# Patient Record
Sex: MALE | Employment: UNEMPLOYED | URBAN - METROPOLITAN AREA
[De-identification: names, ages, dates, MRNs, and addresses within clinical notes are randomized per-mention and may not be internally consistent; named-entity substitution may affect disease eponyms.]

---

## 2020-12-27 ENCOUNTER — TELEPHONE (OUTPATIENT)
Facility: CLINIC | Age: 60
End: 2020-12-27

## 2020-12-27 DIAGNOSIS — R60.9 EDEMA, UNSPECIFIED TYPE: Primary | ICD-10-CM

## 2020-12-27 NOTE — TELEPHONE ENCOUNTER
[facility] now requests orders and shares plan of care/discharge summaries for some patients through [employer].  Please REPLY TO THIS MESSAGE OR ROUTE BACK TO THE AUTHOR in order to give authorization for orders when needed.  This is considered a verbal order, you will still receive a faxed copy of orders for signature.  Thank you for your assistance in improving collaboration for our patients.    ORDER    Requesting 4 total visits with the patient for ongoing OT lymphedema therapy. To assist with compression garments for BLE and education for long term management.     Binu Uribe OTRL, CLT  [phone]  [email or web address]   
no

## 2020-12-30 ENCOUNTER — TELEPHONE (OUTPATIENT)
Facility: CLINIC | Age: 60
End: 2020-12-30

## 2020-12-30 NOTE — TELEPHONE ENCOUNTER
"[facility] now requests orders and shares plan of care/discharge summaries for some patients through [employer].  Please REPLY TO THIS MESSAGE OR ROUTE BACK TO THE AUTHOR in order to give authorization for orders when needed.  This is considered a verbal order, you will still receive a faxed copy of orders for signature.  Thank you for your assistance in improving collaboration for our patients.    ORDER    The patient is requesting Diabetes Education, if agree please place  a Diabetic Education Program Referral through \"Endocrinology\". The patient could do a virtual visit or an in person visit for diabetes/nutrition help for his diabetes.    Thank you    Binu Uribe OTRL, CLT  [phone]  [email or web address]      "

## 2020-12-31 ENCOUNTER — TRANSCRIBE ORDERS (OUTPATIENT)
Dept: OTHER | Age: 60
End: 2020-12-31

## 2020-12-31 DIAGNOSIS — E11.9 TYPE 2 DIABETES MELLITUS WITHOUT COMPLICATION, WITHOUT LONG-TERM CURRENT USE OF INSULIN (H): Primary | ICD-10-CM

## 2020-12-31 DIAGNOSIS — I73.9 PAD (PERIPHERAL ARTERY DISEASE) (H): Primary | ICD-10-CM

## 2021-01-06 ENCOUNTER — TELEPHONE (OUTPATIENT)
Dept: VASCULAR SURGERY | Facility: CLINIC | Age: 61
End: 2021-01-06

## 2021-01-06 DIAGNOSIS — I73.9 PVD (PERIPHERAL VASCULAR DISEASE) (H): Primary | ICD-10-CM
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
## 2021-01-11 ENCOUNTER — TELEPHONE (OUTPATIENT)
Dept: INTERVENTIONAL RADIOLOGY/VASCULAR | Facility: CLINIC | Age: 61
End: 2021-01-11

## 2021-01-11 NOTE — TELEPHONE ENCOUNTER
The pt is scheduled for imaging on 1/13/21 at the [facility] and a telephone visit with Supriya on 1/18/21. The pt does not have video capabilities and states to please call a few times when calling because he takes medications that make him sleep pretty hard.  1/11/21 RC

## 2021-01-11 NOTE — TELEPHONE ENCOUNTER
----- Message from Jo Gross RN sent at 2021  3:48 PM CST -----  Regardin/11 appt needs to be moved  HI Roll,   Did you get my message on this one?? I see that he is still on for Monday new consult with Supriya Black out at least to the following Monday.  He needs to complete the imaging scheduled later next week prior to his consult.    Thanks,  A. Tatiana Gross, RN, BSN  Interventional Radiology Nurse Coordinator   Phone:  [phone]

## 2021-01-14 ENCOUNTER — TELEPHONE (OUTPATIENT)
Facility: CLINIC | Age: 61
End: 2021-01-14

## 2021-01-14 NOTE — TELEPHONE ENCOUNTER
[facility] now requests orders and shares plan of care/discharge summaries for some patients through [facility].  Please REPLY TO THIS MESSAGE OR ROUTE BACK TO THE AUTHOR in order to give authorization for orders when needed.  This is considered a verbal order, you will still receive a faxed copy of orders for signature.  Thank you for your assistance in improving collaboration for our patients.    ORDER    Requesting to restart SN and WOCN through home care to evaluate and treat as indicated for BLE wounds and weeping. Pt has uncontrolled drainage and unable to complete dressings IND. Current SN for mental health will be placed on hold to allow home care to reopen SN and wound care.     Binu Uribe OTRL, CLT  [phone]  [email or web address]

## 2021-01-15 NOTE — TELEPHONE ENCOUNTER
DIAGNOSIS: new pt PVD consult   Dr Gaitan referring    DATE RECEIVED: 1.18.21   NOTES STATUS DETAILS   OFFICE NOTE from referring provider CE 12.31.20, 11.22.19  Dr. Jacky SORIANO   OFFICE NOTE from other specialist CE 6.17-7.11.19  Dr. Gagan Ly  [facility]   OPERATIVE REPORT CE 6.18.19  Aortic Dissection  Dr. Be[facility]   MEDICATION LIST CE    PERTINENT LABS CE    CTA (CT ANGIOGRAPHY) na    CT In process 6.17.19  CT Abd/Pelvis   MRI na    ULTRASOUND In process *sched* for 1.13.21  US ROMY Doppler no exercise  US Venous Comp Left

## 2021-01-18 ENCOUNTER — TELEPHONE (OUTPATIENT)
Dept: VASCULAR SURGERY | Facility: CLINIC | Age: 61
End: 2021-01-18

## 2021-01-18 ENCOUNTER — PRE VISIT (OUTPATIENT)
Dept: VASCULAR SURGERY | Facility: CLINIC | Age: 61
End: 2021-01-18

## 2021-01-18 NOTE — TELEPHONE ENCOUNTER
Left voicemail for patient to call [facility]  to reschedule missed appointments    Carire Mercedes on 1/18/2021 at 1:05 PM

## 2021-01-19 ENCOUNTER — TELEPHONE (OUTPATIENT)
Facility: CLINIC | Age: 61
End: 2021-01-19

## 2021-01-19 NOTE — TELEPHONE ENCOUNTER
Dr. Gaitan / Care team,    Writer requesting a response of approval for the following orders.    SN visits 2x week for week 1, then 3x week for 1 week, then 2 x week for 2 weeks with 3 PRN visits. SN for wound assessments, medication education, general health assessments.    BLE wound cares.    1. Cleanse with soap/water or NS. OK for patient to shower with no dressings prior to nurse arrival.  2. Apply Aquaphor or similar to intact skin.  3. Apply silvasorb gel to wound base.  4. Wrap affected area with unna boot wrap 50% overlap.  5. Apply ABD and kerlix/tape.  6. Apply compression per OT recs.  7. Apply 2-3x week and PRN for drainage control needs.    Thank you,    Michael Stromberg BSN, RN, CWOCN  [phone]  [email or web address]

## 2021-07-15 ENCOUNTER — MEDICAL CORRESPONDENCE (OUTPATIENT)
Dept: HEALTH INFORMATION MANAGEMENT | Facility: CLINIC | Age: 61
End: 2021-07-15

## 2021-07-15 ENCOUNTER — TRANSFERRED RECORDS (OUTPATIENT)
Dept: HEALTH INFORMATION MANAGEMENT | Facility: CLINIC | Age: 61
End: 2021-07-15

## 2021-07-16 ENCOUNTER — TRANSCRIBE ORDERS (OUTPATIENT)
Dept: OTHER | Age: 61
End: 2021-07-16

## 2021-07-16 DIAGNOSIS — E11.9 TYPE 2 DIABETES MELLITUS WITHOUT COMPLICATION, WITHOUT LONG-TERM CURRENT USE OF INSULIN (H): Primary | ICD-10-CM

## 2021-07-20 NOTE — TELEPHONE ENCOUNTER
RECORDS RECEIVED FROM: Type 2 diabetes mellitus without complication- needs toenail trim/Denise Rios MD @ [facility]   DATE RECEIVED: Aug 24, 2021     NOTES STATUS DETAILS   OFFICE NOTE from referring provider Internal    OFFICE NOTE from other specialist N/A    DISCHARGE SUMMARY from hospital N/A    DISCHARGE REPORT from the ER N/A    OPERATIVE REPORT N/A    MEDICATION LIST Internal    IMPLANT RECORD/STICKER N/A    LABS     CBC/DIFF N/A    CULTURES N/A    INJECTIONS DONE IN RADIOLOGY N/A    MRI N/A    CT SCAN N/A    XRAYS (IMAGES & REPORTS) N/A    TUMOR     PATHOLOGY  Slides & report N/A

## 2021-08-24 ENCOUNTER — PRE VISIT (OUTPATIENT)
Dept: ORTHOPEDICS | Facility: CLINIC | Age: 61
End: 2021-08-24

## 2021-09-22 NOTE — TELEPHONE ENCOUNTER
DIAGNOSIS: PVD    DATE RECEIVED: 9.21.21   NOTES STATUS DETAILS   OFFICE NOTE from referring provider Care Everywhere 12.31.20, 11.22.19  Dr. Jacky SORIANO   OFFICE NOTE from other specialist Care Everywhere 6.17-7.11.19  Dr. Gagan Ly  [facility]   OPERATIVE REPORT Care Everywhere 6.18.19  Aortic Dissection  Dr. Beyer [facility]   MEDICATION LIST N/A    PERTINENT LABS Care Everywhere    CTA (CT ANGIOGRAPHY) Care Everywhere    CT NA Nothing within two years   MRI N/A    ULTRASOUND N/A

## 2021-09-25 ENCOUNTER — HOSPITAL ENCOUNTER (OUTPATIENT)
Facility: CLINIC | Age: 61
Setting detail: OBSERVATION
Discharge: HOME | End: 2021-09-28
Attending: EMERGENCY MEDICINE | Admitting: NURSE PRACTITIONER
Payer: COMMERCIAL

## 2021-09-25 ENCOUNTER — APPOINTMENT (OUTPATIENT)
Dept: GENERAL RADIOLOGY | Facility: CLINIC | Age: 61
End: 2021-09-25
Attending: EMERGENCY MEDICINE
Payer: COMMERCIAL

## 2021-09-25 ENCOUNTER — APPOINTMENT (OUTPATIENT)
Dept: ULTRASOUND IMAGING | Facility: CLINIC | Age: 61
End: 2021-09-25
Attending: EMERGENCY MEDICINE
Payer: COMMERCIAL

## 2021-09-25 DIAGNOSIS — I87.2 PERIPHERAL VENOUS INSUFFICIENCY: ICD-10-CM

## 2021-09-25 DIAGNOSIS — M79.89 SWELLING OF LIMB: ICD-10-CM

## 2021-09-25 DIAGNOSIS — L97.201 VENOUS STASIS ULCER OF CALF LIMITED TO BREAKDOWN OF SKIN WITHOUT VARICOSE VEINS, UNSPECIFIED LATERALITY (H): ICD-10-CM

## 2021-09-25 DIAGNOSIS — I87.8 PHLEBOLITHIASIS: ICD-10-CM

## 2021-09-25 DIAGNOSIS — I87.2 VENOUS STASIS ULCER OF CALF LIMITED TO BREAKDOWN OF SKIN WITHOUT VARICOSE VEINS, UNSPECIFIED LATERALITY (H): ICD-10-CM

## 2021-09-25 DIAGNOSIS — R22.43 LOCALIZED SWELLING OF BOTH LOWER LEGS: ICD-10-CM

## 2021-09-25 LAB
ALBUMIN SERPL-MCNC: 2.8 G/DL (ref 3.4–5)
ALP SERPL-CCNC: 72 U/L (ref 40–150)
ALT SERPL W P-5'-P-CCNC: 37 U/L (ref 0–70)
ANION GAP SERPL CALCULATED.3IONS-SCNC: 7 MMOL/L (ref 3–14)
AST SERPL W P-5'-P-CCNC: 19 U/L (ref 0–45)
BASOPHILS # BLD AUTO: 0.1 10E3/UL (ref 0–0.2)
BASOPHILS NFR BLD AUTO: 1 %
BILIRUB SERPL-MCNC: 0.2 MG/DL (ref 0.2–1.3)
BUN SERPL-MCNC: 10 MG/DL (ref 7–30)
CALCIUM SERPL-MCNC: 8.8 MG/DL (ref 8.5–10.1)
CHLORIDE BLD-SCNC: 101 MMOL/L (ref 94–109)
CO2 SERPL-SCNC: 30 MMOL/L (ref 20–32)
CREAT SERPL-MCNC: 1.44 MG/DL (ref 0.66–1.25)
CRP SERPL-MCNC: 22 MG/L (ref 0–8)
EOSINOPHIL # BLD AUTO: 0.2 10E3/UL (ref 0–0.7)
EOSINOPHIL NFR BLD AUTO: 3 %
ERYTHROCYTE [DISTWIDTH] IN BLOOD BY AUTOMATED COUNT: 15.9 % (ref 10–15)
GFR SERPL CREATININE-BSD FRML MDRD: 52 ML/MIN/1.73M2
GLUCOSE BLD-MCNC: 119 MG/DL (ref 70–99)
GLUCOSE BLDC GLUCOMTR-MCNC: 171 MG/DL (ref 70–99)
HBA1C MFR BLD: 7.4 % (ref 0–5.6)
HCT VFR BLD AUTO: 39.8 % (ref 40–53)
HGB BLD-MCNC: 12.9 G/DL (ref 13.3–17.7)
HOLD SPECIMEN: NORMAL
HOLD SPECIMEN: NORMAL
IMM GRANULOCYTES # BLD: 0.1 10E3/UL
IMM GRANULOCYTES NFR BLD: 1 %
LACTATE SERPL-SCNC: 2 MMOL/L (ref 0.7–2)
LYMPHOCYTES # BLD AUTO: 2 10E3/UL (ref 0.8–5.3)
LYMPHOCYTES NFR BLD AUTO: 22 %
MCH RBC QN AUTO: 29.2 PG (ref 26.5–33)
MCHC RBC AUTO-ENTMCNC: 32.4 G/DL (ref 31.5–36.5)
MCV RBC AUTO: 90 FL (ref 78–100)
MONOCYTES # BLD AUTO: 0.7 10E3/UL (ref 0–1.3)
MONOCYTES NFR BLD AUTO: 8 %
NEUTROPHILS # BLD AUTO: 5.8 10E3/UL (ref 1.6–8.3)
NEUTROPHILS NFR BLD AUTO: 65 %
NRBC # BLD AUTO: 0 10E3/UL
NRBC BLD AUTO-RTO: 0 /100
NT-PROBNP SERPL-MCNC: 210 PG/ML (ref 0–900)
PLATELET # BLD AUTO: 272 10E3/UL (ref 150–450)
POTASSIUM BLD-SCNC: 3.4 MMOL/L (ref 3.4–5.3)
PROT SERPL-MCNC: 7.4 G/DL (ref 6.8–8.8)
RBC # BLD AUTO: 4.42 10E6/UL (ref 4.4–5.9)
SARS-COV-2 RNA RESP QL NAA+PROBE: NEGATIVE
SODIUM SERPL-SCNC: 138 MMOL/L (ref 133–144)
TROPONIN I SERPL-MCNC: <0.015 UG/L (ref 0–0.04)
WBC # BLD AUTO: 8.9 10E3/UL (ref 4–11)

## 2021-09-25 PROCEDURE — 71046 X-RAY EXAM CHEST 2 VIEWS: CPT

## 2021-09-25 PROCEDURE — 99285 EMERGENCY DEPT VISIT HI MDM: CPT | Mod: 25 | Performed by: EMERGENCY MEDICINE

## 2021-09-25 PROCEDURE — 36415 COLL VENOUS BLD VENIPUNCTURE: CPT | Performed by: NURSE PRACTITIONER

## 2021-09-25 PROCEDURE — G0378 HOSPITAL OBSERVATION PER HR: HCPCS

## 2021-09-25 PROCEDURE — 84484 ASSAY OF TROPONIN QUANT: CPT | Performed by: EMERGENCY MEDICINE

## 2021-09-25 PROCEDURE — 83880 ASSAY OF NATRIURETIC PEPTIDE: CPT | Performed by: EMERGENCY MEDICINE

## 2021-09-25 PROCEDURE — 36415 COLL VENOUS BLD VENIPUNCTURE: CPT | Performed by: EMERGENCY MEDICINE

## 2021-09-25 PROCEDURE — 93010 ELECTROCARDIOGRAM REPORT: CPT | Performed by: EMERGENCY MEDICINE

## 2021-09-25 PROCEDURE — 73590 X-RAY EXAM OF LOWER LEG: CPT | Mod: 26 | Performed by: RADIOLOGY

## 2021-09-25 PROCEDURE — 93970 EXTREMITY STUDY: CPT | Mod: 26 | Performed by: RADIOLOGY

## 2021-09-25 PROCEDURE — 83036 HEMOGLOBIN GLYCOSYLATED A1C: CPT | Performed by: NURSE PRACTITIONER

## 2021-09-25 PROCEDURE — 80053 COMPREHEN METABOLIC PANEL: CPT | Performed by: EMERGENCY MEDICINE

## 2021-09-25 PROCEDURE — 83605 ASSAY OF LACTIC ACID: CPT | Performed by: NURSE PRACTITIONER

## 2021-09-25 PROCEDURE — 96374 THER/PROPH/DIAG INJ IV PUSH: CPT | Performed by: EMERGENCY MEDICINE

## 2021-09-25 PROCEDURE — 250N000013 HC RX MED GY IP 250 OP 250 PS 637: Performed by: NURSE PRACTITIONER

## 2021-09-25 PROCEDURE — 86140 C-REACTIVE PROTEIN: CPT | Performed by: EMERGENCY MEDICINE

## 2021-09-25 PROCEDURE — 93970 EXTREMITY STUDY: CPT

## 2021-09-25 PROCEDURE — 250N000013 HC RX MED GY IP 250 OP 250 PS 637: Performed by: EMERGENCY MEDICINE

## 2021-09-25 PROCEDURE — U0003 INFECTIOUS AGENT DETECTION BY NUCLEIC ACID (DNA OR RNA); SEVERE ACUTE RESPIRATORY SYNDROME CORONAVIRUS 2 (SARS-COV-2) (CORONAVIRUS DISEASE [COVID-19]), AMPLIFIED PROBE TECHNIQUE, MAKING USE OF HIGH THROUGHPUT TECHNOLOGIES AS DESCRIBED BY CMS-2020-01-R: HCPCS | Performed by: EMERGENCY MEDICINE

## 2021-09-25 PROCEDURE — 71046 X-RAY EXAM CHEST 2 VIEWS: CPT | Mod: 26 | Performed by: RADIOLOGY

## 2021-09-25 PROCEDURE — C9803 HOPD COVID-19 SPEC COLLECT: HCPCS | Performed by: EMERGENCY MEDICINE

## 2021-09-25 PROCEDURE — 85025 COMPLETE CBC W/AUTO DIFF WBC: CPT | Performed by: EMERGENCY MEDICINE

## 2021-09-25 PROCEDURE — 73590 X-RAY EXAM OF LOWER LEG: CPT | Mod: 50

## 2021-09-25 PROCEDURE — 93005 ELECTROCARDIOGRAM TRACING: CPT | Performed by: EMERGENCY MEDICINE

## 2021-09-25 PROCEDURE — 250N000011 HC RX IP 250 OP 636: Performed by: EMERGENCY MEDICINE

## 2021-09-25 RX ORDER — QUETIAPINE FUMARATE 300 MG/1
600 TABLET, FILM COATED ORAL AT BEDTIME
Status: DISCONTINUED | OUTPATIENT
Start: 2021-09-25 | End: 2021-09-28 | Stop reason: HOSPADM

## 2021-09-25 RX ORDER — MINERAL OIL/HYDROPHIL PETROLAT
2 OINTMENT (GRAM) TOPICAL 3 TIMES DAILY PRN
Status: DISCONTINUED | OUTPATIENT
Start: 2021-09-25 | End: 2021-09-28 | Stop reason: HOSPADM

## 2021-09-25 RX ORDER — MELOXICAM 7.5 MG/1
7.5 TABLET ORAL EVERY 12 HOURS PRN
COMMUNITY
End: 2022-12-22

## 2021-09-25 RX ORDER — POTASSIUM CHLORIDE 1500 MG/1
40 TABLET, EXTENDED RELEASE ORAL DAILY
Status: DISCONTINUED | OUTPATIENT
Start: 2021-09-26 | End: 2021-09-28 | Stop reason: HOSPADM

## 2021-09-25 RX ORDER — SIMETHICONE 125 MG
125 TABLET,CHEWABLE ORAL 4 TIMES DAILY PRN
COMMUNITY
End: 2022-09-16

## 2021-09-25 RX ORDER — OLANZAPINE 5 MG/1
5 TABLET ORAL AT BEDTIME
Status: DISCONTINUED | OUTPATIENT
Start: 2021-09-25 | End: 2021-09-28 | Stop reason: HOSPADM

## 2021-09-25 RX ORDER — OLANZAPINE 5 MG/1
5 TABLET ORAL AT BEDTIME
COMMUNITY
End: 2022-12-22

## 2021-09-25 RX ORDER — QUETIAPINE FUMARATE 400 MG/1
800 TABLET, FILM COATED ORAL AT BEDTIME
Status: DISCONTINUED | OUTPATIENT
Start: 2021-09-25 | End: 2021-09-25

## 2021-09-25 RX ORDER — DIPHENHYDRAMINE HCL 50 MG
50 CAPSULE ORAL EVERY 6 HOURS PRN
Status: DISCONTINUED | OUTPATIENT
Start: 2021-09-25 | End: 2021-09-28 | Stop reason: HOSPADM

## 2021-09-25 RX ORDER — POLYETHYLENE GLYCOL 3350 17 G/17G
17 POWDER, FOR SOLUTION ORAL 2 TIMES DAILY
Status: DISCONTINUED | OUTPATIENT
Start: 2021-09-25 | End: 2021-09-28 | Stop reason: HOSPADM

## 2021-09-25 RX ORDER — ARIPIPRAZOLE 10 MG/1
10 TABLET ORAL DAILY
COMMUNITY
End: 2022-12-22

## 2021-09-25 RX ORDER — ACETAMINOPHEN 500 MG
500-1000 TABLET ORAL EVERY 8 HOURS PRN
Status: DISCONTINUED | OUTPATIENT
Start: 2021-09-25 | End: 2021-09-25

## 2021-09-25 RX ORDER — GABAPENTIN 600 MG/1
600 TABLET ORAL 3 TIMES DAILY
COMMUNITY
End: 2022-12-22

## 2021-09-25 RX ORDER — HYDROCODONE BITARTRATE AND ACETAMINOPHEN 5; 325 MG/1; MG/1
1-2 TABLET ORAL EVERY 6 HOURS PRN
Status: ON HOLD | COMMUNITY
End: 2021-09-28

## 2021-09-25 RX ORDER — HYDROCODONE BITARTRATE AND ACETAMINOPHEN 5; 325 MG/1; MG/1
1 TABLET ORAL ONCE
Status: COMPLETED | OUTPATIENT
Start: 2021-09-25 | End: 2021-09-25

## 2021-09-25 RX ORDER — ONDANSETRON 4 MG/1
4 TABLET, ORALLY DISINTEGRATING ORAL EVERY 6 HOURS PRN
Status: DISCONTINUED | OUTPATIENT
Start: 2021-09-25 | End: 2021-09-28 | Stop reason: HOSPADM

## 2021-09-25 RX ORDER — DEXTROSE MONOHYDRATE 25 G/50ML
25-50 INJECTION, SOLUTION INTRAVENOUS
Status: DISCONTINUED | OUTPATIENT
Start: 2021-09-25 | End: 2021-09-28 | Stop reason: HOSPADM

## 2021-09-25 RX ORDER — SIMETHICONE 125 MG
125 TABLET,CHEWABLE ORAL 4 TIMES DAILY PRN
Status: DISCONTINUED | OUTPATIENT
Start: 2021-09-25 | End: 2021-09-28 | Stop reason: HOSPADM

## 2021-09-25 RX ORDER — ONDANSETRON 2 MG/ML
4 INJECTION INTRAMUSCULAR; INTRAVENOUS EVERY 6 HOURS PRN
Status: DISCONTINUED | OUTPATIENT
Start: 2021-09-25 | End: 2021-09-28 | Stop reason: HOSPADM

## 2021-09-25 RX ORDER — FUROSEMIDE 20 MG
40 TABLET ORAL DAILY
Status: DISCONTINUED | OUTPATIENT
Start: 2021-09-26 | End: 2021-09-28 | Stop reason: HOSPADM

## 2021-09-25 RX ORDER — SENNOSIDES A AND B 8.6 MG/1
1 TABLET, FILM COATED ORAL 2 TIMES DAILY PRN
COMMUNITY
End: 2022-09-16

## 2021-09-25 RX ORDER — HYDROCHLOROTHIAZIDE 50 MG/1
50 TABLET ORAL DAILY
Status: ON HOLD | COMMUNITY
End: 2023-01-15

## 2021-09-25 RX ORDER — GABAPENTIN 600 MG/1
600 TABLET ORAL 3 TIMES DAILY
Status: DISCONTINUED | OUTPATIENT
Start: 2021-09-25 | End: 2021-09-28 | Stop reason: HOSPADM

## 2021-09-25 RX ORDER — FUROSEMIDE 10 MG/ML
20 INJECTION INTRAMUSCULAR; INTRAVENOUS ONCE
Status: COMPLETED | OUTPATIENT
Start: 2021-09-25 | End: 2021-09-25

## 2021-09-25 RX ORDER — SENNOSIDES 8.6 MG
1 TABLET ORAL 2 TIMES DAILY PRN
Status: DISCONTINUED | OUTPATIENT
Start: 2021-09-25 | End: 2021-09-28 | Stop reason: HOSPADM

## 2021-09-25 RX ORDER — ACETAMINOPHEN 500 MG
500-1000 TABLET ORAL EVERY 8 HOURS PRN
COMMUNITY

## 2021-09-25 RX ORDER — POTASSIUM CHLORIDE 1500 MG/1
40 TABLET, EXTENDED RELEASE ORAL
COMMUNITY
End: 2022-12-22

## 2021-09-25 RX ORDER — POLYETHYLENE GLYCOL 3350 17 G/17G
1 POWDER, FOR SOLUTION ORAL 2 TIMES DAILY
COMMUNITY
End: 2022-09-16

## 2021-09-25 RX ORDER — NICOTINE POLACRILEX 4 MG
15-30 LOZENGE BUCCAL
Status: DISCONTINUED | OUTPATIENT
Start: 2021-09-25 | End: 2021-09-28 | Stop reason: HOSPADM

## 2021-09-25 RX ORDER — MINERAL OIL/HYDROPHIL PETROLAT
2 OINTMENT (GRAM) TOPICAL 3 TIMES DAILY PRN
COMMUNITY
End: 2022-12-22

## 2021-09-25 RX ORDER — ALBUTEROL SULFATE 90 UG/1
2 AEROSOL, METERED RESPIRATORY (INHALATION) EVERY 4 HOURS PRN
Status: ON HOLD | COMMUNITY
End: 2023-01-15

## 2021-09-25 RX ORDER — FUROSEMIDE 40 MG
40 TABLET ORAL DAILY
COMMUNITY
End: 2022-12-22

## 2021-09-25 RX ORDER — HYDROCODONE BITARTRATE AND ACETAMINOPHEN 5; 325 MG/1; MG/1
1-2 TABLET ORAL EVERY 6 HOURS PRN
Status: DISCONTINUED | OUTPATIENT
Start: 2021-09-25 | End: 2021-09-28 | Stop reason: HOSPADM

## 2021-09-25 RX ORDER — DIPHENHYDRAMINE HCL 50 MG
50 CAPSULE ORAL EVERY 6 HOURS PRN
COMMUNITY
End: 2022-12-22

## 2021-09-25 RX ORDER — ALBUTEROL SULFATE 90 UG/1
2 AEROSOL, METERED RESPIRATORY (INHALATION) EVERY 4 HOURS PRN
Status: DISCONTINUED | OUTPATIENT
Start: 2021-09-25 | End: 2021-09-28 | Stop reason: HOSPADM

## 2021-09-25 RX ORDER — HYDROCHLOROTHIAZIDE 25 MG/1
50 TABLET ORAL DAILY
Status: DISCONTINUED | OUTPATIENT
Start: 2021-09-26 | End: 2021-09-28 | Stop reason: HOSPADM

## 2021-09-25 RX ORDER — MELOXICAM 7.5 MG/1
7.5 TABLET ORAL EVERY 12 HOURS PRN
Status: DISCONTINUED | OUTPATIENT
Start: 2021-09-25 | End: 2021-09-28 | Stop reason: HOSPADM

## 2021-09-25 RX ORDER — ARIPIPRAZOLE 10 MG/1
10 TABLET ORAL DAILY
Status: DISCONTINUED | OUTPATIENT
Start: 2021-09-26 | End: 2021-09-28 | Stop reason: HOSPADM

## 2021-09-25 RX ORDER — LEVETIRACETAM 500 MG/1
500 TABLET ORAL 2 TIMES DAILY
COMMUNITY
End: 2022-12-22

## 2021-09-25 RX ORDER — QUETIAPINE FUMARATE 400 MG/1
400 TABLET, FILM COATED ORAL AT BEDTIME
COMMUNITY
End: 2022-12-22

## 2021-09-25 RX ADMIN — HYDROCODONE BITARTRATE AND ACETAMINOPHEN 1 TABLET: 5; 325 TABLET ORAL at 21:49

## 2021-09-25 RX ADMIN — QUETIAPINE FUMARATE 600 MG: 300 TABLET ORAL at 21:48

## 2021-09-25 RX ADMIN — HYDROCODONE BITARTRATE AND ACETAMINOPHEN 1 TABLET: 5; 325 TABLET ORAL at 15:38

## 2021-09-25 RX ADMIN — GABAPENTIN 600 MG: 600 TABLET, FILM COATED ORAL at 21:48

## 2021-09-25 RX ADMIN — FUROSEMIDE 20 MG: 10 INJECTION, SOLUTION INTRAVENOUS at 15:38

## 2021-09-25 RX ADMIN — POLYETHYLENE GLYCOL 3350 17 G: 17 POWDER, FOR SOLUTION ORAL at 21:50

## 2021-09-25 RX ADMIN — METFORMIN HYDROCHLORIDE 1000 MG: 500 TABLET ORAL at 22:35

## 2021-09-25 RX ADMIN — OLANZAPINE 5 MG: 5 TABLET, FILM COATED ORAL at 21:50

## 2021-09-25 ASSESSMENT — ENCOUNTER SYMPTOMS: SHORTNESS OF BREATH: 1

## 2021-09-25 NOTE — ED PROVIDER NOTES
ED Provider Note  [facility]      History     Chief Complaint   Patient presents with     Leg Pain     The history is provided by the patient and medical records.     Antony Aguila Jr. is a 61 year old male with a past medical history significant for hypertension, DM2, MI, and venous insufficiency who presents here to the Emergency Department due to bilateral leg pain. Per chart review, patient has had multiple recent ED visits concerning venous stasis dermatitis of both lower extremities with the most recent being on 9/22/2021.  On this most recent ED visit, the patient presented with increasing leg pain with the right being greater than left.  Cellulitis was not apparent during this ED visit as the patient had no asymmetric erythema or warmth.  Patient had still not been taking his Lasix.  The patient's renal function was checked which was baseline.  The patient had stated that he was supposed to have wound care visits, but he states that this had not been occurring.  The patient was informed that his illness is chronic and that the best treatment would most likely be observation in the hospital and PT/OT.  The patient did not wish to come into the hospital at that time and requested pain medications.  The patient was comfortable with outpatient plan of care and was deemed stable for discharge.    Here in the ED today, patient presents with request of his wounds to be rewrapped.  He states the wound care nurse that is supposed to visit his house to clean and rewrap his wounds has still not shown up to his house.  Patient states that he last had his wounds rewrapped at his most recent ED visit on 9/22.  He states that he experienced pain as these wraps began to fall off.  He states he also noticed fluid draining from the wound on his right leg that he reports had a foul smell.  Patient does note that his wounds are healing.  He states that his left leg is tender and sore.  Patient  states that he has been experiencing bilateral leg swelling since his MI in 2019.  Does note that the swelling has gone down in the last couple days.  Patient does report some posterior medial left lower leg redness.  Patient denies any recent falls or injuries to his legs.  Patient states that he is on a water pill, but is unsure what exactly he is taking.  He states that he is doing his best to take these as prescribed.  Patient reports that the leg pain he is experiencing has been present for about a year.  Patient also reports having shortness of breath for the past couple months. He denies any chest pain.    Past Medical History  No past medical history on file.  No past surgical history on file.  No current outpatient medications on file.    Allergies   Allergen Reactions     Lidocaine      Lisinopril Swelling     Pollen Extract      Family History  No family history on file.  Social History   Social History     Tobacco Use     Smoking status: Not on file   Substance Use Topics     Alcohol use: Not on file     Drug use: Not on file      Past medical history, past surgical history, medications, allergies, family history, and social history were reviewed with the patient. No additional pertinent items.       Review of Systems   Respiratory: Positive for shortness of breath.    Musculoskeletal:        Pos for bilateral leg pain  Pos for discharge from wound on right leg   All other systems reviewed and are negative.      Physical Exam   BP: (!) 146/80  Pulse: 71  Temp: 97.8  F (36.6  C)  Resp: 16  SpO2: 94 %  Physical Exam  General: patient is alert and oriented and in no acute distress   Head: atraumatic and normocephalic   EENT: moist mucus membranes, sclera anicteric   Neck: supple   Cardiovascular: regular rate and rhythm, extremities warm and well perfused, 1+ bilateral lower extremity edema  Pulmonary: lungs clear to auscultation bilaterally   Abdomen: soft, non-tender   Musculoskeletal: normal range of  motion of the lower extremities, no point bony tenderness to palpation, no crepitus  Neurological: alert and oriented, moving all extremities symmetrically, gait normal   Skin: warm, dry, no associated warmth or erythema, wounds on the right and left lower extremity appear to be well-healing without purulent discharge or signs of infection.  ED Course     1:45 PM  The patient was seen and examined by Ana Suarez MD in Room ED19.     Procedures             EKG Interpretation:      Interpreted by Ana Suarez MD  Time reviewed:1404  Symptoms at time of EKG: dyspnea   Rhythm: Normal sinus   Rate: Normal  Axis: Normal  Ectopy: None and Premature atrial contraction  Conduction: Normal  ST Segments/ T Waves: No acute ischemic changes  Q Waves: None  Comparison to prior: No old EKG available    Clinical Impression: non-specific EKG             No results found for any visits on 09/25/21.  Medications - No data to display     Assessments & Plan (with Medical Decision Making)   Mr. Mingo Valencia is a 61 year old male with a past medical history significant for hypertension, DM2, MI, and venous insufficiency who presents here to the Emergency Department due to bilateral leg pain.  He is mildly hypertensive otherwise hemodynamically stable, afebrile and in no respiratory distress.  He is saturating 94% on room air.  His wounds do not appear to be secondarily infected and no evidence of cellulitis.  Patient did go for duplex ultrasound which shows no evidence of DVT.  Plain films show no evidence of gas within the tissue.  His labs demonstrate no leukocytosis.  CRP is mildly elevated at 22.  His creatinine is at baseline at 1.44, no significant electrolyte abnormalities.  Patient did have an ECG which shows no evidence of acute ischemic changes and troponin is negative.  His BNP is within normal limits.  Chest x-ray shows low lung volumes without focal infiltrate.  He was given a dose of IV Lasix and [place] in the ED.  This  is his fifth visit for similar symptoms and will plan to admit to the observation unit to coordinate wound consultation, home care nursing and adjust his diuretics as needed for continued diuresis at home.    I have reviewed the nursing notes. I have reviewed the findings, diagnosis, plan and need for follow up with the patient.    New Prescriptions    No medications on file       Final diagnoses:   Localized swelling of both lower legs   Venous stasis ulcer of calf limited to breakdown of skin without varicose veins, unspecified laterality (H)   I, Tawanna Pineda, am serving as a trained medical scribe to document services personally performed by Ana Suarez MD, based on the provider's statements to me.  I, Ana Suarez MD, was physically present and have reviewed and verified the accuracy of this note documented by Tawanna Pineda.    --  [facility] EMERGENCY DEPARTMENT  9/25/2021     Ana Suarez MD  09/25/21 1522       Ana Suarez MD  09/25/21 [id number]

## 2021-09-25 NOTE — ED NOTES
[facility]   ED Nurse to Floor Handoff     Antony Aguila Jr. is a 61 year old male who speaks English and lives alone,  in a home  They arrived in the ED by ambulance from home    ED Chief Complaint: Leg Pain    ED Dx;   Final diagnoses:   Localized swelling of both lower legs   Venous stasis ulcer of calf limited to breakdown of skin without varicose veins, unspecified laterality (H)         Needed?: No    Allergies:   Allergies   Allergen Reactions     Lidocaine      Lisinopril Swelling     Pollen Extract    .  Past Medical Hx: No past medical history on file.   Baseline Mental status: WDL  Current Mental Status changes: at basesline    Infection present or suspected this encounter: no  Sepsis suspected: No  Isolation type: No active isolations  Patient tested for COVID 19 prior to admission: YES     Activity level - Baseline/Home:  Independent  Activity Level - Current:   Stand with Assist and Walker    Bariatric equipment needed?: No    In the ED these meds were given:   Medications   furosemide (LASIX) injection 20 mg (20 mg Intravenous Given 9/25/21 1538)   HYDROcodone-acetaminophen (NORCO) 5-325 MG per tablet 1 tablet (1 tablet Oral Given 9/25/21 1538)       Drips running?  No    Home pump  No    Current LDAs  Peripheral IV 09/25/21 Left Hand (Active)   Site Assessment WDL 09/25/21 1428   Number of days: 0       Labs results:   Labs Ordered and Resulted from Time of ED Arrival Up to the Time of Departure from the ED   COMPREHENSIVE METABOLIC PANEL - Abnormal; Notable for the following components:       Result Value    Creatinine 1.44 (*)     Glucose 119 (*)     Albumin 2.8 (*)     GFR Estimate 52 (*)     All other components within normal limits   CRP INFLAMMATION - Abnormal; Notable for the following components:    CRP Inflammation 22.0 (*)     All other components within normal limits   CBC WITH PLATELETS AND DIFFERENTIAL - Abnormal; Notable for the  following components:    Hemoglobin 12.9 (*)     Hematocrit 39.8 (*)     RDW 15.9 (*)     Absolute Immature Granulocytes 0.1 (*)     All other components within normal limits   NT PROBNP INPATIENT - Normal   TROPONIN I - Normal   EXTRA BLUE TOP TUBE   EXTRA RED TOP TUBE   COVID-19 VIRUS (CORONAVIRUS) BY PCR   CBC WITH PLATELETS & DIFFERENTIAL    Narrative:     The following orders were created for panel order CBC with platelets differential.  Procedure                               Abnormality         Status                     ---------                               -----------         ------                     CBC with platelets and d...[[id number]]  Abnormal            Final result                 Please view results for these tests on the individual orders.   EXTRA TUBE    Narrative:     The following orders were created for panel order [place] Draw.  Procedure                               Abnormality         Status                     ---------                               -----------         ------                     Extra Blue Top Tube[[id number]]                              Final result               Extra Red Top Tube[[id number]]                               Final result                 Please view results for these tests on the individual orders.       Imaging Studies:   Recent Results (from the past 24 hour(s))   US Lower Extremity Venous Duplex Bilateral    Impression    RESIDENT PRELIMINARY INTERPRETATION  IMPRESSION:  1.  No evidence of deep venous thrombosis in either lower extremity.   XR Tibia & Fibula Bilateral 2 Views    Narrative    EXAM: XR TIBIA and FIBULA BILATERAL2 VW  LOCATION: [facility]  DATE/TIME: 9/25/2021 2:29 PM    INDICATION: bilateral leg pain  COMPARISON: None.      Impression    IMPRESSION: No evidence of tibial fracture. Left knee compartment joint space narrowing. Right knee lateral compartment joint space narrowing.   XR Chest 2  Views    Narrative    EXAM: XR CHEST 2 VW  LOCATION: [facility]  DATE/TIME: 9/25/2021 2:29 PM    INDICATION: Dyspnea.  COMPARISON: None.      Impression    IMPRESSION: Negative chest.       Recent vital signs:   BP (!) 143/98   Pulse 84   Temp 97.8  F (36.6  C) (Oral)   Resp 16   SpO2 97%     Judith Coma Scale Score: 15 (09/25/21 1549)       Cardiac Rhythm: Normal Sinus  Pt needs tele? No  Skin/wound Issues: leg wounds    Code Status: Full Code    Pain control: fair    Nausea control: pt had none    Abnormal labs/tests/findings requiring intervention: see results    Family present during ED course? No   Family Comments/Social Situation comments: NA    Tasks needing completion: None    Cecilia Arrieta RN  [facility] -- [phone] [place] [facility]  [phone] [facility]

## 2021-09-25 NOTE — ED TRIAGE NOTES
Edema/discharge to bilateral legs. Patient complains of increasing leg pain that started today. 100mcg fentanyl given by EMS in route.

## 2021-09-25 NOTE — LETTER
Fax   Date:         9/27/2021    To:    Name: [facility]  Fax: [phone]  Message:  Home care referral for FABRICIO Aguila. Patient will likely need RN (wound care oversight, teaching) and home PT. Please call w/update as to whether you could accept him or not. Thank you.    From:   Arlene Mendoza RN, BSN  Nurse Care Coordinator   Office: [phone]: [phone]

## 2021-09-25 NOTE — H&P
[facility]    History and Physical - Emergency Department Observation Unit       Date of Admission:  9/25/2021    Assessment & Plan      Antony Aguila Jr. is a 61 year old male admitted on 9/25/2021. He has a history of hypertension, DM2, MI, and venous insufficiency who presents here to the Emergency Department due to bilateral leg pain.     # PVD  # Chronic bilateral lower leg pain  # Lower leg wounds  # h/o chronic dissecting thoracic aorta s/p repair  Leg pain 2/2 PVD and chronic wounds.No concern for infection. Per chart review, patient has had multiple recent ED visits concerning venous stasis dermatitis of both lower extremities with the most recent being on 9/22/2021.   Patient has not been taking his Lasix. The patient reports home care is suppose to come to his house and complete wound care. Home care has not been there for 2 weeks. He notes drainage from the wounds as the dressings on his legs fall off.  Patient states that he has been experiencing bilateral leg swelling since his MI in 2019.  Does note that the swelling has gone down in the last couple days. Patient has been referred to vascular surgery. In the ED:   Vitals:BP:143/98  Pulse: 84 Temp: 97.8 Resp: 16 SP02:97 % Labs: Na 138, K 3.4, GFR 52, Cr 1.44, BUN 10, LFTS normal, CRP 22.0, , Troponin negative, , WBC 8.9, Hgb 12.9, Plt 272.  Medications: Lasix 20 mg IV x 1, Norco 1 tablet once.  Imaging: Chest xray negative, Bilateral xray of tibia/Fibula negative. US LE: No evidence of deep venous thrombosis in either lower extremity. EKG PAC, T wave abnormality.  Consults: WOCN Plan: Admit to ED observation for coordinate wound consultation, home care nursing and adjust his diuretics as needed for continued diuresis at home.   cont Lasix 40 mg daily with PO potassium 40mEq daily   - continue HCTZ 50 daily  - Gabapentin 600mg TID  - Norco, Mobic prn  - Elevate legs  - Aquaphor TID, Benadryl  "prn  - Zofran prn  - Wound care consult  - PT consult  - SW/CC to set up home care     # Insomnia  - PTA seroquel 600 QHS    # T2DM   - add on Hgb A1C   - cont metformin 1000 twice daily  - BG checks TID and HS  - Hypoglycemic protocol     # HTN  - cont Lasix 40 mg daily with PO potassium 40mEq daily   - continue HCTZ 50 daily    # Mood disorder  - PTA Zyprexa, Abilify.     # Constipation  - prn Miralax and Senna       Diet: 2 Gram Sodium Diet    DVT Prophylaxis: Ambulate every shift  Noble Catheter: Not present  Central Lines: None  Code Status:   Full    Disposition Plan   Expected discharge: Tomorrow recommended to TCU vs home once safe disposition plan/ TCU bed available and wound care consult.     The patient's care was discussed with the Bedside Nurse, Patient and ED physician, Dr. Lemos.    BRANDY Prasad CNP        ______________________________________________________________________    Chief Complaint   Bilateral leg pain    History is obtained from the patient and chart reviewed.     History of Present Illness   Per ED note, \" The history is provided by the patient and medical records.      Antony Aguila Jr. is a 61 year old male with a past medical history significant for hypertension, DM2, MI, and venous insufficiency who presents here to the Emergency Department due to bilateral leg pain. Per chart review, patient has had multiple recent ED visits concerning venous stasis dermatitis of both lower extremities with the most recent being on 9/22/2021.  On this most recent ED visit, the patient presented with increasing leg pain with the right being greater than left.  Cellulitis was not apparent during this ED visit as the patient had no asymmetric erythema or warmth.  Patient had still not been taking his Lasix.  The patient's renal function was checked which was baseline.  The patient had stated that he was supposed to have wound care visits, but he states that this had not been occurring.  The " "patient was informed that his illness is chronic and that the best treatment would most likely be observation in the hospital and PT/OT.  The patient did not wish to come into the hospital at that time and requested pain medications.  The patient was comfortable with outpatient plan of care and was deemed stable for discharge.     Here in the ED today, patient presents with request of his wounds to be rewrapped.  He states the wound care nurse that is supposed to visit his house to clean and rewrap his wounds has still not shown up to his house.  Patient states that he last had his wounds rewrapped at his most recent ED visit on 9/22.  He states that he experienced pain as these wraps began to fall off.  He states he also noticed fluid draining from the wound on his right leg that he reports had a foul smell.  Patient does note that his wounds are healing.  He states that his left leg is tender and sore.  Patient states that he has been experiencing bilateral leg swelling since his MI in 2019.  Does note that the swelling has gone down in the last couple days.  Patient does report some posterior medial left lower leg redness.  Patient denies any recent falls or injuries to his legs.  Patient states that he is on a water pill, but is unsure what exactly he is taking.  He states that he is doing his best to take these as prescribed.  Patient reports that the leg pain he is experiencing has been present for about a year.  Patient also reports having shortness of breath for the past couple months. He denies any chest pain.\"        Review of Systems    The 10 point Review of Systems is negative other than noted in the HPI or here. Discharge from right leg. Bilateral leg pain,    Past Medical History    I have reviewed this patient's medical history and updated it with pertinent information if needed.   No past medical history on file.    Past Surgical History   I have reviewed this patient's surgical history and updated " it with pertinent information if needed.  No past surgical history on file.    Social History   I have reviewed this patient's social history and updated it with pertinent information if needed.  Social History     Tobacco Use     Smoking status: Not on file   Substance Use Topics     Alcohol use: Not on file     Drug use: Not on file         Prior to Admission Medications   None     Allergies   Allergies   Allergen Reactions     Lidocaine      Lisinopril Swelling     Pollen Extract        Physical Exam   Vital Signs: Temp: 97.8  F (36.6  C) Temp src: Oral BP: (!) 143/98 Pulse: 84   Resp: 16 SpO2: 97 %      Weight: 0 lbs 0 oz    General: patient is alert and oriented and in no acute distress   Head: atraumatic and normocephalic   EENT: moist mucus membranes, sclera anicteric   Neck: supple   Cardiovascular: regular rate and rhythm, extremities warm and well perfused, 1+ bilateral lower extremity edema  Pulmonary: lungs clear to auscultation bilaterally   Abdomen: soft, non-tender   Musculoskeletal: normal range of motion of the lower extremities, no point bony tenderness to palpation, no crepitus  Neurological: alert and oriented, moving all extremities symmetrically, gait normal   Skin: warm, dry, no associated warmth or erythema, wounds on the right and left lower extremity appear to be well-healing without purulent discharge or signs of infection.    Data   Data reviewed today: I reviewed all medications, new labs and imaging results over the last 24 hours. .    Recent Labs   Lab 09/25/21  1426   WBC 8.9   HGB 12.9*   MCV 90         POTASSIUM 3.4   CHLORIDE 101   CO2 30   BUN 10   CR 1.44*   ANIONGAP 7   ANGELLA 8.8   *   ALBUMIN 2.8*   PROTTOTAL 7.4   BILITOTAL 0.2   ALKPHOS 72   ALT 37   AST 19   TROPONIN <0.015     Most Recent 3 CBC's:Recent Labs   Lab Test 09/25/21  1426   WBC 8.9   HGB 12.9*   MCV 90        Most Recent 3 BMP's:Recent Labs   Lab Test 09/25/21  1426      POTASSIUM  3.4   CHLORIDE 101   CO2 30   BUN 10   CR 1.44*   ANIONGAP 7   ANGELLA 8.8   *     Most Recent 2 LFT's:Recent Labs   Lab Test 09/25/21  1426   AST 19   ALT 37   ALKPHOS 72   BILITOTAL 0.2     Recent Results (from the past 24 hour(s))   US Lower Extremity Venous Duplex Bilateral    Impression    RESIDENT PRELIMINARY INTERPRETATION  IMPRESSION:  1.  No evidence of deep venous thrombosis in either lower extremity.   XR Tibia & Fibula Bilateral 2 Views    Narrative    EXAM: XR TIBIA and FIBULA BILATERAL2 VW  LOCATION: [facility]  DATE/TIME: 9/25/2021 2:29 PM    INDICATION: bilateral leg pain  COMPARISON: None.      Impression    IMPRESSION: No evidence of tibial fracture. Left knee compartment joint space narrowing. Right knee lateral compartment joint space narrowing.   XR Chest 2 Views    Narrative    EXAM: XR CHEST 2 VW  LOCATION: [facility]  DATE/TIME: 9/25/2021 2:29 PM    INDICATION: Dyspnea.  COMPARISON: None.      Impression    IMPRESSION: Negative chest.

## 2021-09-26 ENCOUNTER — APPOINTMENT (OUTPATIENT)
Dept: PHYSICAL THERAPY | Facility: CLINIC | Age: 61
End: 2021-09-26
Attending: NURSE PRACTITIONER
Payer: COMMERCIAL

## 2021-09-26 LAB
ATRIAL RATE - MUSE: 76 BPM
DIASTOLIC BLOOD PRESSURE - MUSE: NORMAL MMHG
GLUCOSE BLDC GLUCOMTR-MCNC: 114 MG/DL (ref 70–99)
GLUCOSE BLDC GLUCOMTR-MCNC: 137 MG/DL (ref 70–99)
INTERPRETATION ECG - MUSE: NORMAL
P AXIS - MUSE: 34 DEGREES
PR INTERVAL - MUSE: 150 MS
QRS DURATION - MUSE: 84 MS
QT - MUSE: 414 MS
QTC - MUSE: 465 MS
R AXIS - MUSE: 31 DEGREES
SYSTOLIC BLOOD PRESSURE - MUSE: NORMAL MMHG
T AXIS - MUSE: 98 DEGREES
VENTRICULAR RATE- MUSE: 76 BPM

## 2021-09-26 PROCEDURE — G0378 HOSPITAL OBSERVATION PER HR: HCPCS

## 2021-09-26 PROCEDURE — 250N000013 HC RX MED GY IP 250 OP 250 PS 637: Performed by: NURSE PRACTITIONER

## 2021-09-26 PROCEDURE — 999N000127 HC STATISTIC PERIPHERAL IV START W US GUIDANCE

## 2021-09-26 PROCEDURE — 99220 PR INITIAL OBSERVATION CARE,LEVEL III: CPT | Performed by: EMERGENCY MEDICINE

## 2021-09-26 PROCEDURE — 97116 GAIT TRAINING THERAPY: CPT | Mod: GP | Performed by: REHABILITATION PRACTITIONER

## 2021-09-26 PROCEDURE — 97161 PT EVAL LOW COMPLEX 20 MIN: CPT | Mod: GP | Performed by: REHABILITATION PRACTITIONER

## 2021-09-26 RX ADMIN — HYDROCHLOROTHIAZIDE 50 MG: 25 TABLET ORAL at 08:24

## 2021-09-26 RX ADMIN — GABAPENTIN 600 MG: 600 TABLET, FILM COATED ORAL at 19:55

## 2021-09-26 RX ADMIN — FUROSEMIDE 40 MG: 20 TABLET ORAL at 08:24

## 2021-09-26 RX ADMIN — OLANZAPINE 5 MG: 5 TABLET, FILM COATED ORAL at 22:10

## 2021-09-26 RX ADMIN — HYDROCODONE BITARTRATE AND ACETAMINOPHEN 2 TABLET: 5; 325 TABLET ORAL at 08:35

## 2021-09-26 RX ADMIN — METFORMIN HYDROCHLORIDE 1000 MG: 500 TABLET ORAL at 17:00

## 2021-09-26 RX ADMIN — GABAPENTIN 600 MG: 600 TABLET, FILM COATED ORAL at 14:32

## 2021-09-26 RX ADMIN — POTASSIUM CHLORIDE 40 MEQ: 1500 TABLET, EXTENDED RELEASE ORAL at 08:24

## 2021-09-26 RX ADMIN — POLYETHYLENE GLYCOL 3350 17 G: 17 POWDER, FOR SOLUTION ORAL at 19:55

## 2021-09-26 RX ADMIN — GABAPENTIN 600 MG: 600 TABLET, FILM COATED ORAL at 08:24

## 2021-09-26 RX ADMIN — METFORMIN HYDROCHLORIDE 1000 MG: 500 TABLET ORAL at 08:24

## 2021-09-26 RX ADMIN — ARIPIPRAZOLE 10 MG: 10 TABLET ORAL at 08:24

## 2021-09-26 RX ADMIN — QUETIAPINE FUMARATE 600 MG: 300 TABLET ORAL at 22:09

## 2021-09-26 RX ADMIN — HYDROCODONE BITARTRATE AND ACETAMINOPHEN 2 TABLET: 5; 325 TABLET ORAL at 16:13

## 2021-09-26 NOTE — PROGRESS NOTES
[facility]    Medicine Progress Note - Emergency Department Observation Unit       Date of Admission:  9/25/2021    Assessment & Plan         Antony Aguila Jr. is a 61 year old male admitted on 9/25/2021. He has a history of hypertension, DM2, MI, and venous insufficiency who presents here to the Emergency Department due to bilateral leg pain.      # PVD  # Chronic bilateral lower leg pain  # Lower leg wounds  # h/o chronic dissecting thoracic aorta s/p repair  Leg pain 2/2 PVD and chronic wounds.No concern for infection. Per chart review, patient has had multiple recent ED visits concerning venous stasis dermatitis of both lower extremities with the most recent being on 9/22/2021. Patient has not been taking his Lasix. The patient reports home care is suppose to come to his house and complete wound care. Home care has not been there for 2 weeks. He notes drainage from the wounds as the dressings on his legs fall off.  Patient states that he has been experiencing bilateral leg swelling since his MI in 2019.  Does note that the swelling has gone down in the last couple days. Patient has been referred to vascular surgery but has not been seen by them.  Chest xray negative, Bilateral xray of tibia/Fibula negative. US LE: No evidence of deep venous thrombosis in either lower extremity. Patient will be seen by wound care tomorrow. Patient was seen by PT who recommended home.   -  cont Lasix 40 mg daily with PO potassium 40mEq daily   - continue HCTZ 50 daily  - Gabapentin 600mg TID  - Norco, Mobic prn  - Elevate legs  - Aquaphor TID, Benadryl prn  - Zofran prn  - SW/CC to set up home care     # Insomnia  - PTA seroquel 600 QHS    # T2DM A1C 7.2/ BG today 171, 137  - cont metformin 1000 twice daily  - BG checks TID and HS  - Hypoglycemic protocol     # HTN  - cont Lasix 40 mg daily with PO potassium 40mEq daily   - continue HCTZ 50 daily     # Mood disorder  - PTA Zyprexa,  Abilify.      # Constipation  - prn Miralax and Senna           Diet: 2 Gram Sodium Diet    DVT Prophylaxis: Ambulate every shift  Noble Catheter: Not present  Central Lines: None  Code Status:   Full     Disposition Plan     Expected discharge: Tomorrow recommended to TCU vs home once safe disposition plan/ TCU bed available and wound care consult.        The patient's care was discussed with the Bedside Nurse, Patient and ED physician, Dr. Murali Dickerson, APRN CNP       ______________________________________________________________________    Interval History   No events overnight     Data reviewed today: I reviewed all medications, new labs and imaging results over the last 24 hours.     Physical Exam   Vital Signs: Temp: 98.1  F (36.7  C) Temp src: Oral BP: 112/82 Pulse: 75   Resp: 20 SpO2: 97 % O2 Device: None (Room air)    Weight: 0 lbs 0 oz  General: patient is alert and oriented and in no acute distress   Head: atraumatic and normocephalic   EENT: moist mucus membranes, sclera anicteric   Neck: supple   Cardiovascular: regular rate and rhythm, extremities warm and well perfused, 1+ bilateral lower extremity edema  Pulmonary: lungs clear to auscultation bilaterally   Abdomen: soft, non-tender   Musculoskeletal: normal range of motion of the lower extremities, no point bony tenderness to palpation, no crepitus  Neurological: alert and oriented, moving all extremities symmetrically, gait normal   Skin: warm, dry, no associated warmth or erythema, wounds on the right and left lower extremity appear to be well-healing without purulent discharge or signs of infection.       Data   Recent Labs   Lab 09/26/21  0803 09/25/21  2252 09/25/21  1426   WBC  --   --  8.9   HGB  --   --  12.9*   MCV  --   --  90   PLT  --   --  272   NA  --   --  138   POTASSIUM  --   --  3.4   CHLORIDE  --   --  101   CO2  --   --  30   BUN  --   --  10   CR  --   --  1.44*   ANIONGAP  --   --  7   ANGELAL  --   --  8.8   *  171* 119*   ALBUMIN  --   --  2.8*   PROTTOTAL  --   --  7.4   BILITOTAL  --   --  0.2   ALKPHOS  --   --  72   ALT  --   --  37   AST  --   --  19   TROPONIN  --   --  <0.015     Recent Results (from the past 24 hour(s))   US Lower Extremity Venous Duplex Bilateral    Narrative    EXAMINATION: DOPPLER VENOUS ULTRASOUND OF BILATERAL LOWER EXTREMITIES,  9/25/2021 2:33 PM     COMPARISON: None.    HISTORY: Bilateral leg swelling    TECHNIQUE:  Gray-scale evaluation with compression, spectral flow and  color Doppler assessment of the deep venous system of both legs from  groin to knee, and then at the ankles.    FINDINGS:  In both lower extremities, the common femoral, femoral, popliteal and  posterior tibial veins demonstrate normal compressibility and blood  flow.      Impression    IMPRESSION:  1.  No evidence of deep venous thrombosis in either lower extremity.    I have personally reviewed the examination and initial interpretation  and I agree with the findings.    MILTON LANCASTER MD         SYSTEM ID:  [id number]   XR Tibia & Fibula Bilateral 2 Views    Narrative    EXAM: XR TIBIA and FIBULA BILATERAL2 VW  LOCATION: [facility]  DATE/TIME: 9/25/2021 2:29 PM    INDICATION: bilateral leg pain  COMPARISON: None.      Impression    IMPRESSION: No evidence of tibial fracture. Left knee compartment joint space narrowing. Right knee lateral compartment joint space narrowing.   XR Chest 2 Views    Narrative    EXAM: XR CHEST 2 VW  LOCATION: [facility]  DATE/TIME: 9/25/2021 2:29 PM    INDICATION: Dyspnea.  COMPARISON: None.      Impression    IMPRESSION: Negative chest.     Medications       ARIPiprazole  10 mg Oral Daily     furosemide  40 mg Oral Daily     gabapentin  600 mg Oral TID     hydrochlorothiazide  50 mg Oral Daily     metFORMIN  1,000 mg Oral BID w/meals     OLANZapine  5 mg Oral At Bedtime     polyethylene glycol  17 g  Oral BID     potassium chloride ER  40 mEq Oral Daily     QUEtiapine  600 mg Oral At Bedtime

## 2021-09-26 NOTE — PROGRESS NOTES
Emergency Medicine Observation Attending note    The patient was independently seen and examined by me. The chart, vital signs, and labs were reviewed. The patient's findings were discussed with the DENA on the observation unit, and I agree with the findings of the note and the plan.    61 year old male with a past medical history significant for hypertension, DM2, MI, and venous insufficiency, admitted to ED OBS after presenting to the ED with complaint of bilateral leg pain.  . He was supposed to be following with wound care, but apparently those visits were not happening. He reports some left leg redness and discomfort. No recent trauma. In the ED it wasn't felt that he had evidence of cellulitis. LE unit(s)/s was done which was negative. Plain films didn't show gas within the soft tissues. Given repeated ED visit with same complaint, he was admitted to ED OBS for social work care coordination regarding his home nursing and wound care. This morning he states he's feeling ok. Still feeling like his legs are draining a little.    BP (!) 159/94 (BP Location: Right arm)   Pulse 102   Temp 98.4  F (36.9  C) (Oral)   Resp 20   SpO2 97%     Exam:  General: awake, alert, NAD  HEENT: NC/AT  Neck: supple  Lungs: CTA-B  Heart: RRR, no M/R/G  Abd: soft, ND/NT  Ext:stasis changes to both lower legs with superficial ulceration. Some diffuse skin thickening of the bilateral lower legs. Feet warm with likely faintly palpable pulses.       Assessment/plan:  1. Stasis dermatitis with lower extremity wounds - ongoing. No clear evidence for cellulitis.  Social work consult to help with arranging home wound care. PT and wound care consults while here.

## 2021-09-26 NOTE — PLAN OF CARE
Observation goals PRIOR TO DISCHARGE    Comments:   -diagnostic tests and consults completed and resulted: not met     -vital signs normal or at patient baseline: met     -returns to baseline functional status: met     -safe disposition plan has been identified: not met     -Wound care consult complete: not met

## 2021-09-26 NOTE — PLAN OF CARE
Discharge Goals  Diagnostic tests and consults completed and resulted.No.  Vital signs normal or at patient baseline.Yes.  Returns to baseline functional status.Yes.  Safe disposition plan has been identified No.  Wound care consult complete.No.

## 2021-09-26 NOTE — PROGRESS NOTES
Observation goals PRIOR TO DISCHARGE    Comments:   -diagnostic tests and consults completed and resulted: not met      -vital signs normal or at patient baseline: met   Blood pressure (!) 136/98, pulse 102, temperature 99.2  F (37.3  C), temperature source Oral, resp. rate 20, SpO2 94 %.       -returns to baseline functional status: met      -safe disposition plan has been identified: not met      -Wound care consult complete: not met    Patient requesting to be left alone to sleep overnight and declining vitals. Provider aware

## 2021-09-26 NOTE — CONSULTS
Care Management Initial Consult    General Information  Assessment completed with: Patient,    Type of CM/SW Visit: Initial Assessment    Primary Care Provider verified and updated as needed: Yes (Per patient)   Readmission within the last 30 days:     Reason for Consult: discharge planning  Advance Care Planning:          Communication Assessment  Patient's communication style: spoken language (English or Bilingual)    Hearing Difficulty or Deaf: no   Wear Glasses or Blind: yes    Cognitive  Cognitive/Neuro/Behavioral: WDL  Mood/Behavior: agitated        Living Environment:   People in home: alone     Current living Arrangements: apartment      Able to return to prior arrangements: yes     Family/Social Support:  Care provided by:  PCA  Provides care for:  No one          Description of Support System:         Current Resources:   Patient receiving home care services: No  Community Resources:  PCA  Equipment currently used at home:  (Cane and walker)  Supplies currently used at home:      Employment/Financial:  Employment Status:          Financial Concerns: No concerns identified      Lifestyle & Psychosocial Needs:  Social Determinants of Health     Tobacco Use:      Smoking Tobacco Use:      Smokeless Tobacco Use:    Alcohol Use:      Frequency of Alcohol Consumption:      Average Number of Drinks:      Frequency of Binge Drinking:    Financial Resource Strain:      Difficulty of Paying Living Expenses:    Food Insecurity:      Worried About Running Out of Food in the Last Year:      Ran Out of Food in the Last Year:    Transportation Needs:      Lack of Transportation (Medical):      Lack of Transportation (Non-Medical):    Physical Activity:      Days of Exercise per Week:      Minutes of Exercise per Session:    Stress:      Feeling of Stress :    Social Connections:      Frequency of Communication with Friends and Family:      Frequency of Social Gatherings with Friends and Family:      Attends [employer]  Services:      Active Member of Clubs or Organizations:      Attends Club or Organization Meetings:      Marital Status:    Intimate Partner Violence:      Fear of Current or Ex-Partner:      Emotionally Abused:      Physically Abused:      Sexually Abused:    Depression:      PHQ-2 Score:    Housing Stability:      Unable to Pay for Housing in the Last Year:      Number of Places Lived in the Last Year:      Unstable Housing in the Last Year:        Additional Information:  Introduced self/role and completed initial assessment with patient via phone. Patient lives alone in an apartment. Has PCA services. He verbalized that he thinks he has home care but a nurse hasn't come for a few weeks. He does not remember the name of the agency. Per chart review, patient has had several recent emergency room visits. A referral was recently sent by [facility] to [facility]. Spoke to the on call nurse there and notes indicate that they declined referral due to patient stating that he already has home care.     Patient agreed for writer to send new referrals for RN/PT. Sent referrals to [facility], [facility], and [facility].     Discharge is pending [facility] consult and home care set up.         NICOLÁS Walters RNCC  RN Care Coordinator   Weekend pager: [phone]

## 2021-09-26 NOTE — PROGRESS NOTES
09/26/21 0904   Quick Adds   Type of Visit Initial PT Evaluation   Living Environment   People in home alone   Current Living Arrangements apartment   Home Accessibility no concerns  (3rd floor apartment,elevators present)   Transportation Anticipated public transportation   Self-Care   Usual Activity Tolerance moderate   Current Activity Tolerance fair   Regular Exercise Yes   Activity/Exercise Type walking   Exercise Amount/Frequency 3-5 times/wk   Equipment Currently Used at Home other (see comments);grab bar, tub/shower;shower chair  (, lifeline button, lift chair)   Activity/Exercise/Self-Care Comment Pt reports he is supposed to have PCA services 2 hours per day, thinks they may only come 5 hours per week, says it is inconsistent.   Disability/Function   Hearing Difficulty or Deaf no   Wear Glasses or Blind yes   Vision Management wears glasses   Concentrating, Remembering or Making Decisions Difficulty no   Difficulty Communicating no   Difficulty Eating/Swallowing no   Walking or Climbing Stairs Difficulty yes   Walking or Climbing Stairs ambulation difficulty, requires equipment   Mobility Management Pt uses 4WW   Dressing/Bathing Difficulty yes   Dressing/Bathing bathing difficulty, requires equipment   Dressing/Bathing Management Pt uses shower chair to bathe.   Toileting issues no   Doing Errands Independently Difficulty (such as shopping) yes   Errands Management Pt reports he receives 1 meal a week from meals on wheels, PCA does some shopping.   Fall history within last six months no   Change in Functional Status Since Onset of Current Illness/Injury no   General Information   Onset of Illness/Injury or Date of Surgery 09/25/21   Referring Physician Bhavana Dickerson, BRANDY CNP   Pertinent History of Current Problem (include personal factors and/or comorbidities that impact the POC) Pt is a 61 year old male admitted on 9/25/2021. He has a history of hypertension, DM2, MI, and venous insufficiency who  presents here to the Emergency Department due to bilateral leg pain.    Existing Precautions/Restrictions fall   Heart Disease Risk Factors Age;Gender;Medical history;High blood pressure;Diabetes;Overweight   Cognition   Orientation Status (Cognition) oriented x 4   Affect/Mental Status (Cognition) WFL   Follows Commands (Cognition) WFL   Safety Deficit (Cognition) impulsivity   Pain Assessment   Patient Currently in Pain Yes, see Vital Sign flowsheet  (Pt reports BLE pain, reports tight achilles of RLE)   Integumentary/Edema   Integumentary/Edema other (describe)   Integumentary/Edema Comments Wounds on BLE   Posture    Posture Forward head position   Range of Motion (ROM)   ROM Comment Decreased ankle DF RLE, reports tight/taut skin, and tight achilles, all other joints WFL   Strength   Strength Comments BLE 5/5 throughout   Bed Mobility   Comment (Bed Mobility) Pt tx supine->sit with IND, HOB up 45 degrees.   Transfers   Transfer Safety Comments Pt tx sit->stand with SBA.   Gait/Stairs (Locomotion)   Comment (Gait/Stairs) Pt amb ~ 50 feet with 4WW and SBA. Pt ambulates with slow pace, flexed posture, no LOB.    Balance   Balance Comments Minimal sway in static standing, needs 4WW for BUE support.   Sensory Examination   Sensory Perception other (describe)   Sensory Perception Comments Pt reports tingling in BLE from toes to mid-shin.   Clinical Impression   Criteria for Skilled Therapeutic Intervention yes, treatment indicated   PT Diagnosis (PT) Impaired Functional Mobility   Influenced by the following impairments activity intolerance, impaired balance, pain limiting functional mobility   Functional limitations due to impairments transfers, gait   Clinical Presentation Stable/Uncomplicated   Clinical Presentation Rationale PMHx, clinical judgement, current presentation   Clinical Decision Making (Complexity) low complexity   Therapy Frequency (PT) 3x/week   Predicted Duration of Therapy Intervention (days/wks)  10/3/21   Planned Therapy Interventions (PT) gait training;neuromuscular re-education;strengthening;transfer training;home program guidelines;risk factor education;progressive activity/exercise   Risk & Benefits of therapy have been explained care plan/treatment goals reviewed   PT Discharge Planning    PT Discharge Recommendation (DC Rec) home with home care physical therapy;home   PT Rationale for DC Rec Pt is mobilizing well, appears to be near baseline level of function, would benefit from skilled home care to address endurance, balance, wound management, and was previously working with edema therapist.   PT Brief overview of current status  Nursing Staff: up with 4WW   Total Evaluation Time   Total Evaluation Time (Minutes) 8

## 2021-09-26 NOTE — PLAN OF CARE
Diagnostic tests and consults completed and resulted.No, pending consult.  Vital signs normal or at patient baseline. Yes.  Returns to baseline functional status.Yes.   Safe disposition plan has been identified.No, pending consult.  Wound care consult complete. No, charge RN attempted to contact WOC RN.

## 2021-09-27 ENCOUNTER — PRE VISIT (OUTPATIENT)
Dept: VASCULAR SURGERY | Facility: CLINIC | Age: 61
End: 2021-09-27

## 2021-09-27 LAB
GLUCOSE BLDC GLUCOMTR-MCNC: 111 MG/DL (ref 70–99)
GLUCOSE BLDC GLUCOMTR-MCNC: 118 MG/DL (ref 70–99)
GLUCOSE BLDC GLUCOMTR-MCNC: 143 MG/DL (ref 70–99)
GLUCOSE BLDC GLUCOMTR-MCNC: 162 MG/DL (ref 70–99)

## 2021-09-27 PROCEDURE — 250N000013 HC RX MED GY IP 250 OP 250 PS 637: Performed by: NURSE PRACTITIONER

## 2021-09-27 PROCEDURE — 250N000013 HC RX MED GY IP 250 OP 250 PS 637: Performed by: PHYSICIAN ASSISTANT

## 2021-09-27 PROCEDURE — G0463 HOSPITAL OUTPT CLINIC VISIT: HCPCS

## 2021-09-27 PROCEDURE — 99226 PR SUBSEQUENT OBSERVATION CARE,LEVEL III: CPT | Performed by: EMERGENCY MEDICINE

## 2021-09-27 PROCEDURE — G0378 HOSPITAL OBSERVATION PER HR: HCPCS

## 2021-09-27 RX ORDER — NALOXONE HYDROCHLORIDE 0.4 MG/ML
0.4 INJECTION, SOLUTION INTRAMUSCULAR; INTRAVENOUS; SUBCUTANEOUS
Status: DISCONTINUED | OUTPATIENT
Start: 2021-09-27 | End: 2021-09-28 | Stop reason: HOSPADM

## 2021-09-27 RX ORDER — NALOXONE HYDROCHLORIDE 0.4 MG/ML
0.2 INJECTION, SOLUTION INTRAMUSCULAR; INTRAVENOUS; SUBCUTANEOUS
Status: DISCONTINUED | OUTPATIENT
Start: 2021-09-27 | End: 2021-09-28 | Stop reason: HOSPADM

## 2021-09-27 RX ORDER — LEVETIRACETAM 500 MG/1
500 TABLET ORAL 2 TIMES DAILY
Status: DISCONTINUED | OUTPATIENT
Start: 2021-09-27 | End: 2021-09-28 | Stop reason: HOSPADM

## 2021-09-27 RX ADMIN — METFORMIN HYDROCHLORIDE 1000 MG: 500 TABLET ORAL at 19:03

## 2021-09-27 RX ADMIN — POLYETHYLENE GLYCOL 3350 17 G: 17 POWDER, FOR SOLUTION ORAL at 19:44

## 2021-09-27 RX ADMIN — HYDROCODONE BITARTRATE AND ACETAMINOPHEN 2 TABLET: 5; 325 TABLET ORAL at 16:06

## 2021-09-27 RX ADMIN — POLYETHYLENE GLYCOL 3350 17 G: 17 POWDER, FOR SOLUTION ORAL at 08:14

## 2021-09-27 RX ADMIN — FUROSEMIDE 40 MG: 20 TABLET ORAL at 08:13

## 2021-09-27 RX ADMIN — GABAPENTIN 600 MG: 600 TABLET, FILM COATED ORAL at 08:14

## 2021-09-27 RX ADMIN — SENNOSIDES 1 TABLET: 8.6 TABLET, COATED ORAL at 16:11

## 2021-09-27 RX ADMIN — GABAPENTIN 600 MG: 600 TABLET, FILM COATED ORAL at 16:07

## 2021-09-27 RX ADMIN — HYDROCODONE BITARTRATE AND ACETAMINOPHEN 2 TABLET: 5; 325 TABLET ORAL at 21:51

## 2021-09-27 RX ADMIN — ARIPIPRAZOLE 10 MG: 10 TABLET ORAL at 08:14

## 2021-09-27 RX ADMIN — METFORMIN HYDROCHLORIDE 1000 MG: 500 TABLET ORAL at 08:13

## 2021-09-27 RX ADMIN — LEVETIRACETAM 500 MG: 500 TABLET ORAL at 19:44

## 2021-09-27 RX ADMIN — HYDROCHLOROTHIAZIDE 50 MG: 25 TABLET ORAL at 08:13

## 2021-09-27 RX ADMIN — QUETIAPINE FUMARATE 600 MG: 300 TABLET ORAL at 21:43

## 2021-09-27 RX ADMIN — ALBUTEROL SULFATE 2 PUFF: 90 AEROSOL, METERED RESPIRATORY (INHALATION) at 19:05

## 2021-09-27 RX ADMIN — GABAPENTIN 600 MG: 600 TABLET, FILM COATED ORAL at 19:44

## 2021-09-27 RX ADMIN — OLANZAPINE 5 MG: 5 TABLET, FILM COATED ORAL at 21:43

## 2021-09-27 RX ADMIN — POTASSIUM CHLORIDE 40 MEQ: 1500 TABLET, EXTENDED RELEASE ORAL at 08:14

## 2021-09-27 NOTE — PROGRESS NOTES
"Emergency Medicine Observation Attending note    The patient was independently seen and examined by me. The chart, vital signs, and labs were reviewed. The patient's findings were discussed with the [facility] on the observation unit, and I agree with the findings of the note and the plan.    61 year old male with a past medical history significant for hypertension, DM2, MI, and venous insufficiency, admitted to ED OBS after presenting to the ED with complaint of bilateral leg pain.  . He was supposed to be following with wound care, but apparently those visits were not happening. He reports some left leg redness and discomfort. No recent trauma. In the ED it wasn't felt that he had evidence of cellulitis. LE unit(s)/s was done which was negative. Plain films didn't show gas within the soft tissues. Given repeated ED visit with same complaint, he was admitted to ED OBS for social work care coordination regarding his home nursing and wound care. This morning he states he's feeling alright -\"So far so good.\"    /72 (BP Location: Right arm)   Pulse 71   Temp 98.6  F (37  C) (Oral)   Resp 18   Wt (!) 153.8 kg (339 lb)   SpO2 96%     Exam:  General: sleepy but awake, NAD  HEENT: NC/AT  Neck: supple  Lungs: CTA-B  Heart: RRR, no M/R/G  Ext:stasis changes to both lower legs with superficial ulceration. Some serosang drainage from some of the wounds. Some diffuse skin thickening of the bilateral lower legs. Feet warm with likely faintly palpable pulses.       Assessment/plan:  1. Stasis dermatitis with lower extremity wounds - ongoing. No clear evidence for cellulitis.  Social work consult to help with arranging home wound care. PT and wound care consults while here.    "

## 2021-09-27 NOTE — PROGRESS NOTES
Observation Goals:  Diagnostic tests and consults completed and resulted: pending  Vital signs normal or at patient baseline: yes  Returns to baseline functional status: pending  Safe disposition plan has been identified: pending  Wound care consult complete: pending

## 2021-09-27 NOTE — PROGRESS NOTES
Care Management Follow Up    Length of Stay (days): 0    Expected Discharge Date: TBD     Concerns to be Addressed: discharge planning, medical readiness.   Patient plan of care discussed at interdisciplinary rounds: Yes    Anticipated Discharge Disposition: Home   Anticipated Discharge Services: PCA, home care  Anticipated Discharge DME: No new DME noted.     Referrals Placed by CM/SW: Homecare  Private pay costs discussed: Not applicable    Additional Information:  Discharge pending medical readiness for discharge, wound care consult. Follow up needed on pending home care referrals.     Pending Home Care referrals:  [facility]: Voicemail left at this time.   [facility]: Referral faxed at this time (fax: [phone]).    Declined home care referrals:  -[facility]: Declined due to capacity.   -[facility]: Declined due to capacity.   -[facility]: Declined due to capacity.     3660 Addendum:  Call out to [facility], they are able to accept patient as long as he does not need daily wound care from home care nurse, primary provider updated. Patient updated, patient confirmed that he will need assistance with arranging a ride at discharge, confirms he would like to take a [place] cab ride.     Discharge transportation:   [place] transportation: [employer] [phone].    RNCC will continue to follow for discharge planning.     Arlene Mendoza, RNCC, BSN    [facility]  [address] [place] [id number]    [email or web address]    Office: [phone] Pager: [phone]  To contact the weekend RNCC, page [phone].

## 2021-09-27 NOTE — UTILIZATION REVIEW
"  [facility] Utilization Review  Admission Status; Secondary Review Determination     Admission Date: 9/25/2021  1:35 PM      Under the authority of the Utilization Management Committee, the utilization review process indicated a secondary review on the above patient.  The review outcome is based on review of the medical records, discussions with staff, and applying clinical experience noted on the date of the review.        (X) Observation Status Appropriate - This patient does not meet hospital inpatient criteria and is placed in observation status. If this patient's primary payer is Medicare and was admitted as an inpatient, Condition Code 44 should be used and patient status changed to \"observation\".   () Observation Status concurrent Review           RATIONALE FOR DETERMINATION   61-year-old male with history of hypertension, diabetes mellitus, MI, venous insufficiency, admitted with bilateral back pain.  Patient has chronic venous stasis ulcers with peripheral vascular disease, has not been taking his Lasix.  Patient has been afebrile, elevated CRP.  Wound care consult, resumed gabapentin, Norco, Lasix with potassium.  Patient has right set up for home and will continue with wound care at home, no antibiotics needed, no further work-up anticipated, patient does not meet criteria for inpatient stay, recommend continued observation status      The severity of illness, intensity of service provided, expected LOS make the care appropriate for observation status at this time.        The information on this document is developed by the utilization review team in order for the business office to ensure compliance.  This only denotes the appropriateness of proper admission status and does not reflect the quality of care rendered.         The definitions of Inpatient Status and Observation Status used in making the determination above are those provided in the CMS Coverage Manual, Chapter 1 and Chapter 6, " section 70.4.      Sincerely,       Roseline Garner MD  Physician Advisor  Utilization Review-[employer]    Phone: [phone]

## 2021-09-27 NOTE — PROGRESS NOTES
Discharge Goals:  Diagnostic tests and consults completed and resulted: pending  Vital signs normal or at patient baseline: yes  Returns to baseline functional status: pending  Safe disposition plan has been identified: pending  Wound care consult complete: pending

## 2021-09-27 NOTE — PLAN OF CARE
"BP (!) 154/96 (BP Location: Right arm)   Pulse 90   Temp 98.8  F (37.1  C) (Oral)   Resp 21   Ht 1.822 m (5' 11.73\")   Wt (!) 153.8 kg (339 lb)   SpO2 98%   BMI 46.32 kg/m      AVSS. A &O x4. On RA. Woc nurse saw pt this afternoon for bilateral leg wounds. Voiding spontaneously. Reports 1 small bm. PIV SL. Tolerating diet with good appetite/ No prn's given. Up independently with his wheel walker from home.       "

## 2021-09-27 NOTE — PROGRESS NOTES
Observation goals PRIOR TO DISCHARGE    Comments:   -diagnostic tests and consults completed and resulted: not met   WOC consult pending      -vital signs normal or at patient baseline: met   .      -returns to baseline functional status: met      -safe disposition plan has been identified: pending   Patient tells me he is not interested in TCU     -Wound care consult complete: not met    Patient requesting to be left alone to sleep overnight and declining vitals.

## 2021-09-27 NOTE — PROGRESS NOTES
"ED OBSERVATION PROGRESS NOTE:  S:Antony Aguila Jr. is a 61 year old male admitted on 9/25/2021. He has a history of hypertension, DM2, MI, and venous insufficiency who presents here to the Emergency Department due to bilateral leg pain.     Chief Complaint   Patient presents with     Leg Pain     1. Localized swelling of both lower legs    2. Venous stasis ulcer of calf limited to breakdown of skin without varicose veins, unspecified laterality (H)        Problem List:  Patient Active Problem List   Diagnosis     Localized swelling of both lower legs     Venous stasis ulcer of calf limited to breakdown of skin without varicose veins, unspecified laterality (H)       MEDS:   No current outpatient medications on file.       ALLERGIES:    Allergies   Allergen Reactions     Lidocaine      Lisinopril Swelling     Pollen Extract        O:BP (!) 154/96 (BP Location: Right arm)   Pulse 90   Temp 98.8  F (37.1  C) (Oral)   Resp 21   Ht 1.822 m (5' 11.73\")   Wt (!) 153.8 kg (339 lb)   SpO2 98%   BMI 46.32 kg/m      Physical Exam   Constitutional: Pt is oriented to person, place, and time.Pt appears well-developed and well-nourished.   HENT:   Head: Normocephalic and atraumatic.   Eyes: Conjunctivae are normal. Pupils are equal, round, and reactive to light.   Neck: Normal range of motion. Neck supple.   Cardiovascular: Normal rate, regular rhythm, normal heart sounds and intact distal pulses.    Pulmonary/Chest: Effort normal and breath sounds normal. No respiratory distress. Pt has no wheezes. Pt has no rales  Abdominal: Soft. Bowel sounds are normal. Pt exhibits no distension and no mass. No tenderness. Pt has no rebound and no guarding.   Musculoskeletal: Bilateral lower extremity dressing in place.   Neurological: Pt is alert and oriented to person, place, and time. Normal reflexes.   Skin: Skin is warm and dry. No rash noted.   Psychiatric: Pt has a normal mood and affect. Behavior is normal. Judgment and thought " content normal.       Assessment & Plan         Antony Aguila Jr. is a 61 year old male admitted on 9/25/2021. He has a history of hypertension, DM2, MI, and venous insufficiency who presents here to the Emergency Department due to bilateral leg pain.      # PVD  # Chronic bilateral lower leg pain  # Lower leg wounds  # h/o chronic dissecting thoracic aorta s/p repair  Leg pain 2/2 PVD and chronic wounds.No concern for infection. Per chart review, patient has had multiple recent ED visits concerning venous stasis dermatitis of both lower extremities with the most recent being on 9/22/2021. Patient has not been taking his Lasix. The patient reports home care is suppose to come to his house and complete wound care. Home care has not been there for 2 weeks. He notes drainage from the wounds as the dressings on his legs fall off.  Patient states that he has been experiencing bilateral leg swelling since his MI in 2019.  Does note that the swelling has gone down in the last couple days. Patient has been referred to vascular surgery but has not been seen by them.  Chest xray negative, Bilateral xray of tibia/Fibula negative. US LE: No evidence of deep venous thrombosis in either lower extremity. Patient was seen by wound care, final recommendation pending. Patient was seen by PT who recommended home. Patient has accepting home care agent, orders placed. Anticipate discharge tomorrow morning.   -  cont Lasix 40 mg daily with PO potassium 40mEq daily   - continue HCTZ 50 daily  - Gabapentin 600mg TID  - Norco, Mobic prn  - Elevate legs  - Aquaphor TID, Benadryl prn  - Zofran prn     # Insomnia  - PTA seroquel 600 QHS    # T2DM A1C 7.2/ BG today 171, 137  - cont metformin 1000 twice daily  - BG checks TID and HS  - Hypoglycemic protocol     # HTN  - cont Lasix 40 mg daily with PO potassium 40mEq daily   - continue HCTZ 50 daily     # Mood disorder  - PTA Zyprexa, Abilify.      # Constipation  - prn Miralax and  Senna           Diet: 2 Gram Sodium Diet    DVT Prophylaxis: Ambulate every shift  Noble Catheter: Not present  Central Lines: None  Code Status:   Full        Signed:  Conchis Chavez PA-C  September 27, 2021 at 5:44 PM

## 2021-09-27 NOTE — PLAN OF CARE
Observation goals PRIOR TO DISCHARGE    Comments:   -diagnostic tests and consults completed and resulted: not met   WOC consult pending      -vital signs normal or at patient baseline: met   Blood pressure (!) 142/90, pulse 93, temperature 98.6  F (37  C), temperature source Oral, resp. rate 19, SpO2 98 %.      -returns to baseline functional status: met      -safe disposition plan has been identified: pending   Patient tells me he is not interested in TCU     -Wound care consult complete: not met

## 2021-09-27 NOTE — CONSULTS
[facility] Nurse Inpatient Wound Assessment   Reason for consultation: Evaluate and treat  Bilateral LE wounds    Assessment  Bilateral LE wounds due to Venous Ulcer  Status: initial assessment    Treatment Plan  Bilateral LE cares: daily while inpt, then over other day after discharge   Cleanse skin and wounds with microklenz spray and gauze.  Pat dry.   Apply sween 24 to intact skin     RLE wounds: Daily  while inpt, every other day after discharge    Cleanse as above.   Cover open wounds and intact blisters with vaseline gauze.   Cover with ABD pads  Secure with kerlix, then surgilast netting.     Orders Written  Recommended provider order: None, at this time  WO Nurse follow-up plan:weekly  Nursing to notify the Provider(s) and re-consult the WO Nurse if wound(s) deteriorates or new skin concern.    Patient History  According to provider note(s): 61 year old male admitted on 9/25/2021. He has a history of hypertension, DM2, MI, and venous insufficiency who presents here to the Emergency Department due to bilateral leg pain.    Objective Data  Active Diet Order  Orders Placed This Encounter      Regular Diet Adult      Output:   I/O last 3 completed shifts:  In: 960 [P.O.:960]  Out: 1000 [Urine:1000]    Risk Assessment:   Sensory Perception: 4-->no impairment  Moisture: 4-->rarely moist  Activity: 3-->walks occasionally  Mobility: 3-->slightly limited  Nutrition: 3-->adequate  Friction and Shear: 3-->no apparent problem  Abhinav Score: 20                          Labs:   Recent Labs   Lab 09/25/21  1807 09/25/21  1426   ALBUMIN  --  2.8*   HGB  --  12.9*   WBC  --  8.9   A1C 7.4*  --    CRP  --  22.0*       Physical Exam  Areas of skin assessed: focused bilateral LEs  Bilateral PP 2/4, cap refill 2-3 secs.  Feet appropriately warm.  Hemosidirin staining from ankles to calves    Wound History: multiple recent ED visits for venous stasis of bilateral LEs.  Has not been taking his diuretics.  Home health visits have been  ordered but have not occurred.  Per pt, this is due to his inability to answer the front door. He is unable to wrap his legs independently.  Wounds had been wrapped in OSH ED on 9/22.  He noted increased drainage and foul odor from wounds on 9/24 and came to our ED    LLE with minimal edema, large amt of crevices from wrinkled epithelium.     Medial aspect of LLE 9/27    Multiple areas of new epithelium with old dry serous drainage.  No current open areas.       Wound Location:  RLE  1+ pitting edema with large amt of skin wrinkling      Anterior and lateral aspects: 9/27    Multiple flat blisters, majority on anterior aspect.    2 wounds that have coalesced on lateral aspect:  Wound Base: 50% smooth pink fibrin, 50% red moist dermis       Palpation of the wound bed: normal      Drainage: small     Description of drainage: serosanguinous     Measurements (length x width x depth, in cm) 12.5  x 8.5  x  0.2 cm      Tunneling N/A     Undermining N/A  Periwound skin: peeling, hyperkeratosis and macerated      Color: normal and consistent with surrounding tissue      Temperature: normal   Odor: mild  Pain: moderate,   Pain intervention prior to dressing change: none    Interventions  Visual inspection and assessment completed   Wound Care Rationale Protect periwound skin, Promote moist wound healing without tissue dehydration , Provide protection , Decrease bacterial load and Pain reduction  Wound Care: done per plan of care  Supplies: gathered and placed at the bedside  Current support surface: Standard  Atmos Air mattress  Education provided to: plan of care, wound progress, Infection prevention  and Moisture management  Discussed plan of care with Patient and Nurse

## 2021-09-27 NOTE — PROGRESS NOTES
Observation Goals:  Diagnostic tests and consults completed and resulted: yes  Vital signs normal or at patient baseline: yes  Returns to baseline functional status: yes  Safe disposition plan has been identified: pending  Wound care consult complete: yes

## 2021-09-28 VITALS
DIASTOLIC BLOOD PRESSURE: 93 MMHG | OXYGEN SATURATION: 96 % | HEIGHT: 72 IN | WEIGHT: 315 LBS | BODY MASS INDEX: 42.66 KG/M2 | HEART RATE: 88 BPM | SYSTOLIC BLOOD PRESSURE: 147 MMHG | RESPIRATION RATE: 18 BRPM | TEMPERATURE: 98.7 F

## 2021-09-28 LAB — GLUCOSE BLDC GLUCOMTR-MCNC: 318 MG/DL (ref 70–99)

## 2021-09-28 PROCEDURE — 250N000013 HC RX MED GY IP 250 OP 250 PS 637: Performed by: NURSE PRACTITIONER

## 2021-09-28 PROCEDURE — G0378 HOSPITAL OBSERVATION PER HR: HCPCS

## 2021-09-28 PROCEDURE — 250N000013 HC RX MED GY IP 250 OP 250 PS 637: Performed by: PHYSICIAN ASSISTANT

## 2021-09-28 PROCEDURE — 99217 PR OBSERVATION CARE DISCHARGE: CPT | Performed by: NURSE PRACTITIONER

## 2021-09-28 RX ORDER — HYDROCODONE BITARTRATE AND ACETAMINOPHEN 5; 325 MG/1; MG/1
1-2 TABLET ORAL EVERY 6 HOURS PRN
Qty: 10 TABLET | Refills: 0 | Status: SHIPPED | OUTPATIENT
Start: 2021-09-28 | End: 2022-09-16

## 2021-09-28 RX ADMIN — LEVETIRACETAM 500 MG: 500 TABLET ORAL at 07:52

## 2021-09-28 RX ADMIN — FUROSEMIDE 40 MG: 20 TABLET ORAL at 07:52

## 2021-09-28 RX ADMIN — POLYETHYLENE GLYCOL 3350 17 G: 17 POWDER, FOR SOLUTION ORAL at 07:51

## 2021-09-28 RX ADMIN — HYDROCHLOROTHIAZIDE 50 MG: 25 TABLET ORAL at 07:52

## 2021-09-28 RX ADMIN — POTASSIUM CHLORIDE 40 MEQ: 1500 TABLET, EXTENDED RELEASE ORAL at 07:52

## 2021-09-28 RX ADMIN — ARIPIPRAZOLE 10 MG: 10 TABLET ORAL at 07:52

## 2021-09-28 RX ADMIN — GABAPENTIN 600 MG: 600 TABLET, FILM COATED ORAL at 07:52

## 2021-09-28 RX ADMIN — METFORMIN HYDROCHLORIDE 1000 MG: 500 TABLET ORAL at 07:52

## 2021-09-28 RX ADMIN — ALBUTEROL SULFATE 2 PUFF: 90 AEROSOL, METERED RESPIRATORY (INHALATION) at 07:09

## 2021-09-28 NOTE — DISCHARGE SUMMARY
"ED Observation Discharge Summary    Antony Aguila Jr.   MRN# [id number]  Age: 61 year old   YOB: 1960            Date of Admission: 09/25/2021    Date of Discharge: 09/28/2021  Admitting Provider:  BRANDY Diaz CNP  Discharge Provider: BRANDY Garzon, CNP        DISCHARGE DIAGNOSIS:     Localized swelling of both lower legs    Venous stasis ulcer of calf limited to breakdown of skin without varicose veins, unspecified laterality (H)    * No resolved hospital problems. *      INTERVAL HISTORY: VSS, afebrile. Up ad holden. Feels improved and ready to discharge to home.     PHYSICAL EXAM:   Blood pressure (!) 145/96, pulse 96, temperature 98.5  F (36.9  C), temperature source Oral, resp. rate 18, height 1.822 m (5' 11.73\"), weight (!) 153.8 kg (339 lb), SpO2 98 %.     GENERAL APPEARENCE:  A/O x4. NAD.  SKIN: Status changes to bilateral legs. No open wound to left leg. Right lower extremity with Kerlix in place. Left leg open to air  HEENT/NECK: NCAT . Sclera anicteric, PERRLA, EOMI.  Oral mucosa pink and moist  CARDIOVASCULAR: S1, S2 RRR. No murmurs, rubs, or gallops.   RESPIRATORY: Respiratory effort WNL. CTA  bilaterally without crackles/rales/wheeze   GI: Active BS in all 4 quadrants. Abdomen soft and non-tender.   : Deferred  MUSCULOSKELETAL: Moves all extremities, wound as above.    PV: 2+ bilateral radial and pedal pulses. No edema noted.   NEURO: CN II-XII grossly intact.  HEME/LYMPH: No visible bleeding.   PSYCHIATRIC: Mentation and affect appear normal  VASCULAR ACCESS: CDI without erythema or discharge. Non-tender.    PROCEDURES AND IMAGING:   Results for orders placed or performed during the hospital encounter of 09/25/21 (from the past 24 hour(s))   Glucose by meter   Result Value Ref Range    GLUCOSE BY METER POCT 162 (H) 70 - 99 mg/dL   Glucose by meter   Result Value Ref Range    GLUCOSE BY METER POCT 118 (H) 70 - 99 mg/dL   Glucose by meter   Result Value Ref Range    " GLUCOSE BY METER POCT 111 (H) 70 - 99 mg/dL     DISCHARGE MEDICATIONS:   Current Discharge Medication List      CONTINUE these medications which have NOT CHANGED    Details   acetaminophen (TYLENOL) 500 MG tablet Take 500-1,000 mg by mouth every 8 hours as needed for mild pain      albuterol (PROAIR HFA/PROVENTIL HFA/VENTOLIN HFA) 108 (90 Base) MCG/ACT inhaler Inhale 2 puffs into the lungs every 4 hours as needed for shortness of breath / dyspnea or wheezing      ARIPiprazole (ABILIFY) 10 MG tablet Take 10 mg by mouth daily      diphenhydrAMINE (BENADRYL) 50 MG capsule Take 50 mg by mouth every 6 hours as needed for itching or allergies      furosemide (LASIX) 40 MG tablet Take 40 mg by mouth daily      gabapentin (NEURONTIN) 600 MG tablet Take 600 mg by mouth 3 times daily      hydrochlorothiazide (HYDRODIURIL) 50 MG tablet Take 50 mg by mouth daily      HYDROcodone-acetaminophen (NORCO) 5-325 MG tablet Take 1-2 tablets by mouth every 6 hours as needed for severe pain      levETIRAcetam (KEPPRA) 500 MG tablet Take 500 mg by mouth 2 times daily      meloxicam (MOBIC) 7.5 MG tablet Take 7.5 mg by mouth every 12 hours as needed      metFORMIN (GLUCOPHAGE) 1000 MG tablet Take 1,000 mg by mouth 2 times daily (with meals)      mineral oil-hydrophilic petrolatum (AQUAPHOR) external ointment Apply 2 g topically 3 times daily as needed      OLANZapine (ZYPREXA) 5 MG tablet Take 5 mg by mouth At Bedtime      polyethylene glycol (MIRALAX) 17 g packet Take 1 packet by mouth 2 times daily      potassium chloride ER (K-TAB) 20 MEQ CR tablet Take 40 mEq by mouth      QUEtiapine (SEROQUEL) 400 MG tablet Take 800 mg by mouth At Bedtime      senna (SENOKOT) 8.6 MG tablet Take 1 tablet by mouth 2 times daily as needed for constipation      simethicone (MYLICON) 125 MG chewable tablet Take 125 mg by mouth 4 times daily as needed for intestinal gas               CONSULTATIONS:   Consultation during this admission received from:  RICKY  "  PT  CC/SW    BRIEF HISTORY OF PRESENT ILLNESS:   (Adopted from admission H&P).    \" The history is provided by the patient and medical records.      Antony Aguila Jr. is a 61 year old male with a past medical history significant for hypertension, DM2, MI, and venous insufficiency who presents here to the Emergency Department due to bilateral leg pain. Per chart review, patient has had multiple recent ED visits concerning venous stasis dermatitis of both lower extremities with the most recent being on 9/22/2021.  On this most recent ED visit, the patient presented with increasing leg pain with the right being greater than left.  Cellulitis was not apparent during this ED visit as the patient had no asymmetric erythema or warmth.  Patient had still not been taking his Lasix.  The patient's renal function was checked which was baseline.  The patient had stated that he was supposed to have wound care visits, but he states that this had not been occurring.  The patient was informed that his illness is chronic and that the best treatment would most likely be observation in the hospital and PT/OT.  The patient did not wish to come into the hospital at that time and requested pain medications.  The patient was comfortable with outpatient plan of care and was deemed stable for discharge.     Here in the ED today, patient presents with request of his wounds to be rewrapped.  He states the wound care nurse that is supposed to visit his house to clean and rewrap his wounds has still not shown up to his house.  Patient states that he last had his wounds rewrapped at his most recent ED visit on 9/22.  He states that he experienced pain as these wraps began to fall off.  He states he also noticed fluid draining from the wound on his right leg that he reports had a foul smell.  Patient does note that his wounds are healing.  He states that his left leg is tender and sore.  Patient states that he has been experiencing bilateral " "leg swelling since his MI in 2019.  Does note that the swelling has gone down in the last couple days.  Patient does report some posterior medial left lower leg redness.  Patient denies any recent falls or injuries to his legs.  Patient states that he is on a water pill, but is unsure what exactly he is taking.  He states that he is doing his best to take these as prescribed.  Patient reports that the leg pain he is experiencing has been present for about a year.  Patient also reports having shortness of breath for the past couple months. He denies any chest pain.\"     ED OBSERVATION COURSE: Antony Aguila Jr. is a 61 year old male admitted on 9/25/2021. He has a history of hypertension, DM2, MI, and venous insufficiency who presents here to the Emergency Department due to bilateral leg pain.      ##PVD:  ##Chronic bilateral lower leg pain:  ##Lower leg wounds:   ##h/o chronic dissecting thoracic aorta s/p repair:   Adopted from H & P. Patient presenting with leg pain 2/2 PVD and chronic wounds. No concern for infection. Per chart review, patient has had multiple recent ED visits concerning venous stasis dermatitis of both lower extremities with the most recent being on 9/22/2021. Patient has not been taking his Lasix. The patient reports home care is suppose to come to his house and complete wound care. Home care has not been there for 2 weeks. He notes drainage from the wounds as the dressings on his legs fall off.  Patient states that he has been experiencing bilateral leg swelling since his MI in 2019.  Does note that the swelling has gone down in the last couple days. Patient has been referred to vascular surgery but has not been seen by them.  Chest xray negative, Bilateral xray of tibia/Fibula negative. US LE: No evidence of deep venous thrombosis in either lower extremity. Patient was seen by wound care, with recommendations as below. Patient was seen by PT who recommended home. Patient has accepting home " care agent, orders placed. Patient improved and feels ready to discharge. He was given strict return instructions. Recommend follow-up with PCP for repeat labs within 2 days.   -  cont Lasix 40 mg daily with PO potassium 40mEq daily   - continue HCTZ 50 daily  - Continue Gabapentin 600mg TID  - Continue Norco, Mobic prn  - Elevate legs  - Aquaphor TID, Benadryl prn       ##Insomnia  - PTA seroquel 600 QHS    ##T2DM:   - cont metformin 1000 twice daily  - BG checks TID and HS  - Hypoglycemic protocol     ##HTN:  - cont Lasix 40 mg daily with PO potassium 40mEq daily   - continue HCTZ 50 daily     ##Mood disorder:  - PTA Zyprexa, Abilify.      ##Constipation:  - prn Miralax and Senna      DISCHARGE DISPOSITION:   Discharged to home. The patient was discharged in a stable condition and agreed to discharge plan.    DISCHARGE INSTRUCTIONS AND FOLLOW-UP:  Discharge Procedure Orders   Home Care PT Referral for Hospital Discharge   Referral Priority: Routine Referral Type: Home Health Therapies & Aides   Number of Visits Requested: 1     Home care nursing referral   Referral Priority: Routine Referral Type: Home Health Therapies & Aides   Number of Visits Requested: 1     MD face to face encounter   Order Comments: Documentation of Face to Face and Certification for Home Health Services    I certify that patient: Antony Aguila Jr. is under my care and that I, or a nurse practitioner or physician's assistant working with me, had a face-to-face encounter that meets the physician face-to-face encounter requirements with this patient on: 9/27/2021.    This encounter with the patient was in whole, or in part, for the following medical condition, which is the primary reason for home health care: venus stasis ulcer.    I certify that, based on my findings, the following services are medically necessary home health services: Nursing and Physical Therapy.    My clinical findings support the need for the above services because:  Nurse is needed: To provide caregiver training to assist with: wound care and Physical Therapy Services are needed to assess and treat the following functional impairments: mobility, endurance.    Further, I certify that my clinical findings support that this patient is homebound (i.e. absences from home require considerable and taxing effort and are for medical reasons or [employer] services or infrequently or of short duration when for other reasons) because: Requires assistance of another person or specialized equipment to access medical services because patient: Range of motion limitations prevents ability to exit home safely...    Based on the above findings. I certify that this patient is confined to the home and needs intermittent skilled nursing care, physical therapy and/or speech therapy.  The patient is under my care, and I have initiated the establishment of the plan of care.  This patient will be followed by a physician who will periodically review the plan of care.  Physician/Provider to provide follow up care: Jacky Gaitan    Attending hospital physician (the Medicare certified [facility] provider): Conchis Chavez  Physician Signature: See electronic signature associated with these discharge orders.  Date: 9/27/2021     Reason for your hospital stay   Order Comments: You were admitted to the hospital for leg pain. Would care saw you and recommended wound cares.     Activity   Order Comments: Your activity upon discharge: activity as tolerated and no driving for today     Order Specific Question Answer Comments   Is discharge order? Yes      Wound care and dressings   Order Comments: Instructions to care for your wound at home: as directed.  Bilateral LE cares: daily while inpt, then over other day after discharge   Cleanse skin and wounds with microklenz spray and gauze.  Pat dry.   Apply sween 24 to intact skin      RLE wounds: Daily  while inpt, every other day after discharge    Cleanse as above.   Cover  open wounds and intact blisters with vaseline gauze.   Cover with ABD pads  Secure with kerlix, then surgilast netting.     When to contact your care team   Order Comments: Return to the ED with fever, uncontrolled nausea, vomiting, unrelieved pain, bleeding not relieved with pressure, dizziness, chest pain, shortness of breath, loss of consciousness, and any new or concerning symptoms.     Adult [facility] [place] Follow-up and recommended labs and tests   Order Comments: Follow up with primary care provider, Jacky Gaitan, within 2 days for hospital follow- up.  The following labs/tests are recommended: CBC, BMP.      Appointments on [place] and/or [facility] (with [facility] [place] or [facility] provider or service). Call [phone] if you haven't heard regarding these appointments within 7 days of discharge.     Diet   Order Comments: Follow this diet upon discharge: Orders Placed This Encounter      Regular Diet Adult     Order Specific Question Answer Comments   Is discharge order? Yes       Attestation:   I have reviewed today's vital signs, notes, medications, labs and imaging.      BRANDY Fernandez, CNP  Nurse Practitioner   Emergency Department Observation Unit

## 2021-09-28 NOTE — PLAN OF CARE
Observation Goals:  Diagnostic tests and consults completed and resulted: Met  Vital signs normal or at patient baseline: Met  Returns to baseline functional status: Met  Safe disposition plan has been identified: Met  Wound care consult complete: Met     A&Ox4.B/P runs high; but pt stated that it is at baseline. Denied pain. PIV removed; tip of catheter is intact.Discharge instruction revised with pt; pt stated understanding of discharge instruction.  Pt's discharge pain med was brought by staff( LAURA Goodman) and given to p Wheelchair ride is scheduled for 11:45 am back to his home.Pt will have home care nurse visit and physical therapy to evaluate and treat. Pt will leave with his belongings and personal walker.  Addendum  Pt is given couple of days of supplies for R leg dressing change.  Pt left floor via [facility] wheelchair ride with his belongings and personal walker.

## 2021-09-28 NOTE — PLAN OF CARE
Physical Therapy Discharge Summary    Reason for therapy discharge:    Discharged to home.    Progress towards therapy goal(s). See goals on Care Plan in [facility] electronic health record for goal details.  Goals partially met.  Barriers to achieving goals:   discharge from facility.    Therapy recommendation(s):    Continued therapy is recommended.  Rationale/Recommendations:  LORRI MCCABE.

## 2021-09-28 NOTE — PROGRESS NOTES
Care Management Discharge Note    Discharge Date:       Discharge Disposition: Home    Discharge Services: PCA, home care    Discharge DME:  No new DME noted.     Discharge Transportation: [facility] w/c ride at 11:45a today.     Private pay costs discussed: Not applicable    Education Provided on the Discharge Plan:  Yes  Persons Notified of Discharge Plans: Patient, bedside nurse, primary provider, home care  Patient/Family in Agreement with the Plan: yes    Handoff Referral Completed: No    Additional Information:  Patient is medically ready for discharge, patient updated. [facility] wheelchair ride scheduled for today at 11:45am. Patient will need to be sent with a few days of wound care supplies, bedside nurse updated. Discharge orders to be faxed to [facility] once completed. No additional needs noted at this time.     [facility] (RN/PT)  Phone: [phone]  Fax: [phone] Addendum:  Discharge orders faxed to [facility] at this time.     Arlene Mendoza, RNCC, BSN    [facility]icine [facility]  [address] [place] [id number]    [email or web address]    Office: [phone] Pager: [phone]  To contact the weekend RNCC, page [phone].

## 2021-09-28 NOTE — PLAN OF CARE
"Observation Goals:  Diagnostic tests and consults completed and resulted: Met  Vital signs normal or at patient baseline: Met  Returns to baseline functional status: Met  Safe disposition plan has been identified: pending  Wound care consult complete: Met     Patient fell sleep while asking pain medication. Re-direct and encourage to follow plan of care.   /52 (BP Location: Right arm)   Pulse 89   Temp 98.7  F (37.1  C) (Oral)   Resp 17   Ht 1.822 m (5' 11.73\")   Wt (!) 153.8 kg (339 lb)   SpO2 96%   BMI 46.32 kg/m       "

## 2021-09-28 NOTE — PLAN OF CARE
Observation Goals:  Diagnostic tests and consults completed and resulted: Met  Vital signs normal or at patient baseline: Met  Returns to baseline functional status: Met  Safe disposition plan has been identified: pending  Wound care consult complete: Met

## 2021-09-28 NOTE — PROGRESS NOTES
"Emergency Medicine Observation Attending note    The patient was independently seen and examined by me. The chart, vital signs, and labs were reviewed. The patient's findings were discussed with the [facility] on the observation unit, and I agree with the findings of the note and the plan.    61 year old male with a past medical history significant for hypertension, DM2, MI, and venous insufficiency, admitted to ED OBS after presenting to the ED with complaint of bilateral leg pain.  . He was supposed to be following with wound care, but apparently those visits were not happening. He reports some left leg redness and discomfort. No recent trauma. In the ED it wasn't felt that he had evidence of cellulitis. LE unit(s)/s was done which was negative. Plain films didn't show gas within the soft tissues. Given repeated ED visit with same complaint, he was admitted to ED OBS for social work care coordination regarding his home nursing and wound care. This morning he states he's feeling a little congesting this am, otherwise feeling alright.    BP (!) 145/96 (BP Location: Left arm)   Pulse 96   Temp 98.5  F (36.9  C) (Oral)   Resp 18   Ht 1.822 m (5' 11.73\")   Wt (!) 153.8 kg (339 lb)   SpO2 98%   BMI 46.32 kg/m        Exam:  General: sleepy but awake, NAD  HEENT: NC/AT  Neck: supple  Lungs: CTA-B  Heart: RRR, no M/R/G  Ext:stasis changes to both lower legs with superficial ulceration to right leg. No open wounds on the left today. Some serosang drainage from some of the wounds on the right - dressing in place. Some diffuse skin thickening of the bilateral lower legs. Feet warm with likely faintly palpable pulses.       Assessment/plan:  1. Stasis dermatitis with lower extremity wounds - ongoing. No clear evidence for cellulitis.  Wound care saw yesterday and has made recs. Home care arranged by [facility] for discharge today.     "

## 2021-09-29 ENCOUNTER — PATIENT OUTREACH (OUTPATIENT)
Dept: CARE COORDINATION | Facility: CLINIC | Age: 61
End: 2021-09-29

## 2021-09-29 DIAGNOSIS — Z71.89 OTHER SPECIFIED COUNSELING: ICD-10-CM

## 2021-09-29 NOTE — PROGRESS NOTES
Clinic Care Coordination Contact  [facility]/Voicemail       Clinical Data: Care Coordinator Outreach  Outreach attempted x 1.  Left message on patient's voicemail with call back information and requested return call.  Plan:Care Coordinator will try to reach patient again in 1-2 business days.        MITCH Kaur  [phone]  [facility]

## 2021-09-30 NOTE — PROGRESS NOTES
"Clinic Care Coordination Contact  [facility]: Post-Discharge Note  SITUATION                                                      Admission:    Admission Date: 09/25/21   Reason for Admission: Localized swelling of both lower legs, Venous stasis ulcer of calf limited to breakdown of skin without varicose veins, unspecified laterality (H)  Discharge:   Discharge Date: 09/28/21  Discharge Diagnosis: Localized swelling of both lower legs, Venous stasis ulcer of calf limited to breakdown of skin without varicose veins, unspecified laterality (H)    BACKGROUND                                                         \" The history is provided by the patient and medical records.      Antony Aguila Jr. is a 61 year old male with a past medical history significant for hypertension, DM2, MI, and venous insufficiency who presents here to the Emergency Department due to bilateral leg pain. Per chart review, patient has had multiple recent ED visits concerning venous stasis dermatitis of both lower extremities with the most recent being on 9/22/2021.  On this most recent ED visit, the patient presented with increasing leg pain with the right being greater than left.  Cellulitis was not apparent during this ED visit as the patient had no asymmetric erythema or warmth.  Patient had still not been taking his Lasix.  The patient's renal function was checked which was baseline.  The patient had stated that he was supposed to have wound care visits, but he states that this had not been occurring.  The patient was informed that his illness is chronic and that the best treatment would most likely be observation in the hospital and PT/OT.  The patient did not wish to come into the hospital at that time and requested pain medications.  The patient was comfortable with outpatient plan of care and was deemed stable for discharge.     Here in the ED today, patient presents with request of his wounds to be rewrapped.  He states the " "wound care nurse that is supposed to visit his house to clean and rewrap his wounds has still not shown up to his house.  Patient states that he last had his wounds rewrapped at his most recent ED visit on 9/22.  He states that he experienced pain as these wraps began to fall off.  He states he also noticed fluid draining from the wound on his right leg that he reports had a foul smell.  Patient does note that his wounds are healing.  He states that his left leg is tender and sore.  Patient states that he has been experiencing bilateral leg swelling since his MI in 2019.  Does note that the swelling has gone down in the last couple days.  Patient does report some posterior medial left lower leg redness.  Patient denies any recent falls or injuries to his legs.  Patient states that he is on a water pill, but is unsure what exactly he is taking.  He states that he is doing his best to take these as prescribed.  Patient reports that the leg pain he is experiencing has been present for about a year.  Patient also reports having shortness of breath for the past couple months. He denies any chest pain.\"      ED OBSERVATION COURSE: Antony Aguila Jr. is a 61 year old male admitted on 9/25/2021. He has a history of hypertension, DM2, MI, and venous insufficiency who presents here to the Emergency Department due to bilateral leg pain.     ASSESSMENT      Enrollment  Primary Care Care Coordination Status: Not a Candidate    Discharge Assessment  How are you doing now that you are home?: leg is still a little swollen  How are your symptoms? (Red Flag symptoms escalate to triage hotline per guidelines): Unchanged  Do you feel your condition is stable enough to be safe at home until your provider visit?: Yes  Does the patient have their discharge instructions? : Yes  Does the patient have questions regarding their discharge instructions? : Yes (see comment) (Pt asked if someone will visit him at home, CTA told Pt that he will be " seen by homecare and where to find that information on his dicharge instructions)  Were you started on any new medications or were there changes to any of your previous medications? : No  Does the patient have all of their medications?: Yes  Do you have questions regarding any of your medications? : No  Do you have all of your needed medical supplies or equipment (DME)?  (i.e. oxygen tank, CPAP, cane, etc.): Yes  Discharge follow-up appointment scheduled within 14 calendar days? : Yes  Discharge Follow Up Appointment Date: 10/06/21  Discharge Follow Up Appointment Scheduled with?: Specialty Care Provider        PLAN                                                      Outpatient Plan:     Follow up with primary care provider, Jacky Gaitan, within 2 days for hospital follow- up.  The following labs/tests are recommended: CBC, BMP.       Appointments on [facility] and/or [facility] (with [facility] or [facility] provider or service). Call [phone] if you haven't heard regarding these appointments within 7 days of discharge.         Future Appointments   Date Time Provider Department Center   [facility]  [facility]   10/6/2021  3:30 PM [address]   10/11/2021  9:30 AM Igor Epps MD Located within [facility]         For any urgent concerns, please contact our 24 hour nurse triage line: [phone])         SULMA Dickson  [phone]  [facility]

## 2021-10-28 ENCOUNTER — HOSPITAL ENCOUNTER (EMERGENCY)
Facility: CLINIC | Age: 61
Discharge: HOME | End: 2021-10-28
Attending: EMERGENCY MEDICINE | Admitting: EMERGENCY MEDICINE
Payer: COMMERCIAL

## 2021-10-28 VITALS
DIASTOLIC BLOOD PRESSURE: 90 MMHG | RESPIRATION RATE: 16 BRPM | BODY MASS INDEX: 42.66 KG/M2 | HEART RATE: 70 BPM | SYSTOLIC BLOOD PRESSURE: 153 MMHG | OXYGEN SATURATION: 99 % | WEIGHT: 315 LBS | TEMPERATURE: 98 F | HEIGHT: 72 IN

## 2021-10-28 DIAGNOSIS — I10 ESSENTIAL HYPERTENSION: ICD-10-CM

## 2021-10-28 LAB
ANION GAP SERPL CALCULATED.3IONS-SCNC: 1 MMOL/L (ref 3–14)
ATRIAL RATE - MUSE: 69 BPM
BASOPHILS # BLD AUTO: 0.1 10E3/UL (ref 0–0.2)
BASOPHILS NFR BLD AUTO: 1 %
BUN SERPL-MCNC: 11 MG/DL (ref 7–30)
CALCIUM SERPL-MCNC: 9.2 MG/DL (ref 8.5–10.1)
CHLORIDE BLD-SCNC: 104 MMOL/L (ref 94–109)
CO2 SERPL-SCNC: 34 MMOL/L (ref 20–32)
CREAT SERPL-MCNC: 1.16 MG/DL (ref 0.66–1.25)
DIASTOLIC BLOOD PRESSURE - MUSE: NORMAL MMHG
EOSINOPHIL # BLD AUTO: 0.2 10E3/UL (ref 0–0.7)
EOSINOPHIL NFR BLD AUTO: 3 %
ERYTHROCYTE [DISTWIDTH] IN BLOOD BY AUTOMATED COUNT: 16.3 % (ref 10–15)
GFR SERPL CREATININE-BSD FRML MDRD: 67 ML/MIN/1.73M2
GLUCOSE BLD-MCNC: 127 MG/DL (ref 70–99)
HCT VFR BLD AUTO: 41.8 % (ref 40–53)
HGB BLD-MCNC: 13.5 G/DL (ref 13.3–17.7)
IMM GRANULOCYTES # BLD: 0 10E3/UL
IMM GRANULOCYTES NFR BLD: 1 %
INTERPRETATION ECG - MUSE: NORMAL
LYMPHOCYTES # BLD AUTO: 1.6 10E3/UL (ref 0.8–5.3)
LYMPHOCYTES NFR BLD AUTO: 24 %
MCH RBC QN AUTO: 28.8 PG (ref 26.5–33)
MCHC RBC AUTO-ENTMCNC: 32.3 G/DL (ref 31.5–36.5)
MCV RBC AUTO: 89 FL (ref 78–100)
MONOCYTES # BLD AUTO: 0.6 10E3/UL (ref 0–1.3)
MONOCYTES NFR BLD AUTO: 9 %
NEUTROPHILS # BLD AUTO: 4.2 10E3/UL (ref 1.6–8.3)
NEUTROPHILS NFR BLD AUTO: 62 %
NRBC # BLD AUTO: 0 10E3/UL
NRBC BLD AUTO-RTO: 0 /100
P AXIS - MUSE: 24 DEGREES
PLATELET # BLD AUTO: 227 10E3/UL (ref 150–450)
POTASSIUM BLD-SCNC: 3.4 MMOL/L (ref 3.4–5.3)
PR INTERVAL - MUSE: 152 MS
QRS DURATION - MUSE: 74 MS
QT - MUSE: 426 MS
QTC - MUSE: 456 MS
R AXIS - MUSE: 32 DEGREES
RBC # BLD AUTO: 4.69 10E6/UL (ref 4.4–5.9)
SODIUM SERPL-SCNC: 139 MMOL/L (ref 133–144)
SYSTOLIC BLOOD PRESSURE - MUSE: NORMAL MMHG
T AXIS - MUSE: 90 DEGREES
VENTRICULAR RATE- MUSE: 69 BPM
WBC # BLD AUTO: 6.6 10E3/UL (ref 4–11)

## 2021-10-28 PROCEDURE — 93005 ELECTROCARDIOGRAM TRACING: CPT | Performed by: EMERGENCY MEDICINE

## 2021-10-28 PROCEDURE — 82565 ASSAY OF CREATININE: CPT | Performed by: EMERGENCY MEDICINE

## 2021-10-28 PROCEDURE — 85025 COMPLETE CBC W/AUTO DIFF WBC: CPT | Performed by: EMERGENCY MEDICINE

## 2021-10-28 PROCEDURE — 99284 EMERGENCY DEPT VISIT MOD MDM: CPT | Performed by: EMERGENCY MEDICINE

## 2021-10-28 PROCEDURE — 250N000013 HC RX MED GY IP 250 OP 250 PS 637: Performed by: EMERGENCY MEDICINE

## 2021-10-28 PROCEDURE — 36415 COLL VENOUS BLD VENIPUNCTURE: CPT | Performed by: EMERGENCY MEDICINE

## 2021-10-28 PROCEDURE — 93010 ELECTROCARDIOGRAM REPORT: CPT | Performed by: EMERGENCY MEDICINE

## 2021-10-28 PROCEDURE — 99285 EMERGENCY DEPT VISIT HI MDM: CPT | Mod: 25 | Performed by: EMERGENCY MEDICINE

## 2021-10-28 RX ORDER — HYDROCODONE BITARTRATE AND ACETAMINOPHEN 5; 325 MG/1; MG/1
1 TABLET ORAL ONCE
Status: COMPLETED | OUTPATIENT
Start: 2021-10-28 | End: 2021-10-28

## 2021-10-28 RX ORDER — HYDROCODONE BITARTRATE AND ACETAMINOPHEN 5; 325 MG/1; MG/1
1 TABLET ORAL ONCE
Status: DISCONTINUED | OUTPATIENT
Start: 2021-10-28 | End: 2021-10-28 | Stop reason: HOSPADM

## 2021-10-28 RX ADMIN — HYDROCODONE BITARTRATE AND ACETAMINOPHEN 1 TABLET: 5; 325 TABLET ORAL at 17:59

## 2021-10-28 ASSESSMENT — MIFFLIN-ST. JEOR: SCORE: 2425.59

## 2021-10-28 ASSESSMENT — ENCOUNTER SYMPTOMS: DIZZINESS: 0

## 2021-10-28 NOTE — DISCHARGE INSTRUCTIONS
Thank you for coming to the [facility] Emergency Department.     Please follow up with your primary care provider in the next 1-2 weeks for a recheck of your blood pressure and adjustment of BP medications, if needed. Take all of your blood pressure medications at the same time each day and try not to skip any doses.     Return to the ER for symptoms of high blood pressure:  Chest pain  Shortness of breath  Worsening leg swelling  Headaches  Vision changes

## 2021-10-28 NOTE — ED TRIAGE NOTES
Pt BIBA with concerns for HTN.    Baseline -140s  Last two days 160-200s  Called nurse triage line and they told him to come into ER to get checked out.    EMS:  EKG NSR  VSS        Addendum: Pt falling asleep intermittently and frequently during triage questions. When asked if he feels tired he stated that the medication he takes makes him feel like he has narcolepsy.

## 2021-10-28 NOTE — PROGRESS NOTES
Brief Social Work Note    Expected Discharge Date:  10/28/2021     Concerns to be Addressed:    Discharge transportation    Additional Information:    SW spoke with ED RN Mariah who requested assistance with arranging discharge transportation.    1749 SW contacted [employer] ([phone]) and was able to secure discharge transportation for will call pt pickup. Representative advised SW to inform pt that  will contact him when near [facility].    1755 SW updated RN regarding transportation. SW also asked RN to ask pt to have his phone on and that  would call when near.    Addendum 1927:    1927 SW spoke with ED RN Francisco who told SW that pt was still waiting for his taxi. SW agreed to follow up with transportation service and update RN.    1931 SW contacted [employer] and was told that the ride was set up as will call which means that pt would need to call them to let them know he was ready for transportation. Agent told SW that she would dispatch  ASAP. SW thanked agent for assistance.    1933 SW updated RN who agreed to update pt.    SW will continue to follow as needed.    CELI Mcdonald, LGSW  ED/OBS   [facility]  Phone: [phone]  Pager: [phone]  Fax: [phone]     On-call pager, [phone], 4:00 pm to midnight

## 2021-10-28 NOTE — ED PROVIDER NOTES
ED Provider Note  [facility]      History     Chief Complaint   Patient presents with     Hypertension     HPI  Antony Aguila Jr. is a 61 year old male with a PMH of DM, HTN and venous stasis ulcer of calf who presents to the ED today complaining of hypertension.  Patient states has been struggling with hypertension for the past 3 years.  He states his home health nurse came out today to check his blood pressure which was high, and told him to come to the ED.  He states he has no symptoms and denies dizziness or malaise.  He states he is on 2 other blood pressure medications besides hydrochlorothiazide, for a total of 3 antihypertensive medications.  He states that he misses his medication some days, noting that he took 2 out of 3 of his medications yesterday and only 1 out of 3 today at 7 AM this morning.  He states he normally takes them before bed and that none of the medications are 3 times a day or more.  Patient also endorses bilateral lower leg pain.  He states he uses edema wraps every other day which his home health nurse helps him put on.  He states he has open wounds on his lower legs but does not want the dressings to be removed for evaluation.     Additional information from his home health RN.   Pt's BP meds are:  Lasix 40mg once daily  hydrochlorothiazide 50mg once daily  Amlodipine 10mg once daily  Lisinopril/HCTZ 10/12.5mg once daily  She is not sure he is taking all of them every day because he prefers to keep the pills in the bottle rather than use a pill box.   She measures /100 then repeat was 158/92 last night. This AM it was 174/103/ Pt was asymptomatic last night and continues to be asymptomatic now.     Past Medical History  Past Medical History:   Diagnosis Date     Diabetes (H)      Hypertension      History reviewed. No pertinent surgical history.  acetaminophen (TYLENOL) 500 MG tablet  albuterol (PROAIR HFA/PROVENTIL HFA/VENTOLIN HFA) 108 (90 Base)  MCG/ACT inhaler  ARIPiprazole (ABILIFY) 10 MG tablet  diphenhydrAMINE (BENADRYL) 50 MG capsule  furosemide (LASIX) 40 MG tablet  gabapentin (NEURONTIN) 600 MG tablet  hydrochlorothiazide (HYDRODIURIL) 50 MG tablet  HYDROcodone-acetaminophen (NORCO) 5-325 MG tablet  levETIRAcetam (KEPPRA) 500 MG tablet  meloxicam (MOBIC) 7.5 MG tablet  metFORMIN (GLUCOPHAGE) 1000 MG tablet  mineral oil-hydrophilic petrolatum (AQUAPHOR) external ointment  OLANZapine (ZYPREXA) 5 MG tablet  polyethylene glycol (MIRALAX) 17 g packet  potassium chloride ER (K-TAB) 20 MEQ CR tablet  QUEtiapine (SEROQUEL) 400 MG tablet  senna (SENOKOT) 8.6 MG tablet  simethicone (MYLICON) 125 MG chewable tablet      Allergies   Allergen Reactions     Lidocaine      Pt states this is not an allergy and doesn't recall this to be an allergy     Lisinopril Swelling     Pt states this is not an allergy and doesn't recall this to be an allergy     Pollen Extract      Family History  No family history on file.  Social History   Social History     Tobacco Use     Smoking status: None   Substance Use Topics     Alcohol use: None     Drug use: None      Past medical history, past surgical history, medications, allergies, family history, and social history were reviewed with the patient. No additional pertinent items.       Review of Systems   Musculoskeletal:        (Positive for bilateral lower extremity pain)  (Positive for lower extremity open wound)   Neurological: Negative for dizziness.   Hematological:        (Positive for hypertension)   All other systems reviewed and are negative.    A complete review of systems was performed with pertinent positives and negatives noted in the HPI, and all other systems negative.    Physical Exam   BP: (!) 156/89  Pulse: 86  Temp: 98  F (36.7  C)  Resp: 16  Height: 182.9 cm (6')  Weight: (!) 158.8 kg (350 lb)  SpO2: 99 %     Physical Exam  Gen:A&Ox3, no acute distress  HEENT:PERRL, no facial tenderness or wounds, head  atraumatic, oropharynx clear, mucous membranes moist, TMs clear bilaterally  CV:RRR without murmurs  PULM:Clear to auscultation bilaterally  Abd:soft, nontender, nondistended. Bowel sounds present and normal  UE:No traumatic injuries, skin normal  LE: edema and venous stasis changes  Neuro:CN II-XII intact, strength 5/5 throughout  Skin: no rashes or ecchymoses      ED Course     3:01 PM  The patient was seen and examined by Mary Kessler MD in Room ED15.     Procedures            EKG Interpretation:      Interpreted by Mary Kessler MD  Time reviewed: 3:44pm  Symptoms at time of EKG: HTN  Rhythm: normal sinus   Rate: 69  Axis: normal  Ectopy: none  Conduction: normal  ST Segments/ T Waves: No ST-T wave changes with nonspecific ST/T wave abnormalities  Q Waves: none  Comparison to prior: Unchanged from Sep 25, 2021    Clinical Impression: normal EKG              Results for orders placed or performed during the hospital encounter of 10/28/21   Basic metabolic panel     Status: Abnormal   Result Value Ref Range    Sodium 139 133 - 144 mmol/L    Potassium 3.4 3.4 - 5.3 mmol/L    Chloride 104 94 - 109 mmol/L    Carbon Dioxide (CO2) 34 (H) 20 - 32 mmol/L    Anion Gap 1 (L) 3 - 14 mmol/L    Urea Nitrogen 11 7 - 30 mg/dL    Creatinine 1.16 0.66 - 1.25 mg/dL    Calcium 9.2 8.5 - 10.1 mg/dL    Glucose 127 (H) 70 - 99 mg/dL    GFR Estimate 67 >60 mL/min/1.73m2   CBC with platelets and differential     Status: Abnormal   Result Value Ref Range    WBC Count 6.6 4.0 - 11.0 10e3/uL    RBC Count 4.69 4.40 - 5.90 10e6/uL    Hemoglobin 13.5 13.3 - 17.7 g/dL    Hematocrit 41.8 40.0 - 53.0 %    MCV 89 78 - 100 fL    MCH 28.8 26.5 - 33.0 pg    MCHC 32.3 31.5 - 36.5 g/dL    RDW 16.3 (H) 10.0 - 15.0 %    Platelet Count 227 150 - 450 10e3/uL    % Neutrophils 62 %    % Lymphocytes 24 %    % Monocytes 9 %    % Eosinophils 3 %    % Basophils 1 %    % Immature Granulocytes 1 %    NRBCs per 100 WBC 0 <1 /100    Absolute  Neutrophils 4.2 1.6 - 8.3 10e3/uL    Absolute Lymphocytes 1.6 0.8 - 5.3 10e3/uL    Absolute Monocytes 0.6 0.0 - 1.3 10e3/uL    Absolute Eosinophils 0.2 0.0 - 0.7 10e3/uL    Absolute Basophils 0.1 0.0 - 0.2 10e3/uL    Absolute Immature Granulocytes 0.0 <=0.0 10e3/uL    Absolute NRBCs 0.0 10e3/uL   EKG 12 lead     Status: None (Preliminary result)   Result Value Ref Range    Systolic Blood Pressure  mmHg    Diastolic Blood Pressure  mmHg    Ventricular Rate 69 BPM    Atrial Rate 69 BPM    IN Interval 152 ms    QRS Duration 74 ms     ms    QTc 456 ms    P Axis 24 degrees    R AXIS 32 degrees    T Axis 90 degrees    Interpretation ECG       Sinus rhythm with sinus arrhythmia  Nonspecific ST and T wave abnormality  Abnormal ECG     CBC with platelets differential     Status: Abnormal    Narrative    The following orders were created for panel order CBC with platelets differential.  Procedure                               Abnormality         Status                     ---------                               -----------         ------                     CBC with platelets and d...[[id number]]  Abnormal            Final result                 Please view results for these tests on the individual orders.     Medications   HYDROcodone-acetaminophen (NORCO) 5-325 MG per tablet 1 tablet (has no administration in time range)   HYDROcodone-acetaminophen (NORCO) 5-325 MG per tablet 1 tablet (1 tablet Oral Given 10/28/21 1759)        Assessments & Plan (with Medical Decision Making)   62 yo M presenting with HTN. Sent by his home nurse after elevated BPs last night and this AM.   Arrives afebrile, initial /90.   Automatic cuff had difficulty reading BPs on him, so we had occasional very elevated levels. Manual /95 on left arm.   Pt continues to be asymptomatic.  Discharged home.   Encouraged continuing BP medications without change, but to make sure to take them daily.     I have reviewed the nursing notes. I have  reviewed the findings, diagnosis, plan and need for follow up with the patient.    New Prescriptions    No medications on file       Final diagnoses:   Essential hypertension   I, Alin Bond, am serving as a trained medical scribe to document services personally performed by Mary Kessler MD, based on the provider's statements to me.     IMary MD, was physically present and have reviewed and verified the accuracy of this note documented by Alin Bond.      --  Mary Kessler MD [facility] EMERGENCY DEPARTMENT  10/28/2021     Mary Kessler MD  10/28/21 2019

## 2021-11-18 ENCOUNTER — LAB REQUISITION (OUTPATIENT)
Dept: LAB | Facility: CLINIC | Age: 61
End: 2021-11-18
Payer: COMMERCIAL

## 2021-11-18 DIAGNOSIS — Z79.899 OTHER LONG TERM (CURRENT) DRUG THERAPY: ICD-10-CM

## 2021-11-18 LAB
ANION GAP SERPL CALCULATED.3IONS-SCNC: 7 MMOL/L (ref 3–14)
BUN SERPL-MCNC: 11 MG/DL (ref 7–30)
CALCIUM SERPL-MCNC: 9 MG/DL (ref 8.5–10.1)
CHLORIDE BLD-SCNC: 101 MMOL/L (ref 94–109)
CHOLEST SERPL-MCNC: 131 MG/DL
CO2 SERPL-SCNC: 27 MMOL/L (ref 20–32)
CREAT SERPL-MCNC: 1.19 MG/DL (ref 0.66–1.25)
FASTING STATUS PATIENT QL REPORTED: NO
GFR SERPL CREATININE-BSD FRML MDRD: 65 ML/MIN/1.73M2
GLUCOSE BLD-MCNC: 126 MG/DL (ref 70–99)
HDLC SERPL-MCNC: 42 MG/DL
LDLC SERPL CALC-MCNC: 68 MG/DL
NONHDLC SERPL-MCNC: 89 MG/DL
POTASSIUM BLD-SCNC: 3.4 MMOL/L (ref 3.4–5.3)
SODIUM SERPL-SCNC: 135 MMOL/L (ref 133–144)
TRIGL SERPL-MCNC: 103 MG/DL

## 2021-11-18 PROCEDURE — 80061 LIPID PANEL: CPT | Mod: ORL | Performed by: INTERNAL MEDICINE

## 2021-11-18 PROCEDURE — 80048 BASIC METABOLIC PNL TOTAL CA: CPT | Mod: ORL | Performed by: INTERNAL MEDICINE

## 2022-04-01 ENCOUNTER — LAB REQUISITION (OUTPATIENT)
Dept: LAB | Facility: CLINIC | Age: 62
End: 2022-04-01
Payer: COMMERCIAL

## 2022-04-01 DIAGNOSIS — E11.9 TYPE 2 DIABETES MELLITUS WITHOUT COMPLICATIONS (H): ICD-10-CM

## 2022-04-01 LAB
CREAT UR-MCNC: 181 MG/DL
MICROALBUMIN UR-MCNC: 10 MG/L
MICROALBUMIN/CREAT UR: 5.52 MG/G CR (ref 0–17)

## 2022-04-01 PROCEDURE — 82043 UR ALBUMIN QUANTITATIVE: CPT | Mod: ORL | Performed by: INTERNAL MEDICINE

## 2022-09-15 ENCOUNTER — LAB REQUISITION (OUTPATIENT)
Dept: LAB | Facility: CLINIC | Age: 62
End: 2022-09-15
Payer: COMMERCIAL

## 2022-09-15 ENCOUNTER — APPOINTMENT (OUTPATIENT)
Dept: GENERAL RADIOLOGY | Facility: CLINIC | Age: 62
End: 2022-09-15
Attending: EMERGENCY MEDICINE
Payer: COMMERCIAL

## 2022-09-15 ENCOUNTER — HOSPITAL ENCOUNTER (INPATIENT)
Facility: CLINIC | Age: 62
LOS: 6 days | Discharge: HOME | End: 2022-09-21
Attending: EMERGENCY MEDICINE | Admitting: INTERNAL MEDICINE
Payer: COMMERCIAL

## 2022-09-15 DIAGNOSIS — Z11.52 ENCOUNTER FOR SCREENING LABORATORY TESTING FOR SEVERE ACUTE RESPIRATORY SYNDROME CORONAVIRUS 2 (SARS-COV-2): ICD-10-CM

## 2022-09-15 DIAGNOSIS — J02.9 ACUTE PHARYNGITIS, UNSPECIFIED: ICD-10-CM

## 2022-09-15 DIAGNOSIS — E87.6 HYPOKALEMIA: ICD-10-CM

## 2022-09-15 DIAGNOSIS — E11.9 TYPE 2 DIABETES MELLITUS WITHOUT COMPLICATIONS (H): ICD-10-CM

## 2022-09-15 DIAGNOSIS — E11.65 UNCONTROLLED TYPE 2 DIABETES MELLITUS WITH HYPERGLYCEMIA (H): ICD-10-CM

## 2022-09-15 DIAGNOSIS — Z79.84 LONG TERM CURRENT USE OF ORAL HYPOGLYCEMIC DRUG: ICD-10-CM

## 2022-09-15 LAB
ALBUMIN SERPL-MCNC: 2.3 G/DL (ref 3.4–5)
ALP SERPL-CCNC: 61 U/L (ref 40–150)
ALT SERPL W P-5'-P-CCNC: 14 U/L (ref 0–70)
ANION GAP SERPL CALCULATED.3IONS-SCNC: 6 MMOL/L (ref 3–14)
ANION GAP SERPL CALCULATED.3IONS-SCNC: 7 MMOL/L (ref 3–14)
AST SERPL W P-5'-P-CCNC: 11 U/L (ref 0–45)
BASOPHILS # BLD AUTO: 0.1 10E3/UL (ref 0–0.2)
BASOPHILS NFR BLD AUTO: 1 %
BILIRUB DIRECT SERPL-MCNC: 0.3 MG/DL (ref 0–0.2)
BILIRUB SERPL-MCNC: 0.8 MG/DL (ref 0.2–1.3)
BUN SERPL-MCNC: 23 MG/DL (ref 7–30)
BUN SERPL-MCNC: 30 MG/DL (ref 7–30)
CA-I BLD-MCNC: 4.1 MG/DL (ref 4.4–5.2)
CALCIUM SERPL-MCNC: 6.3 MG/DL (ref 8.5–10.1)
CALCIUM SERPL-MCNC: 8.8 MG/DL (ref 8.5–10.1)
CHLORIDE BLD-SCNC: 101 MMOL/L (ref 94–109)
CHLORIDE BLD-SCNC: 84 MMOL/L (ref 94–109)
CO2 SERPL-SCNC: 29 MMOL/L (ref 20–32)
CO2 SERPL-SCNC: 38 MMOL/L (ref 20–32)
CREAT SERPL-MCNC: 1.16 MG/DL (ref 0.66–1.25)
CREAT SERPL-MCNC: 1.71 MG/DL (ref 0.66–1.25)
EOSINOPHIL # BLD AUTO: 0.1 10E3/UL (ref 0–0.7)
EOSINOPHIL NFR BLD AUTO: 1 %
ERYTHROCYTE [DISTWIDTH] IN BLOOD BY AUTOMATED COUNT: 13.5 % (ref 10–15)
GFR SERPL CREATININE-BSD FRML MDRD: 44 ML/MIN/1.73M2
GFR SERPL CREATININE-BSD FRML MDRD: 71 ML/MIN/1.73M2
GLUCOSE BLD-MCNC: 469 MG/DL (ref 70–99)
GLUCOSE BLD-MCNC: 576 MG/DL (ref 70–99)
GLUCOSE BLD-MCNC: 631 MG/DL (ref 70–99)
GLUCOSE BLDC GLUCOMTR-MCNC: >600 MG/DL (ref 70–99)
GLUCOSE BLDC GLUCOMTR-MCNC: >600 MG/DL (ref 70–99)
HCT VFR BLD AUTO: 45 % (ref 40–53)
HGB BLD-MCNC: 16 G/DL (ref 13.3–17.7)
IMM GRANULOCYTES # BLD: 0 10E3/UL
IMM GRANULOCYTES NFR BLD: 1 %
LYMPHOCYTES # BLD AUTO: 1.9 10E3/UL (ref 0.8–5.3)
LYMPHOCYTES NFR BLD AUTO: 22 %
MAGNESIUM SERPL-MCNC: 1.6 MG/DL (ref 1.6–2.3)
MCH RBC QN AUTO: 29.7 PG (ref 26.5–33)
MCHC RBC AUTO-ENTMCNC: 35.6 G/DL (ref 31.5–36.5)
MCV RBC AUTO: 84 FL (ref 78–100)
MONOCYTES # BLD AUTO: 0.7 10E3/UL (ref 0–1.3)
MONOCYTES NFR BLD AUTO: 9 %
NEUTROPHILS # BLD AUTO: 5.8 10E3/UL (ref 1.6–8.3)
NEUTROPHILS NFR BLD AUTO: 66 %
NRBC # BLD AUTO: 0 10E3/UL
NRBC BLD AUTO-RTO: 0 /100
PHOSPHATE SERPL-MCNC: 1.8 MG/DL (ref 2.5–4.5)
PLATELET # BLD AUTO: 211 10E3/UL (ref 150–450)
POTASSIUM BLD-SCNC: 2 MMOL/L (ref 3.4–5.3)
POTASSIUM BLD-SCNC: 3.5 MMOL/L (ref 3.4–5.3)
PROT SERPL-MCNC: 5.2 G/DL (ref 6.8–8.8)
RBC # BLD AUTO: 5.38 10E6/UL (ref 4.4–5.9)
SARS-COV-2 RNA RESP QL NAA+PROBE: NEGATIVE
SODIUM SERPL-SCNC: 128 MMOL/L (ref 133–144)
SODIUM SERPL-SCNC: 137 MMOL/L (ref 133–144)
TROPONIN I SERPL HS-MCNC: 12 NG/L
WBC # BLD AUTO: 8.5 10E3/UL (ref 4–11)

## 2022-09-15 PROCEDURE — 82248 BILIRUBIN DIRECT: CPT | Performed by: EMERGENCY MEDICINE

## 2022-09-15 PROCEDURE — 99223 1ST HOSP IP/OBS HIGH 75: CPT | Mod: AI | Performed by: INTERNAL MEDICINE

## 2022-09-15 PROCEDURE — 87081 CULTURE SCREEN ONLY: CPT | Performed by: NURSE PRACTITIONER

## 2022-09-15 PROCEDURE — 96361 HYDRATE IV INFUSION ADD-ON: CPT | Performed by: EMERGENCY MEDICINE

## 2022-09-15 PROCEDURE — 83735 ASSAY OF MAGNESIUM: CPT | Performed by: EMERGENCY MEDICINE

## 2022-09-15 PROCEDURE — 82330 ASSAY OF CALCIUM: CPT | Performed by: EMERGENCY MEDICINE

## 2022-09-15 PROCEDURE — 120N000002 HC R&B MED SURG/OB UMMC

## 2022-09-15 PROCEDURE — 71045 X-RAY EXAM CHEST 1 VIEW: CPT

## 2022-09-15 PROCEDURE — 250N000009 HC RX 250: Performed by: EMERGENCY MEDICINE

## 2022-09-15 PROCEDURE — 36415 COLL VENOUS BLD VENIPUNCTURE: CPT | Performed by: EMERGENCY MEDICINE

## 2022-09-15 PROCEDURE — 83036 HEMOGLOBIN GLYCOSYLATED A1C: CPT | Performed by: EMERGENCY MEDICINE

## 2022-09-15 PROCEDURE — 96366 THER/PROPH/DIAG IV INF ADDON: CPT | Performed by: EMERGENCY MEDICINE

## 2022-09-15 PROCEDURE — 96372 THER/PROPH/DIAG INJ SC/IM: CPT | Performed by: EMERGENCY MEDICINE

## 2022-09-15 PROCEDURE — 93010 ELECTROCARDIOGRAM REPORT: CPT | Performed by: EMERGENCY MEDICINE

## 2022-09-15 PROCEDURE — 84484 ASSAY OF TROPONIN QUANT: CPT | Performed by: EMERGENCY MEDICINE

## 2022-09-15 PROCEDURE — 258N000003 HC RX IP 258 OP 636: Performed by: EMERGENCY MEDICINE

## 2022-09-15 PROCEDURE — 99285 EMERGENCY DEPT VISIT HI MDM: CPT | Mod: 25 | Performed by: EMERGENCY MEDICINE

## 2022-09-15 PROCEDURE — C9803 HOPD COVID-19 SPEC COLLECT: HCPCS | Performed by: EMERGENCY MEDICINE

## 2022-09-15 PROCEDURE — 250N000013 HC RX MED GY IP 250 OP 250 PS 637: Performed by: EMERGENCY MEDICINE

## 2022-09-15 PROCEDURE — U0003 INFECTIOUS AGENT DETECTION BY NUCLEIC ACID (DNA OR RNA); SEVERE ACUTE RESPIRATORY SYNDROME CORONAVIRUS 2 (SARS-COV-2) (CORONAVIRUS DISEASE [COVID-19]), AMPLIFIED PROBE TECHNIQUE, MAKING USE OF HIGH THROUGHPUT TECHNOLOGIES AS DESCRIBED BY CMS-2020-01-R: HCPCS | Performed by: EMERGENCY MEDICINE

## 2022-09-15 PROCEDURE — 250N000012 HC RX MED GY IP 250 OP 636 PS 637: Performed by: EMERGENCY MEDICINE

## 2022-09-15 PROCEDURE — 94640 AIRWAY INHALATION TREATMENT: CPT | Performed by: EMERGENCY MEDICINE

## 2022-09-15 PROCEDURE — 96365 THER/PROPH/DIAG IV INF INIT: CPT | Performed by: EMERGENCY MEDICINE

## 2022-09-15 PROCEDURE — 80053 COMPREHEN METABOLIC PANEL: CPT | Performed by: EMERGENCY MEDICINE

## 2022-09-15 PROCEDURE — 96375 TX/PRO/DX INJ NEW DRUG ADDON: CPT | Performed by: EMERGENCY MEDICINE

## 2022-09-15 PROCEDURE — 82947 ASSAY GLUCOSE BLOOD QUANT: CPT | Mod: ORL | Performed by: NURSE PRACTITIONER

## 2022-09-15 PROCEDURE — 85025 COMPLETE CBC W/AUTO DIFF WBC: CPT | Performed by: EMERGENCY MEDICINE

## 2022-09-15 PROCEDURE — 250N000011 HC RX IP 250 OP 636: Performed by: EMERGENCY MEDICINE

## 2022-09-15 PROCEDURE — 84100 ASSAY OF PHOSPHORUS: CPT | Performed by: EMERGENCY MEDICINE

## 2022-09-15 PROCEDURE — 93005 ELECTROCARDIOGRAM TRACING: CPT | Performed by: EMERGENCY MEDICINE

## 2022-09-15 RX ORDER — ONDANSETRON 2 MG/ML
4 INJECTION INTRAMUSCULAR; INTRAVENOUS EVERY 6 HOURS PRN
Status: DISCONTINUED | OUTPATIENT
Start: 2022-09-15 | End: 2022-09-16

## 2022-09-15 RX ORDER — POTASSIUM CHLORIDE 750 MG/1
40 TABLET, EXTENDED RELEASE ORAL
Status: COMPLETED | OUTPATIENT
Start: 2022-09-15 | End: 2022-09-15

## 2022-09-15 RX ORDER — NICOTINE POLACRILEX 4 MG
15-30 LOZENGE BUCCAL
Status: DISCONTINUED | OUTPATIENT
Start: 2022-09-15 | End: 2022-09-16

## 2022-09-15 RX ORDER — ONDANSETRON 2 MG/ML
4 INJECTION INTRAMUSCULAR; INTRAVENOUS EVERY 30 MIN PRN
Status: DISCONTINUED | OUTPATIENT
Start: 2022-09-15 | End: 2022-09-15

## 2022-09-15 RX ORDER — POTASSIUM CHLORIDE 7.45 MG/ML
10 INJECTION INTRAVENOUS
Status: COMPLETED | OUTPATIENT
Start: 2022-09-15 | End: 2022-09-16

## 2022-09-15 RX ORDER — ALBUTEROL SULFATE 90 UG/1
2 AEROSOL, METERED RESPIRATORY (INHALATION) ONCE
Status: COMPLETED | OUTPATIENT
Start: 2022-09-15 | End: 2022-09-15

## 2022-09-15 RX ORDER — DEXTROSE MONOHYDRATE 100 MG/ML
INJECTION, SOLUTION INTRAVENOUS CONTINUOUS PRN
Status: DISCONTINUED | OUTPATIENT
Start: 2022-09-15 | End: 2022-09-16

## 2022-09-15 RX ORDER — SODIUM CHLORIDE 9 MG/ML
INJECTION, SOLUTION INTRAVENOUS CONTINUOUS
Status: DISCONTINUED | OUTPATIENT
Start: 2022-09-15 | End: 2022-09-16

## 2022-09-15 RX ORDER — DEXTROSE MONOHYDRATE 25 G/50ML
25-50 INJECTION, SOLUTION INTRAVENOUS
Status: DISCONTINUED | OUTPATIENT
Start: 2022-09-15 | End: 2022-09-16

## 2022-09-15 RX ADMIN — POTASSIUM CHLORIDE 10 MEQ: 7.46 INJECTION, SOLUTION INTRAVENOUS at 20:20

## 2022-09-15 RX ADMIN — ONDANSETRON 4 MG: 2 INJECTION INTRAMUSCULAR; INTRAVENOUS at 23:12

## 2022-09-15 RX ADMIN — POTASSIUM CHLORIDE 10 MEQ: 7.46 INJECTION, SOLUTION INTRAVENOUS at 19:09

## 2022-09-15 RX ADMIN — POTASSIUM CHLORIDE 40 MEQ: 750 TABLET, EXTENDED RELEASE ORAL at 19:08

## 2022-09-15 RX ADMIN — POTASSIUM CHLORIDE 10 MEQ: 7.46 INJECTION, SOLUTION INTRAVENOUS at 22:47

## 2022-09-15 RX ADMIN — INSULIN ASPART 8 UNITS: 100 INJECTION, SOLUTION INTRAVENOUS; SUBCUTANEOUS at 20:35

## 2022-09-15 RX ADMIN — POTASSIUM CHLORIDE 10 MEQ: 7.46 INJECTION, SOLUTION INTRAVENOUS at 21:39

## 2022-09-15 RX ADMIN — POTASSIUM CHLORIDE 40 MEQ: 750 TABLET, EXTENDED RELEASE ORAL at 20:19

## 2022-09-15 RX ADMIN — ALBUTEROL SULFATE 2 PUFF: 90 AEROSOL, METERED RESPIRATORY (INHALATION) at 21:48

## 2022-09-15 RX ADMIN — SODIUM CHLORIDE: 9 INJECTION, SOLUTION INTRAVENOUS at 19:42

## 2022-09-15 RX ADMIN — HUMAN INSULIN 5.5 UNITS/HR: 100 INJECTION, SOLUTION SUBCUTANEOUS at 23:25

## 2022-09-15 RX ADMIN — SODIUM CHLORIDE 500 ML: 9 INJECTION, SOLUTION INTRAVENOUS at 17:51

## 2022-09-15 ASSESSMENT — ENCOUNTER SYMPTOMS
SHORTNESS OF BREATH: 1
DIFFICULTY URINATING: 1

## 2022-09-15 ASSESSMENT — ACTIVITIES OF DAILY LIVING (ADL)
ADLS_ACUITY_SCORE: 35

## 2022-09-15 NOTE — ED TRIAGE NOTES
Paramedics state that he comes from [facility], he went in as he was feeling unwell, he had been there a month prior where it was found his A1C was high. He has been on metformin. He has BG over 600. Vitals stable.      Triage Assessment     Row Name 09/15/22 [id number]       Triage Assessment (Adult)    Airway WDL WDL       Respiratory WDL    Respiratory WDL WDL       Skin Circulation/Temperature WDL    Skin Circulation/Temperature WDL WDL       Cardiac WDL    Cardiac WDL WDL       Peripheral/Neurovascular WDL    Peripheral Neurovascular WDL WDL       Cognitive/Neuro/Behavioral WDL    Cognitive/Neuro/Behavioral WDL WDL

## 2022-09-15 NOTE — ED PROVIDER NOTES
[facility] EMERGENCY DEPARTMENT ([facility])     September 15, 2022      History     Chief Complaint   Patient presents with     Hyperglycemia     Patient states to feel nauseas, pt is also feeling unsteady and weak. Clinic stated he had a BG of 600. He also states that he also has swelling in lower legs.      HPI  Antony Aguila Jr. is a 62 year old diabetic male who was recently placed on metformin because of his diabetes. Patient states he is not on any insulin and states that lately he has been somewhat unsteady and weak and went to his clinic where he was found to have a blood sugar of 600. Patient was sent here to the ER for evaluation. Patient denies any chest pain or pressure but admits to some mild shortness of breath. Patient states that he has had some difficulty urinating but is extremely thirsty. The patient is on diuretics including Lasix and hydrochlorothiazide.    Past Medical History  Past Medical History:   Diagnosis Date     Diabetes (H)      Hypertension      No past surgical history on file.     acetaminophen (TYLENOL) 500 MG tablet  albuterol (PROAIR HFA/PROVENTIL HFA/VENTOLIN HFA) 108 (90 Base) MCG/ACT inhaler  ARIPiprazole (ABILIFY) 10 MG tablet  diphenhydrAMINE (BENADRYL) 50 MG capsule  furosemide (LASIX) 40 MG tablet  gabapentin (NEURONTIN) 600 MG tablet  hydrochlorothiazide (HYDRODIURIL) 50 MG tablet  HYDROcodone-acetaminophen (NORCO) 5-325 MG tablet  levETIRAcetam (KEPPRA) 500 MG tablet  meloxicam (MOBIC) 7.5 MG tablet  metFORMIN (GLUCOPHAGE) 1000 MG tablet  mineral oil-hydrophilic petrolatum (AQUAPHOR) external ointment  OLANZapine (ZYPREXA) 5 MG tablet  polyethylene glycol (MIRALAX) 17 g packet  potassium chloride ER (K-TAB) 20 MEQ CR tablet  QUEtiapine (SEROQUEL) 400 MG tablet  senna (SENOKOT) 8.6 MG tablet  simethicone (MYLICON) 125 MG chewable tablet      Allergies   Allergen Reactions     Lidocaine      Pt states this is not an allergy and doesn't recall this to be an  allergy     Lisinopril Swelling     Pt states this is not an allergy and doesn't recall this to be an allergy     Pollen Extract      Family History  No family history on file.     Social History       Past medical history, past surgical history, medications, allergies, family history, and social history were reviewed with the patient. No additional pertinent items.       Review of Systems   Respiratory: Positive for shortness of breath.    Cardiovascular: Negative for chest pain.   Genitourinary: Positive for difficulty urinating.   All other systems reviewed and are negative.    A complete review of systems was performed with pertinent positives and negatives noted in the HPI, and all other systems negative.    Physical Exam   BP: 119/82  Pulse: 91  Temp: 98.2  F (36.8  C)  Resp: 18  Height: 182.9 cm (6')  Weight: 138.3 kg (305 lb)  SpO2: 97 %  Physical Exam  Vitals and nursing note reviewed.   Constitutional:       Appearance: He is obese. He is not ill-appearing or diaphoretic.   HENT:      Head: Atraumatic.   Eyes:      Extraocular Movements: Extraocular movements intact.      Pupils: Pupils are equal, round, and reactive to light.   Cardiovascular:      Rate and Rhythm: Regular rhythm.      Heart sounds: Normal heart sounds.   Pulmonary:      Comments: Good aeration with a few rhonchi bilaterally  Abdominal:      Palpations: Abdomen is soft.   Musculoskeletal:         General: No deformity.      Cervical back: Neck supple.   Neurological:      General: No focal deficit present.      Mental Status: He is alert and oriented to person, place, and time.   Psychiatric:         Mood and Affect: Mood normal.         ED Course      Procedures          Patient was placed in a cubicle and was placed on cardiac monitor and oximetry.  IV was established for blood draw and medication administration.    EKG revealed a normal sinus rhythm at a rate of 89 with a HI interval of point 144 and a QRS duration of point 092.  The  patient had a normal axis with no acute ST or T wave changes significant for ischemia.  This is read by me personally.    Results for orders placed or performed during the hospital encounter of 09/15/22 (from the past 24 hour(s))   Glucose by meter   Result Value Ref Range    GLUCOSE BY METER POCT >600 (HH) 70 - 99 mg/dL   Basic metabolic panel   Result Value Ref Range    Sodium 137 133 - 144 mmol/L    Potassium 2.0 (LL) 3.4 - 5.3 mmol/L    Chloride 101 94 - 109 mmol/L    Carbon Dioxide (CO2) 29 20 - 32 mmol/L    Anion Gap 7 3 - 14 mmol/L    Urea Nitrogen 23 7 - 30 mg/dL    Creatinine 1.16 0.66 - 1.25 mg/dL    Calcium 6.3 (L) 8.5 - 10.1 mg/dL    Glucose 469 (H) 70 - 99 mg/dL    GFR Estimate 71 >60 mL/min/1.73m2   CBC with Platelets & Differential    Narrative    The following orders were created for panel order CBC with Platelets & Differential.  Procedure                               Abnormality         Status                     ---------                               -----------         ------                     CBC with platelets and d...[[id number]]                      Final result                 Please view results for these tests on the individual orders.   CBC with platelets and differential   Result Value Ref Range    WBC Count 8.5 4.0 - 11.0 10e3/uL    RBC Count 5.38 4.40 - 5.90 10e6/uL    Hemoglobin 16.0 13.3 - 17.7 g/dL    Hematocrit 45.0 40.0 - 53.0 %    MCV 84 78 - 100 fL    MCH 29.7 26.5 - 33.0 pg    MCHC 35.6 31.5 - 36.5 g/dL    RDW 13.5 10.0 - 15.0 %    Platelet Count 211 150 - 450 10e3/uL    % Neutrophils 66 %    % Lymphocytes 22 %    % Monocytes 9 %    % Eosinophils 1 %    % Basophils 1 %    % Immature Granulocytes 1 %    NRBCs per 100 WBC 0 <1 /100    Absolute Neutrophils 5.8 1.6 - 8.3 10e3/uL    Absolute Lymphocytes 1.9 0.8 - 5.3 10e3/uL    Absolute Monocytes 0.7 0.0 - 1.3 10e3/uL    Absolute Eosinophils 0.1 0.0 - 0.7 10e3/uL    Absolute Basophils 0.1 0.0 - 0.2 10e3/uL    Absolute Immature  Granulocytes 0.0 <=0.4 10e3/uL    Absolute NRBCs 0.0 10e3/uL   Hepatic panel   Result Value Ref Range    Bilirubin Total 0.8 0.2 - 1.3 mg/dL    Bilirubin Direct 0.3 (H) 0.0 - 0.2 mg/dL    Protein Total 5.2 (L) 6.8 - 8.8 g/dL    Albumin 2.3 (L) 3.4 - 5.0 g/dL    Alkaline Phosphatase 61 40 - 150 U/L    AST 11 0 - 45 U/L    ALT 14 0 - 70 U/L   Magnesium   Result Value Ref Range    Magnesium 1.6 1.6 - 2.3 mg/dL   Phosphorus   Result Value Ref Range    Phosphorus 1.8 (L) 2.5 - 4.5 mg/dL   Troponin I   Result Value Ref Range    Troponin I High Sensitivity 12 <79 ng/L   EKG 12 lead   Result Value Ref Range    Systolic Blood Pressure  mmHg    Diastolic Blood Pressure  mmHg    Ventricular Rate 89 BPM    Atrial Rate 89 BPM    ME Interval 144 ms    QRS Duration 92 ms     ms    QTc 513 ms    P Axis 37 degrees    R AXIS 52 degrees    T Axis 29 degrees    Interpretation ECG       Sinus rhythm with Premature atrial complexes  Nonspecific ST abnormality  Prolonged QT  Abnormal ECG     Asymptomatic COVID-19 Virus (Coronavirus) by PCR Nasopharyngeal    Specimen: Nasopharyngeal; Swab   Result Value Ref Range    SARS CoV2 PCR Negative Negative    Narrative    Testing was performed using the Xpert Xpress SARS-CoV-2 Assay on the   Cepheid Gene-Xpert Instrument Systems. Additional information about   this Emergency Use Authorization (EUA) assay can be found via the Lab   Guide. This test should be ordered for the detection of SARS-CoV-2 in   individuals who meet SARS-CoV-2 clinical and/or epidemiological   criteria. Test performance is unknown in asymptomatic patients. This   test is for in vitro diagnostic use under the FDA EUA for   laboratories certified under CLIA to perform high complexity testing.   This test has not been FDA cleared or approved. A negative result   does not rule out the presence of PCR inhibitors in the specimen or   target RNA in concentration below the limit of detection for the   assay. The possibility of  a false negative should be considered if   the patient's recent exposure or clinical presentation suggests   COVID-19. This test was validated by the [facility]. This laboratory is certified under the Clinical Laboratory Improvement Amendments of 1988 (CLIA-88) as qualified to perform high complexity laboratory testing.     Ionized Calcium   Result Value Ref Range    Calcium Ionized 4.1 (L) 4.4 - 5.2 mg/dL    Glucose 576 (HH) 70 - 99 mg/dL   XR Chest Port 1 View    Narrative    EXAM: XR CHEST PORT 1 VIEW  LOCATION: [facility]  DATE/TIME: 9/15/2022 7:11 PM    INDICATION: SOB  COMPARISON: 9/25/2021      Impression    IMPRESSION: Heart size and pulmonary vasculature are normal. Rounded opacity in the inferior mediastinum suggests underlying hiatal hernia. No infiltrates or effusions. Previous median sternotomy. Atherosclerotic change of the aorta. Degenerative change   in the shoulders.   Basic metabolic panel   Result Value Ref Range    Sodium 128 (L) 133 - 144 mmol/L    Potassium 3.5 3.4 - 5.3 mmol/L    Chloride 84 (L) 94 - 109 mmol/L    Carbon Dioxide (CO2) 38 (H) 20 - 32 mmol/L    Anion Gap 6 3 - 14 mmol/L    Urea Nitrogen 30 7 - 30 mg/dL    Creatinine 1.71 (H) 0.66 - 1.25 mg/dL    Calcium 8.8 8.5 - 10.1 mg/dL    Glucose 631 (HH) 70 - 99 mg/dL    GFR Estimate 44 (L) >60 mL/min/1.73m2     Patient's initial potassium was only 2 therefore he was given oral potassium and IV potassium with gentle fluids and gentle subcu insulin until his potassium alyse to the level where his diabetes could be treated more aggressively.      Medications   potassium chloride 10 mEq in 100 mL sterile water intermittent infusion (premix) (10 mEq Intravenous New Bag 9/15/22 8943)   sodium chloride (PF) 0.9% PF flush 3 mL (has no administration in time range)   sodium chloride (PF) 0.9% PF flush 3 mL (has no administration in time range)   sodium chloride 0.9%  infusion ( Intravenous New Bag 9/15/22 1942)   ondansetron (ZOFRAN) injection 4 mg (has no administration in time range)   dextrose 10% infusion (has no administration in time range)   insulin 1 unit/mL in saline (NovoLIN, HumuLIN Regular) drip - ADULT IV Infusion (has no administration in time range)   glucose gel 15-30 g (has no administration in time range)     Or   dextrose 50 % injection 25-50 mL (has no administration in time range)     Or   glucagon injection 1 mg (has no administration in time range)   0.9% sodium chloride BOLUS (0 mLs Intravenous Stopped 9/15/22 1912)   potassium chloride ER (KLOR-CON M) CR tablet 40 mEq (40 mEq Oral Given 9/15/22 2019)   insulin aspart (NovoLOG) injection (RAPID ACTING) (8 Units Subcutaneous Given 9/15/22 2035)   albuterol (PROVENTIL HFA/VENTOLIN HFA) inhaler (2 puffs Inhalation Given 9/15/22 2148)       After 2 doses of IV and 2 doses of oral potassium the patient's potassium was normalized at this time he be started on a insulin drip.       Assessments & Plan (with Medical Decision Making)     I have reviewed the nursing notes.    At this time patient will be brought into the medicine service on an insulin drip to undergo metabolic correction with both his electrolytes and his glucose.    I have reviewed the findings, diagnosis, plan and need for follow up with the patient.        Final diagnoses:   Uncontrolled type 2 diabetes mellitus with hyperglycemia (H)   Hypokalemia     Tru Mora MD, MD      I, Kristan Valentin, am serving as a trained medical scribe to document services personally performed by Tru Mora MD, based on the provider's statements to me.      ITru MD, was physically present and have reviewed and verified the accuracy of this note documented by Kristan Valentin.    --  Tru Mora MD  [facility] EMERGENCY DEPARTMENT  9/15/2022     Tru Mora MD  09/15/22 6112

## 2022-09-16 LAB
ANION GAP SERPL CALCULATED.3IONS-SCNC: 3 MMOL/L (ref 3–14)
ANION GAP SERPL CALCULATED.3IONS-SCNC: 4 MMOL/L (ref 3–14)
ANION GAP SERPL CALCULATED.3IONS-SCNC: 5 MMOL/L (ref 3–14)
ANION GAP SERPL CALCULATED.3IONS-SCNC: 7 MMOL/L (ref 3–14)
ATRIAL RATE - MUSE: 89 BPM
BASE EXCESS BLDV CALC-SCNC: 11.8 MMOL/L (ref -7.7–1.9)
BASE EXCESS BLDV CALC-SCNC: 13.2 MMOL/L (ref -7.7–1.9)
BUN SERPL-MCNC: 23 MG/DL (ref 7–30)
BUN SERPL-MCNC: 23 MG/DL (ref 7–30)
BUN SERPL-MCNC: 24 MG/DL (ref 7–30)
BUN SERPL-MCNC: 29 MG/DL (ref 7–30)
CA-I BLD-MCNC: 4 MG/DL (ref 4.4–5.2)
CALCIUM SERPL-MCNC: 8.8 MG/DL (ref 8.5–10.1)
CALCIUM SERPL-MCNC: 8.9 MG/DL (ref 8.5–10.1)
CALCIUM SERPL-MCNC: 9.2 MG/DL (ref 8.5–10.1)
CALCIUM SERPL-MCNC: 9.2 MG/DL (ref 8.5–10.1)
CHLORIDE BLD-SCNC: 87 MMOL/L (ref 94–109)
CHLORIDE BLD-SCNC: 90 MMOL/L (ref 94–109)
CHLORIDE BLD-SCNC: 93 MMOL/L (ref 94–109)
CHLORIDE BLD-SCNC: 94 MMOL/L (ref 94–109)
CO2 SERPL-SCNC: 36 MMOL/L (ref 20–32)
CO2 SERPL-SCNC: 37 MMOL/L (ref 20–32)
CO2 SERPL-SCNC: 39 MMOL/L (ref 20–32)
CO2 SERPL-SCNC: 40 MMOL/L (ref 20–32)
CREAT SERPL-MCNC: 1.47 MG/DL (ref 0.66–1.25)
CREAT SERPL-MCNC: 1.48 MG/DL (ref 0.66–1.25)
CREAT SERPL-MCNC: 1.58 MG/DL (ref 0.66–1.25)
CREAT SERPL-MCNC: 1.64 MG/DL (ref 0.66–1.25)
DIASTOLIC BLOOD PRESSURE - MUSE: NORMAL MMHG
FASTING STATUS PATIENT QL REPORTED: ABNORMAL
GFR SERPL CREATININE-BSD FRML MDRD: 47 ML/MIN/1.73M2
GFR SERPL CREATININE-BSD FRML MDRD: 49 ML/MIN/1.73M2
GFR SERPL CREATININE-BSD FRML MDRD: 53 ML/MIN/1.73M2
GFR SERPL CREATININE-BSD FRML MDRD: 53 ML/MIN/1.73M2
GLUCOSE BLD-MCNC: 176 MG/DL (ref 70–99)
GLUCOSE BLD-MCNC: 224 MG/DL (ref 70–99)
GLUCOSE BLD-MCNC: 254 MG/DL (ref 70–99)
GLUCOSE BLD-MCNC: 423 MG/DL (ref 70–99)
GLUCOSE BLDC GLUCOMTR-MCNC: 201 MG/DL (ref 70–99)
GLUCOSE BLDC GLUCOMTR-MCNC: 210 MG/DL (ref 70–99)
GLUCOSE BLDC GLUCOMTR-MCNC: 218 MG/DL (ref 70–99)
GLUCOSE BLDC GLUCOMTR-MCNC: 226 MG/DL (ref 70–99)
GLUCOSE BLDC GLUCOMTR-MCNC: 250 MG/DL (ref 70–99)
GLUCOSE BLDC GLUCOMTR-MCNC: 260 MG/DL (ref 70–99)
GLUCOSE BLDC GLUCOMTR-MCNC: 265 MG/DL (ref 70–99)
GLUCOSE BLDC GLUCOMTR-MCNC: 285 MG/DL (ref 70–99)
GLUCOSE BLDC GLUCOMTR-MCNC: 291 MG/DL (ref 70–99)
GLUCOSE BLDC GLUCOMTR-MCNC: 302 MG/DL (ref 70–99)
GLUCOSE BLDC GLUCOMTR-MCNC: 310 MG/DL (ref 70–99)
GLUCOSE BLDC GLUCOMTR-MCNC: 314 MG/DL (ref 70–99)
GLUCOSE BLDC GLUCOMTR-MCNC: 317 MG/DL (ref 70–99)
GLUCOSE BLDC GLUCOMTR-MCNC: 349 MG/DL (ref 70–99)
GLUCOSE BLDC GLUCOMTR-MCNC: 368 MG/DL (ref 70–99)
GLUCOSE BLDC GLUCOMTR-MCNC: 401 MG/DL (ref 70–99)
GLUCOSE BLDC GLUCOMTR-MCNC: 428 MG/DL (ref 70–99)
GLUCOSE SERPL-MCNC: 719 MG/DL (ref 70–99)
HBA1C MFR BLD: 12.5 % (ref 0–5.6)
HBA1C MFR BLD: 12.7 % (ref 0–5.6)
HCO3 BLDV-SCNC: 38 MMOL/L (ref 21–28)
HCO3 BLDV-SCNC: 39 MMOL/L (ref 21–28)
INTERPRETATION ECG - MUSE: NORMAL
KETONES BLD-SCNC: 0.3 MMOL/L (ref 0–0.6)
MAGNESIUM SERPL-MCNC: 2.6 MG/DL (ref 1.6–2.3)
MAGNESIUM SERPL-MCNC: 2.8 MG/DL (ref 1.6–2.3)
O2/TOTAL GAS SETTING VFR VENT: 0 %
O2/TOTAL GAS SETTING VFR VENT: 21 %
OSMOLALITY SERPL: 301 MMOL/KG (ref 280–301)
P AXIS - MUSE: 37 DEGREES
PCO2 BLDV: 47 MM HG (ref 40–50)
PCO2 BLDV: 59 MM HG (ref 40–50)
PH BLDV: 7.43 [PH] (ref 7.32–7.43)
PH BLDV: 7.52 [PH] (ref 7.32–7.43)
PHOSPHATE SERPL-MCNC: 1.8 MG/DL (ref 2.5–4.5)
PO2 BLDV: 21 MM HG (ref 25–47)
PO2 BLDV: 40 MM HG (ref 25–47)
POTASSIUM BLD-SCNC: 3.1 MMOL/L (ref 3.4–5.3)
POTASSIUM BLD-SCNC: 3.1 MMOL/L (ref 3.4–5.3)
POTASSIUM BLD-SCNC: 3.3 MMOL/L (ref 3.4–5.3)
POTASSIUM BLD-SCNC: 3.4 MMOL/L (ref 3.4–5.3)
POTASSIUM BLD-SCNC: 3.4 MMOL/L (ref 3.4–5.3)
POTASSIUM BLD-SCNC: 3.6 MMOL/L (ref 3.4–5.3)
PR INTERVAL - MUSE: 144 MS
QRS DURATION - MUSE: 92 MS
QT - MUSE: 422 MS
QTC - MUSE: 513 MS
R AXIS - MUSE: 52 DEGREES
SODIUM SERPL-SCNC: 131 MMOL/L (ref 133–144)
SODIUM SERPL-SCNC: 133 MMOL/L (ref 133–144)
SODIUM SERPL-SCNC: 135 MMOL/L (ref 133–144)
SODIUM SERPL-SCNC: 136 MMOL/L (ref 133–144)
SYSTOLIC BLOOD PRESSURE - MUSE: NORMAL MMHG
T AXIS - MUSE: 29 DEGREES
VENTRICULAR RATE- MUSE: 89 BPM

## 2022-09-16 PROCEDURE — 96367 TX/PROPH/DG ADDL SEQ IV INF: CPT | Performed by: EMERGENCY MEDICINE

## 2022-09-16 PROCEDURE — 84100 ASSAY OF PHOSPHORUS: CPT | Performed by: INTERNAL MEDICINE

## 2022-09-16 PROCEDURE — 250N000011 HC RX IP 250 OP 636: Performed by: INTERNAL MEDICINE

## 2022-09-16 PROCEDURE — 99233 SBSQ HOSP IP/OBS HIGH 50: CPT | Performed by: INTERNAL MEDICINE

## 2022-09-16 PROCEDURE — 84132 ASSAY OF SERUM POTASSIUM: CPT | Performed by: INTERNAL MEDICINE

## 2022-09-16 PROCEDURE — 250N000009 HC RX 250: Performed by: INTERNAL MEDICINE

## 2022-09-16 PROCEDURE — 96372 THER/PROPH/DIAG INJ SC/IM: CPT

## 2022-09-16 PROCEDURE — 258N000001 HC RX 258: Performed by: INTERNAL MEDICINE

## 2022-09-16 PROCEDURE — 36415 COLL VENOUS BLD VENIPUNCTURE: CPT | Performed by: INTERNAL MEDICINE

## 2022-09-16 PROCEDURE — 80048 BASIC METABOLIC PNL TOTAL CA: CPT | Performed by: INTERNAL MEDICINE

## 2022-09-16 PROCEDURE — 999N000127 HC STATISTIC PERIPHERAL IV START W US GUIDANCE

## 2022-09-16 PROCEDURE — 120N000002 HC R&B MED SURG/OB UMMC

## 2022-09-16 PROCEDURE — 83930 ASSAY OF BLOOD OSMOLALITY: CPT | Performed by: INTERNAL MEDICINE

## 2022-09-16 PROCEDURE — 84100 ASSAY OF PHOSPHORUS: CPT | Performed by: STUDENT IN AN ORGANIZED HEALTH CARE EDUCATION/TRAINING PROGRAM

## 2022-09-16 PROCEDURE — 258N000003 HC RX IP 258 OP 636: Performed by: INTERNAL MEDICINE

## 2022-09-16 PROCEDURE — 250N000013 HC RX MED GY IP 250 OP 250 PS 637: Performed by: EMERGENCY MEDICINE

## 2022-09-16 PROCEDURE — 250N000009 HC RX 250: Performed by: EMERGENCY MEDICINE

## 2022-09-16 PROCEDURE — 250N000013 HC RX MED GY IP 250 OP 250 PS 637: Performed by: PHYSICIAN ASSISTANT

## 2022-09-16 PROCEDURE — 83735 ASSAY OF MAGNESIUM: CPT | Performed by: INTERNAL MEDICINE

## 2022-09-16 PROCEDURE — 84132 ASSAY OF SERUM POTASSIUM: CPT | Performed by: PHYSICIAN ASSISTANT

## 2022-09-16 PROCEDURE — 99254 IP/OBS CNSLTJ NEW/EST MOD 60: CPT | Performed by: NURSE PRACTITIONER

## 2022-09-16 PROCEDURE — 999N000040 HC STATISTIC CONSULT NO CHARGE VASC ACCESS

## 2022-09-16 PROCEDURE — 82010 KETONE BODYS QUAN: CPT | Performed by: INTERNAL MEDICINE

## 2022-09-16 PROCEDURE — 250N000013 HC RX MED GY IP 250 OP 250 PS 637: Performed by: INTERNAL MEDICINE

## 2022-09-16 PROCEDURE — 82803 BLOOD GASES ANY COMBINATION: CPT | Performed by: INTERNAL MEDICINE

## 2022-09-16 PROCEDURE — 96366 THER/PROPH/DIAG IV INF ADDON: CPT | Performed by: EMERGENCY MEDICINE

## 2022-09-16 PROCEDURE — 93010 ELECTROCARDIOGRAM REPORT: CPT | Performed by: INTERNAL MEDICINE

## 2022-09-16 PROCEDURE — 93005 ELECTROCARDIOGRAM TRACING: CPT

## 2022-09-16 PROCEDURE — 36415 COLL VENOUS BLD VENIPUNCTURE: CPT | Performed by: PHYSICIAN ASSISTANT

## 2022-09-16 PROCEDURE — 258N000003 HC RX IP 258 OP 636

## 2022-09-16 PROCEDURE — 250N000012 HC RX MED GY IP 250 OP 636 PS 637: Performed by: NURSE PRACTITIONER

## 2022-09-16 PROCEDURE — 82330 ASSAY OF CALCIUM: CPT | Performed by: INTERNAL MEDICINE

## 2022-09-16 PROCEDURE — 83036 HEMOGLOBIN GLYCOSYLATED A1C: CPT | Performed by: INTERNAL MEDICINE

## 2022-09-16 RX ORDER — NICOTINE POLACRILEX 4 MG
15-30 LOZENGE BUCCAL
Status: DISCONTINUED | OUTPATIENT
Start: 2022-09-16 | End: 2022-09-21 | Stop reason: HOSPADM

## 2022-09-16 RX ORDER — POTASSIUM CHLORIDE 7.45 MG/ML
10 INJECTION INTRAVENOUS
Status: DISCONTINUED | OUTPATIENT
Start: 2022-09-16 | End: 2022-09-21 | Stop reason: HOSPADM

## 2022-09-16 RX ORDER — QUETIAPINE FUMARATE 200 MG/1
400 TABLET, FILM COATED ORAL AT BEDTIME
Status: ON HOLD | COMMUNITY
End: 2023-01-14

## 2022-09-16 RX ORDER — HYDRALAZINE HYDROCHLORIDE 10 MG/1
10 TABLET, FILM COATED ORAL 4 TIMES DAILY PRN
Status: DISCONTINUED | OUTPATIENT
Start: 2022-09-16 | End: 2022-09-21 | Stop reason: HOSPADM

## 2022-09-16 RX ORDER — POTASSIUM CHLORIDE 1.5 G/1.58G
40 POWDER, FOR SOLUTION ORAL ONCE
Status: DISCONTINUED | OUTPATIENT
Start: 2022-09-16 | End: 2022-09-16

## 2022-09-16 RX ORDER — ARIPIPRAZOLE 10 MG/1
10 TABLET ORAL DAILY
Status: DISCONTINUED | OUTPATIENT
Start: 2022-09-16 | End: 2022-09-18

## 2022-09-16 RX ORDER — ALBUTEROL SULFATE 90 UG/1
2 AEROSOL, METERED RESPIRATORY (INHALATION) EVERY 4 HOURS PRN
Status: DISCONTINUED | OUTPATIENT
Start: 2022-09-16 | End: 2022-09-16

## 2022-09-16 RX ORDER — ENOXAPARIN SODIUM 100 MG/ML
40 INJECTION SUBCUTANEOUS EVERY 12 HOURS
Status: DISCONTINUED | OUTPATIENT
Start: 2022-09-16 | End: 2022-09-21 | Stop reason: HOSPADM

## 2022-09-16 RX ORDER — POTASSIUM CHLORIDE 29.8 MG/ML
20 INJECTION INTRAVENOUS
Status: DISCONTINUED | OUTPATIENT
Start: 2022-09-16 | End: 2022-09-21 | Stop reason: HOSPADM

## 2022-09-16 RX ORDER — SODIUM CHLORIDE 9 MG/ML
INJECTION, SOLUTION INTRAVENOUS
Status: COMPLETED
Start: 2022-09-16 | End: 2022-09-17

## 2022-09-16 RX ORDER — CALCIUM GLUCONATE 94 MG/ML
1 INJECTION, SOLUTION INTRAVENOUS ONCE
Status: DISCONTINUED | OUTPATIENT
Start: 2022-09-16 | End: 2022-09-16 | Stop reason: RX

## 2022-09-16 RX ORDER — POTASSIUM CHLORIDE 1.5 G/1.58G
20 POWDER, FOR SOLUTION ORAL ONCE
Status: DISCONTINUED | OUTPATIENT
Start: 2022-09-16 | End: 2022-09-16

## 2022-09-16 RX ORDER — QUETIAPINE FUMARATE 200 MG/1
600 TABLET, FILM COATED ORAL AT BEDTIME
Status: DISCONTINUED | OUTPATIENT
Start: 2022-09-16 | End: 2022-09-16

## 2022-09-16 RX ORDER — POTASSIUM CHLORIDE 1.5 G/1.58G
20 POWDER, FOR SOLUTION ORAL ONCE
Status: COMPLETED | OUTPATIENT
Start: 2022-09-16 | End: 2022-09-16

## 2022-09-16 RX ORDER — DEXTROSE MONOHYDRATE 25 G/50ML
25-50 INJECTION, SOLUTION INTRAVENOUS
Status: DISCONTINUED | OUTPATIENT
Start: 2022-09-16 | End: 2022-09-21 | Stop reason: HOSPADM

## 2022-09-16 RX ORDER — PROCHLORPERAZINE 25 MG
25 SUPPOSITORY, RECTAL RECTAL EVERY 12 HOURS PRN
Status: DISCONTINUED | OUTPATIENT
Start: 2022-09-16 | End: 2022-09-16

## 2022-09-16 RX ORDER — POTASSIUM CHLORIDE 1.5 G/1.58G
40 POWDER, FOR SOLUTION ORAL ONCE
Status: COMPLETED | OUTPATIENT
Start: 2022-09-16 | End: 2022-09-16

## 2022-09-16 RX ORDER — CALCIUM GLUCONATE 20 MG/ML
1 INJECTION, SOLUTION INTRAVENOUS ONCE
Status: COMPLETED | OUTPATIENT
Start: 2022-09-16 | End: 2022-09-17

## 2022-09-16 RX ORDER — POLYETHYLENE GLYCOL 3350 17 G/17G
17 POWDER, FOR SOLUTION ORAL DAILY PRN
Status: DISCONTINUED | OUTPATIENT
Start: 2022-09-16 | End: 2022-09-18

## 2022-09-16 RX ORDER — ALBUTEROL SULFATE 90 UG/1
2 AEROSOL, METERED RESPIRATORY (INHALATION)
Status: DISCONTINUED | OUTPATIENT
Start: 2022-09-16 | End: 2022-09-21 | Stop reason: HOSPADM

## 2022-09-16 RX ORDER — LEVETIRACETAM 500 MG/1
500 TABLET ORAL 2 TIMES DAILY
Status: DISCONTINUED | OUTPATIENT
Start: 2022-09-16 | End: 2022-09-21 | Stop reason: HOSPADM

## 2022-09-16 RX ORDER — PROCHLORPERAZINE MALEATE 5 MG
5-10 TABLET ORAL EVERY 6 HOURS PRN
Status: DISCONTINUED | OUTPATIENT
Start: 2022-09-16 | End: 2022-09-16

## 2022-09-16 RX ORDER — POTASSIUM CHLORIDE 750 MG/1
20 TABLET, EXTENDED RELEASE ORAL ONCE
Status: COMPLETED | OUTPATIENT
Start: 2022-09-16 | End: 2022-09-16

## 2022-09-16 RX ORDER — SENNOSIDES 8.6 MG
8.6 TABLET ORAL 2 TIMES DAILY PRN
Status: DISCONTINUED | OUTPATIENT
Start: 2022-09-16 | End: 2022-09-18

## 2022-09-16 RX ORDER — QUETIAPINE FUMARATE 300 MG/1
300 TABLET, FILM COATED ORAL AT BEDTIME
Status: DISCONTINUED | OUTPATIENT
Start: 2022-09-16 | End: 2022-09-17

## 2022-09-16 RX ORDER — POTASSIUM CHLORIDE 7.45 MG/ML
10 INJECTION INTRAVENOUS
Status: DISPENSED | OUTPATIENT
Start: 2022-09-16 | End: 2022-09-16

## 2022-09-16 RX ORDER — OLANZAPINE 5 MG/1
5 TABLET ORAL AT BEDTIME
Status: DISCONTINUED | OUTPATIENT
Start: 2022-09-16 | End: 2022-09-18

## 2022-09-16 RX ORDER — POTASSIUM CHLORIDE 1.5 G/1.58G
40 POWDER, FOR SOLUTION ORAL ONCE
Status: DISCONTINUED | OUTPATIENT
Start: 2022-09-17 | End: 2022-09-16

## 2022-09-16 RX ADMIN — POTASSIUM CHLORIDE 10 MEQ: 7.46 INJECTION, SOLUTION INTRAVENOUS at 20:39

## 2022-09-16 RX ADMIN — HUMAN INSULIN 4.5 UNITS/HR: 100 INJECTION, SOLUTION SUBCUTANEOUS at 05:19

## 2022-09-16 RX ADMIN — ARIPIPRAZOLE 10 MG: 10 TABLET ORAL at 08:46

## 2022-09-16 RX ADMIN — CALCIUM GLUCONATE 1 G: 20 INJECTION, SOLUTION INTRAVENOUS at 23:31

## 2022-09-16 RX ADMIN — INSULIN ASPART 13 UNITS: 100 INJECTION, SOLUTION INTRAVENOUS; SUBCUTANEOUS at 18:27

## 2022-09-16 RX ADMIN — POTASSIUM CHLORIDE 20 MEQ: 1.5 FOR SOLUTION ORAL at 03:41

## 2022-09-16 RX ADMIN — POTASSIUM CHLORIDE 40 MEQ: 1.5 FOR SOLUTION ORAL at 13:43

## 2022-09-16 RX ADMIN — ENOXAPARIN SODIUM 40 MG: 40 INJECTION SUBCUTANEOUS at 08:45

## 2022-09-16 RX ADMIN — ALBUTEROL SULFATE 2 PUFF: 90 AEROSOL, METERED RESPIRATORY (INHALATION) at 20:18

## 2022-09-16 RX ADMIN — DEXTROSE AND SODIUM CHLORIDE: 5; 450 INJECTION, SOLUTION INTRAVENOUS at 16:42

## 2022-09-16 RX ADMIN — POTASSIUM CHLORIDE 20 MEQ: 750 TABLET, EXTENDED RELEASE ORAL at 23:34

## 2022-09-16 RX ADMIN — HUMAN INSULIN 5.5 UNITS/HR: 100 INJECTION, SOLUTION SUBCUTANEOUS at 01:37

## 2022-09-16 RX ADMIN — SODIUM CHLORIDE 1000 ML: 9 INJECTION, SOLUTION INTRAVENOUS at 17:08

## 2022-09-16 RX ADMIN — ALBUTEROL SULFATE 2 PUFF: 90 AEROSOL, METERED RESPIRATORY (INHALATION) at 13:16

## 2022-09-16 RX ADMIN — HUMAN INSULIN 12 UNITS/HR: 100 INJECTION, SOLUTION SUBCUTANEOUS at 02:43

## 2022-09-16 RX ADMIN — HUMAN INSULIN: 100 INJECTION, SOLUTION SUBCUTANEOUS at 16:34

## 2022-09-16 RX ADMIN — POLYETHYLENE GLYCOL 3350 17 G: 17 POWDER, FOR SOLUTION ORAL at 16:37

## 2022-09-16 RX ADMIN — POTASSIUM CHLORIDE 10 MEQ: 7.46 INJECTION, SOLUTION INTRAVENOUS at 22:09

## 2022-09-16 RX ADMIN — LEVETIRACETAM 500 MG: 500 TABLET, FILM COATED ORAL at 08:46

## 2022-09-16 RX ADMIN — LEVETIRACETAM 500 MG: 500 TABLET, FILM COATED ORAL at 21:49

## 2022-09-16 RX ADMIN — HUMAN INSULIN: 100 INJECTION, SOLUTION SUBCUTANEOUS at 16:11

## 2022-09-16 RX ADMIN — SODIUM CHLORIDE 1000 ML: 9 INJECTION, SOLUTION INTRAVENOUS at 23:34

## 2022-09-16 RX ADMIN — HUMAN INSULIN 5 UNITS/HR: 100 INJECTION, SOLUTION SUBCUTANEOUS at 03:45

## 2022-09-16 RX ADMIN — HUMAN INSULIN: 100 INJECTION, SOLUTION SUBCUTANEOUS at 23:28

## 2022-09-16 RX ADMIN — POTASSIUM CHLORIDE 10 MEQ: 7.46 INJECTION, SOLUTION INTRAVENOUS at 18:44

## 2022-09-16 RX ADMIN — DEXTROSE AND SODIUM CHLORIDE: 5; 450 INJECTION, SOLUTION INTRAVENOUS at 18:34

## 2022-09-16 RX ADMIN — QUETIAPINE 300 MG: 300 TABLET, FILM COATED ORAL at 21:49

## 2022-09-16 RX ADMIN — ENOXAPARIN SODIUM 40 MG: 40 INJECTION SUBCUTANEOUS at 20:18

## 2022-09-16 ASSESSMENT — ACTIVITIES OF DAILY LIVING (ADL)
ADLS_ACUITY_SCORE: 37
ADLS_ACUITY_SCORE: 37
ADLS_ACUITY_SCORE: 35

## 2022-09-16 NOTE — CONSULTS
"New In-Patient Diabetes/Hyperglycemia Management Consult    Antony Aguila Jr.  Age: 62 year old  MRN # [id number]   YOB: 1960    Chief Complaint: weakness/HHS  Reason for consult: \"HHS\"  Consult requested by: \"see signed in providers\"    All information gathered via chart review and face-to-face interview and assessment  Professional  utilized --> No    History of Present Illness:  Antony Aguila Jr. is a 62 year old man with history of type 2 diabetes,  Hypertension, schizophrenia, venous stasis ulcers, and CKD stage 2 who presented to the ER with weakness and found to have hyperglycemia and hypokalemia.       Diabetes:  Antony Aguila Jr. initially reports to writer that nobody has ever told him he has diabetes. Later then thinks maybe his MD was hinting about him having diabetes about 1-2 years ago and started coaching him on how to eat better and exercise more, what a way to find out!!\"    He reports for the past week or so having no energy and eventually feeling extremely weak needing to lean on his cane and other objects to avoid falling. Additionally, he experienced blurry vision. He denied any polyuria, polydipsia, polyphagia.  It was the weakness which brought him to the ER.     Chart review reveals he has been prescribed Metformin 1000 mg BID (started 11/2019) and more recently Invokana (6/10/2022).  Antony states \"I have never been on 1000 mg of anything ever\". He is not sure about any medications he takes for DM.  He does adamantly denies ever taking or needing insulin in the past.     He is a very challenging historian.  Extremely tangential, falling asleep and then argumentative upon rousing.     First A1c documented 11/2019 of 6.4%, 11 months ago A1c 7.4% and more recently A1c now 12.7% on 9/15/2022.    He follows with Jacky Gaitan - [facility], last visit on 4/1/2022.     Most recently it is unclear what Antony has been doing for his DM at home.    He does report a " "nurse comes to his apt once per week and sets up his meds and will also check his BG.  He is not sure what the readings are \"she always says it is high\".  He recalls her saying BGs are often in the 300 - 500 range which would be consistent with the present A1c.     Last dose of medications for diabetes:  unk as he does not recall what he takes.      Inpatient regimen at time of consult:  On IV insulin drip, HHS protocol.  Call to primary team to change to DKA protocol with fluid order set as well given metabolic derangements, now running DKA order set.   Regular diet has been resumed since this AM.  No cho coverage was ordered, now cho coverage is ordered for meals/snacks    BG at time of consult: 631 - 218    Relevant Labs:     Covid-19 negative  BG now 218 (631 on arrival)   A1c 12.7%  Na 131 (128 on admission)  K 3.3 (was 2.0 on admission)  Bicarb 39  Anion Gap 5  Creat 1.64/GFR 47  Hgb 16.0  Ketones 0.3  Osmolality 301    BG trends:              Today Antony is in bed, he is very challenging to get a history from today,greater than 45 minutes just face-to-face attempting to get a clear idea of what he is doing at home or what his understanding of his diagnosis is.   Much of consult ultimately is obtained via chart review from PCP Dr. Jacky Gaitan of [facility].      We have discussed the plan and he is aware he will be getting cho coverage with his meals while on the IV insulin drip with plans to transition off when appropriate tomorrow.       D5W-containing solutions/medications/confounding factors: D5 in 045 NS running at 75 ml per hour  Planned Procedures/surgeries: none  ELOS:  TBD    Diabetes:  Recent Labs   Lab 09/16/22  0815 09/16/22  0618 09/16/22  0513 09/16/22  0345 09/16/22  0238 09/16/22  0036   * 302* 349* 368* 401* 423*       Diabetes Type:  Type 2 Diabetes  LALI-65 eliza, c-peptide, islet cell eliza - none found in Epic review today     Diabetes Duration: 2019 approximately - patient denies " "having diabetes  Diabetes Control:   Lab Results   Component Value Date    A1C 12.7 09/15/2022    A1C 7.4 09/25/2021       Usual (PTA) Diabetes Regimen:     Medications: Maria Dolores cannot recall any medications for DM.     Per chart review, metformin 1000 mg BID  Invokana 100 mg in AM - recently started on 6/10/2022    BG monitoring frequency:  Once per week when home health nurse comes  Reports his blood sugars range typically between 300 mg/dL and 500 mg/dL    Diet: no restrictions    Denies any diet restrictions, often only eats two meals per day with limited snacks  Does report drinking quite a lot of regular soda at least 4 L per day of orange crush, lemonade [place] as well as [place].    Issues with food insecurity:  denies  Recent weight changes:  denies    Exercise: mostly sedentary    Safety Kit:  denies  Medic Alert:  no      Ability to [place] Prescribed Regimen: TBD, Barriers exist    Diabetes Complications:  retinopathy - denies, last dilated eye exam > 2 years or more  Peripheral neuropathy +, takes gabapentin  Nephropathy - denies, recent microalbuminuria - wnl  Gastroparesis - denies  Unvaccinated for COVID    Preventative/HTN:  Hydrochlorothiazide  Amlodipine  lisinopril      History of DKA:   Denies   Able to Detect Hypoglycemia: \"never happened\"    Usual Diabetes Care Provider/CDCES: Jacky Gaitan - [facility]  Has a care coordinator     Other active medical problems acutely impacting BG control:  Weakness, venous insufficiency/stasis, schizophrenia/PTSD, chronic pain    Factors Impacting Glucose Control:  No Steroids, no tube feeding diet, + diet/eating changes, no sig changes in activity level     Review of Systems:    CC:  Feeling a bit tired but better  Constitutional:   No fever, no chills  ENT/Mouth:   Hearing at level of conversation, denies hearing changes, no ear pain, no sore throat, no rhinorrhea, no difficulty swallowing  Eyes:  No eye pain, no discharge, no vision " changes  CV:   No CP/SOB, no new edema  Resp:  No cough, no wheezing  GI:   resolved nausea, no vomiting, denies constipation, denies diarrhea  :  No dysuria, no frequency, no hematuria  Musk:  No joint swelling/pain, No back pain  Skin/heme:   No new rashes/bruises/open areas.  No pruritis  Neuro:   No new weakness, chronic LE numbness/tingling, no headache  Psych:   No new anxiety, denies changes/worsening mood concerns  Endocrine:  No polyuria, no polydipsia      Past medical, family and social histories are reviewed and updated.    Past Medical History  Past Medical History:   Diagnosis Date     Chronic dissection of thoracic aorta (H)     S/p exploration and median sternotomy by Dr. Marcelo on 6/18/19     Diabetes (H)      Hypertension        Family History  No family history on file.  Reports he has a family history of diabetes including his mother, father, sister    Social History  Social History     Socioeconomic History     Marital status:        Lives alone in a in an apartment in . [place]  Etoh occasionally   Drugs + on medical marijuana  Smoking +       Physical Exam:  /89 (BP Location: Left arm)   Pulse 86   Temp 99.7  F (37.6  C) (Oral)   Resp 18   Ht 1.829 m (6')   Wt 138.3 kg (305 lb)   SpO2 98%   BMI 41.37 kg/m      General:  Somewhat pleasant, no acute distress. Bizarre - sleeping on bed and challenging to engage with   HEENT: NC/AT, PER and anicteric, non-injected, oral mucous membranes moist.   Lungs: non-labored, no cough, no SOB  ABD: rounded/obese  Skin: warm and dry, no obvious lesions, chronic thickening to LE - no open areas  Feet: CMS intact   MSK: fluid movement   Lymp: scant LE edema   Mental status: Alert, oriented x3, communicating clearly  Psych: agitated/bizarre mood, congruent affect, ++ tangential and contradictory       Laboratory  Recent Labs   Lab Test 09/16/22  0815 09/16/22  0618 09/16/22  0238 09/16/22  0036 09/15/22  2311 09/15/22  2136   NA  --   --   --   131*  --  128*   POTASSIUM  --   --   --  3.3*  --  3.5   CHLORIDE  --   --   --  87*  --  84*   CO2  --   --   --  39*  --  38*   ANIONGAP  --   --   --  5  --  6   * 302*   < > 423*   < > 631*   BUN  --   --   --  29  --  30   CR  --   --   --  1.64*  --  1.71*   ANGELLA  --   --   --  9.2  --  8.8    < > = values in this interval not displayed.     CBC RESULTS:   Recent Labs   Lab Test 09/15/22  1751   WBC 8.5   RBC 5.38   HGB 16.0   HCT 45.0   MCV 84   MCH 29.7   MCHC 35.6   RDW 13.5          Liver Function Studies -   Recent Labs   Lab Test 09/15/22  1751   PROTTOTAL 5.2*   ALBUMIN 2.3*   BILITOTAL 0.8   ALKPHOS 61   AST 11   ALT 14       Active Medications  Current Facility-Administered Medications   Medication     albuterol (PROVENTIL HFA/VENTOLIN HFA) inhaler     ARIPiprazole (ABILIFY) tablet 10 mg     glucose gel 15-30 g    Or     dextrose 50 % injection 25-50 mL    Or     glucagon injection 1 mg     enoxaparin ANTICOAGULANT (LOVENOX) injection 40 mg     insulin 1 unit/1mL in saline (NovoLIN-Regular) infusion- HHS algorithm     levETIRAcetam (KEPPRA) tablet 500 mg     OLANZapine (zyPREXA) tablet 5 mg     prochlorperazine (COMPAZINE) injection 5-10 mg    Or     prochlorperazine (COMPAZINE) tablet 5-10 mg    Or     prochlorperazine (COMPAZINE) suppository 25 mg     QUEtiapine (SEROquel) tablet 600 mg     sodium chloride 0.9% infusion     Current Outpatient Medications   Medication     acetaminophen (TYLENOL) 500 MG tablet     albuterol (PROAIR HFA/PROVENTIL HFA/VENTOLIN HFA) 108 (90 Base) MCG/ACT inhaler     ARIPiprazole (ABILIFY) 10 MG tablet     diphenhydrAMINE (BENADRYL) 50 MG capsule     furosemide (LASIX) 40 MG tablet     gabapentin (NEURONTIN) 600 MG tablet     hydrochlorothiazide (HYDRODIURIL) 50 MG tablet     levETIRAcetam (KEPPRA) 500 MG tablet     meloxicam (MOBIC) 7.5 MG tablet     metFORMIN (GLUCOPHAGE) 1000 MG tablet     mineral oil-hydrophilic petrolatum (AQUAPHOR) external ointment      OLANZapine (ZYPREXA) 5 MG tablet     potassium chloride ER (K-TAB) 20 MEQ CR tablet     QUEtiapine (SEROQUEL) 200 MG tablet     QUEtiapine (SEROQUEL) 400 MG tablet     Current Outpatient Medications   Medication Sig Dispense Refill     acetaminophen (TYLENOL) 500 MG tablet Take 500-1,000 mg by mouth every 8 hours as needed for mild pain       albuterol (PROAIR HFA/PROVENTIL HFA/VENTOLIN HFA) 108 (90 Base) MCG/ACT inhaler Inhale 2 puffs into the lungs every 4 hours as needed for shortness of breath / dyspnea or wheezing       ARIPiprazole (ABILIFY) 10 MG tablet Take 10 mg by mouth daily       diphenhydrAMINE (BENADRYL) 50 MG capsule Take 50 mg by mouth every 6 hours as needed for itching or allergies       furosemide (LASIX) 40 MG tablet Take 40 mg by mouth daily       gabapentin (NEURONTIN) 600 MG tablet Take 600 mg by mouth 3 times daily       hydrochlorothiazide (HYDRODIURIL) 50 MG tablet Take 50 mg by mouth daily       levETIRAcetam (KEPPRA) 500 MG tablet Take 500 mg by mouth 2 times daily       meloxicam (MOBIC) 7.5 MG tablet Take 7.5 mg by mouth every 12 hours as needed       metFORMIN (GLUCOPHAGE) 1000 MG tablet Take 1,000 mg by mouth 2 times daily (with meals)       mineral oil-hydrophilic petrolatum (AQUAPHOR) external ointment Apply 2 g topically 3 times daily as needed       OLANZapine (ZYPREXA) 5 MG tablet Take 5 mg by mouth At Bedtime       potassium chloride ER (K-TAB) 20 MEQ CR tablet Take 40 mEq by mouth       QUEtiapine (SEROQUEL) 200 MG tablet Take 200 mg by mouth At Bedtime       QUEtiapine (SEROQUEL) 400 MG tablet Take 400 mg by mouth At Bedtime         Current Diet  Orders Placed This Encounter      Moderate Consistent Carb (60 g CHO per Meal) Diet    Assessment:    1)  Type 2 Diabetes Mellitus; uncontrolled c/b neuropathy.  Presented in HHS.  A1c 12.7%  2)  Nausea and vomiting; resolved  3)  Obesity; BMI 42  4)  Schizophrenia  5)  Weakness  6)  Metabolic derangements;  hypokalemia/hyponemia        Plan:      -  Change IV insulin protocol from HHS to DKA - primary team has been contacted and changes made    -  Once metabolic derangements resolved, can switch to Non-dka IV insulin protocol.  When appropriate, dextrose fluids to reduce then be discontinued.  Once off dextrose fluids and tolerating PO, will be appropriate for transition to MDI    -  Novolog meal coverage 1 unit(s) per 5 g cho AC meals/snacks - will likely need more    -  BG monitoring per IV insulin protocol q1-2 hours    -  Hold all PTA meds -> metformin and Invokana - do not recommend Invokana (SGLT-2) resumption 2/2 [facility] (if he was taking)     -  Test claim - TBD    -  Hypoglycemia protocol    -  Recommend carb counting protocol    -  Education :  anticipated he will need vs closer follow up with skilled nursing to home    -  Outpatient follow up:  recommend [facility] vs PCP      -  Thank you for this consult, IDS will follow. Please do not hesitate to contact the team with any questions/concerns.      Please notify inpatient diabetes service if changes are planned that will impact glycemia, such as NPO, starting/adjusting steroids, enteral feeding, parenteral feeding, dextrose fluids or procedures.     BRANDY Nicholson CNP   Inpatient Diabetes Management Service  Pager - [phone]    To contact Endocrine Diabetes service:   From 8AM-4PM: page inpatient diabetes provider that is following the patient that day (see filed or incomplete progress notes/consult notes).  If uncertain of provider assignment: page job code 3 *'s,  [phone] then enter [phone].  For questions or updates from 4PM-8AM: page the diabetes job code for on call fellow: [phone]    Please notify inpatient diabetes service if changes are planned to steroids, nutrition, or if procedures are planned requiring prolonged NPO status.Diabetes Management Team job code: [phone]       I spent a total of 110 minutes bedside and on the inpatient unit  managing glycemic care.  Over 50% of my time on the unit was spent counseling the patient and/or coordinating care regarding acute hyperglycemic management.  See note for details.

## 2022-09-16 NOTE — UTILIZATION REVIEW
Admission Status; Secondary Review Determination       Under the authority of the Utilization Management Committee, the utilization review process indicated a secondary review on the above patient. The review outcome is based on review of the medical records, discussions with staff, and applying clinical experience noted on the date of the review.     (x) Inpatient Status Appropriate - This patient's medical care is consistent with medical management for inpatient care and reasonable inpatient medical practice.     RATIONALE FOR DETERMINATION     Patient requires inpatient admission versus short stay observation or outpatient treatment for the following reasons:    62 year old who presented with weakness, fatigue, nausea, vomiting, and inability to keep down oral food/hydration who was found to have a glucose of 719, acute kidney injury, and hypokalemia to 2.0. Overall, this presentation consistent with hyperosmolar hyperglycemic state in the absence of anion gap/ketone elevation. He was placed on an infusion and his electrolytes were supported. His glucose is improving and he continues to have improvement in his electrolytes as well. Continued inpatient status for HHS is needed to ensure safe transition to subcutaneous insulin and resolution of his electrolyte derangements, or he would be high risk for worsening of these disease states and resultant morbidity and mortality risk.    The expected length of stay at the time of admission was more than 2 nights because of the severity of illness, intensity of service provided, and risk for adverse outcome. Inpatient admission is appropriate.         This document was produced using voice recognition software       The information on this document is developed by the utilization review team in order for the business office to ensure compliance. This only denotes the appropriateness of proper admission status and does not reflect the quality of care rendered.   The  definitions of Inpatient Status and Observation Status used in making the determination above are those provided in the CMS Coverage Manual, Chapter 1 and Chapter 6, section 70.4.   Sincerely,   Keith Quintanilla DO  Physician Advisor  [facility].

## 2022-09-16 NOTE — PROGRESS NOTES
Pt is transferred to [address] [place] [address] in bed 635. Report given to MELINDA Leung

## 2022-09-16 NOTE — H&P
[facility]    History and Physical - Hospitalist Service, GOLD TEAM        Date of Admission:  9/15/2022    Assessment & Plan      Antony Aguila Jr. is a 62 year old man with history of type 2 diabetes,  Hypertension, schizophrenia, venous stasis ulcers, and CKD stage 2 who presented to the ER with weakness and found to have hyperglycemia and hypokalemia.     Hyperglycemia  Likely, Hyperosmolar hyperglycemic state (HHS)  Type 2 diabetes  Presented with weakness, fatigue, and urinary incontinence. Found to have glucose > 600.  Labs in clinic with normal bicarb and hypokalemia. Labs on arrival to ER with bicarb 38, normal potassium, and hyponatremia. Ketones negative. Osms 301  - continue insulin drip  - hold metform  - repeat BMP. Mg, phos. Check VBG  - Endocrinology consulted     LAKISHA on CKD stage 2  Cr 1.71 on arrival to ER. In clinic had been 1.19, in line with recent baseline. Repeat Cr 1.64. Suspect prerenal with vomiting and poor intake.   - continue hydration with NS  - hold lasix and hydrochlorothiazide     QTC prolongation  QTc 513  - repeat EKG in am  - Avoid QT prolonging medications as able  - Consider psychiatry consult to assess if antipsychotics should be adjusted    Metabolic alkalosis  Bicarb 39 (had been 29 in ER which is his previous baseline). VBG ordered but not drawn. From history, suspect due to combination of contraction alkalosis, vomiting, and likely some chronic compensation for chronic CO2 retention from obesity hypoventilation.   - VBG ordered for am    Shortness of breath, resolved  CXR clear. Troponin negative. EKG without acute ischemia. Suspect due to metabolic issues. Resolved with hydration and insulin    Hypokalemia  K 2.0 in clinic. Resolved to 3.5 on arrival to ER. Repeat was 3.3.   - K replacement protocol    Pseudohypernatremia  Na normal at 137 in clinic. Down to 128 in ER. Corrects to 136 when accounting for glucose.   -  CTM    Urinary hesitancy and incontinence  - Awaiting UA  - Monitor output. Consider bladder scan if low    Nausea/Vomiting  Suspect due to HHS. Improved with zofran, hydration.   - Compazine PRN (rather than zofran due to prolonged QTc)    Hypertension  Clinic notes include lasix, lisinopril/hydrochlorothiazide, and amlodipine but the latter is not on his med list. Normotensive currently  - holding lasix, lisinopril and hydrochlorothiazide due to LAKISHA  - Consider starting amlodipine if he becomes hypertensive    Hypoalbuminemia   Venous stasis  History of LE edema  - holding lasix as above    Schizophrenia  PTSD  - continue zyprexa and abilify for now. Consider psych consult to assess if these should be adjusted based on QTc prolongation.        Diet:   moderate consistent carb  DVT Prophylaxis: Enoxaparin (Lovenox) SQ  Noble Catheter: Not present  Central Lines: None  Cardiac Monitoring: None  Code Status:   Full    Clinically Significant Risk Factors Present on Admission         # Hyponatremia: Na = 128 mmol/L (Ref range: 133 - 144 mmol/L) on admission, will monitor as appropriate     # Hypoalbuminemia: Albumin = 2.3 g/dL (Ref range: 3.4 - 5.0 g/dL) on admission, will monitor as appropriate        # Severe Obesity: Estimated body mass index is 41.37 kg/m  as calculated from the following:    Height as of this encounter: 1.829 m (6').    Weight as of this encounter: 138.3 kg (305 lb).        Disposition Plan      Expected Discharge Date: 09/17/2022                The patient's care was discussed with the Patient.    Mikhail Osborn MD  Hospitalist Service, [facility]  Securely message with the Vocera Web Console (learn more here)  Text page via [facility] Paging/Directory   Please see signed in provider for up to date coverage information      ______________________________________________________________________    Chief Complaint   weakness    History is  obtained from the patient    History of Present Illness   Antony Aguila Jr. is a 62 year old man with history of type 2 diabetes,  Hypertension, and CKD stage 2 who presented to the ER with weakness and found to have hyperglycemia and hypokalemia. Patient states that he recently restarted metformin. For the past several days he has been increasingly fatigued, weak, unsteady, and short of breath. He was also vomiting frequently and unable to keep anything down. He had assumed this was due to his blood pressure but checked it several times and it was okay. He also was having trouble urinating and then having incontinence. He has also been struggling with LE edema for many months. He is on lasix at home and his edema has been much improved.     He presented to clinic earlier to day and was found to have glucose of >600 and low potassium at 2.0. He was sent to the ER where he was given IV fluids and started on an insulin drip. He is already feeling much better. Denies SOB at the moment and feels much less weak.       Review of Systems    The 10 point Review of Systems is negative other than noted in the HPI or here.     Past Medical History    I have reviewed this patient's medical history and updated it with pertinent information if needed.   Past Medical History:   Diagnosis Date     Diabetes (H)      Hypertension        Past Surgical History   I have reviewed this patient's surgical history and updated it with pertinent information if needed.  No past surgical history on file.    Social History   I have reviewed this patient's social history and updated it with pertinent information if needed. He is retired. Keeps busy with walking. Denies drinking. Smokes marijuana and occasional cigarettes.        Family History     Family history of hypertension in father    Prior to Admission Medications   Prior to Admission Medications   Prescriptions Last Dose Informant Patient Reported? Taking?   ARIPiprazole (ABILIFY) 10 MG  tablet Past Week at prn Self Yes Yes   Sig: Take 10 mg by mouth daily   HYDROcodone-acetaminophen (NORCO) 5-325 MG tablet  Self No No   Sig: Take 1-2 tablets by mouth every 6 hours as needed for severe pain   OLANZapine (ZYPREXA) 5 MG tablet 9/15/2022 at am Self Yes Yes   Sig: Take 5 mg by mouth At Bedtime   QUEtiapine (SEROQUEL) 400 MG tablet Past Week at 2/2 pm Self Yes Yes   Sig: Take 800 mg by mouth At Bedtime   acetaminophen (TYLENOL) 500 MG tablet More than a month at prn Self Yes Yes   Sig: Take 500-1,000 mg by mouth every 8 hours as needed for mild pain   albuterol (PROAIR HFA/PROVENTIL HFA/VENTOLIN HFA) 108 (90 Base) MCG/ACT inhaler 9/15/2022 at pm Self Yes Yes   Sig: Inhale 2 puffs into the lungs every 4 hours as needed for shortness of breath / dyspnea or wheezing   diphenhydrAMINE (BENADRYL) 50 MG capsule Past Month at prn Self Yes Yes   Sig: Take 50 mg by mouth every 6 hours as needed for itching or allergies   furosemide (LASIX) 40 MG tablet Unknown at Unknown time Self Yes Yes   Sig: Take 40 mg by mouth daily   gabapentin (NEURONTIN) 600 MG tablet 9/15/2022 at 1/3 am Self Yes Yes   Sig: Take 600 mg by mouth 3 times daily   hydrochlorothiazide (HYDRODIURIL) 50 MG tablet 9/15/2022 at am Self Yes Yes   Sig: Take 50 mg by mouth daily   levETIRAcetam (KEPPRA) 500 MG tablet Unknown at Unknown time Self Yes Yes   Sig: Take 500 mg by mouth 2 times daily   meloxicam (MOBIC) 7.5 MG tablet Unknown at Unknown time Self Yes Yes   Sig: Take 7.5 mg by mouth every 12 hours as needed   metFORMIN (GLUCOPHAGE) 1000 MG tablet Past Month at Unknown time Self Yes Yes   Sig: Take 1,000 mg by mouth 2 times daily (with meals)   mineral oil-hydrophilic petrolatum (AQUAPHOR) external ointment More than a month at Unknown time Self Yes Yes   Sig: Apply 2 g topically 3 times daily as needed   polyethylene glycol (MIRALAX) 17 g packet  Self Yes No   Sig: Take 1 packet by mouth 2 times daily   potassium chloride ER (K-TAB) 20 MEQ  CR tablet 9/15/2022 at pm Self Yes Yes   Sig: Take 40 mEq by mouth   senna (SENOKOT) 8.6 MG tablet  Self Yes No   Sig: Take 1 tablet by mouth 2 times daily as needed for constipation   simethicone (MYLICON) 125 MG chewable tablet  Self Yes No   Sig: Take 125 mg by mouth 4 times daily as needed for intestinal gas      Facility-Administered Medications: None     Allergies   Allergies   Allergen Reactions     Lidocaine      Pt states this is not an allergy and doesn't recall this to be an allergy     Lisinopril Swelling     Pt states this is not an allergy and doesn't recall this to be an allergy     Pollen Extract        Physical Exam   Vital Signs: Temp: 98.2  F (36.8  C) Temp src: Oral BP: 119/82 Pulse: 91   Resp: 18 SpO2: 97 %      Weight: 305 lbs 0 oz    General: Alert and oriented, well nourished, sleeping in chair, NAD  Head: normocephalic, atraumatic,   Eyes: PERRL, EOMI, corneas and conjunctivae clear, no scleral icterus  ENT: mucus membranes moist, no exudates, no erythema.  Neck: no tenderness, supple, full AROM, no adenopathy  Chest/Pulmonary: CTAB, moving air well, no rales or wheezes, no tachypnea   Cardiovascular: S1, S2 normal, regular rate and rhythm and no murmur, no JVD. Distal pulses 2+. No edema--LE with venous stasis changes but legs look quite wrinkled suggesting adequate diuresis  Abdomen: NABS, soft, non-tender, non-distended, no guarding, no rebound, no masses palpable and no hepatomegaly  Musculoskel/Extremities: no erythema or warmth of major joints  Skin: no diaphoresis, skin color normal  Neuro: speech clear, cranial nerves II-XII grossly intact, intact sensory/light touch sensation   Psychiatric: affect/mood normal, cooperative, memory intact

## 2022-09-16 NOTE — PROGRESS NOTES
[facility]    Medicine Progress Note - Hospitalist Service, GOLD TEAM 17    Date of Admission:  9/15/2022    Assessment & Plan          Antony Aguila Jr. is a 62 year old man with history of type 2 diabetes,  Hypertension, schizophrenia, venous stasis ulcers, and CKD stage 2 who presented to clinic with weakness, nausea vomiting  and found to have hyperglycemia and hypokalemia thus was sent to ER .      History of twitching   7/2019   - he is on keppra , EEG was nonspecific as well as brain MRI  - would follow up  With neurology a sop to see if still needs to be on this     Hyperglycemia  Likely, Hyperosmolar hyperglycemic state (HHS)  Type 2 diabetes, out of control   -glucose > 600 ketones negative se  Osms 301,   - was started on insulin drip  - hold metformin   - VBG shows pH 7.52   - Endocrinology consulted   - check blood sugar about once a week  - needs diabetic education   - HgA1c 12.7 9/15      LAKISHA on CKD stage 2  -Cr 1.71 on admission creatinine was 1.16 9/15   - continue IV fluids , Suspect prerenal with vomiting and poor intake.   - hold lasix and hydrochlorothiazide , some places mentoin he had also been on lisinopril but found a note that states possible  angioedema with ACEi during hospital stay 11/2019   - avoid nephtotoxic agents      QTC prolongation  -QTc 513  - history non- sustained VT in 7/2019 in light of electrolyte inbalance   - K , Mg replacement , high   - repeat EKG in am  - Avoid QT prolonging medications as able  - Consider psychiatry consult to assess if antipsychotics should be adjusted     qtc longer at 542,   Replace K, Mg aggressively, check Calcium and hold  any qtc prolonging agents , holding zyprexa, Abilify reduced serqoqeul form 600 mg to 300 mg for now and reevaluate     Metabolic alkalosis   -chronic CO2 retention from obesity hypoventilation.   - no acidosis on VBG  ( pH is 7.52)      Shortness of breath, resolved  Cough      CXR clear. Troponin negative. EKG without acute ischemia.   - SARS- CoV2 negative 9/15   - he is a smoker, start albuterol inhalers for now, out patient  PFT   - has had history of tracheostomy in past  Was decannulated 8/2019      Hypokalemia  -  K 2.0 in clinic, replace      Pseudohypernatremia  - due to hyperglycemia      Urinary hesitancy and incontinence  - Awaiting UA  - Monitor output. Consider bladder scan if low     Nausea/Vomiting  Suspect due to HHS. Improved with zofran, hydration.   - Compazine PRN (rather than zofran due to prolonged QTc)       chr dissecting thoracic aorta, was taken to OR 6/19 when presented with chest pain  To ER but was found to have chronic type B rather than acute dissection and no operative intervention was done atus post  Repair in 6/2019   Hypertension    - goal SBP < 140 mmHg   - needs good blood pressure  Control, his PTA lasix and hydrochlorothiazide on hold, not sure why he is on 2 diuretics, would restart one of them, no ACEi /ARB with RF ,  Consider amlodipine but need to monitir for worsening leg edema , will have PRN hydralazine availablke        Hypoalbuminemia   Venous stasis  History of LE edema  - holding lasix as above  - monitor with IV fluids       Schizophrenia  PTSD  - holding  zyprexa and abilify for now with prolonged qTC , reduced seroquel dose due to same , did not stop as on high dose to avoid withdrawal but if qtc increases then hold all together, monitor for decompensations .        Morbid obesity   - defer back to PMD              Diet: Moderate Consistent Carb (60 g CHO per Meal) Diet    DVT Prophylaxis: Enoxaparin (Lovenox) SQ  Noble Catheter: Not present  Central Lines: None  Cardiac Monitoring: None  Code Status: previously full     Disposition Plan      Expected Discharge Date: 09/17/2022                The patient's care was discussed with  MELINDA Hernandez MD  Hospitalist Service, GOLD TEAM [address] [place] [address]  [facility]  Securely message with the Vocera Web Console (learn more here)  Text page via [facility] Paging/Directory   Please see signed in provider for up to date coverage information      Clinically Significant Risk Factors Present on Admission         # Hyponatremia: Na = 131 mmol/L (Ref range: 133 - 144 mmol/L) on admission, will monitor as appropriate     # Hypoalbuminemia: Albumin = 2.3 g/dL (Ref range: 3.4 - 5.0 g/dL) on admission, will monitor as appropriate        # DMII: A1C = 12.7 % (Ref range: 0.0 - 5.6 %) within past 3 months  # Severe Obesity: Estimated body mass index is 41.37 kg/m  as calculated from the following:    Height as of this encounter: 1.829 m (6').    Weight as of this encounter: 138.3 kg (305 lb).        ______________________________________________________________________    Interval History      Feels  tired but better since here but still tired. Still some nausea but ha shad no emesis, tolerated food last night    Has been coughing but denies worsening cough or shortness of breath     Had diarrhea few days ago but that resolved         Data reviewed today: I reviewed all medications, new labs and imaging results over the last 24 hours    Physical Exam   Vital Signs: Temp: 97.3  F (36.3  C) Temp src: Oral BP: 118/75 Pulse: 86   Resp: 18 SpO2: 98 % O2 Device: None (Room air)    Weight: 305 lbs 0 oz     General appearence: awake alert  In no apparent distress    HEENT: EOMI, PEARLA, sclera nonicteric,  moist,  mucus membranes,   NECK : supple  RESPIRATORY: lungs clear to auscultation bilateral,  no wheezing or crackles   CARDIOVASCULAR:S1 S2 regular rate and rhythm, distant heart sounds  GASTROINTESTINAL:soft, non-distended , non-tender , + bowel sounds, no masses felt   SKIN: warm and dry, no mottling noted   NEUROLOGIC; awake alert and oriented, no focal deficits found  EXTREMITIES: no clubbing, cyanosis , +  edema , moves all extremity,    MUSCULOSKELETAL: without deformity        Data   Recent Labs   Lab 09/16/22  1149 09/16/22  0815 09/16/22  0618 09/16/22  0238 09/16/22  0036 09/15/22  2311 09/15/22  2136 09/15/22  1857 09/15/22  1751   WBC  --   --   --   --   --   --   --   --  8.5   HGB  --   --   --   --   --   --   --   --  16.0   MCV  --   --   --   --   --   --   --   --  84   PLT  --   --   --   --   --   --   --   --  211   NA  --   --   --   --  131*  --  128*  --  137   POTASSIUM  --   --   --   --  3.3*  --  3.5  --  2.0*   CHLORIDE  --   --   --   --  87*  --  84*  --  101   CO2  --   --   --   --  39*  --  38*  --  29   BUN  --   --   --   --  29  --  30  --  23   CR  --   --   --   --  1.64*  --  1.71*  --  1.16   ANIONGAP  --   --   --   --  5  --  6  --  7   ANGELLA  --   --   --   --  9.2  --  8.8  --  6.3*   * 260* 302*   < > 423*   < > 631*   < > 469*   ALBUMIN  --   --   --   --   --   --   --   --  2.3*   PROTTOTAL  --   --   --   --   --   --   --   --  5.2*   BILITOTAL  --   --   --   --   --   --   --   --  0.8   ALKPHOS  --   --   --   --   --   --   --   --  61   ALT  --   --   --   --   --   --   --   --  14   AST  --   --   --   --   --   --   --   --  11    < > = values in this interval not displayed.     Recent Results (from the past 24 hour(s))   XR Chest Port 1 View    Narrative    EXAM: XR CHEST PORT 1 VIEW  LOCATION: [facility]  DATE/TIME: 9/15/2022 7:11 PM    INDICATION: SOB  COMPARISON: 9/25/2021      Impression    IMPRESSION: Heart size and pulmonary vasculature are normal. Rounded opacity in the inferior mediastinum suggests underlying hiatal hernia. No infiltrates or effusions. Previous median sternotomy. Atherosclerotic change of the aorta. Degenerative change   in the shoulders.

## 2022-09-16 NOTE — PLAN OF CARE
Patient arrived from the unit from the ED, alert and oriented x 4, able to make needs known    On continuous insulin drip DKA algorithm, BS hourly     PIV to Right AC patent and infusing.    RN managed Potassium, Magnesium and Phosphorus    LBM per patient is 09/13/2022 - given Miralax PRN     Some wheezing upon auscultation, patient denies shortness of breath at rest    Transfer to [address] [place] [address] for telemetry monitoring, abnormal ecg    Dry skin noted to BLE, no open wounds noted

## 2022-09-17 LAB
ANION GAP SERPL CALCULATED.3IONS-SCNC: 1 MMOL/L (ref 3–14)
ANION GAP SERPL CALCULATED.3IONS-SCNC: 3 MMOL/L (ref 3–14)
ANION GAP SERPL CALCULATED.3IONS-SCNC: 4 MMOL/L (ref 3–14)
BUN SERPL-MCNC: 14 MG/DL (ref 7–30)
BUN SERPL-MCNC: 15 MG/DL (ref 7–30)
BUN SERPL-MCNC: 15 MG/DL (ref 7–30)
BUN SERPL-MCNC: 16 MG/DL (ref 7–30)
BUN SERPL-MCNC: 17 MG/DL (ref 7–30)
CA-I BLD-MCNC: 4.5 MG/DL (ref 4.4–5.2)
CALCIUM SERPL-MCNC: 8.5 MG/DL (ref 8.5–10.1)
CALCIUM SERPL-MCNC: 8.6 MG/DL (ref 8.5–10.1)
CALCIUM SERPL-MCNC: 8.8 MG/DL (ref 8.5–10.1)
CALCIUM SERPL-MCNC: 9 MG/DL (ref 8.5–10.1)
CALCIUM SERPL-MCNC: 9 MG/DL (ref 8.5–10.1)
CHLORIDE BLD-SCNC: 100 MMOL/L (ref 94–109)
CHLORIDE BLD-SCNC: 102 MMOL/L (ref 94–109)
CHLORIDE BLD-SCNC: 98 MMOL/L (ref 94–109)
CHLORIDE BLD-SCNC: 99 MMOL/L (ref 94–109)
CHLORIDE BLD-SCNC: 99 MMOL/L (ref 94–109)
CO2 SERPL-SCNC: 33 MMOL/L (ref 20–32)
CO2 SERPL-SCNC: 34 MMOL/L (ref 20–32)
CO2 SERPL-SCNC: 35 MMOL/L (ref 20–32)
CO2 SERPL-SCNC: 35 MMOL/L (ref 20–32)
CO2 SERPL-SCNC: 36 MMOL/L (ref 20–32)
CREAT SERPL-MCNC: 1.2 MG/DL (ref 0.66–1.25)
CREAT SERPL-MCNC: 1.22 MG/DL (ref 0.66–1.25)
CREAT SERPL-MCNC: 1.22 MG/DL (ref 0.66–1.25)
CREAT SERPL-MCNC: 1.26 MG/DL (ref 0.66–1.25)
CREAT SERPL-MCNC: 1.44 MG/DL (ref 0.66–1.25)
ERYTHROCYTE [DISTWIDTH] IN BLOOD BY AUTOMATED COUNT: 13.7 % (ref 10–15)
ERYTHROCYTE [DISTWIDTH] IN BLOOD BY AUTOMATED COUNT: 13.9 % (ref 10–15)
GFR SERPL CREATININE-BSD FRML MDRD: 55 ML/MIN/1.73M2
GFR SERPL CREATININE-BSD FRML MDRD: 64 ML/MIN/1.73M2
GFR SERPL CREATININE-BSD FRML MDRD: 67 ML/MIN/1.73M2
GFR SERPL CREATININE-BSD FRML MDRD: 67 ML/MIN/1.73M2
GFR SERPL CREATININE-BSD FRML MDRD: 68 ML/MIN/1.73M2
GLUCOSE BLD-MCNC: 102 MG/DL (ref 70–99)
GLUCOSE BLD-MCNC: 135 MG/DL (ref 70–99)
GLUCOSE BLD-MCNC: 146 MG/DL (ref 70–99)
GLUCOSE BLD-MCNC: 227 MG/DL (ref 70–99)
GLUCOSE BLD-MCNC: 74 MG/DL (ref 70–99)
GLUCOSE BLDC GLUCOMTR-MCNC: 105 MG/DL (ref 70–99)
GLUCOSE BLDC GLUCOMTR-MCNC: 126 MG/DL (ref 70–99)
GLUCOSE BLDC GLUCOMTR-MCNC: 129 MG/DL (ref 70–99)
GLUCOSE BLDC GLUCOMTR-MCNC: 130 MG/DL (ref 70–99)
GLUCOSE BLDC GLUCOMTR-MCNC: 131 MG/DL (ref 70–99)
GLUCOSE BLDC GLUCOMTR-MCNC: 133 MG/DL (ref 70–99)
GLUCOSE BLDC GLUCOMTR-MCNC: 143 MG/DL (ref 70–99)
GLUCOSE BLDC GLUCOMTR-MCNC: 146 MG/DL (ref 70–99)
GLUCOSE BLDC GLUCOMTR-MCNC: 149 MG/DL (ref 70–99)
GLUCOSE BLDC GLUCOMTR-MCNC: 156 MG/DL (ref 70–99)
GLUCOSE BLDC GLUCOMTR-MCNC: 162 MG/DL (ref 70–99)
GLUCOSE BLDC GLUCOMTR-MCNC: 174 MG/DL (ref 70–99)
GLUCOSE BLDC GLUCOMTR-MCNC: 179 MG/DL (ref 70–99)
GLUCOSE BLDC GLUCOMTR-MCNC: 183 MG/DL (ref 70–99)
GLUCOSE BLDC GLUCOMTR-MCNC: 183 MG/DL (ref 70–99)
GLUCOSE BLDC GLUCOMTR-MCNC: 194 MG/DL (ref 70–99)
GLUCOSE BLDC GLUCOMTR-MCNC: 213 MG/DL (ref 70–99)
GLUCOSE BLDC GLUCOMTR-MCNC: 216 MG/DL (ref 70–99)
GLUCOSE BLDC GLUCOMTR-MCNC: 219 MG/DL (ref 70–99)
GLUCOSE BLDC GLUCOMTR-MCNC: 238 MG/DL (ref 70–99)
GLUCOSE BLDC GLUCOMTR-MCNC: 244 MG/DL (ref 70–99)
GLUCOSE BLDC GLUCOMTR-MCNC: 256 MG/DL (ref 70–99)
GLUCOSE BLDC GLUCOMTR-MCNC: 259 MG/DL (ref 70–99)
GLUCOSE BLDC GLUCOMTR-MCNC: 271 MG/DL (ref 70–99)
GLUCOSE BLDC GLUCOMTR-MCNC: 275 MG/DL (ref 70–99)
HCT VFR BLD AUTO: 41.6 % (ref 40–53)
HCT VFR BLD AUTO: 42.1 % (ref 40–53)
HGB BLD-MCNC: 14.4 G/DL (ref 13.3–17.7)
HGB BLD-MCNC: 14.7 G/DL (ref 13.3–17.7)
MAGNESIUM SERPL-MCNC: 2.5 MG/DL (ref 1.6–2.3)
MCH RBC QN AUTO: 29.5 PG (ref 26.5–33)
MCH RBC QN AUTO: 30.5 PG (ref 26.5–33)
MCHC RBC AUTO-ENTMCNC: 34.6 G/DL (ref 31.5–36.5)
MCHC RBC AUTO-ENTMCNC: 34.9 G/DL (ref 31.5–36.5)
MCV RBC AUTO: 85 FL (ref 78–100)
MCV RBC AUTO: 87 FL (ref 78–100)
PHOSPHATE SERPL-MCNC: 1.2 MG/DL (ref 2.5–4.5)
PHOSPHATE SERPL-MCNC: 3.2 MG/DL (ref 2.5–4.5)
PLATELET # BLD AUTO: 204 10E3/UL (ref 150–450)
PLATELET # BLD AUTO: NORMAL 10*3/UL
POTASSIUM BLD-SCNC: 3.1 MMOL/L (ref 3.4–5.3)
POTASSIUM BLD-SCNC: 3.1 MMOL/L (ref 3.4–5.3)
POTASSIUM BLD-SCNC: 3.2 MMOL/L (ref 3.4–5.3)
POTASSIUM BLD-SCNC: 3.3 MMOL/L (ref 3.4–5.3)
POTASSIUM BLD-SCNC: 3.3 MMOL/L (ref 3.4–5.3)
POTASSIUM BLD-SCNC: 3.5 MMOL/L (ref 3.4–5.3)
POTASSIUM BLD-SCNC: 3.9 MMOL/L (ref 3.4–5.3)
RBC # BLD AUTO: 4.82 10E6/UL (ref 4.4–5.9)
RBC # BLD AUTO: 4.88 10E6/UL (ref 4.4–5.9)
SODIUM SERPL-SCNC: 136 MMOL/L (ref 133–144)
SODIUM SERPL-SCNC: 136 MMOL/L (ref 133–144)
SODIUM SERPL-SCNC: 138 MMOL/L (ref 133–144)
SODIUM SERPL-SCNC: 138 MMOL/L (ref 133–144)
SODIUM SERPL-SCNC: 139 MMOL/L (ref 133–144)
WBC # BLD AUTO: 8.1 10E3/UL (ref 4–11)
WBC # BLD AUTO: 8.4 10E3/UL (ref 4–11)

## 2022-09-17 PROCEDURE — 250N000011 HC RX IP 250 OP 636: Performed by: INTERNAL MEDICINE

## 2022-09-17 PROCEDURE — 99233 SBSQ HOSP IP/OBS HIGH 50: CPT | Performed by: NURSE PRACTITIONER

## 2022-09-17 PROCEDURE — 258N000003 HC RX IP 258 OP 636

## 2022-09-17 PROCEDURE — 83735 ASSAY OF MAGNESIUM: CPT | Performed by: INTERNAL MEDICINE

## 2022-09-17 PROCEDURE — 93005 ELECTROCARDIOGRAM TRACING: CPT

## 2022-09-17 PROCEDURE — 36415 COLL VENOUS BLD VENIPUNCTURE: CPT | Performed by: INTERNAL MEDICINE

## 2022-09-17 PROCEDURE — 84132 ASSAY OF SERUM POTASSIUM: CPT | Performed by: INTERNAL MEDICINE

## 2022-09-17 PROCEDURE — 87205 SMEAR GRAM STAIN: CPT | Performed by: STUDENT IN AN ORGANIZED HEALTH CARE EDUCATION/TRAINING PROGRAM

## 2022-09-17 PROCEDURE — 250N000013 HC RX MED GY IP 250 OP 250 PS 637: Performed by: INTERNAL MEDICINE

## 2022-09-17 PROCEDURE — 87040 BLOOD CULTURE FOR BACTERIA: CPT | Performed by: INTERNAL MEDICINE

## 2022-09-17 PROCEDURE — 85048 AUTOMATED LEUKOCYTE COUNT: CPT | Performed by: INTERNAL MEDICINE

## 2022-09-17 PROCEDURE — 258N000003 HC RX IP 258 OP 636: Performed by: STUDENT IN AN ORGANIZED HEALTH CARE EDUCATION/TRAINING PROGRAM

## 2022-09-17 PROCEDURE — 120N000002 HC R&B MED SURG/OB UMMC

## 2022-09-17 PROCEDURE — 93010 ELECTROCARDIOGRAM REPORT: CPT | Performed by: INTERNAL MEDICINE

## 2022-09-17 PROCEDURE — 250N000009 HC RX 250: Performed by: STUDENT IN AN ORGANIZED HEALTH CARE EDUCATION/TRAINING PROGRAM

## 2022-09-17 PROCEDURE — 250N000009 HC RX 250: Performed by: INTERNAL MEDICINE

## 2022-09-17 PROCEDURE — 82330 ASSAY OF CALCIUM: CPT | Performed by: INTERNAL MEDICINE

## 2022-09-17 PROCEDURE — 84100 ASSAY OF PHOSPHORUS: CPT | Performed by: STUDENT IN AN ORGANIZED HEALTH CARE EDUCATION/TRAINING PROGRAM

## 2022-09-17 PROCEDURE — 250N000013 HC RX MED GY IP 250 OP 250 PS 637: Performed by: STUDENT IN AN ORGANIZED HEALTH CARE EDUCATION/TRAINING PROGRAM

## 2022-09-17 PROCEDURE — 250N000013 HC RX MED GY IP 250 OP 250 PS 637: Performed by: PEDIATRICS

## 2022-09-17 PROCEDURE — 99233 SBSQ HOSP IP/OBS HIGH 50: CPT | Performed by: INTERNAL MEDICINE

## 2022-09-17 PROCEDURE — 250N000009 HC RX 250: Performed by: NURSE PRACTITIONER

## 2022-09-17 RX ORDER — ACETAMINOPHEN 325 MG/1
650 TABLET ORAL EVERY 4 HOURS PRN
Status: DISCONTINUED | OUTPATIENT
Start: 2022-09-17 | End: 2022-09-21 | Stop reason: HOSPADM

## 2022-09-17 RX ORDER — POTASSIUM CHLORIDE 750 MG/1
40 TABLET, EXTENDED RELEASE ORAL ONCE
Status: COMPLETED | OUTPATIENT
Start: 2022-09-17 | End: 2022-09-17

## 2022-09-17 RX ORDER — CALCIUM CARBONATE 500 MG/1
500 TABLET, CHEWABLE ORAL 3 TIMES DAILY PRN
Status: DISCONTINUED | OUTPATIENT
Start: 2022-09-17 | End: 2022-09-21 | Stop reason: HOSPADM

## 2022-09-17 RX ORDER — SODIUM CHLORIDE 9 MG/ML
INJECTION, SOLUTION INTRAVENOUS CONTINUOUS
Status: DISCONTINUED | OUTPATIENT
Start: 2022-09-17 | End: 2022-09-19

## 2022-09-17 RX ORDER — SODIUM CHLORIDE 9 MG/ML
INJECTION, SOLUTION INTRAVENOUS
Status: DISPENSED
Start: 2022-09-17 | End: 2022-09-18

## 2022-09-17 RX ORDER — QUETIAPINE FUMARATE 100 MG/1
200 TABLET, FILM COATED ORAL AT BEDTIME
Status: DISCONTINUED | OUTPATIENT
Start: 2022-09-17 | End: 2022-09-18

## 2022-09-17 RX ADMIN — SODIUM CHLORIDE: 9 INJECTION, SOLUTION INTRAVENOUS at 13:06

## 2022-09-17 RX ADMIN — ALBUTEROL SULFATE 2 PUFF: 90 AEROSOL, METERED RESPIRATORY (INHALATION) at 20:15

## 2022-09-17 RX ADMIN — ENOXAPARIN SODIUM 40 MG: 40 INJECTION SUBCUTANEOUS at 20:15

## 2022-09-17 RX ADMIN — ALBUTEROL SULFATE 2 PUFF: 90 AEROSOL, METERED RESPIRATORY (INHALATION) at 03:11

## 2022-09-17 RX ADMIN — QUETIAPINE FUMARATE 200 MG: 100 TABLET ORAL at 23:15

## 2022-09-17 RX ADMIN — INSULIN ASPART 12 UNITS: 100 INJECTION, SOLUTION INTRAVENOUS; SUBCUTANEOUS at 11:37

## 2022-09-17 RX ADMIN — ALBUTEROL SULFATE 2 PUFF: 90 AEROSOL, METERED RESPIRATORY (INHALATION) at 13:22

## 2022-09-17 RX ADMIN — HUMAN INSULIN 10 UNITS/HR: 100 INJECTION, SOLUTION SUBCUTANEOUS at 19:53

## 2022-09-17 RX ADMIN — POTASSIUM PHOSPHATE, MONOBASIC AND POTASSIUM PHOSPHATE, DIBASIC 15 MMOL: 224; 236 INJECTION, SOLUTION INTRAVENOUS at 03:11

## 2022-09-17 RX ADMIN — INSULIN ASPART 13 UNITS: 100 INJECTION, SOLUTION INTRAVENOUS; SUBCUTANEOUS at 18:06

## 2022-09-17 RX ADMIN — ENOXAPARIN SODIUM 40 MG: 40 INJECTION SUBCUTANEOUS at 09:57

## 2022-09-17 RX ADMIN — CALCIUM CARBONATE (ANTACID) CHEW TAB 500 MG 500 MG: 500 CHEW TAB at 23:22

## 2022-09-17 RX ADMIN — LEVETIRACETAM 500 MG: 500 TABLET, FILM COATED ORAL at 20:15

## 2022-09-17 RX ADMIN — POTASSIUM CHLORIDE 40 MEQ: 750 TABLET, EXTENDED RELEASE ORAL at 03:05

## 2022-09-17 RX ADMIN — POTASSIUM CHLORIDE 40 MEQ: 750 TABLET, EXTENDED RELEASE ORAL at 20:15

## 2022-09-17 RX ADMIN — POLYETHYLENE GLYCOL 3350 17 G: 17 POWDER, FOR SOLUTION ORAL at 13:38

## 2022-09-17 RX ADMIN — POTASSIUM CHLORIDE 40 MEQ: 750 TABLET, EXTENDED RELEASE ORAL at 13:32

## 2022-09-17 RX ADMIN — HUMAN INSULIN 5.5 UNITS/HR: 100 INJECTION, SOLUTION SUBCUTANEOUS at 05:13

## 2022-09-17 RX ADMIN — POTASSIUM PHOSPHATE, MONOBASIC AND POTASSIUM PHOSPHATE, DIBASIC 15 MMOL: 224; 236 INJECTION, SOLUTION INTRAVENOUS at 05:16

## 2022-09-17 ASSESSMENT — ACTIVITIES OF DAILY LIVING (ADL)
ADLS_ACUITY_SCORE: 37

## 2022-09-17 NOTE — PROGRESS NOTES
Pt A&Ox4 w/ intermittent confusion related to his MI. Pt up to chair most of the day and transferred with SBA x2. Pt transitioned from DKA insulin gtt to adult algorithm. Pt currently on AG 2 at 2 units/hr. Carb choices for meals avg 60 carb/meal and covered on sliding scale in addition to gtt. K+ improving but still requires replacement intervention to meet minimum goal level. Pt voiding in urinal w/ good UOP, is passing gas, no BM this shift. AM EKG shows narrowing of Qtc. Continue to monitor hour BG and K+ levels.     Madison Serrano RN

## 2022-09-17 NOTE — PLAN OF CARE
"BP (!) 139/96 (BP Location: Left arm)   Pulse 90   Temp 99.6  F (37.6  C) (Oral)   Resp 19   Ht 1.829 m (6')   Wt 138.3 kg (305 lb)   SpO2 97%   BMI 41.37 kg/m      Patient arrived on unit around 1940 from 6 Med/Surg. Patient transferred to 5 Med/surg due to needing tele.  Tele: normal sinus rhythm.   Patient has a productive cough. Patient has a scheduled albuterol inhaler (see MAR).  A&O x4. VSS on RA.  Denies chest pain, SOB.  No skin issues.  SBA for transfers.      IV is infusing continuous insulin drip and potassium replacement. IV is also infusing D5 and 1/2 NS (see MAR/order).  Patient is on Algorithm 2 of Insulin Drip protocol.    Writer spoke with another nurse on the unit and decided to page Provider and ask if patient should be on the \"Regular Insulin Drip\" protocol instead of the \"DKA Insulin Drip\" protocol that he is currently on. Provider called writer back and after looking at Endocrine's note, provider told writer to keep patient on the DKA Insulin Drip Protocol and to continue infusing D5 1/2 NS at 100ml/hr with the insulin drip.    When patient arrived on unit, they were complaining of slight pain at IV insertion site. * IV is sticking partially out (it was like this when patient transferred to us). IV flushes fine, is infusing fine, and no leakage. Around 2245 R-IV started to leak so writer removed IV.  2nd IV was placed in L-hand. Patient complaining of pain from potassium infusing. Writer stopped the potassium infusion. About 20 minutes later patient pulled out IV. Writer paged vascular access to place 2 IV's. Vascular access arrived right at shift change (11pm).    Patient needs 1 IV for insulin drip and fluid and needs a 2nd IV for calcium infusion that is scheduled for 11pm start time.     Continue with POC.  "

## 2022-09-17 NOTE — PROGRESS NOTES
Brief cross cover note    Following for DKA and associated electrolyte derangements    DKA  IDDM type 2 with poor control  Recent Labs   Lab 09/16/22  2007 09/16/22  1920 09/16/22  1827 09/16/22  1811 09/16/22  1714 09/16/22  1650   * 310* 265* 224* 317* 254*     - agree with IV insulin protocol and cares per endocrinology   - glucose remains difficult to control  - nursing to notify if hypoglycemia or already at algorithm 4    Hypocalcemia  I-ben 4  - calcium gluconate 1g IV    Hypokalemia  K last 3.1, next recheck ~22:00  - continue RN managed replacement    Hypomagnesemia  - RN guided replacement    Addendum:   - patient only infused 20 meq of the 40meq he was due for and pulled out IVs, replacement pending (needs pedi access)  - ordered x 1 KlorCon 20mEq oral, continue q4h checks    Elisa Colin PA-C  [facility]  Contact information available via [facility] Paging/Directory

## 2022-09-17 NOTE — PROGRESS NOTES
Diabetes Consult Daily  Progress Note          Assessment/Plan:     HPI:  Antony Aguila Jr. is a 62 year old man with history of type 2 diabetes,  Hypertension, schizophrenia, venous stasis ulcers, and CKD stage 2 who presented to the ER with weakness and found to have hyperglycemia and hypokalemia.     Assessment:     1)  Type 2 Diabetes Mellitus; uncontrolled c/b neuropathy.  Presented in HHS.  A1c 12.7%  2)  Nausea and vomiting; resolved  3)  Obesity; BMI 42  4)  Schizophrenia  5)  Weakness  6)  Metabolic derangements; hypokalemia/hyponemia     Plan:       -  Continue on IV insulin protocol; DKA -> will switch to Non-DKA (1300)     -  Dextrose fluids to be discontinued.  Once off dextrose fluids and tolerating PO, AND stable rates on drip, will be appropriate for transition to MDI    -  Novolog meal coverage 1 unit(s) per 5 g cho AC meals/snacks - will likely need more    -  BG monitoring per IV insulin protocol q1-2 hours    -  Hold all PTA meds -> metformin and Invokana - do not recommend Invokana (SGLT-2) resumption 2/2 [facility] (if he was taking/compliant)     -  Test claim - TBD    -  Hypoglycemia protocol    -  Recommend carb counting protocol    -  Education :  anticipated he will need vs closer follow up with skilled nursing to home    -  Outpatient follow up:  recommend [facility] Endocr[facility]ology vs PCP    Plan discussed with patient, bedside RN/primary team via this note.           Interval History:     The last 24 hours progress and nursing notes reviewed.      BG trend:        Average insulin per hour:  Range 3-7 unit(s) over the interval with Dextrose in background, PO resumed and cho coverage added.   d5% and 0.45% running at 75 ml/hr    Antony is lethargic this AM, diaphoretic - BGs have not been low however did get cho coverage and rate is now at 8.5 unit(s) perhour on the drip.   Contacted RN to re-check BG    Antony has not new questions or concerns.  Feeling ok, eating well.    Denies sxs of hypoglycemia.  RN checked for safety and BG remains elevated.     Metabolic derangements normalized. Last K was 3.5  Since PO has been consistent, stopping dextrose fluids could be ok now unless primary team has other thoughts.       Planned Procedures/surgeries: none  D5W-containing solutions/medications: none    Recent Labs   Lab 09/17/22  0659 09/17/22  0612 09/17/22  0513 09/17/22  0415 09/17/22  0304 09/17/22  0159   * 162* 179* 183* 149* 183*           Nutrition:     Orders Placed This Encounter      Moderate Consistent Carb (60 g CHO per Meal) Diet        PTA Regimen:      Maria Dolores cannot recall any medications for DM.      Per chart review, metformin 1000 mg BID  Invokana 100 mg in AM - recently started on 6/10/2022     BG monitoring frequency:  Once per week when home health nurse comes  Reports his blood sugars range typically between 300 mg/dL and 500 mg/dL     Diet: no restrictions     Denies any diet restrictions, often only eats two meals per day with limited snacks  Does report drinking quite a lot of regular soda at least 4 L per day of orange crush, lemonade Minute Maid as well as Gingerale.             Review of Systems:   CC: Denies         Medications:   Steroid planning:  no  Tube Feeding: no       Physical Exam:   /67 (BP Location: Left arm)   Pulse 88   Temp 97.9  F (36.6  C) (Oral)   Resp 19   Ht 1.829 m (6')   Wt 138.3 kg (305 lb)   SpO2 (!) 86%   BMI 41.37 kg/m      General:   A&O, NAD, pleasant, resting comfortably. Well nourished - diaphoretic   HEENT:  NC/AT. MMM, EOMI, Anicteric  Lungs:  unremarkable, no new cough, no SOB  ABD:   rounded, soft  Extremities:  + edema, non-tender.  No obvious ulcerations noted.   Skin:  warm and dry, no obvious lesions/rash/bleeding  Neuro:  No focal neurological deficits  Psych:   Bizarre affect, fairly cooperative, labile mood, fair eye contact, congruent affect          Data:     Lab Results   Component Value Date     A1C 12.5 09/16/2022    A1C 12.7 09/15/2022    A1C 7.4 09/25/2021        CBC RESULTS: Recent Labs   Lab Test 09/17/22  1012   WBC 8.1   RBC 4.82   HGB 14.7   HCT 42.1   MCV 87   MCH 30.5   MCHC 34.9   RDW 13.9          Recent Labs   Lab Test 09/17/22  1227 09/17/22  1141 09/17/22  1042 09/17/22  1012 09/17/22  0659 09/17/22  0656   NA  --   --   --  138  --  136   POTASSIUM  --   --   --  3.1*  --  3.5   CHLORIDE  --   --   --  99  --  99   CO2  --   --   --  35*  --  33*   ANIONGAP  --   --   --  4  --  4   * 238*   < > 102*   < > 146*   BUN  --   --   --  15  --  17   CR  --   --   --  1.22  --  1.20   ANGELLA  --   --   --  8.6  --  8.8    < > = values in this interval not displayed.     Liver Function Studies -   Recent Labs   Lab Test 09/15/22  1751   PROTTOTAL 5.2*   ALBUMIN 2.3*   BILITOTAL 0.8   ALKPHOS 61   AST 11   ALT 14     No results found for: INR      BRANDY Castorena CNP   Inpatient Diabetes Management Service  Pager - [phone]  Available on [employer]     To contact Endocrine Diabetes service:   From 8AM-4PM: page inpatient diabetes provider who is following the patient that day (see filed or incomplete progress notes/consult notes).  If uncertain of provider assignment: page job code [id number]. (To page job code in-house dial 3 stars, [phone] then enter number).  For questions or updates AFTER HOURS from 4PM-8AM: page the diabetes job code for on call fellow: [phone]    Please notify inpatient diabetes service if changes are planned to steroids, nutrition, or if procedures are planned requiring prolonged NPO status.Diabetes Management Team job code: [phone]    I spent a total of 35 minutes on the date of the encounter doing chart review, history and exam, documentation and further activities per the note.  Over 50% of my time on the unit was spent counseling the patient and/or coordinating care regarding acute hyperglycemic management.  See note for details.

## 2022-09-17 NOTE — PROGRESS NOTES
[facility]    Medicine Progress Note - Hospitalist Service, GOLD TEAM 17    Date of Admission:  9/15/2022    Assessment & Plan          Antony Aguila Jr. is a 62 year old man with history of type 2 diabetes,  Hypertension, schizophrenia, venous stasis ulcers, and CKD stage 2 who presented to clinic with weakness, nausea vomiting  and found to have hyperglycemia and hypokalemia thus was sent to ER .      History of twitching   7/2019   - he is on keppra , EEG was nonspecific as well as brain MRI  - would follow up  With neurology a sop to see if still needs to be on this     Hyperglycemia  Likely, Hyperosmolar hyperglycemic state (HHS)  Type 2 diabetes, out of control   -glucose > 600 ketones negative se  Osms 301,   - was started on insulin drip and did transition to DKA orders now back to regular   - continue to hold metformin   - VBG shows pH 7.52   - Endocrinology consulted and appreciate help   - he checks blood sugar about once a week  - needs diabetic education   - HgA1c 12.7 9/15   - continue with q 4 hr BMP for now      LAKISHA on CKD stage 2  -Cr 1.71 on admission creatinine was 1.16 9/15 , now creatinine down to 1.22 with IV fluids    - Suspect prerenal with vomiting and poor intake.   - continue to hold lasix and hydrochlorothiazide , some places mentoin he had also been on lisinopril but found a note that states possible  angioedema with ACEi during hospital stay 11/2019   - avoid nephtotoxic agents      QTC prolongation  -QTc 513--- 542---515   - history non- sustained VT in 7/2019 in light of electrolyte inbalance   - Avoid QT prolonging medications as able  - Replace K, Mg aggressively,  Did get calcium gluconate 7/16 and ionized calcium is now within normal   - continue to  hold zyprexa, Abilify reduced serqoqeul to 200 mg for now and reevaluate     Metabolic alkalosis   -chronic CO2 retention from obesity hypoventilation.   - no acidosis on VBG  ( pH is  7.52)      Shortness of breath, resolved  Cough     CXR clear. Troponin negative. EKG without acute ischemia.   - SARS- CoV2 negative 9/15   - he is a smoker, start albuterol inhalers for now, out patient  PFT   - has had history of tracheostomy in past  Was decannulated 8/2019      Hypokalemia  -  K 2.0 in clinic, replace      Pseudohypernatremia  - due to hyperglycemia      Urinary hesitancy and incontinence  - Awaiting UA  - Monitor output. Consider bladder scan if low     Nausea/Vomiting  Suspect due to HHS. Improved with zofran, hydration.   - Compazine PRN (rather than zofran due to prolonged QTc)       chr dissecting thoracic aorta, was taken to OR 6/19 when presented with chest pain  To ER but was found to have chronic type B rather than acute dissection and no operative intervention was done atus post  Repair in 6/2019   Hypertension    - goal SBP < 140 mmHg   - needs good blood pressure  Control, his PTA lasix and hydrochlorothiazide on hold, not sure why he is on 2 diuretics, would restart one of them, no ACEi /ARB with RF ,  Consider amlodipine but need to monitir for worsening leg edema , will have PRN hydralazine availablke        Hypoalbuminemia   Venous stasis  History of LE edema  - holding lasix as above  - monitor with IV fluids    - currently murphy snot have any edema      Schizophrenia  PTSD  - holding  zyprexa and abilify for now with prolonged qTC , reduced seroquel dose due to same, states he takes 400-600 mg of seroquel, takes the 600 mg about 2-4 x a week and 400 mg on other days so going down to 200 mg should  be ok  -monitor for decompensations, restart medications as able  - asked pych to help out and see whet else we can use  .        Morbid obesity   - defer back to PMD              Diet: Moderate Consistent Carb (60 g CHO per Meal) Diet    DVT Prophylaxis: Enoxaparin (Lovenox) SQ  Noble Catheter: Not present  Central Lines: None  Cardiac Monitoring: ACTIVE order. Indication: QTc  prolonging medication (48 hours)  Code Status: previously full     Disposition Plan      Expected Discharge Date: 09/18/2022                The patient's care was discussed with  RN     Ladi Hernandez MD  Hospitalist Service, GOLD TEAM 17  [facility]  Securely message with the Vocera Web Console (learn more here)  Text page via [facility] Paging/Directory   Please see signed in provider for up to date coverage information      Clinically Significant Risk Factors Present on Admission                # DMII: A1C = 12.5 % (Ref range: 0.0 - 5.6 %) within past 3 months  # Severe Obesity: Estimated body mass index is 41.37 kg/m  as calculated from the following:    Height as of this encounter: 1.829 m (6').    Weight as of this encounter: 138.3 kg (305 lb).        ______________________________________________________________________    Interval History      Doing ok, feels  much better, no further nausea vomiting , constipated, denies any chest pain  No shortness of breath          Data reviewed today: I reviewed all medications, new labs and imaging results over the last 24 hours    Physical Exam   Vital Signs: Temp: 97.9  F (36.6  C) Temp src: Oral BP: 114/67 Pulse: 88   Resp: 19 SpO2: (!) 86 % O2 Device: None (Room air) Oxygen Delivery: 2 LPM  Weight: 305 lbs 0 oz     General appearence: awake alert  In no apparent distress    HEENT: EOMI, PEARLA, sclera nonicteric,  moist,  mucus membranes,   NECK : supple  RESPIRATORY: lungs clear to auscultation bilateral,  no wheezing or crackles   CARDIOVASCULAR:S1 S2 regular rate and rhythm, distant heart sounds  GASTROINTESTINAL:soft, non-distended , non-tender , + bowel sounds, no masses felt   SKIN: warm and dry, no mottling noted   NEUROLOGIC; awake alert and oriented, no focal deficits found  EXTREMITIES: no clubbing, cyanosis ,  No edema, legs wrinkly , moves all extremity,    MUSCULOSKELETAL: without deformity       Data   Recent  Labs   Lab 09/17/22  0744 09/17/22  0659 09/17/22  0656 09/17/22  0106 09/17/22  0050 09/16/22  2307 09/16/22  2213 09/16/22  1827 09/16/22  1811 09/15/22  1857 09/15/22  1751   WBC  --   --  8.4  --   --   --   --   --   --   --  8.5   HGB  --   --  14.4  --   --   --   --   --   --   --  16.0   MCV  --   --  85  --   --   --   --   --   --   --  84   PLT  --   --   --   --   --   --   --   --   --   --  211   NA  --   --  136  --   --   --  135  --  136   < > 137   POTASSIUM  --   --  3.5  --  3.2*  --  3.6  --  3.1*   < > 2.0*   CHLORIDE  --   --  99  --   --   --  94  --  93*   < > 101   CO2  --   --  33*  --   --   --  37*  --  40*   < > 29   BUN  --   --  17  --   --   --  23  --  23   < > 23   CR  --   --  1.20  --   --   --  1.48*  --  1.47*   < > 1.16   ANIONGAP  --   --  4  --   --   --  4  --  3   < > 7   ANGELLA  --   --  8.8  --   --   --  8.9  --  8.8   < > 6.3*   * 174* 146*   < >  --    < > 176*   < > 224*   < > 469*   ALBUMIN  --   --   --   --   --   --   --   --   --   --  2.3*   PROTTOTAL  --   --   --   --   --   --   --   --   --   --  5.2*   BILITOTAL  --   --   --   --   --   --   --   --   --   --  0.8   ALKPHOS  --   --   --   --   --   --   --   --   --   --  61   ALT  --   --   --   --   --   --   --   --   --   --  14   AST  --   --   --   --   --   --   --   --   --   --  11    < > = values in this interval not displayed.     No results found for this or any previous visit (from the past 24 hour(s)).

## 2022-09-18 LAB
ALBUMIN UR-MCNC: NEGATIVE MG/DL
ANION GAP SERPL CALCULATED.3IONS-SCNC: 2 MMOL/L (ref 3–14)
ANION GAP SERPL CALCULATED.3IONS-SCNC: 3 MMOL/L (ref 3–14)
ANION GAP SERPL CALCULATED.3IONS-SCNC: 4 MMOL/L (ref 3–14)
ANION GAP SERPL CALCULATED.3IONS-SCNC: <1 MMOL/L (ref 3–14)
APPEARANCE UR: CLEAR
BACTERIA SPEC CULT: NORMAL
BILIRUB UR QL STRIP: NEGATIVE
BUN SERPL-MCNC: 11 MG/DL (ref 7–30)
BUN SERPL-MCNC: 12 MG/DL (ref 7–30)
BUN SERPL-MCNC: 13 MG/DL (ref 7–30)
BUN SERPL-MCNC: 14 MG/DL (ref 7–30)
CALCIUM SERPL-MCNC: 8.6 MG/DL (ref 8.5–10.1)
CALCIUM SERPL-MCNC: 8.6 MG/DL (ref 8.5–10.1)
CALCIUM SERPL-MCNC: 8.7 MG/DL (ref 8.5–10.1)
CALCIUM SERPL-MCNC: 9 MG/DL (ref 8.5–10.1)
CHLORIDE BLD-SCNC: 101 MMOL/L (ref 94–109)
CHLORIDE BLD-SCNC: 104 MMOL/L (ref 94–109)
CHLORIDE BLD-SCNC: 104 MMOL/L (ref 94–109)
CHLORIDE BLD-SCNC: 106 MMOL/L (ref 94–109)
CO2 SERPL-SCNC: 29 MMOL/L (ref 20–32)
CO2 SERPL-SCNC: 29 MMOL/L (ref 20–32)
CO2 SERPL-SCNC: 33 MMOL/L (ref 20–32)
CO2 SERPL-SCNC: 34 MMOL/L (ref 20–32)
COLOR UR AUTO: ABNORMAL
CREAT SERPL-MCNC: 1.08 MG/DL (ref 0.66–1.25)
CREAT SERPL-MCNC: 1.16 MG/DL (ref 0.66–1.25)
CREAT SERPL-MCNC: 1.27 MG/DL (ref 0.66–1.25)
CREAT SERPL-MCNC: 1.3 MG/DL (ref 0.66–1.25)
GFR SERPL CREATININE-BSD FRML MDRD: 62 ML/MIN/1.73M2
GFR SERPL CREATININE-BSD FRML MDRD: 63 ML/MIN/1.73M2
GFR SERPL CREATININE-BSD FRML MDRD: 71 ML/MIN/1.73M2
GFR SERPL CREATININE-BSD FRML MDRD: 77 ML/MIN/1.73M2
GLUCOSE BLD-MCNC: 112 MG/DL (ref 70–99)
GLUCOSE BLD-MCNC: 177 MG/DL (ref 70–99)
GLUCOSE BLD-MCNC: 185 MG/DL (ref 70–99)
GLUCOSE BLD-MCNC: 224 MG/DL (ref 70–99)
GLUCOSE BLDC GLUCOMTR-MCNC: 112 MG/DL (ref 70–99)
GLUCOSE BLDC GLUCOMTR-MCNC: 113 MG/DL (ref 70–99)
GLUCOSE BLDC GLUCOMTR-MCNC: 118 MG/DL (ref 70–99)
GLUCOSE BLDC GLUCOMTR-MCNC: 123 MG/DL (ref 70–99)
GLUCOSE BLDC GLUCOMTR-MCNC: 134 MG/DL (ref 70–99)
GLUCOSE BLDC GLUCOMTR-MCNC: 145 MG/DL (ref 70–99)
GLUCOSE BLDC GLUCOMTR-MCNC: 145 MG/DL (ref 70–99)
GLUCOSE BLDC GLUCOMTR-MCNC: 178 MG/DL (ref 70–99)
GLUCOSE BLDC GLUCOMTR-MCNC: 180 MG/DL (ref 70–99)
GLUCOSE BLDC GLUCOMTR-MCNC: 181 MG/DL (ref 70–99)
GLUCOSE BLDC GLUCOMTR-MCNC: 186 MG/DL (ref 70–99)
GLUCOSE BLDC GLUCOMTR-MCNC: 189 MG/DL (ref 70–99)
GLUCOSE BLDC GLUCOMTR-MCNC: 201 MG/DL (ref 70–99)
GLUCOSE BLDC GLUCOMTR-MCNC: 202 MG/DL (ref 70–99)
GLUCOSE BLDC GLUCOMTR-MCNC: 216 MG/DL (ref 70–99)
GLUCOSE BLDC GLUCOMTR-MCNC: 236 MG/DL (ref 70–99)
GLUCOSE BLDC GLUCOMTR-MCNC: 96 MG/DL (ref 70–99)
GLUCOSE BLDC GLUCOMTR-MCNC: 96 MG/DL (ref 70–99)
GLUCOSE BLDC GLUCOMTR-MCNC: 97 MG/DL (ref 70–99)
GLUCOSE UR STRIP-MCNC: 1000 MG/DL
HGB UR QL STRIP: NEGATIVE
HOLD SPECIMEN: NORMAL
HOLD SPECIMEN: NORMAL
KETONES UR STRIP-MCNC: ABNORMAL MG/DL
LEUKOCYTE ESTERASE UR QL STRIP: NEGATIVE
MAGNESIUM SERPL-MCNC: 2.1 MG/DL (ref 1.6–2.3)
NITRATE UR QL: NEGATIVE
PH UR STRIP: 6 [PH] (ref 5–7)
PHOSPHATE SERPL-MCNC: 2.3 MG/DL (ref 2.5–4.5)
PLATELET # BLD AUTO: 191 10E3/UL (ref 150–450)
POTASSIUM BLD-SCNC: 3.2 MMOL/L (ref 3.4–5.3)
POTASSIUM BLD-SCNC: 3.3 MMOL/L (ref 3.4–5.3)
POTASSIUM BLD-SCNC: 3.3 MMOL/L (ref 3.4–5.3)
POTASSIUM BLD-SCNC: 3.4 MMOL/L (ref 3.4–5.3)
POTASSIUM BLD-SCNC: 3.6 MMOL/L (ref 3.4–5.3)
POTASSIUM BLD-SCNC: 3.7 MMOL/L (ref 3.4–5.3)
POTASSIUM BLD-SCNC: 5.5 MMOL/L (ref 3.4–5.3)
RBC URINE: <1 /HPF
SODIUM SERPL-SCNC: 134 MMOL/L (ref 133–144)
SODIUM SERPL-SCNC: 137 MMOL/L (ref 133–144)
SODIUM SERPL-SCNC: 137 MMOL/L (ref 133–144)
SODIUM SERPL-SCNC: 140 MMOL/L (ref 133–144)
SP GR UR STRIP: 1.01 (ref 1–1.03)
UROBILINOGEN UR STRIP-MCNC: NORMAL MG/DL
WBC URINE: <1 /HPF

## 2022-09-18 PROCEDURE — 99221 1ST HOSP IP/OBS SF/LOW 40: CPT | Performed by: PSYCHIATRY & NEUROLOGY

## 2022-09-18 PROCEDURE — 250N000009 HC RX 250: Performed by: INTERNAL MEDICINE

## 2022-09-18 PROCEDURE — 84132 ASSAY OF SERUM POTASSIUM: CPT | Performed by: INTERNAL MEDICINE

## 2022-09-18 PROCEDURE — 258N000003 HC RX IP 258 OP 636: Performed by: INTERNAL MEDICINE

## 2022-09-18 PROCEDURE — 93010 ELECTROCARDIOGRAM REPORT: CPT | Performed by: INTERNAL MEDICINE

## 2022-09-18 PROCEDURE — 81001 URINALYSIS AUTO W/SCOPE: CPT | Performed by: INTERNAL MEDICINE

## 2022-09-18 PROCEDURE — 120N000002 HC R&B MED SURG/OB UMMC

## 2022-09-18 PROCEDURE — 85049 AUTOMATED PLATELET COUNT: CPT | Performed by: INTERNAL MEDICINE

## 2022-09-18 PROCEDURE — 250N000013 HC RX MED GY IP 250 OP 250 PS 637: Performed by: PSYCHIATRY & NEUROLOGY

## 2022-09-18 PROCEDURE — 99233 SBSQ HOSP IP/OBS HIGH 50: CPT | Performed by: NURSE PRACTITIONER

## 2022-09-18 PROCEDURE — 250N000011 HC RX IP 250 OP 636: Performed by: INTERNAL MEDICINE

## 2022-09-18 PROCEDURE — 999N000040 HC STATISTIC CONSULT NO CHARGE VASC ACCESS

## 2022-09-18 PROCEDURE — 36415 COLL VENOUS BLD VENIPUNCTURE: CPT | Performed by: INTERNAL MEDICINE

## 2022-09-18 PROCEDURE — 250N000013 HC RX MED GY IP 250 OP 250 PS 637: Performed by: INTERNAL MEDICINE

## 2022-09-18 PROCEDURE — 99233 SBSQ HOSP IP/OBS HIGH 50: CPT | Performed by: INTERNAL MEDICINE

## 2022-09-18 PROCEDURE — 250N000009 HC RX 250: Performed by: NURSE PRACTITIONER

## 2022-09-18 PROCEDURE — 999N000127 HC STATISTIC PERIPHERAL IV START W US GUIDANCE

## 2022-09-18 PROCEDURE — 84100 ASSAY OF PHOSPHORUS: CPT | Performed by: INTERNAL MEDICINE

## 2022-09-18 PROCEDURE — 93005 ELECTROCARDIOGRAM TRACING: CPT

## 2022-09-18 PROCEDURE — 83735 ASSAY OF MAGNESIUM: CPT | Performed by: INTERNAL MEDICINE

## 2022-09-18 RX ORDER — BISACODYL 10 MG
10 SUPPOSITORY, RECTAL RECTAL DAILY PRN
Status: DISCONTINUED | OUTPATIENT
Start: 2022-09-18 | End: 2022-09-21 | Stop reason: HOSPADM

## 2022-09-18 RX ORDER — ARIPIPRAZOLE 10 MG/1
10 TABLET ORAL AT BEDTIME
Status: DISCONTINUED | OUTPATIENT
Start: 2022-09-18 | End: 2022-09-21 | Stop reason: HOSPADM

## 2022-09-18 RX ORDER — POTASSIUM CHLORIDE 7.45 MG/ML
10 INJECTION INTRAVENOUS
Status: COMPLETED | OUTPATIENT
Start: 2022-09-18 | End: 2022-09-18

## 2022-09-18 RX ORDER — POLYETHYLENE GLYCOL 3350 17 G/17G
17 POWDER, FOR SOLUTION ORAL 2 TIMES DAILY
Status: DISCONTINUED | OUTPATIENT
Start: 2022-09-18 | End: 2022-09-21 | Stop reason: HOSPADM

## 2022-09-18 RX ORDER — POTASSIUM CHLORIDE 750 MG/1
40 TABLET, EXTENDED RELEASE ORAL ONCE
Status: COMPLETED | OUTPATIENT
Start: 2022-09-18 | End: 2022-09-18

## 2022-09-18 RX ORDER — SENNOSIDES 8.6 MG
8.6 TABLET ORAL 2 TIMES DAILY PRN
Status: DISCONTINUED | OUTPATIENT
Start: 2022-09-18 | End: 2022-09-21 | Stop reason: HOSPADM

## 2022-09-18 RX ORDER — SENNOSIDES 8.6 MG
8.6 TABLET ORAL 2 TIMES DAILY
Status: DISCONTINUED | OUTPATIENT
Start: 2022-09-18 | End: 2022-09-21 | Stop reason: HOSPADM

## 2022-09-18 RX ADMIN — POTASSIUM & SODIUM PHOSPHATES POWDER PACK 280-160-250 MG 1 PACKET: 280-160-250 PACK at 07:44

## 2022-09-18 RX ADMIN — INSULIN ASPART 18 UNITS: 100 INJECTION, SOLUTION INTRAVENOUS; SUBCUTANEOUS at 16:07

## 2022-09-18 RX ADMIN — HUMAN INSULIN 1.5 UNITS/HR: 100 INJECTION, SOLUTION SUBCUTANEOUS at 05:23

## 2022-09-18 RX ADMIN — POTASSIUM CHLORIDE 40 MEQ: 750 TABLET, EXTENDED RELEASE ORAL at 07:44

## 2022-09-18 RX ADMIN — POTASSIUM CHLORIDE 10 MEQ: 7.46 INJECTION, SOLUTION INTRAVENOUS at 17:48

## 2022-09-18 RX ADMIN — ALBUTEROL SULFATE 2 PUFF: 90 AEROSOL, METERED RESPIRATORY (INHALATION) at 14:09

## 2022-09-18 RX ADMIN — POLYETHYLENE GLYCOL 3350 17 G: 17 POWDER, FOR SOLUTION ORAL at 22:02

## 2022-09-18 RX ADMIN — POTASSIUM & SODIUM PHOSPHATES POWDER PACK 280-160-250 MG 1 PACKET: 280-160-250 PACK at 11:59

## 2022-09-18 RX ADMIN — ALBUTEROL SULFATE 2 PUFF: 90 AEROSOL, METERED RESPIRATORY (INHALATION) at 22:02

## 2022-09-18 RX ADMIN — POTASSIUM CHLORIDE 10 MEQ: 7.46 INJECTION, SOLUTION INTRAVENOUS at 15:50

## 2022-09-18 RX ADMIN — SODIUM CHLORIDE: 9 INJECTION, SOLUTION INTRAVENOUS at 12:01

## 2022-09-18 RX ADMIN — POTASSIUM PHOSPHATE, MONOBASIC AND POTASSIUM PHOSPHATE, DIBASIC 15 MMOL: 224; 236 INJECTION, SOLUTION INTRAVENOUS at 18:18

## 2022-09-18 RX ADMIN — POTASSIUM CHLORIDE 40 MEQ: 750 TABLET, EXTENDED RELEASE ORAL at 03:10

## 2022-09-18 RX ADMIN — INSULIN ASPART 20 UNITS: 100 INJECTION, SOLUTION INTRAVENOUS; SUBCUTANEOUS at 10:17

## 2022-09-18 RX ADMIN — ALBUTEROL SULFATE 2 PUFF: 90 AEROSOL, METERED RESPIRATORY (INHALATION) at 00:25

## 2022-09-18 RX ADMIN — INSULIN ASPART 11 UNITS: 100 INJECTION, SOLUTION INTRAVENOUS; SUBCUTANEOUS at 05:14

## 2022-09-18 RX ADMIN — ENOXAPARIN SODIUM 40 MG: 40 INJECTION SUBCUTANEOUS at 07:43

## 2022-09-18 RX ADMIN — ACETAMINOPHEN 650 MG: 325 TABLET, FILM COATED ORAL at 17:37

## 2022-09-18 RX ADMIN — QUETIAPINE 400 MG: 300 TABLET, FILM COATED ORAL at 21:52

## 2022-09-18 RX ADMIN — INSULIN ASPART 9 UNITS: 100 INJECTION, SOLUTION INTRAVENOUS; SUBCUTANEOUS at 22:04

## 2022-09-18 RX ADMIN — LEVETIRACETAM 500 MG: 500 TABLET, FILM COATED ORAL at 07:44

## 2022-09-18 RX ADMIN — HUMAN INSULIN 3 UNITS/HR: 100 INJECTION, SOLUTION SUBCUTANEOUS at 14:17

## 2022-09-18 ASSESSMENT — ACTIVITIES OF DAILY LIVING (ADL)
ADLS_ACUITY_SCORE: 40
ADLS_ACUITY_SCORE: 37
ADLS_ACUITY_SCORE: 37
ADLS_ACUITY_SCORE: 40

## 2022-09-18 NOTE — PLAN OF CARE
Pt rude to staff   Attempting educating on insulin explaining why we check BG and need to replace his electroyltes  Patient refusing oral potassium replacement  Educated by nurses and doctors on why he needs potassium for his heart (Hx of heart attack 2 years ago)       BM today     IV replacement ordered  One IV infusing insulin   On algortihm 3  Also carb coverage insulin  On tele

## 2022-09-18 NOTE — PROGRESS NOTES
Diabetes Consult Daily  Progress Note          Assessment/Plan:     HPI:  Antony Aguila Jr. is a 62 year old man with history of type 2 diabetes,  Hypertension, schizophrenia, venous stasis ulcers, and CKD stage 2 who presented to the ER with weakness and found to have hyperglycemia and hypokalemia.     Assessment:     1)  Type 2 Diabetes Mellitus; uncontrolled c/b neuropathy.  Presented in HHS.  A1c 12.7%  2)  Nausea and vomiting; resolved  3)  Obesity; BMI 42  4)  Schizophrenia  5)  Weakness  6)  Metabolic derangements; hypokalemia/hyponemia     Plan:       -  Continue on IV insulin protocol; DKA -> will switch to Non-DKA (1300)     -  has been tolerating PO, once IV insulin rates stable on drip, will be appropriate for transition to MDI    -  Novolog meal coverage 1 unit(s) per 5 g cho AC meals/snacks - will likely need more    -  BG monitoring per IV insulin protocol q1-2 hours    -  Hold all PTA meds -> metformin and Invokana - do not recommend Invokana (SGLT-2) resumption 2/2 HHS (if he was taking/compliant)     -  Test claim - TBD    -  Hypoglycemia protocol    -  Recommend carb counting protocol    -  Education :  anticipated he will need vs closer follow up with skilled nursing to home    -  Outpatient follow up:  recommend [facility] Endocrinology vs PCP    Plan discussed with patient, bedside RN/primary team via this note.        Interval History:     The last 24 hours progress and nursing notes reviewed.      K+ dipped again < wnl target and continues to receive supplementation.  Far more stable on drip over the interval, dextrose stopped yesterday after K had normalized.    Would like to transition off drip today, likely closer to noon or later afternoon.     BG trend:        Average insulin per hour:  Range 1.5 - 5.5 unit(s) over the interval with average of 2.68 unit(s) per hour.  Notable that BG dips precipitously when drip on higher rates. IE - rate at 5.5 x 2 resulting in  BG of 112 then drip turned off.    Need more moderation, will discuss with nursing.       Antony has not new questions or concerns.  Feeling ok, eating well.   Quite argumentative when discussing any issues today  Extremely poor insight/judgment.    2:19 PM  Calls to RN/Charge RN to discuss drip and rates since last rates are now excalated again - 8.5 unit(s) per hour and now 7 unit(s).  Anticipating a precipitous drop if this persists resulting in another low and a pausing of the drip which will again result in rebound... and on.  Rate to reduce to 3 unit(s) now.     Will work toward moderation and hopefully more stability with goal of transition off in AM to basal/bolus.       Planned Procedures/surgeries: none  D5W-containing solutions/medications: K phos in D5W    Recent Labs   Lab 09/18/22  0556 09/18/22  0552 09/18/22  0436 09/18/22  0337 09/18/22  0234 09/18/22  0207   * 178* 118* 112* 112* 112*           Nutrition:     Orders Placed This Encounter      Moderate Consistent Carb (60 g CHO per Meal) Diet        PTA Regimen:      Antony cannot recall any medications for DM.      Per chart review, metformin 1000 mg BID  Invokana 100 mg in AM - recently started on 6/10/2022     BG monitoring frequency:  Once per week when home health nurse comes  Reports his blood sugars range typically between 300 mg/dL and 500 mg/dL     Diet: no restrictions     Denies any diet restrictions, often only eats two meals per day with limited snacks  Does report drinking quite a lot of regular soda at least 4 L per day of orange crush, lemonade Minute Maid as well as Gingerale.             Review of Systems:   CC: Denies         Medications:   Steroid planning:  no  Tube Feeding: no       Physical Exam:   /57 (BP Location: Right arm, Patient Position: Left side, Cuff Size: Adult Regular)   Pulse 87   Temp 98.8  F (37.1  C) (Oral)   Resp 18   Ht 1.829 m (6')   Wt 138.3 kg (305 lb)   SpO2 96%   BMI 41.37 kg/m       General:   A&O, NAD, argumentative resting comfortably. Well nourished    HEENT:  NC/AT. MMM, EOMI, Anicteric  Lungs:  unremarkable, no new cough, no SOB  ABD:   rounded, soft  Extremities:  + edema, non-tender.  No obvious ulcerations noted. Thicken skin to LE  Skin:  warm and dry, no obvious lesions/rash/bleeding  Neuro:  No focal neurological deficits  Psych:   Bizarre affect, minimally cooperative, labile mood, fair eye contact          Data:     Lab Results   Component Value Date    A1C 12.5 09/16/2022    A1C 12.7 09/15/2022    A1C 7.4 09/25/2021        CBC RESULTS: Recent Labs   Lab Test 09/18/22  0556 09/17/22  1012   WBC  --  8.1   RBC  --  4.82   HGB  --  14.7   HCT  --  42.1   MCV  --  87   MCH  --  30.5   MCHC  --  34.9   RDW  --  13.9    204     Recent Labs   Lab Test 09/18/22  0812 09/18/22  0659 09/18/22  0604 09/18/22  0556 09/18/22  0234 09/18/22  0207   NA  --   --   --  137  --  140   POTASSIUM  --   --  3.3* 3.3*  --  3.2*   CHLORIDE  --   --   --  104  --  104   CO2  --   --   --  29  --  33*   ANIONGAP  --   --   --  4  --  3   * 180*  --  177*   < > 112*   BUN  --   --   --  13  --  14   CR  --   --   --  1.16  --  1.30*   ANGELLA  --   --   --  8.7  --  8.6    < > = values in this interval not displayed.     Liver Function Studies -   Recent Labs   Lab Test 09/15/22  1751   PROTTOTAL 5.2*   ALBUMIN 2.3*   BILITOTAL 0.8   ALKPHOS 61   AST 11   ALT 14     No results found for: INR    BRANDY Castorena CNP   Inpatient Diabetes Management Service  Pager - [phone]  Available on [employer]     To contact Endocrine Diabetes service:   From 8AM-4PM: page inpatient diabetes provider who is following the patient that day (see filed or incomplete progress notes/consult notes).  If uncertain of provider assignment: page job code [phone]. (To page job code in-house dial 3 stars, [phone] then enter number).  For questions or updates AFTER HOURS from 4PM-8AM: page the diabetes job code for on  call fellow: [phone]    Please notify inpatient diabetes service if changes are planned to steroids, nutrition, or if procedures are planned requiring prolonged NPO status.Diabetes Management Team job code: [phone]    I spent a total of 35 minutes on the date of the encounter doing chart review, history and exam, documentation and further activities per the note.  Over 50% of my time on the unit was spent counseling the patient and/or coordinating care regarding acute hyperglycemic management.  See note for details.

## 2022-09-18 NOTE — CONSULTS
"        Initial Psychiatric Consult   Consult date: September 18, 2022         Reason for Consult, requesting source:    Review psych meds   Requesting source: Mikhail Osborn    Labs and imaging reviewed. Discussed with nursing and Dr Hernandez        HPI:   Antony Aguila Jr. is a 62 year old man with history of type 2 diabetes,  Hypertension, schizophrenia, venous stasis ulcers, and CKD stage 2 who presented to clinic with weakness, nausea vomiting  and found to have hyperglycemia and hypokalemia thus was sent to ER .  He tells me that he was diagnosed with schizophrenia at about age 29 and may have been hospitalized back in [place] before he moved here.  However, he denies any hospitalizations here in [place] (he moved up to [place] many years ago).  He denies having any voices or significant paranoia but he admits to being suspicious of people.  However, he points out that he does not think he is being monitored or followed.   He not is the best variable historian, so it is difficult to get a straight story from him.  Also apparently he has been quite abusive to the nursing staff noncompliant with cares.  He is suspicious that \"they are not doing anything\" for him in the hospital to help him..  He was a bit irritable at times.        Past Psychiatric History:   He used to see a psychiatrist at [facility] and I see he had seen Julianne PRITCHETT in 7/11/12 for an evaluation.  I cannot find any notes from a mental health practitioner since then.  Dr Jacky Gaitan at [facility] has been prescribing all of his medications including the psychiatric meds.   He is interested in starting psychotherapy again.         Substance Use and History:   He said he used to do a lot of cocaine, pot and was a heavy drinker, but stopped the drugs and alcohol use when he moved to [place] in 2006.  He does use cannabis on occasion.  He also smokes cigarettes \"every once in a while\", but is trying to cut back        Past Medical History: "   PAST MEDICAL HISTORY:   Past Medical History:   Diagnosis Date     Chronic dissection of thoracic aorta (H)     S/p exploration and median sternotomy by Dr. Marcelo on 6/18/19     Diabetes (H)      Hypertension        PAST SURGICAL HISTORY: No past surgical history on file.          Family History:   FAMILY HISTORY: His parents and grandparents were alcoholics        Social History:   Grew up in [place] and as indicated above he moved to [place] in 2006.   He has been on disability for schizophrenia for many years.  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  He lives by himself in an apartment in [facility] and he says it is a good neighborhood for him.         Physical ROS:   The 10 point Review of Systems is negative other than noted in the HPI or here.           Medications:       albuterol  2 puff Inhalation Q6H     [Held by provider] ARIPiprazole  10 mg Oral Daily     enoxaparin ANTICOAGULANT  40 mg Subcutaneous Q12H     insulin aspart   Subcutaneous TID w/meals     levETIRAcetam  500 mg Oral BID     OLANZapine  5 mg Oral At Bedtime     polyethylene glycol  17 g Oral BID     potassium & sodium phosphates  1 packet Oral or Feeding Tube Q4H     QUEtiapine  200 mg Oral At Bedtime     sennosides  8.6 mg Oral BID     Medications Prior to Admission   Medication Sig Dispense Refill Last Dose     acetaminophen (TYLENOL) 500 MG tablet Take 500-1,000 mg by mouth every 8 hours as needed for mild pain   More than a month at prn     albuterol (PROAIR HFA/PROVENTIL HFA/VENTOLIN HFA) 108 (90 Base) MCG/ACT inhaler Inhale 2 puffs into the lungs every 4 hours as needed for shortness of breath / dyspnea or wheezing   9/15/2022 at pm     ARIPiprazole (ABILIFY) 10 MG tablet Take 10 mg by mouth daily   Past Week at prn     diphenhydrAMINE (BENADRYL) 50 MG capsule Take 50 mg by mouth every 6 hours as needed for itching or allergies   Past Month at prn     furosemide (LASIX) 40 MG tablet Take 40 mg by mouth daily   Unknown  "at Unknown time     gabapentin (NEURONTIN) 600 MG tablet Take 600 mg by mouth 3 times daily   9/15/2022 at 1/3 am     hydrochlorothiazide (HYDRODIURIL) 50 MG tablet Take 50 mg by mouth daily   9/15/2022 at am     levETIRAcetam (KEPPRA) 500 MG tablet Take 500 mg by mouth 2 times daily   Unknown at Unknown time     meloxicam (MOBIC) 7.5 MG tablet Take 7.5 mg by mouth every 12 hours as needed   Unknown at Unknown time     metFORMIN (GLUCOPHAGE) 1000 MG tablet Take 1,000 mg by mouth 2 times daily (with meals)   Past Month at Unknown time     mineral oil-hydrophilic petrolatum (AQUAPHOR) external ointment Apply 2 g topically 3 times daily as needed   More than a month at Unknown time     OLANZapine (ZYPREXA) 5 MG tablet Take 5 mg by mouth At Bedtime   9/15/2022 at am     potassium chloride ER (K-TAB) 20 MEQ CR tablet Take 40 mEq by mouth   9/15/2022 at pm     QUEtiapine (SEROQUEL) 200 MG tablet Take 200 mg by mouth At Bedtime   Past Week at Unknown time     QUEtiapine (SEROQUEL) 400 MG tablet Take 400 mg by mouth At Bedtime   Past Week at 2/2 pm             Allergies:     Allergies   Allergen Reactions     Lidocaine      Pt states this is not an allergy and doesn't recall this to be an allergy     Lisinopril Swelling     Pt states this is not an allergy and doesn't recall this to be an allergy     Pollen Extract             Physical and Psychiatric Examination:     /85 (BP Location: Left arm)   Pulse 86   Temp 98.8  F (37.1  C) (Oral)   Resp 19   Ht 1.829 m (6')   Wt 138.3 kg (305 lb)   SpO2 98%   BMI 41.37 kg/m    Weight is 305 lbs 0 oz  Body mass index is 41.37 kg/m .    Physical Exam:  I have reviewed the physical exam as documented by by the medical team and agree with findings and assessment and have no additional findings to add at this time.         MSE:   Appearance: awake, alert and adequately groomed  Attitude:  somewhat cooperative  Eye Contact:  fair  Mood:  \"OK\"0  Affect:  : slightly " "restricted  Speech:  clear, coherent  Psychomotor Behavior:  no evidence of tardive dyskinesia, dystonia, or tics  Thought Process:  circumstantial  Associations:  no loose associations  Thought Content:  no evidence of suicidal ideation or homicidal ideation and no voices, but is paranoid   Insight:  limited  Judgement:  limited  Oriented to:  time, person, and place  Attention Span and Concentration:  fair  Recent and Remote Memory:  fair             DSM-5 Diagnosis:   295.90  (F20.9) Schizophrenia          Assessment:   I would like to reduce his antipsychotic burden especially since when he was admitted he did have prolonged QTC.  However, this is now normalized to 469 ms.  Abilify was held since Dr. Hernandez was told that that has the most potential for QTC prolongation (which is not true; it actually may help QTc).  He does not have a lot of active thought disorder symptoms think we would be okay discontinuing the Zyprexa (this also has the most potential for making his diabetes worse).  He was okay with discontinuing Zyprexa as long as he can have Seroquel at nighttime to help him sleep.  He would also prefer to have the Abilify at nighttime since it appears to make him a bit drowsy as well.          Summary of Recommendations:   I discontinued the Zyprexa and increase the bedtime Seroquel to 400 mg.   Also Abilify was restarted and I will give at bedtime per his preference.   I will talk with Dr. Gaitan to see what it takes to get him set up with psychotherapy at [facility].    Page me or re-consult psychiatry as needed (psychiatry is signing off).     Jai Aly M.D.   [facility]   Contact information available via [facility] Paging/Directory.  If I am not available, then [facility] intake ([phone]) should know who   Is on call        \"This dictation was performed with voice recognition software and may contain errors,  omissions and inadvertent word substitution.\"     "

## 2022-09-18 NOTE — PROGRESS NOTES
[facility]    Medicine Progress Note - Hospitalist Service, GOLD TEAM 17    Date of Admission:  9/15/2022    Assessment & Plan          Antony Aguila Jr. is a 62 year old man with history of type 2 diabetes,  Hypertension, schizophrenia, venous stasis ulcers, and CKD stage 2 who presented to clinic with weakness, nausea vomiting  and found to have hyperglycemia and hypokalemia thus was sent to ER .      History of twitching   7/2019   - he is on keppra , EEG was nonspecific as well as brain MRI  - would follow up  With neurology a sop to see if still needs to be on this     Hyperglycemia  Likely, Hyperosmolar hyperglycemic state (HHS)  Type 2 diabetes, out of control   -glucose > 600 ketones negative se  Osms 301,   - was started on insulin drip and did transition to DKA orders now back to regular   - continue to hold metformin   - VBG shows pH 7.52   - Endocrinology consulted and appreciate help   - he checks blood sugar about once a week  - needs ongoing diabetic education , nutritionist to see   - HgA1c 12.7 9/15   - changed to q 6 hr  BMP as murphy snot liked to be pocked and hoe will comply       LAKISHA on CKD stage 2  -Cr 1.71 on admission creatinine was 1.16 9/15 , now creatinine down to 1.16 with IV fluids    - Suspect prerenal with vomiting and poor intake.   - continue to hold lasix and hydrochlorothiazide , some places mentoin he had also been on lisinopril but found a note that states possible  angioedema with ACEi during hospital stay 11/2019   - avoid nephtotoxic agents      QTC prolongation  -QTc 513--- 542---515 --469   - history non- sustained VT in 7/2019 in light of electrolyte inbalance   - Avoid QT prolonging medications as able  - continue to  Replace K, Mg aggressively,  Did  Discussed with   patient  And encourage dto do ,  Did get calcium gluconate 7/16 and ionized calcium is now within normal   - continue to  hold zyprexa, Abilify reduced serqoqeul  to 200 mg add back on zyprexa at night for now  for now and reevaluate     Metabolic alkalosis   -chronic CO2 retention from obesity hypoventilation.   - no acidosis on VBG  ( pH is 7.52)      Shortness of breath, resolved  Cough     CXR clear. Troponin negative. EKG without acute ischemia.   - SARS- CoV2 negative 9/15   - he is a smoker, start albuterol inhalers for now, out patient  PFT   - has had history of tracheostomy in past  Was decannulated 8/2019      Hypokalemia  -  K 2.0 in clinic, replace      Pseudohypernatremia  - due to hyperglycemia      Urinary hesitancy and incontinence  -  UAl= with high glucose , negative for albumin and infection   - Monitor output. Consider bladder scan if low     Nausea/Vomiting  Suspect due to HHS. Improved with zofran, hydration.   - Compazine PRN (rather than zofran due to prolonged QTc)       chr dissecting thoracic aorta, was taken to OR 6/19 when presented with chest pain  To ER but was found to have chronic type B rather than acute dissection and no operative intervention was done atus post  Repair in 6/2019   Hypertension    - goal SBP < 140 mmHg   - needs good blood pressure  Control, his PTA lasix and hydrochlorothiazide on hold, not sure why he is on 2 diuretics, would restart one of them, no ACEi /ARB with RF ,  Consider amlodipine but need to monitir for worsening leg edema , will have PRN hydralazine availablke        Hypoalbuminemia   Venous stasis  History of LE edema  - holding lasix as above  - monitor with IV fluids    - currently murphy snot have any edema      Schizophrenia  PTSD  - held   zyprexa and abilify for now with prolonged qTC , reduced seroquel dose due to same, states he takes 400-600 mg of seroquel, takes the 600 mg about 2-4 x a week and 400 mg on other days so going down to 200 mg should  be ok, now qtc better so added back zyprexa at night and asked psych to see and help adjust medications, continue to monitor qtc   -monitor for  decompensations  - asked pych to help out and see whet else we can use  .        Morbid obesity   - defer back to PMD              Diet: Moderate Consistent Carb (60 g CHO per Meal) Diet    DVT Prophylaxis: Enoxaparin (Lovenox) SQ  Noble Catheter: Not present  Central Lines: None  Cardiac Monitoring: ACTIVE order. Indication: QTc prolonging medication (48 hours)  Code Status:   Full discussed with  Patient       Disposition Plan      Expected Discharge Date: 09/20/2022        Discharge Comments: 1-2 days pending BGs and lyte replacements        The patient's care was discussed with  RN , tunde Hernandez MD  Hospitalist Service, GOLD TEAM 17  [facility]  Securely message with the Vocera Web Console (learn more here)  Text page via [facility] Paging/Directory   Please see signed in provider for up to date coverage information      Clinically Significant Risk Factors Present on Admission                # DMII: A1C = 12.5 % (Ref range: 0.0 - 5.6 %) within past 3 months  # Severe Obesity: Estimated body mass index is 41.37 kg/m  as calculated from the following:    Height as of this encounter: 1.829 m (6').    Weight as of this encounter: 138.3 kg (305 lb).        ______________________________________________________________________    Interval History      Up in chair, did not understand why he need to stil take potassium also insulin, so did try to explain to patient    Needs ongoing lots of education tried to explain   No BM since here   Data reviewed today: I reviewed all medications, new labs and imaging results over the last 24 hours    Physical Exam   Vital Signs: Temp: 98.8  F (37.1  C) Temp src: Oral BP: 134/85 Pulse: 86   Resp: 19 SpO2: 98 % O2 Device: None (Room air)    Weight: 305 lbs 0 oz     General appearence: awake alert  In no apparent distress    HEENT: EOMI, PEARLA, sclera nonicteric,  moist,  mucus membranes,   NECK : supple  RESPIRATORY: lungs clear  to auscultation bilateral,  no wheezing or crackles   CARDIOVASCULAR:S1 S2 regular rate and rhythm, distant heart sounds  GASTROINTESTINAL:soft, non-distended , non-tender , + bowel sounds, no masses felt   SKIN: warm and dry, no mottling noted   NEUROLOGIC; awake alert and oriented, no focal deficits found  EXTREMITIES: no clubbing, cyanosis ,  No edema, legs wrinkly , moves all extremity,    MUSCULOSKELETAL: without deformity       Data   Recent Labs   Lab 09/18/22  0904 09/18/22  0812 09/18/22  0659 09/18/22  0604 09/18/22  0556 09/18/22  0234 09/18/22  0207 09/17/22  2301 09/17/22  2220 09/17/22  1042 09/17/22  1012 09/17/22  0659 09/17/22  0656 09/15/22  1857 09/15/22  1751   WBC  --   --   --   --   --   --   --   --   --   --  8.1  --  8.4  --  8.5   HGB  --   --   --   --   --   --   --   --   --   --  14.7  --  14.4  --  16.0   MCV  --   --   --   --   --   --   --   --   --   --  87  --  85  --  84   PLT  --   --   --   --  191  --   --   --   --   --  204  --   --   --  211   NA  --   --   --   --  137  --  140  --  139   < > 138  --  136   < > 137   POTASSIUM  --   --   --  3.3* 3.3*  --  3.2*   < > 3.9   < > 3.1*  --  3.5   < > 2.0*   CHLORIDE  --   --   --   --  104  --  104  --  102   < > 99  --  99   < > 101   CO2  --   --   --   --  29  --  33*  --  36*   < > 35*  --  33*   < > 29   BUN  --   --   --   --  13  --  14  --  15   < > 15  --  17   < > 23   CR  --   --   --   --  1.16  --  1.30*  --  1.44*   < > 1.22  --  1.20   < > 1.16   ANIONGAP  --   --   --   --  4  --  3  --  1*   < > 4  --  4   < > 7   ANGELLA  --   --   --   --  8.7  --  8.6  --  9.0   < > 8.6  --  8.8   < > 6.3*   GLC 96 112* 180*  --  177*   < > 112*   < > 74   < > 102*   < > 146*   < > 469*   ALBUMIN  --   --   --   --   --   --   --   --   --   --   --   --   --   --  2.3*   PROTTOTAL  --   --   --   --   --   --   --   --   --   --   --   --   --   --  5.2*   BILITOTAL  --   --   --   --   --   --   --   --   --   --   --   --    --   --  0.8   ALKPHOS  --   --   --   --   --   --   --   --   --   --   --   --   --   --  61   ALT  --   --   --   --   --   --   --   --   --   --   --   --   --   --  14   AST  --   --   --   --   --   --   --   --   --   --   --   --   --   --  11    < > = values in this interval not displayed.     No results found for this or any previous visit (from the past 24 hour(s)).

## 2022-09-18 NOTE — PLAN OF CARE
Alert.No confusion noted.However,easily gets agitated and is verbally abusive to staffs at times.Cares need to be anticipated by staffs to lessen agitation as pt feels being neglected.Was more calm after HS seroquel was given.  VSS.Tele showing NSR with occasional PAC's.Denies any chest pain.  BGs monitored hourly with insulin drip.  Snacking on g.crackers and milk,insulin given with carb coverage.  K level still low at 3.3,replaced orally.  Phos,K again low this morning and needs replacement.  Mg level WNL.  Voiding on urinal,urine specimen sent to lab for UA.  Not passing gas lately,no BM for the past 4 days.  Will have senna/miralax to help with constipation.  Calls to make needs known.

## 2022-09-19 LAB
ANION GAP SERPL CALCULATED.3IONS-SCNC: 1 MMOL/L (ref 3–14)
ANION GAP SERPL CALCULATED.3IONS-SCNC: 2 MMOL/L (ref 3–14)
ANION GAP SERPL CALCULATED.3IONS-SCNC: 6 MMOL/L (ref 3–14)
ATRIAL RATE - MUSE: 82 BPM
ATRIAL RATE - MUSE: 82 BPM
ATRIAL RATE - MUSE: 83 BPM
ATRIAL RATE - MUSE: 88 BPM
ATRIAL RATE - MUSE: 89 BPM
BACTERIA SPT CULT: ABNORMAL
BUN SERPL-MCNC: 12 MG/DL (ref 7–30)
BUN SERPL-MCNC: 8 MG/DL (ref 7–30)
BUN SERPL-MCNC: 8 MG/DL (ref 7–30)
CALCIUM SERPL-MCNC: 8.3 MG/DL (ref 8.5–10.1)
CALCIUM SERPL-MCNC: 8.5 MG/DL (ref 8.5–10.1)
CALCIUM SERPL-MCNC: 9 MG/DL (ref 8.5–10.1)
CHLORIDE BLD-SCNC: 104 MMOL/L (ref 94–109)
CHLORIDE BLD-SCNC: 105 MMOL/L (ref 94–109)
CHLORIDE BLD-SCNC: 108 MMOL/L (ref 94–109)
CO2 SERPL-SCNC: 28 MMOL/L (ref 20–32)
CO2 SERPL-SCNC: 32 MMOL/L (ref 20–32)
CO2 SERPL-SCNC: 32 MMOL/L (ref 20–32)
CREAT SERPL-MCNC: 1.12 MG/DL (ref 0.66–1.25)
CREAT SERPL-MCNC: 1.17 MG/DL (ref 0.66–1.25)
CREAT SERPL-MCNC: 1.2 MG/DL (ref 0.66–1.25)
DIASTOLIC BLOOD PRESSURE - MUSE: NORMAL MMHG
GFR SERPL CREATININE-BSD FRML MDRD: 68 ML/MIN/1.73M2
GFR SERPL CREATININE-BSD FRML MDRD: 70 ML/MIN/1.73M2
GFR SERPL CREATININE-BSD FRML MDRD: 74 ML/MIN/1.73M2
GLUCOSE BLD-MCNC: 168 MG/DL (ref 70–99)
GLUCOSE BLD-MCNC: 222 MG/DL (ref 70–99)
GLUCOSE BLD-MCNC: 73 MG/DL (ref 70–99)
GLUCOSE BLDC GLUCOMTR-MCNC: 128 MG/DL (ref 70–99)
GLUCOSE BLDC GLUCOMTR-MCNC: 142 MG/DL (ref 70–99)
GLUCOSE BLDC GLUCOMTR-MCNC: 150 MG/DL (ref 70–99)
GLUCOSE BLDC GLUCOMTR-MCNC: 158 MG/DL (ref 70–99)
GLUCOSE BLDC GLUCOMTR-MCNC: 189 MG/DL (ref 70–99)
GLUCOSE BLDC GLUCOMTR-MCNC: 196 MG/DL (ref 70–99)
GLUCOSE BLDC GLUCOMTR-MCNC: 197 MG/DL (ref 70–99)
GLUCOSE BLDC GLUCOMTR-MCNC: 201 MG/DL (ref 70–99)
GLUCOSE BLDC GLUCOMTR-MCNC: 201 MG/DL (ref 70–99)
GLUCOSE BLDC GLUCOMTR-MCNC: 207 MG/DL (ref 70–99)
GLUCOSE BLDC GLUCOMTR-MCNC: 213 MG/DL (ref 70–99)
GLUCOSE BLDC GLUCOMTR-MCNC: 214 MG/DL (ref 70–99)
GLUCOSE BLDC GLUCOMTR-MCNC: 225 MG/DL (ref 70–99)
GLUCOSE BLDC GLUCOMTR-MCNC: 266 MG/DL (ref 70–99)
GLUCOSE BLDC GLUCOMTR-MCNC: 79 MG/DL (ref 70–99)
GRAM STAIN RESULT: ABNORMAL
HOLD SPECIMEN: NORMAL
INTERPRETATION ECG - MUSE: NORMAL
MAGNESIUM SERPL-MCNC: 2.1 MG/DL (ref 1.6–2.3)
P AXIS - MUSE: 23 DEGREES
P AXIS - MUSE: 25 DEGREES
P AXIS - MUSE: 36 DEGREES
P AXIS - MUSE: 53 DEGREES
P AXIS - MUSE: NORMAL DEGREES
PHOSPHATE SERPL-MCNC: 2.5 MG/DL (ref 2.5–4.5)
POTASSIUM BLD-SCNC: 3.3 MMOL/L (ref 3.4–5.3)
POTASSIUM BLD-SCNC: 3.8 MMOL/L (ref 3.4–5.3)
POTASSIUM BLD-SCNC: 3.9 MMOL/L (ref 3.4–5.3)
POTASSIUM BLD-SCNC: 4 MMOL/L (ref 3.4–5.3)
PR INTERVAL - MUSE: 144 MS
PR INTERVAL - MUSE: 146 MS
PR INTERVAL - MUSE: 158 MS
QRS DURATION - MUSE: 76 MS
QRS DURATION - MUSE: 76 MS
QRS DURATION - MUSE: 80 MS
QRS DURATION - MUSE: 86 MS
QRS DURATION - MUSE: 88 MS
QT - MUSE: 388 MS
QT - MUSE: 408 MS
QT - MUSE: 424 MS
QT - MUSE: 446 MS
QT - MUSE: 462 MS
QTC - MUSE: 469 MS
QTC - MUSE: 476 MS
QTC - MUSE: 515 MS
QTC - MUSE: 521 MS
QTC - MUSE: 542 MS
R AXIS - MUSE: 172 DEGREES
R AXIS - MUSE: 20 DEGREES
R AXIS - MUSE: 24 DEGREES
R AXIS - MUSE: 25 DEGREES
R AXIS - MUSE: 32 DEGREES
SODIUM SERPL-SCNC: 138 MMOL/L (ref 133–144)
SODIUM SERPL-SCNC: 139 MMOL/L (ref 133–144)
SODIUM SERPL-SCNC: 141 MMOL/L (ref 133–144)
SYSTOLIC BLOOD PRESSURE - MUSE: NORMAL MMHG
T AXIS - MUSE: 0 DEGREES
T AXIS - MUSE: 163 DEGREES
T AXIS - MUSE: 33 DEGREES
T AXIS - MUSE: 40 DEGREES
T AXIS - MUSE: 51 DEGREES
VENTRICULAR RATE- MUSE: 82 BPM
VENTRICULAR RATE- MUSE: 82 BPM
VENTRICULAR RATE- MUSE: 83 BPM
VENTRICULAR RATE- MUSE: 88 BPM
VENTRICULAR RATE- MUSE: 89 BPM

## 2022-09-19 PROCEDURE — 83735 ASSAY OF MAGNESIUM: CPT | Performed by: PHYSICIAN ASSISTANT

## 2022-09-19 PROCEDURE — 84100 ASSAY OF PHOSPHORUS: CPT | Performed by: INTERNAL MEDICINE

## 2022-09-19 PROCEDURE — 250N000009 HC RX 250: Performed by: NURSE PRACTITIONER

## 2022-09-19 PROCEDURE — 120N000002 HC R&B MED SURG/OB UMMC

## 2022-09-19 PROCEDURE — 84132 ASSAY OF SERUM POTASSIUM: CPT | Performed by: INTERNAL MEDICINE

## 2022-09-19 PROCEDURE — 250N000013 HC RX MED GY IP 250 OP 250 PS 637: Performed by: PSYCHIATRY & NEUROLOGY

## 2022-09-19 PROCEDURE — 250N000012 HC RX MED GY IP 250 OP 636 PS 637: Performed by: CLINICAL NURSE SPECIALIST

## 2022-09-19 PROCEDURE — 93010 ELECTROCARDIOGRAM REPORT: CPT | Performed by: INTERNAL MEDICINE

## 2022-09-19 PROCEDURE — 250N000013 HC RX MED GY IP 250 OP 250 PS 637: Performed by: INTERNAL MEDICINE

## 2022-09-19 PROCEDURE — 36415 COLL VENOUS BLD VENIPUNCTURE: CPT | Performed by: INTERNAL MEDICINE

## 2022-09-19 PROCEDURE — 93005 ELECTROCARDIOGRAM TRACING: CPT

## 2022-09-19 PROCEDURE — 99233 SBSQ HOSP IP/OBS HIGH 50: CPT | Performed by: CLINICAL NURSE SPECIALIST

## 2022-09-19 PROCEDURE — 99233 SBSQ HOSP IP/OBS HIGH 50: CPT | Performed by: INTERNAL MEDICINE

## 2022-09-19 PROCEDURE — 250N000011 HC RX IP 250 OP 636: Performed by: INTERNAL MEDICINE

## 2022-09-19 RX ORDER — POTASSIUM CHLORIDE 1.5 G/1.58G
40 POWDER, FOR SOLUTION ORAL ONCE
Status: COMPLETED | OUTPATIENT
Start: 2022-09-19 | End: 2022-09-19

## 2022-09-19 RX ORDER — SIMETHICONE 80 MG
80 TABLET,CHEWABLE ORAL EVERY 6 HOURS PRN
Status: DISCONTINUED | OUTPATIENT
Start: 2022-09-19 | End: 2022-09-19

## 2022-09-19 RX ADMIN — POTASSIUM CHLORIDE 40 MEQ: 1.5 FOR SOLUTION ORAL at 09:38

## 2022-09-19 RX ADMIN — INSULIN ASPART 21 UNITS: 100 INJECTION, SOLUTION INTRAVENOUS; SUBCUTANEOUS at 18:47

## 2022-09-19 RX ADMIN — POLYETHYLENE GLYCOL 3350 17 G: 17 POWDER, FOR SOLUTION ORAL at 09:40

## 2022-09-19 RX ADMIN — ALBUTEROL SULFATE 2 PUFF: 90 AEROSOL, METERED RESPIRATORY (INHALATION) at 08:42

## 2022-09-19 RX ADMIN — Medication: at 12:57

## 2022-09-19 RX ADMIN — ALBUTEROL SULFATE 2 PUFF: 90 AEROSOL, METERED RESPIRATORY (INHALATION) at 20:36

## 2022-09-19 RX ADMIN — ALBUTEROL SULFATE 2 PUFF: 90 AEROSOL, METERED RESPIRATORY (INHALATION) at 13:37

## 2022-09-19 RX ADMIN — ARIPIPRAZOLE 10 MG: 10 TABLET ORAL at 23:06

## 2022-09-19 RX ADMIN — QUETIAPINE 400 MG: 300 TABLET, FILM COATED ORAL at 23:07

## 2022-09-19 RX ADMIN — POLYETHYLENE GLYCOL 3350 17 G: 17 POWDER, FOR SOLUTION ORAL at 20:36

## 2022-09-19 RX ADMIN — ENOXAPARIN SODIUM 40 MG: 40 INJECTION SUBCUTANEOUS at 09:35

## 2022-09-19 RX ADMIN — INSULIN ASPART: 100 INJECTION, SOLUTION INTRAVENOUS; SUBCUTANEOUS at 09:35

## 2022-09-19 RX ADMIN — INSULIN ASPART: 100 INJECTION, SOLUTION INTRAVENOUS; SUBCUTANEOUS at 13:40

## 2022-09-19 RX ADMIN — HUMAN INSULIN 5.5 UNITS/HR: 100 INJECTION, SOLUTION SUBCUTANEOUS at 01:18

## 2022-09-19 RX ADMIN — ENOXAPARIN SODIUM 40 MG: 40 INJECTION SUBCUTANEOUS at 20:37

## 2022-09-19 RX ADMIN — INSULIN GLARGINE 55 UNITS: 100 INJECTION, SOLUTION SUBCUTANEOUS at 10:50

## 2022-09-19 RX ADMIN — HUMAN INSULIN 5.5 UNITS/HR: 100 INJECTION, SOLUTION SUBCUTANEOUS at 02:33

## 2022-09-19 ASSESSMENT — ACTIVITIES OF DAILY LIVING (ADL)
ADLS_ACUITY_SCORE: 35

## 2022-09-19 NOTE — PLAN OF CARE
Goal Outcome Evaluation:    Plan of Care Reviewed With: patient     Overall Patient Progress: no change    Outcome Evaluation: Visited with patient at length at bedside. Provided education and support, pt would likely benefit from outpatient/clinic RD follow-up. See 9/19 note for full assessment.    Jennifer Quigley, MPH, Dietitian   5A ([phone])/5B RD pager: [phone]  Weekend/Holiday RD pager: [phone]

## 2022-09-19 NOTE — PROGRESS NOTES
CLINICAL NUTRITION SERVICES - ASSESSMENT NOTE     Nutrition Prescription    RECOMMENDATIONS FOR MDs/PROVIDERS TO ORDER:  None at this time- pt would likely benefit from outpatient/clinic RD support.     Malnutrition Status:    Patient does not meet two of the established criteria necessary for diagnosing malnutrition.     Recommendations already ordered by Registered Dietitian (RD):  None at this time.     Future/Additional Recommendations:  Monitor lytes, BGs, and weights. Monitor for readiness for additional education.        REASON FOR ASSESSMENT  Antony Aguila Jr. is a/an 62 year old male assessed by the dietitian for Provider Order - diabetes.     [facility]  Patient presents with a history of type 2 diabetes,  Hypertension, schizophrenia, venous stasis ulcers, and CKD stage 2 who presented to clinic with weakness, nausea vomiting  and found to have hyperglycemia and hypokalemia thus was sent to ER.    Pt presented to the hospital with abnormal electrolytes, urine glucose and urine ketones. Pt diabetes is uncontrolled, he reports taking his blood sugars once weekly. Pt A1C has increased by 5.1% since September last year.    Pt blood glucose at time of admission was 719 mg/dl, he was on DKA orders and no longer is. Presented with an LAKISHA on CKD stage 2, Cr normal today. Pt is being closely followed by endocrine.     NUTRITION HISTORY  Per 9/18 endocrine note, pt denies any diet restrictions. He often only eats 3 meals per day with limited snack. He reports drinking a lot of regular soda, at least 4L per day of orange crush, lemonade [place], and ginger ale.     Visited with patient at length this afternoon. We had a long discussion re his diabetes, pt had difficulty staying on topic and was providing conflicting information. He is unhappy with his diet here and reports not doing well with restriction of any kind. Writer explained why patient was on the diet, how to read his meal receipts for the carbohydrate count,  and what his goal per meal was (60 g).      We discussed what foods are carbohydrate containing, how to build a balanced plate and the importance of protein/fiber in moderating BG spikes.  We discussed patient's sugary beverage intake and ways he can cut back such as choosing diet pop, drinking no sugar added juices, diluating juices with water, and sticking to small servings of sugar sweetened beverages (pt reports drinking 32 oz of juice daily, 2 L of pop). Pt asked appropriate questions and felt he had received some of this education before.     Pt does report food insecurity and struggling to get enough food, he reports not receiving his SNAP benefits for the last 2mo because he missed an appointment, writer encouraged pt to discuss with SW as able.     Overall, pt was somewhat amenable to receiving education but writer is not confident in his readiness to change. Patient would likely benefit from further outpatient support.     CURRENT NUTRITION ORDERS  Diet: Moderate (60 g CHO per meal) Consistent Carbohydrate  Intake/Tolerance: 100% per I/Os     LABS   Latest Reference Range & Units 09/25/21 18:07 09/15/22 17:51 09/16/22 16:50   Hemoglobin A1C 0.0 - 5.6 % 7.4 (H) 12.7 (H) 12.5 (H)   (H): Data is abnormally high   Latest Reference Range & Units 09/17/22 10:12 09/17/22 13:45 09/17/22 18:17 09/17/22 22:20 09/18/22 00:00 09/18/22 02:07 09/18/22 05:56 09/18/22 06:04 09/18/22 12:24 09/18/22 17:36 09/18/22 23:58 09/19/22 05:55   Potassium 3.4 - 5.3 mmol/L 3.1 (L) 3.1 (L) 3.3 (L)  3.3 (L) 3.9 3.4 3.2 (L) 3.3 (L) 3.3 (L) 3.7  3.6 5.5 (H) 3.8 3.3 (L)   (L): Data is abnormally low  (H): Data is abnormally high   Latest Reference Range & Units 09/15/22 17:51 09/16/22 00:36 09/16/22 22:13 09/17/22 06:56 09/18/22 05:56 09/19/22 05:55   Phosphorus 2.5 - 4.5 mg/dL 1.8 (L) 1.8 (L) 1.2 (L) 3.2 2.3 (L) 2.5   (L): Data is abnormally low      Latest Reference Range & Units 09/15/22 17:51 09/16/22 00:36 09/16/22 16:50 09/17/22  "06:56 09/18/22 05:56 09/19/22 05:55   Magnesium 1.6 - 2.3 mg/dL 1.6 2.6 (H) 2.8 (H) 2.5 (H) 2.1 2.1   (H): Data is abnormally high    MEDICATIONS  Abilify @   Novolog TID w/ meals  Lantus once daily   Miralax BID   Senna BID     ANTHROPOMETRICS  Height: 182.9 cm (6' 0\")  Most Recent Weight: 138.3 kg (305 lb)    IBW: 77.6 kg  %IBW: 178%  BMI: Obesity Grade III BMI >40  Weight History:   Wt Readings from Last 7 Encounters:   09/15/22 138.3 kg (305 lb)   10/28/21 (!) 158.8 kg (350 lb)   09/26/21 (!) 153.8 kg (339 lb)   Care Everywhere:  7/25/22:    149.5 kg (329 lb 9.4 oz)   7.5% wt loss in ~2 months, non-significant 3.7% wt loss in 1 month.     Dosing Weight: 93 kg AdjBW     ASSESSED NUTRITION NEEDS  Estimated Energy Needs: 6134-2286 kcals/day (20 - 25 kcals/kg)  Justification: Obese  Estimated Protein Needs: 74-93 grams protein/day (0.8 - 1 grams of pro/kg)  Justification: Maintenance  Estimated Fluid Needs: 1 mL/kcal    Justification: Maintenance    PHYSICAL FINDINGS  See malnutrition section below.    MALNUTRITION  % Intake: No decreased intake noted  % Weight Loss: Weight loss does not meet criteria  Subcutaneous Fat Loss: None observed  Muscle Loss: None observed  Fluid Accumulation/Edema: None noted  Malnutrition Diagnosis: Patient does not meet two of the established criteria necessary for diagnosing malnutrition.     NUTRITION DIAGNOSIS  Food- and nutrition-related knowledge deficit related to diabetes management and carbohydrate counting as evidenced by patient dietary recall, questions asked, and patient blood sugars in the 700s at admit.     INTERVENTIONS  Implementation  Nutrition Education: Provided education on diabetes management, see nutrition history for details.      Goals  Patient to consume % of nutritionally adequate meal trays TID, or the equivalent with supplements/snacks.     Monitoring/Evaluation  Progress toward goals will be monitored and evaluated per protocol.    Jennifer Quigley, " MPH, Dietitian   5A (212-)/5B RD pager: [phone]  Weekend/Holiday RD pager: [phone]

## 2022-09-19 NOTE — PROGRESS NOTES
"[facility]    Medicine Progress Note - Hospitalist Service, GOLD TEAM 17    Date of Admission:  9/15/2022    Assessment & Plan          Antony Aguila Jr. is a 62 year old man with history of type 2 diabetes,  Hypertension, schizophrenia, venous stasis ulcers, and CKD stage 2 who presented to clinic with weakness, nausea vomiting  and found to have hyperglycemia and hypokalemia thus was sent to ER .      He wants \"  Personalized\" plans and \" not just brochures\"     History of twitching   7/2019   - he is on keppra , EEG was nonspecific as well as brain MRI  - would follow up  With neurology as op to see if still needs to be on this     Hyperglycemia  Likely, Hyperosmolar hyperglycemic state (HHS)  Type 2 diabetes, out of control   -glucose > 600 ketones negative se  Osms 301,   - was started on insulin drip and did transition to DKA orders than back t regular insulin drip, now being transitioned off insulin drip.   - continue to hold metformin   - Endocrinology consulted and appreciate help   - he checks blood sugar about once a week  - he needs ongoing diabetic education  - also asked nutritionist to see , he does have financial strain that will play a role and wants  \" personalized plan\"   - HgA1c 12.7 9/15   - changed to daily  As he does not want more pokes       LAKISHA on CKD stage 2  -Cr 1.71 on admission creatinine was 1.16 9/15 , now creatinine down to 1.2  Stopped IV fluids      - Suspect prerenal with vomiting and poor intake.   - has had no edema but now 9/19 staring with leg edema, some places mentoin he had also been on lisinopril but found a note that states possible  angioedema with ACEi during hospital stay 11/2019   - avoid nephtotoxic agents      QTC prolongation  -QTc 513--- 542---515 --469   - history non- sustained VT in 7/2019 in light of electrolyte inbalance   - Avoid QT prolonging medications as able  - continue to  Replace K, Mg aggressively,  Did  " Discussed with   patient  And encouraged to do ,  Did get calcium gluconate 7/16 and ionized calcium is now within normal   - continue to  hold zyprexa, Abilify and seroquel now back on  , see bellow       Metabolic alkalosis   -chronic CO2 retention from obesity hypoventilation.   - no acidosis on VBG  ( pH is 7.52)      Shortness of breath, resolved  Cough     CXR clear. Troponin negative. EKG without acute ischemia.   - SARS- CoV2 negative 9/15   - he is a smoker, started albuterol inhalers for now, out patient  PFT   - has had history of tracheostomy in past  Was decannulated 8/2019      Hypokalemia  -  K 2.0 in clinic, replace      Pseudohypernatremia  - due to hyperglycemia , resolved     History chronic leg edema , venous stasis  - no edema on admission ( had wrinkling skin) now starting up with edema, stopped IV fluids , restart lasix if edema worsens but need to keep close eye on K    - improve nutritional status    Urinary hesitancy and incontinence  -  UA with high glucose , negative for albumin and infection   - Monitor output. Consider bladder scan if low     Nausea/Vomiting  Suspect due to HHS  - resolved        chr dissecting thoracic aorta, was taken to OR 6/19 when presented with chest pain  To ER but was found to have chronic type B rather than acute dissection and no operative intervention was done atus post  Repair in 6/2019   Hypertension    - goal SBP < 140 mmHg   - needs good blood pressure  Control, his PTA lasix and hydrochlorothiazide on hold, not sure why he is on 2 diuretics, would restart one of them, no ACEi /ARB with RF ,  Consider amlodipine but need to monitir for worsening leg edema , will have PRN hydralazine availablke         Schizophrenia  PTSD  - appreciate Dr Aly's input, now zyprexa discontinued ( has been on hold since admit for prolonged qTC)  abilify restarted , seroquel now at 400 mg at night. He stsates he wa staking  400-600 mg of seroquel, takes the 600 mg about 2-4  x a week and 400 mg on other days , continue to monitor qtc   -monitor for decompensations  - appreciate  psych input   reconsult as needed        Morbid obesity   - defer back to PMD       Social   - patient  Has very limited finances, food insecurity , asked  to see        Diet: Moderate Consistent Carb (60 g CHO per Meal) Diet    DVT Prophylaxis: Enoxaparin (Lovenox) SQ  Onble Catheter: Not present  Central Lines: None  Cardiac Monitoring: ACTIVE order. Indication: Electrolyte Imbalance (24 hours)- Magnesium <1.3 mg/ml; Potassium < =2.8 or > 5.5 mg/ml  Code Status:   Full discussed with  Patient       Disposition Plan     Expected Discharge Date: 09/20/2022        Discharge Comments: 1-2 days pending BGs and lyte replacements        The patient's care was discussed with  RN , tunde Hernandez MD  Hospitalist Service, GOLD TEAM [address] [place] [address]  Securely message with the Vocera Web Console (learn more here)  Text page via [facility] Paging/Directory   Please see signed in provider for up to date coverage information      Clinically Significant Risk Factors Present on Admission                # DMII: A1C = 12.5 % (Ref range: 0.0 - 5.6 %) within past 3 months  # Severe Obesity: Estimated body mass index is 41.37 kg/m  as calculated from the following:    Height as of this encounter: 1.829 m (6').    Weight as of this encounter: 138.3 kg (305 lb).        ______________________________________________________________________    Interval History      Doing ok, he does not remember ever seeing me ( I spent about 45 min talking with him yesterday and introduced myself) again was in room for about 45 min . Again we reviewed the plan and what needs to be done  going forward, again tried to educate him why he is on insulin now ands why we need to supplement his K.   He does get irritable and circles back  Multiple times to what we had discussed few times during  conversation  He does not think that he got enough cares as out patient  And also here by nurses and murphy snot respect younger people much     Data reviewed today: I reviewed all medications, new labs and imaging results over the last 24 hours    Physical Exam   Vital Signs: Temp: 99.2  F (37.3  C) Temp src: Oral BP: 118/66 Pulse: 86   Resp: 16 SpO2: 98 % O2 Device: None (Room air)    Weight: 305 lbs 0 oz     General appearence: awake alert  In no apparent distress    HEENT: EOMI, PEARLA, sclera nonicteric,  moist,  mucus membranes,   NECK : supple  RESPIRATORY: lungs clear to auscultation bilateral,  no wheezing or crackles   CARDIOVASCULAR:S1 S2 regular rate and rhythm, distant heart sounds  GASTROINTESTINAL:soft, non-distended , non-tender , + bowel sounds, no masses felt   SKIN: warm and dry, no mottling noted   NEUROLOGIC; awake alert and oriented, no focal deficits found  EXTREMITIES: no clubbing, cyanosis ,  Now starting with some leg edema,, moves all extremity,    MUSCULOSKELETAL: without deformity       Data   Recent Labs   Lab 09/19/22  0555 09/19/22  0427 09/19/22  0320 09/19/22  0116 09/18/22  2358 09/18/22  1742 09/18/22  1736 09/18/22  0604 09/18/22  0556 09/17/22  1042 09/17/22  1012 09/17/22  0659 09/17/22  0656 09/15/22  1857 09/15/22  1751   WBC  --   --   --   --   --   --   --   --   --   --  8.1  --  8.4  --  8.5   HGB  --   --   --   --   --   --   --   --   --   --  14.7  --  14.4  --  16.0   MCV  --   --   --   --   --   --   --   --   --   --  87  --  85  --  84   PLT  --   --   --   --   --   --   --   --  191  --  204  --   --   --  211     --   --   --  138  --  134   < > 137   < > 138  --  136   < > 137   POTASSIUM 3.3*  --   --   --  3.8  --  5.5*   < > 3.3*   < > 3.1*  --  3.5   < > 2.0*   CHLORIDE 108  --   --   --  104  --  106   < > 104   < > 99  --  99   < > 101   CO2 32  --   --   --  28  --  29   < > 29   < > 35*  --  33*   < > 29   BUN 8  --   --   --  12  --  11   < > 13    < > 15  --  17   < > 23   CR 1.12  --   --   --  1.17  --  1.08   < > 1.16   < > 1.22  --  1.20   < > 1.16   ANIONGAP 1*  --   --   --  6  --  <1*   < > 4   < > 4  --  4   < > 7   ANGELLA 8.3*  --   --   --  8.5  --  8.6   < > 8.7   < > 8.6  --  8.8   < > 6.3*   GLC 73 128* 158*   < > 222*   < > 185*   < > 177*   < > 102*   < > 146*   < > 469*   ALBUMIN  --   --   --   --   --   --   --   --   --   --   --   --   --   --  2.3*   PROTTOTAL  --   --   --   --   --   --   --   --   --   --   --   --   --   --  5.2*   BILITOTAL  --   --   --   --   --   --   --   --   --   --   --   --   --   --  0.8   ALKPHOS  --   --   --   --   --   --   --   --   --   --   --   --   --   --  61   ALT  --   --   --   --   --   --   --   --   --   --   --   --   --   --  14   AST  --   --   --   --   --   --   --   --   --   --   --   --   --   --  11    < > = values in this interval not displayed.     No results found for this or any previous visit (from the past 24 hour(s)).

## 2022-09-19 NOTE — PLAN OF CARE
/85 (BP Location: Left arm)   Pulse 86   Temp 98.8  F (37.1  C) (Oral)   Resp 19   Ht 1.829 m (6')   Wt 138.3 kg (305 lb)   SpO2 98%   BMI 41.37 kg/m      Patient A&O x4. VSS on RA. IMC status  On tele: normal sinus rhythm  Continuous insulin drip (right IV). L-IV-SL.  Lung sounds clear.   Refused skin assessment. Refused to answer CMS questions.  SBA to the bathroom  Able to make needs known  Moderate Consistent Carb (60 g CHO per meal) Diet  Carb coverage with meals  Mag, Phos, Pot Replacement - RN managed (Potassium and Phosphorus replaced this shift)    Insulin infusion: Patient on algorithm 3. Patient refused to have his blood glucose checked at the 2240 time check.    Patient very agitated this shift. Patient rude to staff. Writer went in every hour to check BG and each time patient stated that writer was not listening to him and not caring about his feelings. Writer was explaining his care to patient many times throughout the shift. Patient asked to speak to the Provider, wanting a better explanation about his care. Writer paged Provider. Provider called back and said they would tell oncoming staff to have the morning Provider talk to patient.      Patient refused 2nd assessment (IMC status).    2250: writer disconnected patient from tele leads, IV tubing, and pulse ox so patient could use the bathroom. Writer went back into the room and the patient had not gone to the bathroom yet. Patient refused to have IV's, tele and pulse ox plugged back in.    Continue with POC.

## 2022-09-19 NOTE — PLAN OF CARE
Goal Outcome Evaluation:    Patient alert/oriented x 4. Pleasant and cooperative.   Room air. Standby assist.    RN managed pot 3.3 , mag 2.1 , phos 2.5.   Patient refused Keppra and Senna.  R arm PIV, saline locked.   NSR, VSS,   Dry, tight skin on lower legs bilaterally.   Diminished lung sounds.     Blood sugars have been between 201-225

## 2022-09-19 NOTE — PROGRESS NOTES
Diabetes Consult Daily  Progress Note          Assessment/Plan:     HPI:  Antony Aguila Jr. is a 62 year old man with history of type 2 diabetes,  Hypertension, schizophrenia, venous stasis ulcers, and CKD stage 2 who presented to the ER with weakness and found to have hyperglycemia and hypokalemia.     Assessment:     1)  Type 2 Diabetes Mellitus; uncontrolled c/b neuropathy.  Presented in HHS.  A1c 12.7%  2)  Nausea and vomiting; resolved  3)  Obesity; BMI 42  4)  Schizophrenia  5)  Weakness  6)  Metabolic derangements; hypokalemia/hyponemia     Plan:       -  stop IV insulin protocol 2 hours after first dose glargine 55 units q24h      -  Novolog meal coverage 1 unit(s) per 3 g cho AC meals/snacks -     -  BG monitoring -->AC, HS 0200 once off IV insulin    -  Hold all PTA meds -> metformin and Invokana ---> will consider invokana restart  Tomorrow.  Pt does NOT want to resume metformin due to GI side effects    -  Test claim - TBD    -  Hypoglycemia protocol    -  Recommend carb counting protocol    -  Education :  anticipated he will need vs closer follow up with skilled nursing to home    -  Outpatient follow up:  recommend [facility] vs PCP ([facility])    Plan discussed with patient, bedside RN and maikol promary team        Interval History:     The last 24 hours progress and nursing notes reviewed.      Phos replacement in D5W 250 ml at 1818  Abilify ordered but did not take last night  Overnight IV insulin rates 3-5.5, then zero at 0540.    BG trend:            Extremely poor insight/judgment. Per notes    Discharge 9/20? 1-2 days  He does feel better, but wants to feel good.  Wants leg swelling to further improve.  Antony reflects on how he's been doing prior to admission and all that is new now.  Feels his resoures from home care werent' meeting his needs.  Many food related questions-- fast food cravings.  Metformin always caused him trouble if he didn't eat.  Wants to  avoid it for now.  Wonders about generic versus name brand.  Dietician visitied him    Planned Procedures/surgeries: none  D5W-containing solutions/medications: K phos in D5W    Recent Labs   Lab 09/19/22  0808 09/19/22  0555 09/19/22  0541 09/19/22  0427 09/19/22  0320 09/19/22  0241   * 73 79 128* 158* 142*           Nutrition:     Orders Placed This Encounter      Moderate Consistent Carb (60 g CHO per Meal) Diet        PTA Regimen:      Antony cannot recall any medications for DM.      Per chart review, metformin 1000 mg BID  Invokana 100 mg in AM - recently started on 6/10/2022     BG monitoring frequency:  Once per week when home health nurse comes  Reports his blood sugars range typically between 300 mg/dL and 500 mg/dL     Diet: no restrictions     Denies any diet restrictions, often only eats two meals per day with limited snacks  Does report drinking quite a lot of regular soda at least 4 L per day of orange crush, lemonade Minute Maid as well as Gingerale.             Review of Systems:   See interval hx         Medications:   Steroid planning:  no  Tube Feeding: no       Physical Exam:   /66 (BP Location: Right arm, Patient Position: Left side)   Pulse 86   Temp 99.2  F (37.3  C) (Oral)   Resp 16   Ht 1.829 m (6')   Wt 138.3 kg (305 lb)   SpO2 98%   BMI 41.37 kg/m      General:   A&O, NAD, resting comfortably in crecliner  HEENT:  NC/AT. MMM, EOMI, Anicteric  Lungs:  unremarkable, no new cough, no SOB  ABD:   rounded  Extremities:  + edema, non-tender.  No obvious ulcerations noted. Thicken skin to LE  Skin:  warm and dry, no obvious lesions/rash/bleeding  Neuro:  No focal neurological deficits  Psych:   High energy         Data:     Lab Results   Component Value Date    A1C 12.5 09/16/2022    A1C 12.7 09/15/2022    A1C 7.4 09/25/2021        Recent Labs   Lab Test 09/19/22  1254 09/19/22  1154 09/19/22  0808 09/19/22  0555 09/19/22  0116 09/18/22  2358   NA  --   --   --  141  --   138   POTASSIUM  --   --   --  3.3*  --  3.8   CHLORIDE  --   --   --  108  --  104   CO2  --   --   --  32  --  28   ANIONGAP  --   --   --  1*  --  6   * 266*   < > 73   < > 222*   BUN  --   --   --  8  --  12   CR  --   --   --  1.12  --  1.17   ANGELLA  --   --   --  8.3*  --  8.5    < > = values in this interval not displayed.     CBC RESULTS: Recent Labs   Lab Test 09/18/22  0556 09/17/22  1012   WBC  --  8.1   RBC  --  4.82   HGB  --  14.7   HCT  --  42.1   MCV  --  87   MCH  --  30.5   MCHC  --  34.9   RDW  --  13.9    204       To contact Endocrine Diabetes service:   From 8AM-4PM: page inpatient diabetes provider who is following the patient that day (see filed or incomplete progress notes/consult notes).  If uncertain of provider assignment: page job code [id number]. (To page job code in-house dial 3 stars, 777 then enter number).  For questions or updates AFTER HOURS from 4PM-8AM: page the diabetes job code for on call fellow: [phone]    Please notify inpatient diabetes service if changes are planned to steroids, nutrition, or if procedures are planned requiring prolonged NPO status.Diabetes Management Team job code: [phone]    I spent a total of 35 minutes on the date of the encounter doing chart review, history and exam, documentation and further activities per the note.  Over 50% of my time on the unit was spent counseling the patient and/or coordinating care regarding acute hyperglycemic management.  See note for details.

## 2022-09-19 NOTE — PLAN OF CARE
Pt was frustrated throughout the shift and making statement about staff not treating him well dt his class status and being a person of color. Pt did refuse some of his morning medication. Pt does not like the taste of pills and did not understand why he was on an anti-convulsant.     Pt talked with endocrine and was able to get information on his diet and changes that should be made. Pt was also taken off the insulin drip and placed on a long acting insulin. Pt is happy to not have as many items connected to him. Pt continues to refuse a full skin assessment. Continue with POC.

## 2022-09-19 NOTE — PLAN OF CARE
Goal Outcome Evaluation:        Pt rested in voided on urinal x 2. Insulin drip stopped at 630 B/c Bg was 79 Pt refused 730 BG. Was Calm and cooperative during night shift, is frustrated by lack of sleep due to several nights of every hour blood glucose checks. VSS. Tele NSR. Cont to assess.

## 2022-09-20 ENCOUNTER — APPOINTMENT (OUTPATIENT)
Dept: PHYSICAL THERAPY | Facility: CLINIC | Age: 62
End: 2022-09-20
Attending: INTERNAL MEDICINE
Payer: COMMERCIAL

## 2022-09-20 LAB
ANION GAP SERPL CALCULATED.3IONS-SCNC: 3 MMOL/L (ref 3–14)
BUN SERPL-MCNC: 7 MG/DL (ref 7–30)
CALCIUM SERPL-MCNC: 8.6 MG/DL (ref 8.5–10.1)
CHLORIDE BLD-SCNC: 109 MMOL/L (ref 94–109)
CO2 SERPL-SCNC: 29 MMOL/L (ref 20–32)
CREAT SERPL-MCNC: 1.09 MG/DL (ref 0.66–1.25)
GFR SERPL CREATININE-BSD FRML MDRD: 76 ML/MIN/1.73M2
GLUCOSE BLD-MCNC: 100 MG/DL (ref 70–99)
GLUCOSE BLDC GLUCOMTR-MCNC: 112 MG/DL (ref 70–99)
GLUCOSE BLDC GLUCOMTR-MCNC: 113 MG/DL (ref 70–99)
GLUCOSE BLDC GLUCOMTR-MCNC: 137 MG/DL (ref 70–99)
GLUCOSE BLDC GLUCOMTR-MCNC: 175 MG/DL (ref 70–99)
GLUCOSE BLDC GLUCOMTR-MCNC: 200 MG/DL (ref 70–99)
HOLD SPECIMEN: NORMAL
HOLD SPECIMEN: NORMAL
MAGNESIUM SERPL-MCNC: 2.1 MG/DL (ref 1.6–2.3)
MAGNESIUM SERPL-MCNC: 2.1 MG/DL (ref 1.6–2.3)
PHOSPHATE SERPL-MCNC: 2.9 MG/DL (ref 2.5–4.5)
PHOSPHATE SERPL-MCNC: 3.1 MG/DL (ref 2.5–4.5)
POTASSIUM BLD-SCNC: 3.6 MMOL/L (ref 3.4–5.3)
POTASSIUM BLD-SCNC: 3.6 MMOL/L (ref 3.4–5.3)
SODIUM SERPL-SCNC: 141 MMOL/L (ref 133–144)

## 2022-09-20 PROCEDURE — 99233 SBSQ HOSP IP/OBS HIGH 50: CPT | Performed by: CLINICAL NURSE SPECIALIST

## 2022-09-20 PROCEDURE — 83735 ASSAY OF MAGNESIUM: CPT | Performed by: INTERNAL MEDICINE

## 2022-09-20 PROCEDURE — 250N000013 HC RX MED GY IP 250 OP 250 PS 637: Performed by: PSYCHIATRY & NEUROLOGY

## 2022-09-20 PROCEDURE — 36415 COLL VENOUS BLD VENIPUNCTURE: CPT | Performed by: INTERNAL MEDICINE

## 2022-09-20 PROCEDURE — 84132 ASSAY OF SERUM POTASSIUM: CPT | Performed by: INTERNAL MEDICINE

## 2022-09-20 PROCEDURE — 250N000013 HC RX MED GY IP 250 OP 250 PS 637: Performed by: INTERNAL MEDICINE

## 2022-09-20 PROCEDURE — 250N000013 HC RX MED GY IP 250 OP 250 PS 637: Performed by: CLINICAL NURSE SPECIALIST

## 2022-09-20 PROCEDURE — 80048 BASIC METABOLIC PNL TOTAL CA: CPT | Performed by: INTERNAL MEDICINE

## 2022-09-20 PROCEDURE — 120N000002 HC R&B MED SURG/OB UMMC

## 2022-09-20 PROCEDURE — 250N000011 HC RX IP 250 OP 636: Performed by: INTERNAL MEDICINE

## 2022-09-20 PROCEDURE — 84100 ASSAY OF PHOSPHORUS: CPT | Performed by: INTERNAL MEDICINE

## 2022-09-20 PROCEDURE — 97161 PT EVAL LOW COMPLEX 20 MIN: CPT | Mod: GP

## 2022-09-20 PROCEDURE — 99232 SBSQ HOSP IP/OBS MODERATE 35: CPT | Performed by: INTERNAL MEDICINE

## 2022-09-20 RX ORDER — METFORMIN HCL 500 MG
500 TABLET, EXTENDED RELEASE 24 HR ORAL
Status: DISCONTINUED | OUTPATIENT
Start: 2022-09-20 | End: 2022-09-21 | Stop reason: HOSPADM

## 2022-09-20 RX ORDER — POTASSIUM CHLORIDE 1.5 G/1.58G
20 POWDER, FOR SOLUTION ORAL ONCE
Status: COMPLETED | OUTPATIENT
Start: 2022-09-20 | End: 2022-09-20

## 2022-09-20 RX ADMIN — ALBUTEROL SULFATE 2 PUFF: 90 AEROSOL, METERED RESPIRATORY (INHALATION) at 01:19

## 2022-09-20 RX ADMIN — ENOXAPARIN SODIUM 40 MG: 40 INJECTION SUBCUTANEOUS at 08:43

## 2022-09-20 RX ADMIN — ALBUTEROL SULFATE 2 PUFF: 90 AEROSOL, METERED RESPIRATORY (INHALATION) at 14:18

## 2022-09-20 RX ADMIN — INSULIN ASPART: 100 INJECTION, SOLUTION INTRAVENOUS; SUBCUTANEOUS at 08:41

## 2022-09-20 RX ADMIN — ALBUTEROL SULFATE 2 PUFF: 90 AEROSOL, METERED RESPIRATORY (INHALATION) at 19:10

## 2022-09-20 RX ADMIN — ARIPIPRAZOLE 10 MG: 10 TABLET ORAL at 21:39

## 2022-09-20 RX ADMIN — INSULIN ASPART 13 UNITS: 100 INJECTION, SOLUTION INTRAVENOUS; SUBCUTANEOUS at 21:35

## 2022-09-20 RX ADMIN — INSULIN ASPART 11 UNITS: 100 INJECTION, SOLUTION INTRAVENOUS; SUBCUTANEOUS at 22:56

## 2022-09-20 RX ADMIN — METFORMIN HYDROCHLORIDE 500 MG: 500 TABLET, EXTENDED RELEASE ORAL at 19:10

## 2022-09-20 RX ADMIN — ENOXAPARIN SODIUM 40 MG: 40 INJECTION SUBCUTANEOUS at 19:10

## 2022-09-20 RX ADMIN — INSULIN ASPART 21 UNITS: 100 INJECTION, SOLUTION INTRAVENOUS; SUBCUTANEOUS at 14:20

## 2022-09-20 RX ADMIN — ACETAMINOPHEN 650 MG: 325 TABLET, FILM COATED ORAL at 01:19

## 2022-09-20 RX ADMIN — POLYETHYLENE GLYCOL 3350 17 G: 17 POWDER, FOR SOLUTION ORAL at 08:40

## 2022-09-20 RX ADMIN — POTASSIUM CHLORIDE 20 MEQ: 1.5 FOR SOLUTION ORAL at 10:29

## 2022-09-20 RX ADMIN — INSULIN ASPART 11 UNITS: 100 INJECTION, SOLUTION INTRAVENOUS; SUBCUTANEOUS at 19:59

## 2022-09-20 RX ADMIN — ALBUTEROL SULFATE 2 PUFF: 90 AEROSOL, METERED RESPIRATORY (INHALATION) at 08:41

## 2022-09-20 RX ADMIN — INSULIN GLARGINE 55 UNITS: 100 INJECTION, SOLUTION SUBCUTANEOUS at 08:43

## 2022-09-20 RX ADMIN — QUETIAPINE 400 MG: 300 TABLET, FILM COATED ORAL at 21:38

## 2022-09-20 ASSESSMENT — ACTIVITIES OF DAILY LIVING (ADL)
ADLS_ACUITY_SCORE: 35
DEPENDENT_IADLS:: INDEPENDENT
ADLS_ACUITY_SCORE: 31
ADLS_ACUITY_SCORE: 35

## 2022-09-20 NOTE — PLAN OF CARE
/75 (BP Location: Right arm, Patient Position: Sitting, Cuff Size: Adult Regular)   Pulse 85   Temp 98.1  F (36.7  C) (Oral)   Resp 18   Ht 1.829 m (6')   Wt 138.3 kg (305 lb)   SpO2 98%   BMI 41.37 kg/m      A&Ox4.   VSS on RA.   On tele - NSR.   Dyspnea w/ activity - albuterol given.   SBA.   BLE edema.   BG checks w/ carb counting.   Using bedside urinal to void.   Plan: Discharge pending electrolyte and BG levels.

## 2022-09-20 NOTE — PLAN OF CARE
/86 (BP Location: Right arm)   Pulse 78   Temp 98.3  F (36.8  C) (Oral)   Resp 20   Ht 1.829 m (6')   Wt 138.3 kg (305 lb)   SpO2 95%   BMI 41.37 kg/m      A&Ox4. Cooperative throughout shift. Pt's sister was visiting at the start of the shift and took pt outside for a walk. Pt c/o pain in bilateral ankles rated at 4/10 in severity. Pt reports this is due to swelling -- managed with elevation of BLE and adjustment of socks. Denies N/T. Consistent carb diet with sliding scale and carb coverage insulin. Pt ordered dinner from  [facility], but then had BBQ chicken wings from [employer], Brisk iced tea, and ice cream -- snack carb coverage used for wings, tea, and ice cream. Pt has diabetes educator coming tomorrow morning, as well as endocrinologist to make adjustments to insulin doses before pt discharges to home. Continue POC.

## 2022-09-20 NOTE — DISCHARGE INSTRUCTIONS
Diabetes Plan for Antony Aguila Jr 9/21/22  Check glucose before meals and at bedtime  Work to add activity each day  Hydrate with water as your primary beverage    Lantus 50 units every morning  Novolog 17 units per meal  Novolog 9 units per snack    Metformin  mg at supper    Follow up with [facility] diabetes specialists, appt requested to take place within 2 weeks. If you tolerate, your metformin will be increased.  This will help decrease your insulin need.  And at your follow up, Bydureon may be added.  This would also help decrease your insulin need.  With Bydureon and Metformin full dose, you have potential to be off insulin altogether.  Will request appt for educator and dietician as well.  Referral will be placed for ophthalmology.    CONTACT FOR CADI SERVICES:  Senior Linkage Line- [phone]  [email or web address]   [facility] Door- [phone]

## 2022-09-20 NOTE — CONSULTS
Consulted by Kaycee Lagunas to run a test claim for GLP-1 agonist medications.    Patient has pharmacy benefits through [facility] PMAP (pre-paid medical assistance plan). Per insurance, the following are covered and preferred under the patient's plans:       Bydureon BCISE - $0    Victoza - $0    Byetta - $0     The following are not covered:    Ozempic    Trulicity    Adlyxin    Rybelsus      Please feel free to contact me with any other test claims, prior authorizations, or insurance questions regarding outpatient medications.     Thanks!      Carin Hawkins [facility] Discharge Pharmacy Liaison  Pronouns: She/Her/Hers    [facility]  [address] [place] [address]  [address] [place] [address] [address] [place] [address]   [email or web address]  [email or web address]   Phone: [phone]  Pager: [phone]  Fax: [phone]

## 2022-09-20 NOTE — PLAN OF CARE
Pt was pleasant this shift. Pt was taken off tele and no longer needs hourly BG checks which is likely contributing to his positive mood. Pt was told by provider that discharge is possibly today or tomorrow depending on his electrolytes and blood sugar under control.    Pt's sister visited the pt and states that she can help the pt as he changes his lifestyle to help control his blood sugar.  BG was 112 at breakfast and 175 at lunch. Continue with POC

## 2022-09-20 NOTE — PROGRESS NOTES
"EVALUATION ONLY- pt does not require acute therapy needs during admission. He is up IND, ambulating x400' in marcos without difficulty. PT encouraged pt to go for x4 walks a day for general hospital-related activity program. Will complete orders. Safe to discharge home when medically ready.       09/20/22 1300   Quick Adds   Type of Visit Initial PT Evaluation   Living Environment   People in Home alone   Current Living Arrangements apartment   Home Accessibility no concerns   Living Environment Comments elevator access   Self-Care   Usual Activity Tolerance good   Current Activity Tolerance good   Regular Exercise No   Equipment Currently Used at Home none   Fall history within last six months no   Activity/Exercise/Self-Care Comment IND at baseline with all cares and mobility.   General Information   Onset of Illness/Injury or Date of Surgery 09/15/22   Referring Physician Ladi Hernandez MD   Pertinent History of Current Problem (include personal factors and/or comorbidities that impact the POC) per chart review, \"  Antony Aguila Jr. is a 62 year old man with history of type 2 diabetes,  Hypertension, schizophrenia, venous stasis ulcers, and CKD stage 2 who presented to clinic with weakness, nausea vomiting  and found to have hyperglycemia and hypokalemia thus was sent to ER . \"   Existing Precautions/Restrictions no known precautions/restrictions   Cognition   Affect/Mental Status (Cognition) WNL   Pain Assessment   Patient Currently in Pain No   Integumentary/Edema   Integumentary/Edema no deficits were identifed   Posture    Posture Not impaired   Range of Motion (ROM)   ROM Comment WFL   Strength (Manual Muscle Testing)   Strength Comments pt reports some genrealized weakness from admisison and not getting up much, but grossly 5/5 B LEs and strength is WFL per IND functional mobility   Bed Mobility   Comment, (Bed Mobility) IND   Transfers   Comment, (Transfers) IND   Gait/Stairs (Locomotion)   Comment, " (Gait/Stairs) pt ambulates x400' without AD, up IND, steady on feet, no LOB with functional head turns, environmental scanning. Pt reports overall his gait feels baseline   Balance   Balance no deficits were identified   Clinical Impression   Criteria for Skilled Therapeutic Intervention Evaluation only   PT Diagnosis (PT) none   Influenced by the following impairments mild subjective hospital-related deconditioning, not affecting mobility or activity tolerance   Functional limitations due to impairments none   Clinical Presentation (PT Evaluation Complexity) Stable/Uncomplicated   Clinical Presentation Rationale PMH, clinician impression   Clinical Decision Making (Complexity) low complexity   PT Discharge Planning   PT Discharge Recommendation (DC Rec) home   PT Rationale for DC Rec pt at his baseline, ambulating extended hallway distances, up IND. No acute therapy needs indicated, can address his subjective deconditioning with x4 walks/day on his own.   PT Brief overview of current status up IND   Plan of Care Review   Plan of Care Reviewed With patient;sibling   Total Evaluation Time   Total Evaluation Time (Minutes) 10

## 2022-09-20 NOTE — PROGRESS NOTES
Diabetes Consult Daily  Progress Note          Assessment/Plan:     HPI:  Antony Aguila Jr. is a 62 year old man with history of type 2 diabetes,  Hypertension, schizophrenia, venous stasis ulcers, and CKD stage 2 who presented to the ER with weakness and found to have hyperglycemia and hypokalemia.     Assessment:     1)  Type 2 Diabetes Mellitus; uncontrolled c/b neuropathy.  Presented in HHS.  A1c 12.7%  2)  Nausea and vomiting; resolved  3)  Obesity; BMI 42  4)  Schizophrenia  5)  Weakness  6)  Metabolic derangements; hypokalemia/hyponemia     Plan:       -  continue glargine 55 units q24h 0930--> then discontinue.  Planning for lower dose tomorrow which will be ordered in the morning  - add metformin  mg tonight w/ supper    -  Novolog meal coverage 1 unit(s) per 3 g cho AC meals/snacks - --> decrease to 1 per 4 grams starting with supper tonight    -  BG monitoring -->AC, HS 0200       -  Test claim - coverage for insulin and testing supplies, waiting on GLP-1 RA info    -  Hypoglycemia protocol    -  Recommend carb counting protocol    -  Education : planning for tomorrow, and to include pt's sister Mallorie if possible    -  Outpatient follow up:  recommend [facility] --> appt request sent via discharge navigator on 9/20, for follow up within 2 weeks    Plan discussed with patient, bedside RN and primary team, and unit pharmacist    Draft plan in AVS        Interval History:     The last 24 hours progress and nursing notes reviewed.      BG trend:      Discharge 9/21  After teaching and establishment of insulin/metformin regimen.  Pt feels a lot improved after getting some sleep.  Overall has more energy and more hope for feeling good in future.  [place]ister [place] and will be a support to him. He shares challenges with trusting some providers, systems, and shares safety concerns in his neighborhood.  Will cook.  Knows certain ways.  And is open  to new options.  Did not like Mom's meals.    Thinks he'll work on avoiding fast food.  HAs access to gym inside his building  Talked about starting w/ 5 min activity per day.  He mentions he may stop smoking.    Reviewed past use of metformin.  He worries the supply he had was not good quality.  He didn't know the significance of impact on BG and of BG potential to make him so sick.  Maybe was taking it every other day.  Home formulation per admit list was metformin HCl  He does not remember taking Invokana.    Reviewed draft insulin plan with fixed meal doses.        Planned Procedures/surgeries: none  D5W-containing solutions/medications: K phos in D5W    Recent Labs   Lab 09/20/22  1308 09/20/22  0822 09/20/22  0731 09/20/22  0118 09/19/22  2200 09/19/22  1946   * 112* 100* 137* 225* 213*           Nutrition:     Orders Placed This Encounter      Moderate Consistent Carb (60 g CHO per Meal) Diet        PTA Regimen:   **was on ariprazole and olanzapine at home (prescribed them anyways)     Antony cannot recall any medications for DM.      Per chart review, metformin 1000 mg BID  Invokana 100 mg in AM - recently started on 6/10/2022---> 7/28 visit shows this med discontinued     BG monitoring frequency:  Once per week when home health nurse comes  Reports his blood sugars range typically between 300 mg/dL and 500 mg/dL     Diet: no restrictions     Denies any diet restrictions, often only eats two meals per day with limited snacks  Does report drinking quite a lot of regular soda at least 4 L per day of orange crush, lemonade Minute Maid as well as Gingerale.             Review of Systems:   See interval hx         Medications:   Admin ariprazole last night...**Note start date of aripiprozole listed 12/21.   Factor in his worsening BG control?         Physical Exam:   /86 (BP Location: Right arm)   Pulse 78   Temp 98.3  F (36.8  C) (Oral)   Resp 20   Ht 1.829 m (6')   Wt 138.3 kg (305 lb)    SpO2 95%   BMI 41.37 kg/m      General:   A&O, NAD, resting comfortably at edge of bed  HEENT:  NC/AT. MMM, EOMI, Anicteric  Lungs:  unremarkable, no new cough, no SOB  ABD:   rounded  Skin:  dry, no obvious lesions/rash/bleeding  Neuro:  No focal neurological deficits  Psych:   High energy, contemplative         Data:     Lab Results   Component Value Date    A1C 12.5 09/16/2022    A1C 12.7 09/15/2022    A1C 7.4 09/25/2021      **Note start date of aripiprozole listed 12/21.   Factor in his worsening BG control?      Recent Labs   Lab Test 09/20/22  1308 09/20/22  0929 09/20/22  0822 09/20/22  0731 09/19/22  1546 09/19/22  1425   NA  --   --   --  141  --  139   POTASSIUM  --  3.6  --  3.6  --  4.0  3.9   CHLORIDE  --   --   --  109  --  105   CO2  --   --   --  29  --  32   ANIONGAP  --   --   --  3  --  2*   *  --  112* 100*   < > 168*   BUN  --   --   --  7  --  8   CR  --   --   --  1.09  --  1.20   ANGELLA  --   --   --  8.6  --  9.0    < > = values in this interval not displayed.     CBC RESULTS: Recent Labs   Lab Test 09/18/22  0556 09/17/22  1012   WBC  --  8.1   RBC  --  4.82   HGB  --  14.7   HCT  --  42.1   MCV  --  87   MCH  --  30.5   MCHC  --  34.9   RDW  --  13.9    204       To contact Endocrine Diabetes service:   From 8AM-4PM: page inpatient diabetes provider who is following the patient that day (see filed or incomplete progress notes/consult notes).  If uncertain of provider assignment: page job code [phone]. (To page job code in-house dial 3 stars, 777 then enter number).  For questions or updates AFTER HOURS from 4PM-8AM: page the diabetes job code for on call fellow: [phone]    Please notify inpatient diabetes service if changes are planned to steroids, nutrition, or if procedures are planned requiring prolonged NPO status.Diabetes Management Team job code: [phone]    I spent a total of 35 minutes on the date of the encounter doing chart review, history and exam, documentation and further  activities per the note.  Over 50% of my time on the unit was spent counseling the patient and/or coordinating care regarding acute hyperglycemic management.  See note for details.

## 2022-09-20 NOTE — PROGRESS NOTES
[facility]    Medicine Progress Note - Hospitalist Service, GOLD TEAM 17    Date of Admission:  9/15/2022    Assessment & Plan         Antony Aguila Jr. is a 61 yo morbidly obese gentleman w/ h/o T2DM, HTN, schizophrenia, venous stasis ulcers and CKD2 stage 2 who presented to clinic on 9/15/22 with weakness, nausea and vomiting.  He was found to have hyperglycemia and hypokalemia thus was sent to ER.    T2DM w/ hyperosmolar hyperglycemic state (HHS)  ---   Uncontrolled T2DM due to medical noncompliance  ---   HgA1c 12.7 9/15   ---   Initial glucose > 600, ketones negative   ---   Treated w/ insulin drip, IVF hydration and lytes replacement  ---   Seen by endocrine consult service who have been adjusting his insulin  ---   Currently on Lantus 55 units daily, NovoLog 1 units per 3 g carb tid with meals/snacks, NovoLog 1 unit per 4 g carb with supper at night.  ---   Today, metformin  mg po daily added  ---   He received diabetic education tomorrow  ---   Lantus dose may be titrated down tomorrow  ---   He will discharge to home with metformin, Lantus and NovoLog possibly tomorrow afternoon     LAKISHA on CKD2  ---   Due to prerenal azotemia in the setting of HHS   ---   Admit Cr was 1.71.  ---   Creatinine back to normal range of 1.09 today   ---   Avoid nephtotoxic agents   ---   History of ACE I induced angioedema in 2019 per pt     QTC prolongation  ---   QTc 513--- 542---515 --469   ---   H/o non- sustained VT in 7/2019 in light of electrolyte inbalance   ---   Avoid QT prolonging medications as able    Metabolic alkalosis  ---   Chronic CO2 retention from obesity hypoventilation  ---   No acidosis on VBG ( pH is 7.52)      Shortness of breath, resolved  Cough     ---   CXR clear.   ---   Troponin negative.   ---   EKG without acute ischemia.   ---   SARS- CoV2 negative 9/15   ---   He is a smoker, started albuterol inhalers for now, out patient  PFT       Hypokalemia  ---   Replaced per protocol       Pseudohyponatremia  ---   Due to severe hyperglycemia, resolved     History chronic leg edema, venous stasis  ----   No edema on exam  ----   He received aggressive IVF hydration at the time of admit, IVF discontinued     Schizophrenia  PTSD  ---   Appreciate Dr Aly's input,   ---   Continue Zyprexa, Abilify and Seroquel       Morbid obesity w/ BMI 41.4 kg/m2  ---   Weight loss recommended      Social issues  ---   Patient has very limited finances, food insecurity, asked  to see         Diet: Moderate Consistent Carb (60 g CHO per Meal) Diet    DVT Prophylaxis: Enoxaparin (Lovenox) SQ  Noble Catheter: Not present  Central Lines: None  Cardiac Monitoring: None  Code Status:   Full discussed with  Patient     Disposition Plan    Probably discharge tomorrow afternoon.  Diabetic educator unable to see patient till tomorrow morning.  Endocrine service will round on the patient early afternoon and will adjust his discharge insulin regimen.          Clemente Barrera MD  Hospitalist Service, [facility] 17  [facility]  Securely message with the Vocera Web Console (learn more here)  Text page via [facility] Paging/Directory   Please see signed in provider for up to date coverage information      ______________________________________________________________________    Interval History    No complaints.    Uneventful night.  Fasting glucose was 112 this morning    Data reviewed today: I reviewed all medications, new labs and imaging results over the last 24 hours    Physical Exam   Vital Signs: Temp: 98.3  F (36.8  C) Temp src: Oral BP: 118/86 Pulse: 78   Resp: 20 SpO2: 95 % O2 Device: None (Room air)    Weight: 305 lbs 0 oz   General: Morbidly obese, aao x 3, NAD.  HEENT:  NC/AT, neck supple  CVS:  NL s 1 and s2, no m/r/g.  Lungs:  CTA B/L.   Abd:  Soft, + bs, NT, no rebound or gaurding, no fluid shift.  Ext:  No  c/c.  Lymph:  No edema.  Neuro:  Nonfocal.  Musculoskeletal: No calf tenderness to palpation.    Skin:  No rash.  Psychiatry:  Mood and affect appropriate.        Data   Recent Labs   Lab 09/20/22  1308 09/20/22  0929 09/20/22  0822 09/20/22  0731 09/19/22  1546 09/19/22  1425 09/19/22  0808 09/19/22  0555 09/18/22  0604 09/18/22  0556 09/17/22  1042 09/17/22  1012 09/17/22  0659 09/17/22  0656 09/15/22  1857 09/15/22  1751   WBC  --   --   --   --   --   --   --   --   --   --   --  8.1  --  8.4  --  8.5   HGB  --   --   --   --   --   --   --   --   --   --   --  14.7  --  14.4  --  16.0   MCV  --   --   --   --   --   --   --   --   --   --   --  87  --  85  --  84   PLT  --   --   --   --   --   --   --   --   --  191  --  204  --   --   --  211   NA  --   --   --  141  --  139  --  141   < > 137   < > 138  --  136   < > 137   POTASSIUM  --  3.6  --  3.6  --  4.0  3.9  --  3.3*   < > 3.3*   < > 3.1*  --  3.5   < > 2.0*   CHLORIDE  --   --   --  109  --  105  --  108   < > 104   < > 99  --  99   < > 101   CO2  --   --   --  29  --  32  --  32   < > 29   < > 35*  --  33*   < > 29   BUN  --   --   --  7  --  8  --  8   < > 13   < > 15  --  17   < > 23   CR  --   --   --  1.09  --  1.20  --  1.12   < > 1.16   < > 1.22  --  1.20   < > 1.16   ANIONGAP  --   --   --  3  --  2*  --  1*   < > 4   < > 4  --  4   < > 7   ANGELLA  --   --   --  8.6  --  9.0  --  8.3*   < > 8.7   < > 8.6  --  8.8   < > 6.3*   *  --  112* 100*   < > 168*   < > 73   < > 177*   < > 102*   < > 146*   < > 469*   ALBUMIN  --   --   --   --   --   --   --   --   --   --   --   --   --   --   --  2.3*   PROTTOTAL  --   --   --   --   --   --   --   --   --   --   --   --   --   --   --  5.2*   BILITOTAL  --   --   --   --   --   --   --   --   --   --   --   --   --   --   --  0.8   ALKPHOS  --   --   --   --   --   --   --   --   --   --   --   --   --   --   --  61   ALT  --   --   --   --   --   --   --   --   --   --   --   --   --   --   --   14   AST  --   --   --   --   --   --   --   --   --   --   --   --   --   --   --  11    < > = values in this interval not displayed.     No results found for this or any previous visit (from the past 24 hour(s)).

## 2022-09-20 NOTE — CONSULTS
Care Management Initial Consult    General Information  Assessment completed with: Patient, Family  Type of CM/SW Visit: Initial Assessment  Primary Care Provider verified and updated as needed: Yes   Readmission within the last 30 days: no previous admission in last 30 days  Reason for Consult: discharge planning  Advance Care Planning: other (see comments) (SW did not discuss with patient)    Communication Assessment  Patient's communication style: spoken language (English or Bilingual)    Cognitive  Cognitive/Neuro/Behavioral: WDL                Best Language: 0 - No aphasia    Speech: clear, spontaneous    Living Environment:   People in home: alone     Current living Arrangements: apartment      Able to return to prior arrangements: yes    Family/Social Support:  Care provided by: self  Provides care for: no one  Marital Status:   Support System: Sibling(s)          Description of Support System: Supportive, Involved    Support Assessment: Adequate family and caregiver support, Adequate social supports    Current Resources:   Patient receiving home care services: Yes  Skilled Home Care Services: Home Health Aid  Community Resources: County Worker  Equipment currently used at home: walker, standard, cane, straight  Supplies currently used at home: None    Employment/Financial:  Employment Status: retired  Financial Concerns: No concerns identified   Referral to Financial Worker: No    Lifestyle & Psychosocial Needs:  Social Determinants of Health     Tobacco Use: Not on file   Alcohol Use: Not on file   Financial Resource Strain: Not on file   Food Insecurity: Not on file   Transportation Needs: Not on file   Physical Activity: Not on file   Stress: Not on file   Social Connections: Not on file   Intimate Partner Violence: Not on file   Depression: Not on file   Housing Stability: Not on file     Functional Status:  Prior to admission patient needed assistance:   Dependent ADLs: Independent  Dependent  IADLs: Independent  Assesssment of Functional Status: At functional baseline    Mental Health Status:  Mental Health Status: No Current Concerns       Chemical Dependency Status:  Chemical Dependency Status: No Current Concerns    Values/Beliefs:  Spiritual, Cultural Beliefs, Synagogue Practices, Values that affect care: no    Additional Information:  Antony Aguila Jr. is a 62 year old man with history of type 2 diabetes, Hypertension, schizophrenia, venous stasis ulcers, and CKD stage 2 who presented to the ER with weakness and found to have hyperglycemia and hypokalemia.     Social work met with patient for an initial assessment. Patient reported he lives at home independently but receives some services from the [facility] It appears patient has a CADI waiver although he could not confirm that he did. Patient has a  but he was not sure what this persons name is. He also has a nurse who comes into the home once a week and home health services that come in a couple hours a day 5x a week. Patient has an ILS worker, too. These individuals mainly help patient with running errands and getting things completed around the home. Patient is able to take care of ADLs and IADLs independently. He uses a shower chair, walker and cane in the home. Patient is asking if he could get a motorized scooter. SW stated that I was not sure what the process for this was but that I could ask RNCC. RNCC states that patient would need to speak with PCP if he needs one of these. Patient reports that he would like one so that he can complete errands more independently.     Once ready to discharge, patient will need a ride home. He reports that he uses [employer] and that he can get a ride through them. SW offered to set up his ride tomorrow once hes medically ready to discharge. Added [facility] # to patients AVS so that he can call to follow up and find out who his CADI  is. Social work will  continue to follow and provide assistance to ensure a safe and timely discharge.     CELI Alonso, LGSW  8A and 10 ICU   [facility]   Phone: [phone]

## 2022-09-21 VITALS
HEIGHT: 72 IN | OXYGEN SATURATION: 99 % | WEIGHT: 305 LBS | RESPIRATION RATE: 18 BRPM | HEART RATE: 87 BPM | TEMPERATURE: 98.6 F | SYSTOLIC BLOOD PRESSURE: 145 MMHG | DIASTOLIC BLOOD PRESSURE: 82 MMHG | BODY MASS INDEX: 41.31 KG/M2

## 2022-09-21 LAB
CREAT SERPL-MCNC: 1.07 MG/DL (ref 0.66–1.25)
GFR SERPL CREATININE-BSD FRML MDRD: 78 ML/MIN/1.73M2
GLUCOSE BLDC GLUCOMTR-MCNC: 118 MG/DL (ref 70–99)
GLUCOSE BLDC GLUCOMTR-MCNC: 156 MG/DL (ref 70–99)
GLUCOSE BLDC GLUCOMTR-MCNC: 161 MG/DL (ref 70–99)
GLUCOSE BLDC GLUCOMTR-MCNC: 163 MG/DL (ref 70–99)
GLUCOSE BLDC GLUCOMTR-MCNC: 174 MG/DL (ref 70–99)
MAGNESIUM SERPL-MCNC: 2 MG/DL (ref 1.6–2.3)
PHOSPHATE SERPL-MCNC: 3.1 MG/DL (ref 2.5–4.5)
PLATELET # BLD AUTO: 215 10E3/UL (ref 150–450)
POTASSIUM BLD-SCNC: 3.8 MMOL/L (ref 3.4–5.3)

## 2022-09-21 PROCEDURE — 250N000013 HC RX MED GY IP 250 OP 250 PS 637: Performed by: INTERNAL MEDICINE

## 2022-09-21 PROCEDURE — 250N000012 HC RX MED GY IP 250 OP 636 PS 637: Performed by: NURSE PRACTITIONER

## 2022-09-21 PROCEDURE — 82565 ASSAY OF CREATININE: CPT | Performed by: INTERNAL MEDICINE

## 2022-09-21 PROCEDURE — 250N000011 HC RX IP 250 OP 636: Performed by: INTERNAL MEDICINE

## 2022-09-21 PROCEDURE — 85049 AUTOMATED PLATELET COUNT: CPT | Performed by: INTERNAL MEDICINE

## 2022-09-21 PROCEDURE — 83735 ASSAY OF MAGNESIUM: CPT | Performed by: INTERNAL MEDICINE

## 2022-09-21 PROCEDURE — 84100 ASSAY OF PHOSPHORUS: CPT | Performed by: INTERNAL MEDICINE

## 2022-09-21 PROCEDURE — 84132 ASSAY OF SERUM POTASSIUM: CPT | Performed by: INTERNAL MEDICINE

## 2022-09-21 PROCEDURE — 99239 HOSP IP/OBS DSCHRG MGMT >30: CPT | Performed by: INTERNAL MEDICINE

## 2022-09-21 PROCEDURE — 250N000013 HC RX MED GY IP 250 OP 250 PS 637: Performed by: CLINICAL NURSE SPECIALIST

## 2022-09-21 PROCEDURE — 36415 COLL VENOUS BLD VENIPUNCTURE: CPT | Performed by: INTERNAL MEDICINE

## 2022-09-21 PROCEDURE — 99233 SBSQ HOSP IP/OBS HIGH 50: CPT | Performed by: CLINICAL NURSE SPECIALIST

## 2022-09-21 RX ORDER — MAGNESIUM OXIDE 400 MG/1
400 TABLET ORAL EVERY 4 HOURS
Status: COMPLETED | OUTPATIENT
Start: 2022-09-21 | End: 2022-09-21

## 2022-09-21 RX ORDER — POTASSIUM CHLORIDE 750 MG/1
20 TABLET, EXTENDED RELEASE ORAL ONCE
Status: COMPLETED | OUTPATIENT
Start: 2022-09-21 | End: 2022-09-21

## 2022-09-21 RX ORDER — METFORMIN HCL 500 MG
500 TABLET, EXTENDED RELEASE 24 HR ORAL
Qty: 30 TABLET | Refills: 3 | Status: SHIPPED | OUTPATIENT
Start: 2022-09-21 | End: 2022-10-25

## 2022-09-21 RX ADMIN — Medication 400 MG: at 11:44

## 2022-09-21 RX ADMIN — INSULIN ASPART 18 UNITS: 100 INJECTION, SOLUTION INTRAVENOUS; SUBCUTANEOUS at 08:43

## 2022-09-21 RX ADMIN — ALBUTEROL SULFATE 2 PUFF: 90 AEROSOL, METERED RESPIRATORY (INHALATION) at 13:52

## 2022-09-21 RX ADMIN — METFORMIN HYDROCHLORIDE 500 MG: 500 TABLET, EXTENDED RELEASE ORAL at 17:20

## 2022-09-21 RX ADMIN — INSULIN ASPART 15 UNITS: 100 INJECTION, SOLUTION INTRAVENOUS; SUBCUTANEOUS at 06:07

## 2022-09-21 RX ADMIN — POLYETHYLENE GLYCOL 3350 17 G: 17 POWDER, FOR SOLUTION ORAL at 08:40

## 2022-09-21 RX ADMIN — POTASSIUM CHLORIDE 20 MEQ: 750 TABLET, EXTENDED RELEASE ORAL at 08:39

## 2022-09-21 RX ADMIN — INSULIN ASPART 16 UNITS: 100 INJECTION, SOLUTION INTRAVENOUS; SUBCUTANEOUS at 13:49

## 2022-09-21 RX ADMIN — Medication 400 MG: at 08:40

## 2022-09-21 RX ADMIN — ALBUTEROL SULFATE 2 PUFF: 90 AEROSOL, METERED RESPIRATORY (INHALATION) at 08:41

## 2022-09-21 RX ADMIN — ENOXAPARIN SODIUM 40 MG: 40 INJECTION SUBCUTANEOUS at 08:40

## 2022-09-21 ASSESSMENT — ACTIVITIES OF DAILY LIVING (ADL)
ADLS_ACUITY_SCORE: 31

## 2022-09-21 NOTE — CONSULTS
Diabetes Educator consult received for Antony Aguila Jr., age 62, with history of type 2 diabetes,  Hypertension, schizophrenia, venous stasis ulcers, and CKD stage 2 who presented to the ER with weakness and found to have hyperglycemia and hypokalemia.      The Inpatient Diabetes Management service has been consulted and providing glycemic management.  Plan is to discharge home today with the following plan:  Glucose monitoring before meals and bedtime.  Lantus 50 units daily in am  Novolog 17 units with each meal; breakfast, lunch, and supper  Novolog 9 units with snacks  Metformin  mg PO with supper  F/U with endocrinology in 2 weeks.    Met with Antony at bedside for diabetes education.  He was eager to learn and cooperative.  Education provided included:  1.  Glucose monitoring with Accu Chek Guide Me glucose meter.  Meter and starter kit provided to patient. Reviewed and demonstrated set-up, loading/unloading lancing device, testing, fingerstick, use of memory, and safe sharps disposal.  Antony was able to perform a self test without difficult.  2.  Reviewed action, peak, dosage, duration of Lantus and Novolog insulins.  Explained concept of basal/bolus therapy, use of a fixed dose for meals and for snacks, and range for blood sugars.    3.  Insulin administration-identified how to store insulin and usage.  Using training pens and injection pads, demonstrated placing pen needle, priming needle, dialing dose, injection technique, safe removal and disposal of pen needle, and site selection and rotation.  Antony was able to return demonstration accurately, talking out loud with each step so that I could confirm it was accurate.  Went over the different colors and names of the two types of insulin pens, have good lighting, take your time.  He feels he can do this.  4.  Signs/symptoms/causes/treatment of hypoglycemia and treating with CHO, 15 grams or 4 ounces of apple juice, etc.  Recheck in 15  minutes.  5.  Provided resources:  Understanding Your Diabetes Basics; B-D Insulin Pen Needle use and injection with pens; B-D site selection and rotation.  6.  Did review the Metformin ER, dose, how it works, side effects.    Antony states he needs an optometrist because he has had blurred vision.  Explained why he needs to wait 4-6 weeks for an eye exam and suggested he see an opthamologist.  Communicated this to Kaycee Lagunas, PAULETTES-BC, IDS.    For discharge, please order:   Accu Chek Guide test strips   Accu Chek Soft Clix lancets   Sharps Container   B-D 4 mm jama pen needles   Lantus solostar pens   Novolog flexpens   Alcohol Wipes    MELINDA Simpson, informed of education completed.  Gave her a few home pen needles so that Antony can prepare and administer his next couple insulin injections with RN supervision while still here.    BRANDY Gabriel  Diabetes Clinical Nurse Specialist/[facility]  [phone]

## 2022-09-21 NOTE — DISCHARGE SUMMARY
[facility]  Hospitalist Discharge Summary      Date of Admission:  9/15/2022  Date of Discharge:  9/21/2022  6:34 PM  Discharging Provider: Clemente Barrera MD  Discharge Service: Hospitalist Service, GOLD TEAM 17    Discharge Diagnoses    1.  T2DM w/ hyperosmolar hyperglycemic state (HHS)  2.  LAKISHA on CKD2  3.  Metabolic alkalosis  4.  Hypokalemia  5.  Pseudohyponatremia    Follow-ups Needed After Discharge     1.  Follow Up ([facility])     Diabetes specialty care.  Hospital follow up after HHS admission.  Seen by endocrinology inpatient.  Needs outpt follow up within 2 weeks please.  Pt is amenable to video visit.    2.  Appointments on [place] and/or [facility] (with [facility] or [facility] provider or service). Call [phone] if you haven't heard regarding these appointments within 7 days of discharge.        3.  Follow Up ([facility])     Diabetes education visit with nurse and dietician-- follow up after   inpatient insulin start-- 4-6 weeks please.      Unresulted Labs Ordered in the Past 30 Days of this Admission     Date and Time Order Name Status Description    9/16/2022  7:38 AM Blood Culture Arm, Right Preliminary       These results will be followed up by Hospitalist and PCP    Discharge Disposition   Discharged to home  Condition at discharge: Stable    Hospital Course   Antony Aguila Jr. is a 63 yo morbidly obese gentleman w/ h/o T2DM, HTN, schizophrenia, venous stasis ulcers and CKD2 who presented to clinic on 9/15/22 with weakness, nausea and vomiting.  He was found to have hyperglycemia and hypokalemia thus was sent to the ER.  Diagnosed w/ hyperosmolar hyperglycemic state (HHS) in the ED and was subsequently admitted.     T2DM w/ hyperosmolar hyperglycemic state (HHS)  ---   Uncontrolled T2DM due to medical noncompliance  ---   HgA1c was 12.7% at admit, 9/15/22  ---   Initial glucose > 600, ketones negative   ---   Treated w/ insulin drip, IVF hydration and  lytes replacement  ---   Seen by endocrine consult service who have adjusted his meds  ---   He will discharge to home w/ Lantus 50 units daily, NovoLog 17 units tid w/ meals and 9 units w/ snacks and metformin  mg po daily   ---   He received diabetic education  ---   He will follow up at endocrine clinic in 2 weeks for further meds adjustment.  ---   He will also f/u at PCP clinic in 1-2 weeks     LAKISHA on CKD2  ---   Due to prerenal azotemia in the setting of HHS   ---   Admit Cr was 1.71.  ---   Creatinine back to normal range of 1.07 following IVF hydration  ---   Avoid nephtotoxic agents   ---   History of ACE I induced angioedema in 2019 per pt     QTC prolongation  ---   QTc 513--- 542---515 --469   ---   H/o non-sustained VT in 7/2019 in light of electrolyte inbalance   ---   Avoid QT prolonging medications as able     Metabolic alkalosis  ---   Chronic CO2 retention from obesity hypoventilation  ---   No acidosis on VBG ( pH is 7.52)      SOB  Cough     ---   CXR clear.   ---   Troponin negative.   ---   EKG without acute ischemia.   ---   SARS- CoV2 negative 9/15   ---   He is an active smoker, treated w/ prn albuterol inhalers, out patient  PFT recommended  ---   Resolved     Hypokalemia  ---   Replaced per protocol       Pseudohyponatremia  ---   Due to severe hyperglycemia, resolved      History chronic leg edema, venous stasis  ----   No edema on exam  ----   He received aggressive IVF hydration at the time of admit, IVF discontinued      Schizophrenia  PTSD  ---   Appreciate psychiatry consult, Dr. Aly's input,   ---   Continue Zyprexa, Abilify and Seroquel        Morbid obesity w/ BMI 41.4 kg/m2  ---   Weight loss recommended         Diet:   Moderate Consistent Carb (60 g CHO per Meal) Diet    Code Status:   Full           Clemente Barrera MD  Hospitalist Service, GOLD TEAM 17  [facility]  Securely message with the Vocera Web Console (learn more  here)  Text page via [facility] Paging/Directory   Please see signed in provider for up to date coverage information          Consultations This Hospital Stay   ENDOCRINE DIABETES ADULT IP CONSULT  PSYCHIATRY IP CONSULT  NUTRITION SERVICES ADULT IP CONSULT  DIABETES EDUCATION IP CONSULT  PHARMACY LIAISON FOR MEDICATION COVERAGE CONSULT  PHYSICAL THERAPY ADULT IP CONSULT  CARE MANAGEMENT / SOCIAL WORK IP CONSULT  PHARMACY LIAISON FOR MEDICATION COVERAGE CONSULT      Time Spent on this Encounter   I, Clemente Barrera MD, personally saw the patient today and spent greater than 30 minutes discharging this patient.       Clemente Barrera MD  [facility] MED SURG  [address] [place] [id number]  Phone: [phone]  Fax: [phone]  ______________________________________________________________________    Physical Exam   Vital Signs: Temp: 98.6  F (37  C) Temp src: Oral BP: (!) 145/82 Pulse: 87   Resp: 18 SpO2: 99 % O2 Device: None (Room air)    Weight: 305 lbs 0 oz  General: morbidly obese, aao x 3, NAD.  HEENT:  NC/AT, PERRL, EOMI, neck supple, no thyromegaly, op clear, mmm.  CVS:  NL s 1 and s2, no m/r/g.  Lungs:  CTA B/L.   Abd:  Soft, + bs, NT, no rebound or gaurding, no fluid shift.  Ext:  No c/c.  Lymph:  No edema.  Neuro:  Nonfocal.  Musculoskeletal: No calf tenderness to palpation.    Skin:  No rash.  Psychiatry:  Mood and affect appropriate.         Primary Care Physician   Jacky Gaitan    Discharge Orders      Adult Eye  Referral      Follow Up ([facility])    Diabetes specialty care.  Hospital follow up after [facility] admission.  Seen by endocrinology inpatient.  Needs outpt follow up within 2 weeks please.  Pt is amenable to video visit.    Appointments on [place] and/or [facility] (with [facility] or [facility] provider or service). Call [phone] if you haven't heard regarding these appointments within 7 days of discharge.     Follow Up ([facility])    Diabetes education visit with nurse  and dietician-- follow up after inpatient insulin start-- 4-6 weeks please.    Appointments on [place] and/or [facility] (with [facility] or [facility] provider or service). Call [phone] if you haven't heard regarding these appointments within 7 days of discharge.     Reason for your hospital stay    Uncontrolled T2DM w/ HHS     Activity    Your activity upon discharge: activity as tolerated     Follow Up and recommended labs and tests    Follow up with primary care provider, Jacky Gaitan, within 7-14 days for post hospitalization follow up       Discharge Medications   Current Discharge Medication List      START taking these medications    Details   insulin aspart (NOVOLOG PEN) 100 UNIT/ML pen Inject 17 Units Subcutaneous 3 times daily (with meals) and 9 units with snacks  Qty: 15 mL, Refills: 3    Associated Diagnoses: Uncontrolled type 2 diabetes mellitus with hyperglycemia (H)      insulin glargine (LANTUS PEN) 100 UNIT/ML pen Inject 50 Units Subcutaneous every morning  Qty: 15 mL, Refills: 3    Comments: If Lantus is not covered by insurance, may substitute Basaglar or Semglee or other insulin glargine product per insurance preference at same dose and frequency.    Associated Diagnoses: Uncontrolled type 2 diabetes mellitus with hyperglycemia (H)      metFORMIN (GLUCOPHAGE XR) 500 MG 24 hr tablet Take 1 tablet (500 mg) by mouth daily (with dinner)  Qty: 30 tablet, Refills: 3    Associated Diagnoses: Uncontrolled type 2 diabetes mellitus with hyperglycemia (H)         CONTINUE these medications which have NOT CHANGED    Details   acetaminophen (TYLENOL) 500 MG tablet Take 500-1,000 mg by mouth every 8 hours as needed for mild pain      albuterol (PROAIR HFA/PROVENTIL HFA/VENTOLIN HFA) 108 (90 Base) MCG/ACT inhaler Inhale 2 puffs into the lungs every 4 hours as needed for shortness of breath / dyspnea or wheezing      ARIPiprazole (ABILIFY) 10 MG tablet Take 10 mg by mouth daily      diphenhydrAMINE (BENADRYL) 50  MG capsule Take 50 mg by mouth every 6 hours as needed for itching or allergies      furosemide (LASIX) 40 MG tablet Take 40 mg by mouth daily      gabapentin (NEURONTIN) 600 MG tablet Take 600 mg by mouth 3 times daily      hydrochlorothiazide (HYDRODIURIL) 50 MG tablet Take 50 mg by mouth daily      levETIRAcetam (KEPPRA) 500 MG tablet Take 500 mg by mouth 2 times daily      meloxicam (MOBIC) 7.5 MG tablet Take 7.5 mg by mouth every 12 hours as needed      mineral oil-hydrophilic petrolatum (AQUAPHOR) external ointment Apply 2 g topically 3 times daily as needed      OLANZapine (ZYPREXA) 5 MG tablet Take 5 mg by mouth At Bedtime      potassium chloride ER (K-TAB) 20 MEQ CR tablet Take 40 mEq by mouth      !! QUEtiapine (SEROQUEL) 200 MG tablet Take 200 mg by mouth At Bedtime      !! QUEtiapine (SEROQUEL) 400 MG tablet Take 400 mg by mouth At Bedtime       !! - Potential duplicate medications found. Please discuss with provider.      STOP taking these medications       metFORMIN (GLUCOPHAGE) 1000 MG tablet Comments:   Reason for Stopping:             Allergies   Allergies   Allergen Reactions     Lidocaine      Pt states this is not an allergy and doesn't recall this to be an allergy     Lisinopril Swelling     Pt states this is not an allergy and doesn't recall this to be an allergy     Pollen Extract

## 2022-09-21 NOTE — PLAN OF CARE
Patient is A&Ox4, able to make needs known, using call light appropriately.  VSS, LS clear. Magnesium and potassium replaced per protocol this am. CMS intact, Neuros are intact. Pain well controlled, reports amira ankle pain, relieved with rest. BS present, Patient is passing gas, no bm this shift, refused senna, Voiding spontaneously. Tolerating regular diet, BG checks and insulin given before meals, patient provided education by diabetes nurse educator, patient to dial up his own insulin and practice giving injections with home needles, patient dialed up his appropriate dose for lunch and successfully gave himself a shot in the belly (to note the insulin dose for home is different than what we give here, his dosage for home is on his bedside table). Denies dizziness, SOB & CP. IV SL. Plan is to discharge home.

## 2022-09-21 NOTE — PROGRESS NOTES
Diabetes Consult Daily  Progress Note          Assessment/Plan:     HPI:  Antony Aguila Jr. is a 62 year old man with history of type 2 diabetes,  Hypertension, schizophrenia, venous stasis ulcers, and CKD stage 2 who presented to the ER with weakness and found to have hyperglycemia and hypokalemia.     Assessment:     1)  Type 2 Diabetes Mellitus; uncontrolled c/b neuropathy.  Presented in HHS.  A1c 12.7%  2)  Nausea and vomiting; resolved  3)  Obesity; BMI 42  4)  Schizophrenia  5)  Weakness  6)  Metabolic derangements; hypokalemia/hyponemia     Plan:       -   glargine 55 units q24h 0930--> 50 units daily AM    -  continue metformin  mg tonight w/ supper    -  Novolog meal coverage 1 unit(s) per 4 g cho AC meals/snacks --> fixed meal dose for home    -  BG monitoring -->AC, HS 0200       -  Test claim - coverage for insulin and testing supplies (All covered), waiting on GLP-1 RA -->+Bydureon BCISE - $0   +Victoza - $0   +Byetta - $0     -  Hypoglycemia protocol    -  Recommend carb counting protocol    -  Education : planning for today prior to discharge and to include pt's sister Mallorie if possible    -  Outpatient follow up:  recommend [facility] --> appt request sent via discharge navigator on 9/20, for follow up within 2 weeks. Will also submit request for  outpatient diabetes educator and dietician.  Primary will place referral for opthalmology    Plan discussed with patient, diabetes educator and primary team  plan in AVS and shared w/ pt at bedside    Diabetes Plan for Antony Aguila Jr 9/21/22  Check glucose before meals and at bedtime  Work to add activity each day  Hydrate with water as your primary beverage    Lantus 50 units every morning  Novolog 17 units per meal  Novolog 9 units per snack    Metformin  mg at supper    Follow up with [facility] diabetes specialists, appt requested to take place within 2 weeks. If you tolerate, your metformin will  be increased.  This will help decrease your insulin need.  And at your follow up, Bydureon may be added.  This would also help decrease your insulin need.  With Bydureon and Metformin full dose, you have potential to be off insulin altogether.  Will request appt for educator and dietician as well.  Referral will be placed for ophthalmology.        Interval History:     The last 24 hours progress and nursing notes reviewed.    OUtside for walk last night with sister.  Food from outside hospital, covered with aspart.  Pt had education on new meter and insulin.  He has questions about storage and correct dosing.  He will be practicing injection for his lunch insulin.  Explained increase in metformin and potential addition of Bydureon will help him decrease insulin intensity and hopefully get off of it altogether.  He is motivated to change his habits.  And recognizes it will be challenging.  Current plan is the plan until clinic follow up, at which time expect decreased intensity of insulin management.  Reviewed A1C current and target:  Improve toward 7% over next 6 months.  He reports he's been on abilify and zyprexa for years, long predating A1C rise.      BG trend:      Hx from 9/20  Pt feels a lot improved after getting some sleep.  Overall has more energy and more hope for feeling good in future.  [place] and will be a support to him. He shares challenges with trusting some providers, systems, and shares safety concerns in his neighborhood.  Will cook.  Knows certain ways.  And is open to new options.  Did not like Mom's meals.    Thinks he'll work on avoiding fast food.  HAs access to gym inside his building  Talked about starting w/ 5 min activity per day.  He mentions he may stop smoking.    Reviewed past use of metformin.  He worries the supply he had was not good quality.  He didn't know the significance of impact on BG and of BG potential to make him so sick.  Maybe was taking it  every other day.  Home formulation per admit list was metformin HCl  He does not remember taking Invokana.          Planned Procedures/surgeries: none  D5W-containing solutions/medications: K phos in D5W    Recent Labs   Lab 09/21/22  0610 09/21/22  0221 09/20/22  2113 09/20/22  1902 09/20/22  1308 09/20/22  0822   * 118* 200* 113* 175* 112*           Nutrition:     Orders Placed This Encounter      Moderate Consistent Carb (60 g CHO per Meal) Diet        PTA Regimen:   **was on ariprazole and olanzapine at home (prescribed them anyways)     Antony cannot recall any medications for DM.      Per chart review, metformin 1000 mg BID  Invokana 100 mg in AM - recently started on 6/10/2022---> 7/28 visit shows this med discontinued     BG monitoring frequency:  Once per week when home health nurse comes  Reports his blood sugars range typically between 300 mg/dL and 500 mg/dL     Diet: no restrictions     Denies any diet restrictions, often only eats two meals per day with limited snacks  Does report drinking quite a lot of regular soda at least 4 L per day of orange crush, lemonade [place]id as well as Gingerale.             Review of Systems:   See interval hx         Medications:   Admin aripiprazole here.         Physical Exam:   /63 (BP Location: Right arm, Cuff Size: Adult Regular)   Pulse 79   Temp 98.2  F (36.8  C) (Oral)   Resp 16   Ht 1.829 m (6')   Wt 138.3 kg (305 lb)   SpO2 99%   BMI 41.37 kg/m      General:   A&O, NAD, resting comfortably in chair  HEENT:  NC/AT. MMM, EOMI, Anicteric  Lungs:  unremarkable, no new cough, no SOB  ABD:   rounded  Skin:  dry, no obvious lesions/rash/bleeding  Neuro:  No focal neurological deficits  Psych:   Easily engaged, improved focus         Data:     Lab Results   Component Value Date    A1C 12.5 09/16/2022    A1C 12.7 09/15/2022    A1C 7.4 09/25/2021        Recent Labs   Lab Test 09/21/22  1148 09/21/22  0806 09/21/22  0610 09/21/22  0549  09/20/22  1308 09/20/22  0929 09/20/22  0822 09/20/22  0731 09/19/22  1546 09/19/22  1425   NA  --   --   --   --   --   --   --  141  --  139   POTASSIUM  --   --   --  3.8  --  3.6  --  3.6  --  4.0  3.9   CHLORIDE  --   --   --   --   --   --   --  109  --  105   CO2  --   --   --   --   --   --   --  29  --  32   ANIONGAP  --   --   --   --   --   --   --  3  --  2*   * 161*   < >  --    < >  --    < > 100*   < > 168*   BUN  --   --   --   --   --   --   --  7  --  8   CR  --   --   --  1.07  --   --   --  1.09  --  1.20   ANGELLA  --   --   --   --   --   --   --  8.6  --  9.0    < > = values in this interval not displayed.     CBC RESULTS: Recent Labs   Lab Test 09/21/22  0549 09/18/22  0556 09/17/22  1012   WBC  --   --  8.1   RBC  --   --  4.82   HGB  --   --  14.7   HCT  --   --  42.1   MCV  --   --  87   MCH  --   --  30.5   MCHC  --   --  34.9   RDW  --   --  13.9      < > 204    < > = values in this interval not displayed.     To contact Endocrine Diabetes service:   From 8AM-4PM: page inpatient diabetes provider who is following the patient that day (see filed or incomplete progress notes/consult notes).  If uncertain of provider assignment: page job code [phone]. (To page job code in-house dial 3 stars, 777 then enter number).  For questions or updates AFTER HOURS from 4PM-8AM: page the diabetes job code for on call fellow: [phone]    Please notify inpatient diabetes service if changes are planned to steroids, nutrition, or if procedures are planned requiring prolonged NPO status.Diabetes Management Team job code: [phone]    I spent a total of 35 minutes on the date of the encounter doing chart review, history and exam, documentation and further activities per the note.  Over 50% of my time on the unit was spent counseling the patient and/or coordinating care regarding acute hyperglycemic management.  See note for details.

## 2022-09-21 NOTE — PLAN OF CARE
Pt discharged home with belongings @ 18:32 via family friend. AVS given, discharge teaching done. Medications given, medication administration teaching done. Pt seen by diabetes educator prior to discharge. IV access removed. Pt aware of follow-up.

## 2022-09-22 LAB — BACTERIA BLD CULT: NO GROWTH

## 2022-10-08 ENCOUNTER — OFFICE VISIT (OUTPATIENT)
Dept: OPHTHALMOLOGY | Facility: CLINIC | Age: 62
End: 2022-10-08
Attending: INTERNAL MEDICINE
Payer: COMMERCIAL

## 2022-10-08 DIAGNOSIS — H25.13 AGE-RELATED NUCLEAR CATARACT OF BOTH EYES: ICD-10-CM

## 2022-10-08 DIAGNOSIS — H52.4 HYPEROPIA OF BOTH EYES WITH REGULAR ASTIGMATISM AND PRESBYOPIA: Primary | ICD-10-CM

## 2022-10-08 DIAGNOSIS — H52.03 HYPEROPIA OF BOTH EYES WITH REGULAR ASTIGMATISM AND PRESBYOPIA: Primary | ICD-10-CM

## 2022-10-08 DIAGNOSIS — H52.223 HYPEROPIA OF BOTH EYES WITH REGULAR ASTIGMATISM AND PRESBYOPIA: Primary | ICD-10-CM

## 2022-10-08 DIAGNOSIS — E11.65 UNCONTROLLED TYPE 2 DIABETES MELLITUS WITH HYPERGLYCEMIA (H): ICD-10-CM

## 2022-10-08 PROCEDURE — 92004 COMPRE OPH EXAM NEW PT 1/>: CPT | Performed by: OPTOMETRIST

## 2022-10-08 PROCEDURE — 92015 DETERMINE REFRACTIVE STATE: CPT | Performed by: OPTOMETRIST

## 2022-10-08 ASSESSMENT — VISUAL ACUITY
OS_SC: 20/40
OD_SC: 20/40
METHOD: SNELLEN - LINEAR
OD_SC+: +2
OS_SC+: -2

## 2022-10-08 ASSESSMENT — REFRACTION_MANIFEST
OS_ADD: +2.50
OD_CYLINDER: +0.75
OS_CYLINDER: +1.75
OD_AXIS: 020
OS_SPHERE: -0.75
OD_ADD: +2.50
OD_SPHERE: +0.25
OS_AXIS: 005

## 2022-10-08 ASSESSMENT — EXTERNAL EXAM - LEFT EYE: OS_EXAM: NORMAL

## 2022-10-08 ASSESSMENT — CUP TO DISC RATIO
OS_RATIO: 0.5
OD_RATIO: 0.5

## 2022-10-08 ASSESSMENT — SLIT LAMP EXAM - LIDS
COMMENTS: NORMAL
COMMENTS: NORMAL

## 2022-10-08 ASSESSMENT — TONOMETRY
OS_IOP_MMHG: 16
IOP_METHOD: ICARE
OD_IOP_MMHG: 14

## 2022-10-08 ASSESSMENT — CONF VISUAL FIELD
OD_NORMAL: 1
OS_NORMAL: 1
METHOD: COUNTING FINGERS

## 2022-10-08 ASSESSMENT — EXTERNAL EXAM - RIGHT EYE: OD_EXAM: NORMAL

## 2022-10-08 NOTE — NURSING NOTE
Chief Complaints and History of Present Illnesses   Patient presents with     Diabetic Eye Exam     Chief Complaint(s) and History of Present Illness(es)     Diabetic Eye Exam     Vision: is worsening    Associated symptoms: flashes.  Negative for floaters    Treatments tried: artificial tears              Comments     Here for diabetic exam. Last exam was several years ago. Vision is gradually worsening in both eyes since last exam. He notes occasional flashes without floaters. No pain.  Hx of trauma to left eye resulting in nerve damage.    LBS: average around 200 - did not check today  Lab Results       Component                Value               Date                       A1C                      12.5                09/16/2022                 A1C                      12.7                09/15/2022                 A1C                      7.4                 09/25/2021              Elkin Abernathy COT 9:23 AM October 8, 2022

## 2022-10-08 NOTE — PROGRESS NOTES
Assessment & Plan       Antony Aguila Jr. is a 62 year old male with the following diagnoses:   1. Hyperopia of both eyes with regular astigmatism and presbyopia - Both Eyes    2. Uncontrolled type 2 diabetes mellitus with hyperglycemia (H) - Both Eyes    3. Age-related nuclear cataract of both eyes         New prescription for glasses   NO DBR   Cataracts follow  Recheck 6 mths uncontrolled diabetes       Patient disposition:   Return in about 6 months (around 4/8/2023) for Follow Up.          Complete documentation of historical and exam elements from today's encounter can be found in the full encounter summary report (not reduplicated in this progress note). I personally obtained the chief complaint(s) and history of present illness.  I confirmed and edited as necessary the review of systems, past medical/surgical history, family history, social history, and examination findings as documented by others; and I examined the patient myself. I personally reviewed the relevant tests, images, and reports as documented above. I formulated and edited as necessary the assessment and plan and discussed the findings and management plan with the patient and family.  Dr. Benedicto De Paz

## 2022-10-21 ENCOUNTER — TELEPHONE (OUTPATIENT)
Dept: ENDOCRINOLOGY | Facility: CLINIC | Age: 62
End: 2022-10-21

## 2022-10-21 DIAGNOSIS — E11.65 UNCONTROLLED TYPE 2 DIABETES MELLITUS WITH HYPERGLYCEMIA (H): Primary | ICD-10-CM

## 2022-10-21 NOTE — PROGRESS NOTES
Antony Aguila Jr.  is being evaluated via a billable video visit.      How would you like to obtain your AVS? [employer]  For the video visit, send the invitation by: Text to cell phone: [phone]  Will anyone else be joining your video visit? No      Outcome for 10/21/22 9:58 AM: Data obtained via phone and located below  BOB Iraheta    10/20 161  10/19 150  10/18 135  10/17 146  10/16 151  10/15 149  His meter wouldn't go back any further.     Start time: 0708  End time:   Provider location: off site- home  Patient location: off site- home.        HPI:   Antony is a 62 year old man here to establish care for type 2 diabetes, diagnosed around 2019.  This is a telephone visit. He also has a history of hypertension, schizophrenia, venous stasis ulcers, and CKD stage 2.  He usually sees Dr. Jacky Gaitan in primary care. He recently presented to the ER with weakness and found to have hyperglycemia and hypokalemia. First A1c documented 11/2019 of 6.4%, 11 months ago A1c 7.4% and more recently A1c now 12.7% on 9/15/2022. He was hospitalized and stabilized.  His glucose has been under good control since leaving the hospital.  He would like refills on his syringes and supplies.      His current treatment strategy is:   Lantus-70 units daily.  Novolog - 17 units with each meal.  Metformin 1000 mg daily    Recent glucose:   10/20 161  10/19 150  10/18 135  10/17 146  10/16 151  10/15 149  His meter wouldn't go back any further.     Typical day:   Sometimes he goes on an 18-36 hour day.  Depends on seroquel to sleep.  Brain clouded, can't focus.  Lives by himself.  Sometimes he gets takeout.  Appetite is ok.  He doesn't have a car or drive.  Can't get to the store.  Can't carry that much weight.  Can't carry groceries.  He has an increase in his food stamps.  Neighborhood stores don't have a big selection.  Junk food, poor choices.     Breakfast- sausage and eggs.    Lunch- soup, sandwich with lunch meat.   Dinner-  Air fryer- steak, pork chops, joel, biscuits.   Drinking- trying to avoid pop (previously was drinking up to 4 liters/day.  Juices, orange juice, milk.      Activity- exercise room. [place] flats.  200 person apartment complex.  Walks on the treadmill.     Right leg is swollen up, much more than the left.  Water pills are not doing a fast enough job.  Under knee to ankle- severely swollen. Tender to the touch. Not warm.  Been going on for a couple of years and he has had this evaluated. He has a walker or a cane.       Previous treatments:   Metformin- started 2019 per chart review.  Patient does not recall being on this in the past.   Invokana- started 6/10/22     He does report a nurse comes to his apt once per week and sets up his meds and will also check his BG.      Diabetes Control:   Lab Results   Component Value Date    A1C 12.5 09/16/2022    A1C 12.7 09/15/2022    A1C 7.4 09/25/2021       Past Medical History:   Diagnosis Date     Chronic dissection of thoracic aorta     S/p exploration and median sternotomy by Dr. Marcelo on 6/18/19     Diabetes (H)      Hypertension        No past surgical history on file.    No family history on file.    Social History     Socioeconomic History     Marital status:        Current Outpatient Medications   Medication     acetaminophen (TYLENOL) 500 MG tablet     albuterol (PROAIR HFA/PROVENTIL HFA/VENTOLIN HFA) 108 (90 Base) MCG/ACT inhaler     ARIPiprazole (ABILIFY) 10 MG tablet     diphenhydrAMINE (BENADRYL) 50 MG capsule     furosemide (LASIX) 40 MG tablet     gabapentin (NEURONTIN) 600 MG tablet     hydrochlorothiazide (HYDRODIURIL) 50 MG tablet     insulin aspart (NOVOLOG PEN) 100 UNIT/ML pen     insulin glargine (LANTUS PEN) 100 UNIT/ML pen     insulin pen needle (31G X 5 MM) 31G X 5 MM miscellaneous     levETIRAcetam (KEPPRA) 500 MG tablet     meloxicam (MOBIC) 7.5 MG tablet     metFORMIN (GLUCOPHAGE XR) 500 MG 24 hr tablet     mineral oil-hydrophilic  petrolatum (AQUAPHOR) external ointment     OLANZapine (ZYPREXA) 5 MG tablet     potassium chloride ER (K-TAB) 20 MEQ CR tablet     QUEtiapine (SEROQUEL) 200 MG tablet     QUEtiapine (SEROQUEL) 400 MG tablet     No current facility-administered medications for this visit.          Allergies   Allergen Reactions     Lidocaine      Pt states this is not an allergy and doesn't recall this to be an allergy     Lisinopril Swelling     Pt states this is not an allergy and doesn't recall this to be an allergy     Pollen Extract        Physical Exam  There were no vitals taken for this visit.  GENERAL: healthy, alert and no distress  RESP: no audible wheeze, cough. Able to speak fully in complete sentences.  PSYCH: mentation appears normal, affect normal, judgement and insight intact, normal speech     RESULTS  Lab Results   Component Value Date    A1C 12.5 (H) 09/16/2022    A1C 12.7 (H) 09/15/2022    A1C 7.4 (H) 09/25/2021       No results found for: TSH, T4    ALT   Date Value Ref Range Status   09/15/2022 14 0 - 70 U/L Final   09/25/2021 37 0 - 70 U/L Final   ]    Recent Labs   Lab Test 11/18/21  1455   CHOL 131   HDL 42   LDL 68   TRIG 103       Lab Results   Component Value Date     09/20/2022      Lab Results   Component Value Date    POTASSIUM 3.8 09/21/2022     Lab Results   Component Value Date    CHLORIDE 109 09/20/2022     Lab Results   Component Value Date    ANGELLA 8.6 09/20/2022     Lab Results   Component Value Date    CO2 29 09/20/2022     Lab Results   Component Value Date    BUN 7 09/20/2022     Lab Results   Component Value Date    CR 1.07 09/21/2022       GFR Estimate   Date Value Ref Range Status   09/21/2022 78 >60 mL/min/1.73m2 Final     Comment:     Effective December 21, 2021 eGFRcr in adults is calculated using the 2021 CKD-EPI creatinine equation which includes age and gender (Jones alonso al., NEJM, DOI: 10.1056/CQHFma3185654)   09/20/2022 76 >60 mL/min/1.73m2 Final     Comment:     Effective  December 21, 2021 eGFRcr in adults is calculated using the 2021 CKD-EPI creatinine equation which includes age and gender (Jones alonso al., NE, DOI: 10.1056/SRUNpw6473097)   09/19/2022 68 >60 mL/min/1.73m2 Final     Comment:     Effective December 21, 2021 eGFRcr in adults is calculated using the 2021 CKD-EPI creatinine equation which includes age and gender (Jones et al., [facility], DOI: 10.1056/YLXGne8180711)     No results found for: GFRESTBLACK    Lab Results   Component Value Date    MICROL 10 04/01/2022     No results found for: MICROALBUMIN  No results found for: CPEPT, GADAB, ISCAB    No results found for: B12]    Most recent eye exam date: : Not Found     Assessment/Plan:     1.  Type 2 diabetes- Well controlled.  Will try to go to higher dose Metformin. Could benefit from once weekly GLP-1 agonist (perhaps nurse could give) or SGLT-2 inhibitor.  Patient happy with current treatment, so we did not make a change today.  Will review again at next visit.  We made the following changes (instructions given to patient):     No changes in your insulin.      Please increase Metformin to 1000 mg daily (2 tablets).      I have prescribed a david sensor.  Please swipe your glucose before each meal and at bedtime.      Please set this up with Fritz Richardson, pharmacist.      Emergency issues: Please contact the clinic as soon as you recognize a problem.  Here are some concerns you should contact us about.  -Vomiting: more than twice.  Please check ketones.  If positive, go to ER. Monitor glucose hourly.   -High glucose (over 300 mg/dL twice in a row): Please check ketones.  If ketones are negative, take an insulin correction and recheck glucose in 1 hour.  If glucose is not coming down, please call the clinic. If ketones are moderate or large, drink lots of water, take an insulin correction, and recheck ketones in 1 hour.  If ketones are still high (or you are vomiting), go to the ER.  -Hypoglycemia (low glucose):   If glucose  is less than 70 mg/dL, treat with 15g carb (4 glucose tablets), recheck glucose in 10 minutes.  If low again, repeat.   If glucose is less than 54 mg/dL, treat with 30g carb, recheck glucose in 10 minutes.  If low again, repeat.  Keep glucagon in your home in case of severe hypoglycemia and train someone how to use this.    Emergency kit (please ensure you always have these with you):   Glucose tablets  Glucagon  Insulin  Syringes (if on a pump)  Extra infusion set (if on a pump)  Ketone strips    Contact information:   If you have concerns, please send me a [employer] message or call the clinic at [phone].  For more urgent concerns, please call [phone] after hours/weekends and ask to speak with the endocrinologist on call.      Please let me know if you are having low blood sugars less than 70 or over 350 mg/dL.  Do not wait until your next appointment if this is happening.      2.  Risk factors-     Retinopathy:  Unsure.  Referral was placed.  Nephropathy:  BP has been high in the past.  No microalbuminuria.  Creatinine stable.   Neuropathy: Yes. Takes gabapentin    Feet: OK, no ulcers.   Lipids:  LDL at target.  I do not see statin prescribed on his med list.  He was unsure.  Will revisit at next appt.   Smoking: current smoker. Thinking about quitting.   B12: will check, as he is on metformin.     3.  F/U in 4 weeks with me, sooner with concerns.     42 minutes spent on the date of the encounter doing chart review, review of test results, interpretation of glucose data, patient visit and documentation, counseling/coordination of care, and discussion of follow up plan for worsening hyper and hypoglycemia.  The patient understood and is satisfied with today's visit.     Keiko Mirza PA-C, MPAS   [facility]  Department of Medicine  Division of Endocrinology and Diabetes

## 2022-10-21 NOTE — TELEPHONE ENCOUNTER
Antony was notified that he has refills remaining at the [facility] pharmacy . I did send in 30 days pen needles but oral medication and insulins need to be picked up at [place]. Jaimee Zheng RN on 10/21/2022 at 1:20 PM

## 2022-10-21 NOTE — TELEPHONE ENCOUNTER
----- Message from Adam Ridley sent at 10/21/2022 12:15 PM CDT -----  Regarding: FW: Needs refill  Hello all,    Please see the note below regarding pt's medication refill needs.    Thank you,  Adam  ----- Message -----  From: Mariah Hoang  Sent: 10/21/2022  10:22 AM CDT  To: [id number]  Subject: Needs refill                                     Patient states that he needs a refill on his insulin. He couldn't recall the name of the medication, said it was the 50mg he takes every day and that he's out of it currently. Patient has an appointment with Keiko Mirza PA-C on Tuesday, October 25 at 7am.     [facility] in [place] [address] is the pharmacy he uses.

## 2022-10-25 ENCOUNTER — TELEPHONE (OUTPATIENT)
Dept: ENDOCRINOLOGY | Facility: CLINIC | Age: 62
End: 2022-10-25

## 2022-10-25 ENCOUNTER — VIRTUAL VISIT (OUTPATIENT)
Dept: ENDOCRINOLOGY | Facility: CLINIC | Age: 62
End: 2022-10-25
Payer: COMMERCIAL

## 2022-10-25 DIAGNOSIS — E11.65 UNCONTROLLED TYPE 2 DIABETES MELLITUS WITH HYPERGLYCEMIA (H): ICD-10-CM

## 2022-10-25 PROCEDURE — 99214 OFFICE O/P EST MOD 30 MIN: CPT | Performed by: PHYSICIAN ASSISTANT

## 2022-10-25 RX ORDER — URINE ACETONE TEST STRIPS
STRIP MISCELLANEOUS
Qty: 50 STRIP | Refills: 3 | Status: SHIPPED | OUTPATIENT
Start: 2022-10-25 | End: 2022-12-22

## 2022-10-25 RX ORDER — METFORMIN HCL 500 MG
1000 TABLET, EXTENDED RELEASE 24 HR ORAL
Qty: 180 TABLET | Refills: 3 | Status: ON HOLD | OUTPATIENT
Start: 2022-10-25 | End: 2023-01-15

## 2022-10-25 NOTE — LETTER
10/25/2022       RE: Antony Aguila Jr.  [address]  [place] [address]     Dear Colleague,    Thank you for referring your patient, Antony Aguila Jr., to the [facility] [place] at [facility]. Please see a copy of my visit note below.    Antony Aguila Jr.  is being evaluated via a billable video visit.      How would you like to obtain your AVS? MyChart  For the video visit, send the invitation by: Text to cell phone: [phone]  Will anyone else be joining your video visit? No  {If patient encounters technical issues they should call [phone] :[id number]}    Outcome for 10/21/22 9:58 AM: Data obtained via phone and located below  Mariah Hoang BOB    10/20 161  10/19 150  10/18 135  10/17 146  10/16 151  10/15 149  His meter wouldn't go back any further.     Start time: 0708  End time:       HPI:   History of Present Illness:  Antony Aguila Jr. is a 62 year old man with history of type 2 diabetes,  Hypertension, schizophrenia, venous stasis ulcers, and CKD stage 2 who presented to the ER with weakness and found to have hyperglycemia and hypokalemia.     Running low on syringes. Clinic called yesterday- wants to send it [facility].      Sugars are much better.    Enough to     Lantus-70 units  Novolog - 17 units with each meal.      Appetite is ok.  He doesn't have a car or drive.  Can't get to the store.  Can't carry that much weight.  Can't carry groceries.  He has an increase in his food stamps.  Neighborhood stores don't have a big selection.  Junk food     Stopped giving him the blood thinner.     Typical day:   Sometimes he goes on an 18-36 hour day.  Depends on seroquel to sleep.  Brain clouded, can't focus.  Lives by himself.  Sometimes he gets takeout.  Can't     Breakfast- sausage and eggs.    Lunch- soup, sandwich with lunch meat.   Dinner-   Air fryer- steak, pork chops, joel, biscuits.   Drinking-  "trying to avoid pop.  Juices, orange juice, milk.      Right leg is swollen up, much more than the left.  Water pills are not doing a fast enough job.  Under knee to ankle- severely swollen.  Since he was in the hospital.  Tender to the touch. Not warm.  Been going on for a couple of years.  Did not do a test on him.  Didn't get an ultrasound. He has a walker or a cane.     Cooking experience is not that expensive.       Activity- exercise room. [place] flats.  200 person apartment complex.  Walks on the treadmill.          Diabetes:  Antony Mingo Valencia initially reports to writer that nobody has ever told him he has diabetes. Later then thinks maybe his MD was hinting about him having diabetes about 1-2 years ago and started coaching him on how to eat better and exercise more, what a way to find out!!\"     He reports for the past week or so having no energy and eventually feeling extremely weak needing to lean on his cane and other objects to avoid falling. Additionally, he experienced blurry vision. He denied any polyuria, polydipsia, polyphagia.  It was the weakness which brought him to the ER.      Chart review reveals he has been prescribed Metformin 1000 mg BID (started 11/2019) and more recently Invokana (6/10/2022).  Antony states \"I have never been on 1000 mg of anything ever\". He is not sure about any medications he takes for DM.  He does adamantly denies ever taking or needing insulin in the past.      He is a very challenging historian.  Extremely tangential, falling asleep and then argumentative upon rousing.      First A1c documented 11/2019 of 6.4%, 11 months ago A1c 7.4% and more recently A1c now 12.7% on 9/15/2022.     He follows with Jacky Gaitan - [facility], last visit on 4/1/2022.      Most recently it is unclear what Antony has been doing for his DM at home.    He does report a nurse comes to his apt once per week and sets up his meds and will also check his BG.  He is not sure what the " "readings are \"she always says it is high\".  He recalls her saying BGs are often in the 300 - 500 range which would be consistent with the present A1c.      Last dose of medications for diabetes:  unk as he does not recall what he takes.        Inpatient regimen at time of consult:  On IV insulin drip, HHS protocol.  Call to primary team to change to DKA protocol with fluid order set as well given metabolic derangements, now running DKA order set.   Regular diet has been resumed since this AM.  No cho coverage was ordered, now cho coverage is ordered for meals/snacks      Per chart review, metformin 1000 mg BID  Invokana 100 mg in AM - recently started on 6/10/2022---> 7/28 visit shows this med discontinued     BG monitoring frequency:  Once per week when home health nurse comes  Reports his blood sugars range typically between 300 mg/dL and 500 mg/dL     Diet: no restrictions     Denies any diet restrictions, often only eats two meals per day with limited snacks  Does report drinking quite a lot of regular soda at least 4 L per day of orange crush, lemonade [place] as well as [place].    Discharge 9/21  After teaching and establishment of insulin/metformin regimen.  Pt feels a lot improved after getting some sleep.  Overall has more energy and more hope for feeling good in future.  [place] and will be a support to him. He shares challenges with trusting some providers, systems, and shares safety concerns in his neighborhood.  Will cook.  Knows certain ways.  And is open to new options.  Did not like Mom's meals.    Thinks he'll work on avoiding fast food.  HAs access to gym inside his building  Talked about starting w/ 5 min activity per day.  He mentions he may stop smoking.     Reviewed past use of metformin.  He worries the supply he had was not good quality.  He didn't know the significance of impact on BG and of BG potential to make him so sick.  Maybe was taking it every other " day.  Home formulation per admit list was metformin HCl  He does not remember taking Invokana.     Reviewed draft insulin plan with fixed meal doses.       Diabetes Type: ***  {Diabetes Types:773958}  Diabetes Duration: ***  Usual Diabetes Regimen:   ***BG monitoring frequency  ***diet  *** {Orals/injectables}  ***pump type: ***  Pump insulin: ***  Previous diabetes treatments:     Ability to [place] Prescribed Regimen: ***  Diabetes Control:   Lab Results   Component Value Date    A1C 12.5 09/16/2022    A1C 12.7 09/15/2022    A1C 7.4 09/25/2021     Diabetes Complications: ***retinopathy, ***peripheral neuropathy, ***nephropathy  History of DKA: ***  Able to Detect Hypoglycemia: ***  Usual Diabetes Care Provider: ***  Factors Impacting Glucose Control: ***    ROS  GENERAL: no weight loss, weight gain, fevers, chills, malaise, night sweats.   HEENT: no dysphagia, diplopia, neck pain or tenderness, dry/scratchy eyes, URI, cough, sinus drainage, tinnitus, sinus pressure  CV: no chest pain, pressure, palpitations, skipped beats, LOC  LUNGS: no SOB, CRESPO, cough, sputum production, wheezing   GI: no diarrhea, constipation, abdominal pain  EXTREMITIES: no rashes, ulcers, edema  NEUROLOGY: no changes in vision, tingling or numbness in hands or feet.   MSK: no muscle aches or pains, weakness  PSYCH: mood stable    Past Medical History:   Diagnosis Date     Chronic dissection of thoracic aorta     S/p exploration and median sternotomy by Dr. Marcelo on 6/18/19     Diabetes (H)      Hypertension        No past surgical history on file.    No family history on file.    Social History     Socioeconomic History     Marital status:        Current Outpatient Medications   Medication     acetaminophen (TYLENOL) 500 MG tablet     albuterol (PROAIR HFA/PROVENTIL HFA/VENTOLIN HFA) 108 (90 Base) MCG/ACT inhaler     ARIPiprazole (ABILIFY) 10 MG tablet     diphenhydrAMINE (BENADRYL) 50 MG capsule     furosemide (LASIX) 40 MG tablet      gabapentin (NEURONTIN) 600 MG tablet     hydrochlorothiazide (HYDRODIURIL) 50 MG tablet     insulin aspart (NOVOLOG PEN) 100 UNIT/ML pen     insulin glargine (LANTUS PEN) 100 UNIT/ML pen     insulin pen needle (31G X 5 MM) 31G X 5 MM miscellaneous     levETIRAcetam (KEPPRA) 500 MG tablet     meloxicam (MOBIC) 7.5 MG tablet     metFORMIN (GLUCOPHAGE XR) 500 MG 24 hr tablet     mineral oil-hydrophilic petrolatum (AQUAPHOR) external ointment     OLANZapine (ZYPREXA) 5 MG tablet     potassium chloride ER (K-TAB) 20 MEQ CR tablet     QUEtiapine (SEROQUEL) 200 MG tablet     QUEtiapine (SEROQUEL) 400 MG tablet     No current facility-administered medications for this visit.          Allergies   Allergen Reactions     Lidocaine      Pt states this is not an allergy and doesn't recall this to be an allergy     Lisinopril Swelling     Pt states this is not an allergy and doesn't recall this to be an allergy     Pollen Extract        Physical Exam  There were no vitals taken for this visit.  GENERAL: healthy, alert and no distress  RESP: no audible wheeze, cough, or visible cyanosis.  No visible retractions or increased work of breathing.  Able to speak fully in complete sentences.  PSYCH: mentation appears normal, affect normal/bright, judgement and insight intact, normal speech and appearance well-groomed    RESULTS    Lab Results   Component Value Date    A1C 12.5 (H) 09/16/2022    A1C 12.7 (H) 09/15/2022    A1C 7.4 (H) 09/25/2021       Hemoglobin A1C   Date Value Ref Range Status   09/16/2022 12.5 (H) 0.0 - 5.6 % Final     Comment:     Normal <5.7%   Prediabetes 5.7-6.4%    Diabetes 6.5% or higher     Note: Adopted from ADA consensus guidelines.   09/15/2022 12.7 (H) 0.0 - 5.6 % Final     Comment:     Normal <5.7%   Prediabetes 5.7-6.4%    Diabetes 6.5% or higher     Note: Adopted from ADA consensus guidelines.   09/25/2021 7.4 (H) 0.0 - 5.6 % Final     Comment:     Normal <5.7%   Prediabetes 5.7-6.4%    Diabetes 6.5%  or higher     Note: Adopted from ADA consensus guidelines.       No results found for: TSH, T4    Creatinine   Date Value Ref Range Status   09/21/2022 1.07 0.66 - 1.25 mg/dL Final   09/20/2022 1.09 0.66 - 1.25 mg/dL Final       Potassium   Date Value Ref Range Status   09/21/2022 3.8 3.4 - 5.3 mmol/L Final   09/20/2022 3.6 3.4 - 5.3 mmol/L Final       No results found for: MICROALBUMIN    ALT   Date Value Ref Range Status   09/15/2022 14 0 - 70 U/L Final   09/25/2021 37 0 - 70 U/L Final       Recent Labs   Lab Test 11/18/21  1455   CHOL 131   HDL 42   LDL 68   TRIG 103       No components found for: DBQCLLCJ94  No results found for: GADAB  No results found for: CPEPT        Assessment/Plan:     1.  Type 1 diabetes-     2.  Risk factors-     Retinopathy:  No.  Had eye exam within last year.   Nephropathy:  BP well controlled. No microalbuminuria.  Creatinine stable.   Neuropathy: No.    Feet: OK, no ulcers.   Taking ASA:   Lipids:  LDL at target.  Patient taking statin  Thyroid screening:   Celiac screening:   Contraception:    3.  F/U in        Keiko Mirza PA-C, MPAS   [facility]  Department of Medicine  Division of Endocrinology and Diabetes      Pt scheduled

## 2022-10-25 NOTE — PATIENT INSTRUCTIONS
No changes in your insulin.      Please increase Metformin to 1000 mg daily (2 tablets).      I have prescribed a david sensor.  Please swipe your glucose before each meal and at bedtime.      Please set this up with Fritz Richardson, pharmacist.      Emergency issues: Please contact the clinic as soon as you recognize a problem.  Here are some concerns you should contact us about.  -Vomiting: more than twice.  Please check ketones.  If positive, go to ER. Monitor glucose hourly.   -High glucose (over 300 mg/dL twice in a row): Please check ketones.  If ketones are negative, take an insulin correction and recheck glucose in 1 hour.  If glucose is not coming down, please call the clinic. If ketones are moderate or large, drink lots of water, take an insulin correction, and recheck ketones in 1 hour.  If ketones are still high (or you are vomiting), go to the ER.  -Hypoglycemia (low glucose):   If glucose is less than 70 mg/dL, treat with 15g carb (4 glucose tablets), recheck glucose in 10 minutes.  If low again, repeat.   If glucose is less than 54 mg/dL, treat with 30g carb, recheck glucose in 10 minutes.  If low again, repeat.  Keep glucagon in your home in case of severe hypoglycemia and train someone how to use this.    Emergency kit (please ensure you always have these with you):   Glucose tablets  Glucagon  Insulin  Syringes (if on a pump)  Extra infusion set (if on a pump)  Ketone strips    Contact information:   If you have concerns, please send me a [employer] message or call the clinic at [phone].  For more urgent concerns, please call [phone] after hours/weekends and ask to speak with the endocrinologist on call.      Please let me know if you are having low blood sugars less than 70 or over 350 mg/dL.  Do not wait until your next appointment if this is happening.

## 2022-10-25 NOTE — TELEPHONE ENCOUNTER
Attempted to reach patient to schedule follow up in the Endocrinology Clinic. No answer, LM on VM to call office back.    Schedule with ARAM Polk in about 4 weeks (around 11/22/2022). .     Samara Denise, VF

## 2022-10-31 ENCOUNTER — DOCUMENTATION ONLY (OUTPATIENT)
Dept: PHARMACY | Facility: CLINIC | Age: 62
End: 2022-10-31

## 2022-10-31 NOTE — PROGRESS NOTES
Spoke with Anotny over the phone last week. We were not able to get the Dario setup without video. Scheduled patient for in-person visit on 10/27. Patient did not show for this appointment. Left voice message x2 for patient to call back. Will outreach again this week.     Fritz Richardson, PharmD  Endocrine & Diabetes [facility] Pharmacist  [address] [place] [id number]  Direct Voicemail: [phone]

## 2022-11-11 ENCOUNTER — DOCUMENTATION ONLY (OUTPATIENT)
Dept: PHARMACY | Facility: CLINIC | Age: 62
End: 2022-11-11

## 2022-11-11 NOTE — PROGRESS NOTES
Attempted to reach patient to schedule in person Dario training. His phone is not accepting calls at this time and there is no option to leave a voicemail. Sent text message through [employer].    I will try to outreach again from clinic Monday.    Fritz Richardson, PharmD  Endocrine & Diabetes [facility] Pharmacist  [address] [place] [id number]  Direct Voicemail: [phone]

## 2022-11-16 NOTE — PLAN OF CARE
"Observation Goals:  Diagnostic tests and consults completed and resulted: Met  Vital signs normal or at patient baseline: Met  Returns to baseline functional status: Met  Safe disposition plan has been identified: pending  Wound care consult complete: Met    Patient fell sleep while asking pain medication. Re-direct and encourage to follow plan of care.   /52 (BP Location: Right arm)   Pulse 89   Temp 98.7  F (37.1  C) (Oral)   Resp 17   Ht 1.822 m (5' 11.73\")   Wt (!) 153.8 kg (339 lb)   SpO2 96%   BMI 46.32 kg/m      " No

## 2022-11-17 ENCOUNTER — APPOINTMENT (OUTPATIENT)
Dept: OPTOMETRY | Facility: CLINIC | Age: 62
End: 2022-11-17
Payer: COMMERCIAL

## 2022-11-17 PROCEDURE — 92341 FIT SPECTACLES BIFOCAL: CPT | Performed by: OPTOMETRIST

## 2022-11-30 ENCOUNTER — DOCUMENTATION ONLY (OUTPATIENT)
Dept: PHARMACY | Facility: CLINIC | Age: 62
End: 2022-11-30

## 2022-11-30 NOTE — PROGRESS NOTES
3rd attempt to contact Antony today over the phone to set up a time to come in for Dario setup and was unable to reach him.    I would be happy to assist getting him set up on the Dario once he reestablishes care.    Fritz Richardson, PharmD  Endocrine & Diabetes [facility] Pharmacist  [address] [place] [id number]  Direct Voicemail: [phone]

## 2022-12-16 ENCOUNTER — LAB REQUISITION (OUTPATIENT)
Dept: LAB | Facility: CLINIC | Age: 62
End: 2022-12-16
Payer: COMMERCIAL

## 2022-12-16 DIAGNOSIS — R60.9 EDEMA, UNSPECIFIED: ICD-10-CM

## 2022-12-16 LAB
ALBUMIN SERPL BCG-MCNC: 3.7 G/DL (ref 3.5–5.2)
ALP SERPL-CCNC: 70 U/L (ref 40–129)
ALT SERPL W P-5'-P-CCNC: 25 U/L (ref 10–50)
ANION GAP SERPL CALCULATED.3IONS-SCNC: 12 MMOL/L (ref 7–15)
AST SERPL W P-5'-P-CCNC: 34 U/L (ref 10–50)
BILIRUB SERPL-MCNC: 0.4 MG/DL
BUN SERPL-MCNC: 12.1 MG/DL (ref 8–23)
CALCIUM SERPL-MCNC: 8.9 MG/DL (ref 8.8–10.2)
CHLORIDE SERPL-SCNC: 96 MMOL/L (ref 98–107)
CREAT SERPL-MCNC: 1.44 MG/DL (ref 0.67–1.17)
DEPRECATED HCO3 PLAS-SCNC: 32 MMOL/L (ref 22–29)
GFR SERPL CREATININE-BSD FRML MDRD: 55 ML/MIN/1.73M2
GLUCOSE SERPL-MCNC: 158 MG/DL (ref 70–99)
POTASSIUM SERPL-SCNC: 3.1 MMOL/L (ref 3.4–5.3)
PROT SERPL-MCNC: 7 G/DL (ref 6.4–8.3)
SODIUM SERPL-SCNC: 140 MMOL/L (ref 136–145)

## 2022-12-16 PROCEDURE — 80053 COMPREHEN METABOLIC PANEL: CPT | Mod: ORL | Performed by: INTERNAL MEDICINE

## 2022-12-21 ENCOUNTER — HOSPITAL ENCOUNTER (INPATIENT)
Facility: CLINIC | Age: 62
LOS: 25 days | Discharge: HOME | End: 2023-01-15
Attending: EMERGENCY MEDICINE | Admitting: INTERNAL MEDICINE
Payer: COMMERCIAL

## 2022-12-21 ENCOUNTER — APPOINTMENT (OUTPATIENT)
Dept: CT IMAGING | Facility: CLINIC | Age: 62
End: 2022-12-21
Attending: EMERGENCY MEDICINE
Payer: COMMERCIAL

## 2022-12-21 ENCOUNTER — APPOINTMENT (OUTPATIENT)
Dept: ULTRASOUND IMAGING | Facility: CLINIC | Age: 62
End: 2022-12-21
Attending: EMERGENCY MEDICINE
Payer: COMMERCIAL

## 2022-12-21 ENCOUNTER — APPOINTMENT (OUTPATIENT)
Dept: GENERAL RADIOLOGY | Facility: CLINIC | Age: 62
End: 2022-12-21
Attending: EMERGENCY MEDICINE
Payer: COMMERCIAL

## 2022-12-21 DIAGNOSIS — I71.012 DESCENDING THORACIC DISSECTION (H): ICD-10-CM

## 2022-12-21 DIAGNOSIS — Z11.52 ENCOUNTER FOR SCREENING LABORATORY TESTING FOR SEVERE ACUTE RESPIRATORY SYNDROME CORONAVIRUS 2 (SARS-COV-2): ICD-10-CM

## 2022-12-21 DIAGNOSIS — I83.022 VENOUS STASIS ULCER OF LEFT CALF LIMITED TO BREAKDOWN OF SKIN, UNSPECIFIED WHETHER VARICOSE VEINS PRESENT (H): ICD-10-CM

## 2022-12-21 DIAGNOSIS — I71.012 DISSECTING ANEURYSM OF THORACIC AORTA, STANFORD TYPE B (H): ICD-10-CM

## 2022-12-21 DIAGNOSIS — E11.69 TYPE 2 DIABETES MELLITUS WITH OTHER SPECIFIED COMPLICATION, WITH LONG-TERM CURRENT USE OF INSULIN (H): ICD-10-CM

## 2022-12-21 DIAGNOSIS — E11.65 UNCONTROLLED TYPE 2 DIABETES MELLITUS WITH HYPERGLYCEMIA (H): ICD-10-CM

## 2022-12-21 DIAGNOSIS — R10.13 DYSPEPSIA: ICD-10-CM

## 2022-12-21 DIAGNOSIS — R41.0 DELIRIUM: ICD-10-CM

## 2022-12-21 DIAGNOSIS — E87.6 HYPOKALEMIA: ICD-10-CM

## 2022-12-21 DIAGNOSIS — I77.810 AORTIC ROOT DILATION (H): Primary | ICD-10-CM

## 2022-12-21 DIAGNOSIS — Z79.4 TYPE 2 DIABETES MELLITUS WITH OTHER SPECIFIED COMPLICATION, WITH LONG-TERM CURRENT USE OF INSULIN (H): ICD-10-CM

## 2022-12-21 DIAGNOSIS — L97.221 VENOUS STASIS ULCER OF LEFT CALF LIMITED TO BREAKDOWN OF SKIN, UNSPECIFIED WHETHER VARICOSE VEINS PRESENT (H): ICD-10-CM

## 2022-12-21 DIAGNOSIS — E53.8 FOLIC ACID DEFICIENCY: ICD-10-CM

## 2022-12-21 DIAGNOSIS — J45.909 REACTIVE AIRWAY DISEASE WITHOUT COMPLICATION, UNSPECIFIED ASTHMA SEVERITY, UNSPECIFIED WHETHER PERSISTENT: ICD-10-CM

## 2022-12-21 DIAGNOSIS — L97.221 NON-PRESSURE CHRONIC ULCER OF LEFT CALF, LIMITED TO BREAKDOWN OF SKIN (H): ICD-10-CM

## 2022-12-21 DIAGNOSIS — T78.3XXS ANGIOEDEMA, SEQUELA: ICD-10-CM

## 2022-12-21 DIAGNOSIS — F25.9 SCHIZOAFFECTIVE DISORDER, UNSPECIFIED TYPE (H): ICD-10-CM

## 2022-12-21 DIAGNOSIS — I83.022 VARICOSE VEINS OF LEFT LOWER EXTREMITY WITH ULCER OF CALF (CODE) (H): ICD-10-CM

## 2022-12-21 LAB
ANION GAP SERPL CALCULATED.3IONS-SCNC: 6 MMOL/L (ref 3–14)
ATRIAL RATE - MUSE: 89 BPM
BASOPHILS # BLD AUTO: 0 10E3/UL (ref 0–0.2)
BASOPHILS NFR BLD AUTO: 1 %
BUN SERPL-MCNC: 10 MG/DL (ref 7–30)
CALCIUM SERPL-MCNC: 9.7 MG/DL (ref 8.5–10.1)
CHLORIDE BLD-SCNC: 102 MMOL/L (ref 94–109)
CO2 SERPL-SCNC: 32 MMOL/L (ref 20–32)
CREAT SERPL-MCNC: 1.23 MG/DL (ref 0.66–1.25)
D DIMER PPP FEU-MCNC: 1.18 UG/ML FEU (ref 0–0.5)
DIASTOLIC BLOOD PRESSURE - MUSE: NORMAL MMHG
EOSINOPHIL # BLD AUTO: 0.2 10E3/UL (ref 0–0.7)
EOSINOPHIL NFR BLD AUTO: 2 %
ERYTHROCYTE [DISTWIDTH] IN BLOOD BY AUTOMATED COUNT: 15.8 % (ref 10–15)
GFR SERPL CREATININE-BSD FRML MDRD: 66 ML/MIN/1.73M2
GLUCOSE BLD-MCNC: 220 MG/DL (ref 70–99)
GLUCOSE BLDC GLUCOMTR-MCNC: 236 MG/DL (ref 70–99)
HCT VFR BLD AUTO: 41.3 % (ref 40–53)
HGB BLD-MCNC: 14 G/DL (ref 13.3–17.7)
IMM GRANULOCYTES # BLD: 0 10E3/UL
IMM GRANULOCYTES NFR BLD: 1 %
INTERPRETATION ECG - MUSE: NORMAL
LYMPHOCYTES # BLD AUTO: 1.7 10E3/UL (ref 0.8–5.3)
LYMPHOCYTES NFR BLD AUTO: 23 %
MAGNESIUM SERPL-MCNC: 1.7 MG/DL (ref 1.6–2.3)
MCH RBC QN AUTO: 29.5 PG (ref 26.5–33)
MCHC RBC AUTO-ENTMCNC: 33.9 G/DL (ref 31.5–36.5)
MCV RBC AUTO: 87 FL (ref 78–100)
MONOCYTES # BLD AUTO: 0.7 10E3/UL (ref 0–1.3)
MONOCYTES NFR BLD AUTO: 9 %
NEUTROPHILS # BLD AUTO: 4.9 10E3/UL (ref 1.6–8.3)
NEUTROPHILS NFR BLD AUTO: 64 %
NRBC # BLD AUTO: 0 10E3/UL
NRBC BLD AUTO-RTO: 0 /100
NT-PROBNP SERPL-MCNC: 15 PG/ML (ref 0–900)
P AXIS - MUSE: 29 DEGREES
PLATELET # BLD AUTO: 240 10E3/UL (ref 150–450)
POTASSIUM BLD-SCNC: 3.3 MMOL/L (ref 3.4–5.3)
PR INTERVAL - MUSE: 152 MS
QRS DURATION - MUSE: 86 MS
QT - MUSE: 396 MS
QTC - MUSE: 481 MS
R AXIS - MUSE: 21 DEGREES
RBC # BLD AUTO: 4.75 10E6/UL (ref 4.4–5.9)
SODIUM SERPL-SCNC: 140 MMOL/L (ref 133–144)
SYSTOLIC BLOOD PRESSURE - MUSE: NORMAL MMHG
T AXIS - MUSE: 31 DEGREES
TROPONIN I SERPL HS-MCNC: 14 NG/L
VENTRICULAR RATE- MUSE: 89 BPM
WBC # BLD AUTO: 7.5 10E3/UL (ref 4–11)

## 2022-12-21 PROCEDURE — 99285 EMERGENCY DEPT VISIT HI MDM: CPT | Mod: 25 | Performed by: EMERGENCY MEDICINE

## 2022-12-21 PROCEDURE — 250N000013 HC RX MED GY IP 250 OP 250 PS 637: Performed by: EMERGENCY MEDICINE

## 2022-12-21 PROCEDURE — 85379 FIBRIN DEGRADATION QUANT: CPT | Performed by: EMERGENCY MEDICINE

## 2022-12-21 PROCEDURE — 120N000002 HC R&B MED SURG/OB UMMC

## 2022-12-21 PROCEDURE — 250N000011 HC RX IP 250 OP 636: Performed by: EMERGENCY MEDICINE

## 2022-12-21 PROCEDURE — 93010 ELECTROCARDIOGRAM REPORT: CPT | Performed by: EMERGENCY MEDICINE

## 2022-12-21 PROCEDURE — 250N000009 HC RX 250: Performed by: EMERGENCY MEDICINE

## 2022-12-21 PROCEDURE — 71046 X-RAY EXAM CHEST 2 VIEWS: CPT

## 2022-12-21 PROCEDURE — 83880 ASSAY OF NATRIURETIC PEPTIDE: CPT | Performed by: EMERGENCY MEDICINE

## 2022-12-21 PROCEDURE — 80053 COMPREHEN METABOLIC PANEL: CPT | Performed by: EMERGENCY MEDICINE

## 2022-12-21 PROCEDURE — 93970 EXTREMITY STUDY: CPT

## 2022-12-21 PROCEDURE — 99207 PR SC NO CHARGE VISIT: CPT | Performed by: PHYSICIAN ASSISTANT

## 2022-12-21 PROCEDURE — C9803 HOPD COVID-19 SPEC COLLECT: HCPCS | Performed by: EMERGENCY MEDICINE

## 2022-12-21 PROCEDURE — 82248 BILIRUBIN DIRECT: CPT | Performed by: STUDENT IN AN ORGANIZED HEALTH CARE EDUCATION/TRAINING PROGRAM

## 2022-12-21 PROCEDURE — 74174 CTA ABD&PLVS W/CONTRAST: CPT

## 2022-12-21 PROCEDURE — 96374 THER/PROPH/DIAG INJ IV PUSH: CPT | Mod: 59 | Performed by: EMERGENCY MEDICINE

## 2022-12-21 PROCEDURE — 85025 COMPLETE CBC W/AUTO DIFF WBC: CPT | Performed by: EMERGENCY MEDICINE

## 2022-12-21 PROCEDURE — 99222 1ST HOSP IP/OBS MODERATE 55: CPT | Performed by: PHYSICIAN ASSISTANT

## 2022-12-21 PROCEDURE — 83735 ASSAY OF MAGNESIUM: CPT | Performed by: EMERGENCY MEDICINE

## 2022-12-21 PROCEDURE — 84484 ASSAY OF TROPONIN QUANT: CPT | Performed by: EMERGENCY MEDICINE

## 2022-12-21 PROCEDURE — 36415 COLL VENOUS BLD VENIPUNCTURE: CPT | Performed by: EMERGENCY MEDICINE

## 2022-12-21 PROCEDURE — 93005 ELECTROCARDIOGRAM TRACING: CPT | Performed by: EMERGENCY MEDICINE

## 2022-12-21 PROCEDURE — 86140 C-REACTIVE PROTEIN: CPT | Performed by: STUDENT IN AN ORGANIZED HEALTH CARE EDUCATION/TRAINING PROGRAM

## 2022-12-21 RX ORDER — AMLODIPINE BESYLATE 5 MG/1
10 TABLET ORAL ONCE
Status: COMPLETED | OUTPATIENT
Start: 2022-12-21 | End: 2022-12-21

## 2022-12-21 RX ORDER — MORPHINE SULFATE 4 MG/ML
4 INJECTION, SOLUTION INTRAMUSCULAR; INTRAVENOUS ONCE
Status: COMPLETED | OUTPATIENT
Start: 2022-12-21 | End: 2022-12-21

## 2022-12-21 RX ORDER — IOPAMIDOL 755 MG/ML
100 INJECTION, SOLUTION INTRAVASCULAR ONCE
Status: COMPLETED | OUTPATIENT
Start: 2022-12-21 | End: 2022-12-21

## 2022-12-21 RX ORDER — POTASSIUM CHLORIDE 20MEQ/15ML
40 LIQUID (ML) ORAL ONCE
Status: COMPLETED | OUTPATIENT
Start: 2022-12-21 | End: 2022-12-21

## 2022-12-21 RX ORDER — ACETAMINOPHEN 500 MG
1000 TABLET ORAL ONCE
Status: COMPLETED | OUTPATIENT
Start: 2022-12-21 | End: 2022-12-21

## 2022-12-21 RX ADMIN — IOPAMIDOL 100 ML: 755 INJECTION, SOLUTION INTRAVENOUS at 20:27

## 2022-12-21 RX ADMIN — MORPHINE SULFATE 4 MG: 4 INJECTION INTRAVENOUS at 20:51

## 2022-12-21 RX ADMIN — POTASSIUM CHLORIDE 40 MEQ: 40 SOLUTION ORAL at 17:23

## 2022-12-21 RX ADMIN — ACETAMINOPHEN 1000 MG: 500 TABLET ORAL at 18:21

## 2022-12-21 RX ADMIN — AMLODIPINE BESYLATE 10 MG: 5 TABLET ORAL at 22:05

## 2022-12-21 RX ADMIN — SODIUM CHLORIDE 75 ML: 9 INJECTION, SOLUTION INTRAVENOUS at 20:26

## 2022-12-21 ASSESSMENT — ENCOUNTER SYMPTOMS
HEADACHES: 0
BACK PAIN: 0
NAUSEA: 0
COLOR CHANGE: 0
CONFUSION: 0
PALPITATIONS: 0
FEVER: 0
RHINORRHEA: 0
FLANK PAIN: 0
NUMBNESS: 0
VOMITING: 0
ABDOMINAL PAIN: 0
CHILLS: 0
FREQUENCY: 0
HEMATURIA: 0
DIARRHEA: 0
LIGHT-HEADEDNESS: 0
BRUISES/BLEEDS EASILY: 0
COUGH: 0
CONSTIPATION: 0
SHORTNESS OF BREATH: 1
NECK PAIN: 0
DYSURIA: 0
WEAKNESS: 0
FATIGUE: 0
WOUND: 0
APPETITE CHANGE: 0

## 2022-12-21 ASSESSMENT — ACTIVITIES OF DAILY LIVING (ADL)
ADLS_ACUITY_SCORE: 33
ADLS_ACUITY_SCORE: 35

## 2022-12-21 NOTE — DISCHARGE INSTRUCTIONS
A referral was placed to wound care clinic, please follow-up with them they should call to help you schedule an appointment.  This is important to continue to care for the ulcers that you have on your legs secondary to your swelling/venous stasis.

## 2022-12-21 NOTE — ED PROVIDER NOTES
ED Provider Note  [facility]      History     Chief Complaint   Patient presents with     Abnormal Labs     Advised by nurse at his clinic to come  to the ER after abnormal test results, he said there was a critical kidney result and low potassium. Patient is unsure exactly what his labs were     HPI  Antony Aguila Jr. is a 62 year old male with a past medical history including DM2, dissection of thoracic aorta who presents to the Emergency Department for evaluation of abnormal labs. The patient states that he was at the clinic last Thursday and had labs drawn, he was also taken off Lasix and started on Bumex which he has been taking as prescribed. He reports that they called him and told him to go to the ER today because his potassium was low.  Otherwise, he notes baseline exertional dyspnea that is unchanged in severity at this time.  Denies any chest pain, back pain, abdominal pain, back pain, or leg pain.  Has baseline lower extremity edema for which she has been following up with his PCP on an outpatient basis, has venousstasis ulcerations at baseline, denies any redness or warmth developing.  Denies any fevers or chills.  Urinating normally. The patient denies any other physical concerns today.     Past Medical History  Past Medical History:   Diagnosis Date     Chronic dissection of thoracic aorta     S/p exploration and median sternotomy by Dr. Marcelo on 6/18/19     Diabetes (H)      Hypertension      No past surgical history on file.  albuterol (PROAIR HFA/PROVENTIL HFA/VENTOLIN HFA) 108 (90 Base) MCG/ACT inhaler  gabapentin (NEURONTIN) 600 MG tablet  hydrochlorothiazide (HYDRODIURIL) 50 MG tablet  insulin aspart (NOVOLOG PEN) 100 UNIT/ML pen  insulin glargine (LANTUS PEN) 100 UNIT/ML pen  levETIRAcetam (KEPPRA) 500 MG tablet  meloxicam (MOBIC) 7.5 MG tablet  metFORMIN (GLUCOPHAGE XR) 500 MG 24 hr tablet  QUEtiapine (SEROQUEL) 200 MG tablet  QUEtiapine (SEROQUEL) 400 MG  tablet  acetaminophen (TYLENOL) 500 MG tablet  ARIPiprazole (ABILIFY) 10 MG tablet  Continuous Blood Gluc  (FREESTYLE LIA 2 READER) NATALIA  Continuous Blood Gluc Sensor (FREESTYLE LIA 2 SENSOR) MISC  diphenhydrAMINE (BENADRYL) 50 MG capsule  furosemide (LASIX) 40 MG tablet  insulin pen needle (31G X 5 MM) 31G X 5 MM miscellaneous  KETOSTIX test strip  mineral oil-hydrophilic petrolatum (AQUAPHOR) external ointment  OLANZapine (ZYPREXA) 5 MG tablet  potassium chloride ER (K-TAB) 20 MEQ CR tablet      Allergies   Allergen Reactions     Lidocaine      Pt states this is not an allergy and doesn't recall this to be an allergy     Lisinopril Swelling     Pt states this is not an allergy and doesn't recall this to be an allergy     Pollen Extract      Family History  No family history on file.  Social History   Social History     Tobacco Use     Smoking status: Every Day     Types: Cigarettes     Smokeless tobacco: Never   Vaping Use     Vaping Use: Never used      Past medical history, past surgical history, medications, allergies, family history, and social history were reviewed with the patient. No additional pertinent items.       Review of Systems   Constitutional: Negative for appetite change, chills, fatigue and fever.   HENT: Negative for congestion, ear pain and rhinorrhea.    Eyes: Negative for visual disturbance.   Respiratory: Positive for shortness of breath (Baseline, exertional). Negative for cough.    Cardiovascular: Positive for leg swelling (Baseline). Negative for chest pain and palpitations.   Gastrointestinal: Negative for abdominal pain, constipation, diarrhea, nausea and vomiting.   Genitourinary: Negative for dysuria, flank pain, frequency, hematuria and urgency.   Musculoskeletal: Negative for back pain and neck pain.   Skin: Negative for color change, rash and wound.   Allergic/Immunologic: Negative for immunocompromised state.   Neurological: Negative for syncope, weakness,  light-headedness, numbness and headaches.   Hematological: Does not bruise/bleed easily.   Psychiatric/Behavioral: Negative for confusion.   All other systems reviewed and are negative.    A complete review of systems was performed with pertinent positives and negatives noted in the HPI, and all other systems negative.    Physical Exam   BP: (!) 158/87  Pulse: 92  Temp: 99.3  F (37.4  C)  Resp: 20  Height: 182.9 cm (6')  Weight: (!) 158.8 kg (350 lb)  SpO2: 93 %     Physical Exam  Vitals and nursing note reviewed.   Constitutional:       General: He is not in acute distress.     Appearance: He is obese. He is not ill-appearing, toxic-appearing or diaphoretic.   HENT:      Head: Normocephalic and atraumatic.      Right Ear: External ear normal.      Left Ear: External ear normal.      Nose: Nose normal.      Mouth/Throat:      Mouth: Mucous membranes are moist.   Eyes:      Extraocular Movements: Extraocular movements intact.      Conjunctiva/sclera: Conjunctivae normal.      Pupils: Pupils are equal, round, and reactive to light.   Cardiovascular:      Rate and Rhythm: Normal rate and regular rhythm.      Pulses: Normal pulses.      Heart sounds: Normal heart sounds. No murmur heard.    No friction rub. No gallop.   Pulmonary:      Effort: Pulmonary effort is normal. No respiratory distress.      Breath sounds: Normal breath sounds. No stridor. No wheezing, rhonchi or rales.   Abdominal:      General: Bowel sounds are normal. There is no distension.      Tenderness: There is no abdominal tenderness. There is no right CVA tenderness, left CVA tenderness, guarding or rebound.   Musculoskeletal:         General: Normal range of motion.      Cervical back: Normal range of motion and neck supple. No rigidity.      Right lower leg: Edema (1+ pitting) present.      Left lower leg: Edema (1+ pitting) present.      Comments: Venous stasis ulceration to the anterior left shin as well as posterior left calf; no discharge, no  surrounding erythema/warmth/induration/fluctuance.   Skin:     General: Skin is warm.      Capillary Refill: Capillary refill takes less than 2 seconds.   Neurological:      General: No focal deficit present.      Mental Status: He is alert.   Psychiatric:         Mood and Affect: Mood normal.         Behavior: Behavior normal.         ED Course     ED Course as of 12/21/22 2233   Wed Dec 21, 2022   1705 WBC: 7.5   1705 Hemoglobin: 14.0   1705 Platelet Count: 240   1707 Sodium: 140   1711 Potassium(!): 3.3   1711 GLUCOSE BY METER POCT(!): 236   1714 Creatinine: 1.23  1.44 5 days ago on chart review.    1715 GFR Estimate: 66   1715 Glucose(!): 220   1715 Magnesium: 1.7   1737 Troponin I High Sensitivity: 14  Baseline 12 on chart review.    1751 Chest XR,  PA & LAT  Widened mediastinal silhouette, compatible with underlying thoracic aortic aneurysm. The size appears slightly increased from prior exam, possibly projectional. If clinical concern for acute aortic pathology, consider CTA.     Stable nonenlarged cardiopericardial silhouette.     No focal airspace disease. No pleural effusion or pneumothorax.     Hiatal hernia.     Median sternotomy wires.      Multilevel degenerative changes of the spine.   1812 N-Terminal Pro BNP Inpatient: 15   1850 D-Dimer Quantitative(!): 1.18   2053  Lower Extremity Venous Duplex Bilateral  No deep venous thrombosis in the bilateral lower extremities.   2115 CTA CHEST ABDOMEN PELVIS WITH CONTRAST PANEL  1.  Type B aortic dissection, beginning distal to the left subclavian artery and extending to the left common iliac artery. Dissection flap involves the celiac, superior mesenteric, and left renal arteries, resulting in severe narrowing of the true   lumen.     2.  Short segment dissections versus pseudoaneurysms at the aortic root and at the distal ascending thoracic aorta/proximal aortic arch.     3.  Aneurysmal dilatation of the ascending and descending thoracic aorta.     4.  No  mediastinal hemorrhage or hemothorax.   2128 Spoke with vascular surgery, from their standpoint after reviewing imaging from 2019 and imaging today the type B aortic dissection is unchanged; however they recommended talking with cardiac surgery in regard to the dissection versus pseudoaneurysm at the aortic root on imaging today.   2152 Spoke with cardiac surgery, they recommended formal echo in the morning and admission to the medicine service on the [facility].     Procedures: none.             EKG Interpretation:      Interpreted by Taya Romero MD  Time reviewed: 17:20  Symptoms at time of EKG: Baseline shortness of breath with exertion    Rhythm: normal sinus   Rate: normal  Axis: normal  Ectopy: none  Conduction: /QTc 481  ST Segments/ T Waves: No ST elevation or depression, no TWI   Q Waves: none  Comparison to prior: Unchanged from 9/18/22    Clinical Impression: normal EKG  _______________________________________________     The medical record was reviewed and interpreted.  Current labs reviewed and interpreted.  Previous labs reviewed and interpreted.  Current images reviewed and interpreted: as above.  Previous images reviewed and interpreted: read from CTA in June 2019.  EKG reviewed and interpreted: as above.    Medications   potassium chloride (KAYCIEL) solution 40 mEq (40 mEq Oral Given 12/21/22 1723)   acetaminophen (TYLENOL) tablet 1,000 mg (1,000 mg Oral Given 12/21/22 1821)   morphine (PF) injection 4 mg (4 mg Intravenous Given 12/21/22 2051)   iopamidol (ISOVUE-370) solution 100 mL (100 mLs Intravenous Given 12/21/22 2027)   sodium chloride 0.9 % bag 100mL for CT scan flush use (75 mLs Intravenous Given 12/21/22 2026)   amLODIPine (NORVASC) tablet 10 mg (10 mg Oral Given 12/21/22 2205)        Assessments & Plan (with Medical Decision Making)   Nursing notes and vital signs reviewed.     Presentation, exam, and workup is most consistent with chronic type B aortic dissection with aortic root  dissection versus pseudoaneurysm.    Please see HPI, ROS, exam, and ED course above for pertinent history and findings. In short, the patient presented to the ED for evaluation of hypokalemia, concern for LAKISHA from blood work earlier this week.  Here, he also notes that he has had exertional dyspnea for the last 1 month.  He adamantly denies any chest, back, abdominal, or flank pain.      Reviewed CTA from June 2019: Type A aortic dissection.  This starts approximately 6 cm above the aortic valve.  The great vessels off the arch appear to be supplied by the true lumen and the visualized portions are patent.  The false lumen is to the left and larger than the true lumen in the descending thoracic aorta.  The dissection extends into the proximal celiac artery, but this artery is predominantly filled by the false lumen.  Dissection extends into the superior mesenteric artery and is filled by both the true and false lumen.  Right renal artery is patent and filled by the true lumen.  Left renal artery is patent and filled by the false lumen.  The dissection into the very proximal left common iliac artery.     Per the discharge summary from [facility] in July 2019: Antony came in with chest pain and a CT that was concerning for an acute type a aortic dissection. He was taken to the operating room and had a sternotomy. There they found rather than acute type a he had a chronic type B aortic dissection. He was closed without any operative intervention with recommendation of tight blood pressure control.     CTA today with a stable type B dissection, vascular surgery reviewed imaging and from their standpoint the type B dissection is unchanged in severity.  However, imaging also shows short segment dissections versus pseudoaneurysms at the aortic root and at the distal ascending thoracic aorta/proximal aortic arch and aneurysmal dilatation of the ascending and descending thoracic aorta.  Cardiac surgery reviewed imaging  and from their standpoint they are unable to discern whether this is acute or chronic, although reassuring that the patient has no hematoma, pericardial effusion, or other signs of acute type A dissection, they recommended echocardiogram in the morning and admission to the medicine service on the [facility] so that they can come up with final plans, however at this time no indication for acute surgical intervention.  Patient's heart rate is 82 and blood pressure 132/87, no indication for IV antihypertensives or beta-blocker, given home dose of amlodipine that he did not take yet this morning.    Discussed the patient with internal medicine, who will admit the patient to the [facility] for further monitoring, evaluation, and treatment. Rechecked the patient, findings and plan explained to the patient, who consents to admission.     Otherwise, in regard to the hypokalemia he was sent here for, repeat potassium here is 3.3, he was given oral replacement.  Creatinine of 1.23 is not acutely changed from his baseline, no evidence for LAKISHA.  He is making urine as normal.  In regard to his venous stasis ulcerations, no evidence for infection on exam, can be followed up with wound care on inpatient and outpatient basis, referral for outpatient wound care was placed.  He also has 1+ pitting edema however his BNP is 15 and no evidence for pulmonary edema, lower suspicion for a CHF exacerbation, no indication for IV Lasix/Bumex urgently/emergently.     Medicine recommending admission to the [facility] emergency department given the patient's inpatient bed on the [facility] will not become available until tomorrow, discussed with Dr. Mcrae plan for transfer over to [facility] emergency department.  Discussed with her that I have spoken with internal medicine, cardiac surgery, and thoracic surgery.  Plan will be to let internal medicine and cardiac surgery know that the patient is over to the [facility] once he gets over  there.    Disposition: The patient was transferred to the [facility] emergency department in stable condition.      Final diagnoses:   Hypokalemia   Venous stasis ulcer of left calf limited to breakdown of skin, unspecified whether varicose veins present (H)   Aortic root dilation (H)   Dissecting aneurysm of thoracic aorta, [place] type B       --  Taya Romero MD   [facility] EMERGENCY DEPARTMENT  12/21/2022     Taya Romero MD  12/21/22 [id number]

## 2022-12-21 NOTE — ED TRIAGE NOTES
Patient was at the clinic last Thursday and had blood drawn, and then called him today to go to the ER because his potassium was low. He reports they said his kidneys were not working as they should. Patient is unsure exactly what his labs showed. Patient is diabetic and has leg wounds as well.

## 2022-12-22 ENCOUNTER — APPOINTMENT (OUTPATIENT)
Dept: CT IMAGING | Facility: CLINIC | Age: 62
End: 2022-12-22
Attending: STUDENT IN AN ORGANIZED HEALTH CARE EDUCATION/TRAINING PROGRAM
Payer: COMMERCIAL

## 2022-12-22 ENCOUNTER — APPOINTMENT (OUTPATIENT)
Dept: CARDIOLOGY | Facility: CLINIC | Age: 62
End: 2022-12-22
Payer: COMMERCIAL

## 2022-12-22 LAB
ABO/RH(D): NORMAL
ALBUMIN SERPL-MCNC: 3.3 G/DL (ref 3.4–5)
ALBUMIN UR-MCNC: NEGATIVE MG/DL
ALP SERPL-CCNC: 66 U/L (ref 40–150)
ALT SERPL W P-5'-P-CCNC: 29 U/L (ref 0–70)
ANION GAP SERPL CALCULATED.3IONS-SCNC: 8 MMOL/L (ref 7–15)
ANTIBODY SCREEN: NEGATIVE
APPEARANCE UR: CLEAR
AST SERPL W P-5'-P-CCNC: 30 U/L (ref 0–45)
ATRIAL RATE - MUSE: 87 BPM
BILIRUB DIRECT SERPL-MCNC: 0.1 MG/DL (ref 0–0.2)
BILIRUB SERPL-MCNC: 0.5 MG/DL (ref 0.2–1.3)
BILIRUB UR QL STRIP: NEGATIVE
BUN SERPL-MCNC: 8.4 MG/DL (ref 8–23)
CALCIUM SERPL-MCNC: 9.3 MG/DL (ref 8.8–10.2)
CHLORIDE SERPL-SCNC: 101 MMOL/L (ref 98–107)
COLOR UR AUTO: NORMAL
CREAT SERPL-MCNC: 1.26 MG/DL (ref 0.67–1.17)
CRP SERPL-MCNC: 13 MG/L (ref 0–8)
DEPRECATED HCO3 PLAS-SCNC: 32 MMOL/L (ref 22–29)
DIASTOLIC BLOOD PRESSURE - MUSE: NORMAL MMHG
ERYTHROCYTE [DISTWIDTH] IN BLOOD BY AUTOMATED COUNT: 16 % (ref 10–15)
GFR SERPL CREATININE-BSD FRML MDRD: 64 ML/MIN/1.73M2
GLUCOSE BLDC GLUCOMTR-MCNC: 124 MG/DL (ref 70–99)
GLUCOSE BLDC GLUCOMTR-MCNC: 144 MG/DL (ref 70–99)
GLUCOSE BLDC GLUCOMTR-MCNC: 144 MG/DL (ref 70–99)
GLUCOSE BLDC GLUCOMTR-MCNC: 161 MG/DL (ref 70–99)
GLUCOSE BLDC GLUCOMTR-MCNC: 200 MG/DL (ref 70–99)
GLUCOSE SERPL-MCNC: 159 MG/DL (ref 70–99)
GLUCOSE UR STRIP-MCNC: NEGATIVE MG/DL
HCT VFR BLD AUTO: 40.6 % (ref 40–53)
HGB BLD-MCNC: 13 G/DL (ref 13.3–17.7)
HGB UR QL STRIP: NEGATIVE
INR PPP: 1.08 (ref 0.85–1.15)
INTERPRETATION ECG - MUSE: NORMAL
KETONES UR STRIP-MCNC: NEGATIVE MG/DL
LEUKOCYTE ESTERASE UR QL STRIP: NEGATIVE
LVEF ECHO: NORMAL
MAGNESIUM SERPL-MCNC: 2 MG/DL (ref 1.7–2.3)
MCH RBC QN AUTO: 28.4 PG (ref 26.5–33)
MCHC RBC AUTO-ENTMCNC: 32 G/DL (ref 31.5–36.5)
MCV RBC AUTO: 89 FL (ref 78–100)
NITRATE UR QL: NEGATIVE
P AXIS - MUSE: 43 DEGREES
PH UR STRIP: 7 [PH] (ref 5–7)
PHOSPHATE SERPL-MCNC: 3.3 MG/DL (ref 2.5–4.5)
PLATELET # BLD AUTO: 235 10E3/UL (ref 150–450)
POTASSIUM SERPL-SCNC: 3.3 MMOL/L (ref 3.4–5.3)
PR INTERVAL - MUSE: 156 MS
PROT SERPL-MCNC: 7.5 G/DL (ref 6.8–8.8)
QRS DURATION - MUSE: 88 MS
QT - MUSE: 390 MS
QTC - MUSE: 469 MS
R AXIS - MUSE: 43 DEGREES
RBC # BLD AUTO: 4.58 10E6/UL (ref 4.4–5.9)
RBC URINE: 1 /HPF
SARS-COV-2 RNA RESP QL NAA+PROBE: NEGATIVE
SODIUM SERPL-SCNC: 141 MMOL/L (ref 136–145)
SP GR UR STRIP: 1.03 (ref 1–1.03)
SPECIMEN EXPIRATION DATE: NORMAL
SYSTOLIC BLOOD PRESSURE - MUSE: NORMAL MMHG
T AXIS - MUSE: 61 DEGREES
TROPONIN T SERPL HS-MCNC: 19 NG/L
UROBILINOGEN UR STRIP-MCNC: NORMAL MG/DL
VENTRICULAR RATE- MUSE: 87 BPM
WBC # BLD AUTO: 6.6 10E3/UL (ref 4–11)
WBC URINE: <1 /HPF

## 2022-12-22 PROCEDURE — 250N000012 HC RX MED GY IP 250 OP 636 PS 637: Performed by: STUDENT IN AN ORGANIZED HEALTH CARE EDUCATION/TRAINING PROGRAM

## 2022-12-22 PROCEDURE — 250N000011 HC RX IP 250 OP 636: Performed by: STUDENT IN AN ORGANIZED HEALTH CARE EDUCATION/TRAINING PROGRAM

## 2022-12-22 PROCEDURE — 36415 COLL VENOUS BLD VENIPUNCTURE: CPT | Performed by: STUDENT IN AN ORGANIZED HEALTH CARE EDUCATION/TRAINING PROGRAM

## 2022-12-22 PROCEDURE — 250N000013 HC RX MED GY IP 250 OP 250 PS 637: Performed by: STUDENT IN AN ORGANIZED HEALTH CARE EDUCATION/TRAINING PROGRAM

## 2022-12-22 PROCEDURE — 86901 BLOOD TYPING SEROLOGIC RH(D): CPT | Performed by: STUDENT IN AN ORGANIZED HEALTH CARE EDUCATION/TRAINING PROGRAM

## 2022-12-22 PROCEDURE — 250N000011 HC RX IP 250 OP 636: Performed by: INTERNAL MEDICINE

## 2022-12-22 PROCEDURE — 75574 CT ANGIO HRT W/3D IMAGE: CPT

## 2022-12-22 PROCEDURE — 120N000002 HC R&B MED SURG/OB UMMC

## 2022-12-22 PROCEDURE — 999N000208 ECHOCARDIOGRAM COMPLETE

## 2022-12-22 PROCEDURE — 84100 ASSAY OF PHOSPHORUS: CPT | Performed by: STUDENT IN AN ORGANIZED HEALTH CARE EDUCATION/TRAINING PROGRAM

## 2022-12-22 PROCEDURE — 86850 RBC ANTIBODY SCREEN: CPT | Performed by: STUDENT IN AN ORGANIZED HEALTH CARE EDUCATION/TRAINING PROGRAM

## 2022-12-22 PROCEDURE — 81001 URINALYSIS AUTO W/SCOPE: CPT | Performed by: STUDENT IN AN ORGANIZED HEALTH CARE EDUCATION/TRAINING PROGRAM

## 2022-12-22 PROCEDURE — 80048 BASIC METABOLIC PNL TOTAL CA: CPT | Performed by: STUDENT IN AN ORGANIZED HEALTH CARE EDUCATION/TRAINING PROGRAM

## 2022-12-22 PROCEDURE — 75574 CT ANGIO HRT W/3D IMAGE: CPT | Mod: 26 | Performed by: STUDENT IN AN ORGANIZED HEALTH CARE EDUCATION/TRAINING PROGRAM

## 2022-12-22 PROCEDURE — U0003 INFECTIOUS AGENT DETECTION BY NUCLEIC ACID (DNA OR RNA); SEVERE ACUTE RESPIRATORY SYNDROME CORONAVIRUS 2 (SARS-COV-2) (CORONAVIRUS DISEASE [COVID-19]), AMPLIFIED PROBE TECHNIQUE, MAKING USE OF HIGH THROUGHPUT TECHNOLOGIES AS DESCRIBED BY CMS-2020-01-R: HCPCS | Performed by: STUDENT IN AN ORGANIZED HEALTH CARE EDUCATION/TRAINING PROGRAM

## 2022-12-22 PROCEDURE — 71275 CT ANGIOGRAPHY CHEST: CPT | Mod: 26 | Performed by: STUDENT IN AN ORGANIZED HEALTH CARE EDUCATION/TRAINING PROGRAM

## 2022-12-22 PROCEDURE — 71275 CT ANGIOGRAPHY CHEST: CPT

## 2022-12-22 PROCEDURE — 93306 TTE W/DOPPLER COMPLETE: CPT | Mod: 26 | Performed by: INTERNAL MEDICINE

## 2022-12-22 PROCEDURE — 99253 IP/OBS CNSLTJ NEW/EST LOW 45: CPT | Mod: 25 | Performed by: INTERNAL MEDICINE

## 2022-12-22 PROCEDURE — 255N000002 HC RX 255 OP 636: Performed by: INTERNAL MEDICINE

## 2022-12-22 PROCEDURE — 85610 PROTHROMBIN TIME: CPT | Performed by: STUDENT IN AN ORGANIZED HEALTH CARE EDUCATION/TRAINING PROGRAM

## 2022-12-22 PROCEDURE — 999N000127 HC STATISTIC PERIPHERAL IV START W US GUIDANCE

## 2022-12-22 PROCEDURE — 83735 ASSAY OF MAGNESIUM: CPT | Performed by: STUDENT IN AN ORGANIZED HEALTH CARE EDUCATION/TRAINING PROGRAM

## 2022-12-22 PROCEDURE — G0463 HOSPITAL OUTPT CLINIC VISIT: HCPCS

## 2022-12-22 PROCEDURE — 85018 HEMOGLOBIN: CPT | Performed by: STUDENT IN AN ORGANIZED HEALTH CARE EDUCATION/TRAINING PROGRAM

## 2022-12-22 PROCEDURE — 84484 ASSAY OF TROPONIN QUANT: CPT | Performed by: STUDENT IN AN ORGANIZED HEALTH CARE EDUCATION/TRAINING PROGRAM

## 2022-12-22 RX ORDER — MAGNESIUM SULFATE HEPTAHYDRATE 40 MG/ML
2 INJECTION, SOLUTION INTRAVENOUS ONCE
Status: COMPLETED | OUTPATIENT
Start: 2022-12-22 | End: 2022-12-22

## 2022-12-22 RX ORDER — OLANZAPINE 5 MG/1
5 TABLET ORAL AT BEDTIME
Status: DISCONTINUED | OUTPATIENT
Start: 2022-12-22 | End: 2022-12-22

## 2022-12-22 RX ORDER — POTASSIUM CHLORIDE 20MEQ/15ML
40 LIQUID (ML) ORAL ONCE
Status: COMPLETED | OUTPATIENT
Start: 2022-12-22 | End: 2022-12-22

## 2022-12-22 RX ORDER — DEXTROSE MONOHYDRATE 25 G/50ML
25-50 INJECTION, SOLUTION INTRAVENOUS
Status: DISCONTINUED | OUTPATIENT
Start: 2022-12-22 | End: 2022-12-22

## 2022-12-22 RX ORDER — HEPARIN SODIUM 5000 [USP'U]/.5ML
5000 INJECTION, SOLUTION INTRAVENOUS; SUBCUTANEOUS EVERY 12 HOURS
Status: CANCELLED | OUTPATIENT
Start: 2022-12-22

## 2022-12-22 RX ORDER — METFORMIN HCL 500 MG
1000 TABLET, EXTENDED RELEASE 24 HR ORAL
Status: DISCONTINUED | OUTPATIENT
Start: 2022-12-22 | End: 2023-01-03

## 2022-12-22 RX ORDER — ACETAMINOPHEN 325 MG/1
650 TABLET ORAL EVERY 6 HOURS PRN
Status: DISCONTINUED | OUTPATIENT
Start: 2022-12-22 | End: 2023-01-15 | Stop reason: HOSPADM

## 2022-12-22 RX ORDER — METOPROLOL TARTRATE 50 MG
50 TABLET ORAL ONCE
Status: COMPLETED | OUTPATIENT
Start: 2022-12-22 | End: 2022-12-22

## 2022-12-22 RX ORDER — LABETALOL 100 MG/1
100 TABLET, FILM COATED ORAL EVERY 12 HOURS SCHEDULED
Status: DISCONTINUED | OUTPATIENT
Start: 2022-12-22 | End: 2022-12-23

## 2022-12-22 RX ORDER — GABAPENTIN 300 MG/1
300 CAPSULE ORAL 3 TIMES DAILY
Status: DISCONTINUED | OUTPATIENT
Start: 2022-12-22 | End: 2023-01-05

## 2022-12-22 RX ORDER — POTASSIUM CHLORIDE 1500 MG/1
40 TABLET, EXTENDED RELEASE ORAL DAILY
Status: ON HOLD | COMMUNITY
End: 2023-01-15

## 2022-12-22 RX ORDER — DEXTROSE MONOHYDRATE 25 G/50ML
25-50 INJECTION, SOLUTION INTRAVENOUS
Status: CANCELLED | OUTPATIENT
Start: 2022-12-22

## 2022-12-22 RX ORDER — IOPAMIDOL 755 MG/ML
120 INJECTION, SOLUTION INTRAVASCULAR ONCE
Status: COMPLETED | OUTPATIENT
Start: 2022-12-22 | End: 2022-12-22

## 2022-12-22 RX ORDER — NITROGLYCERIN 0.4 MG/1
.4-.8 TABLET SUBLINGUAL
Status: DISCONTINUED | OUTPATIENT
Start: 2022-12-22 | End: 2022-12-23 | Stop reason: HOSPADM

## 2022-12-22 RX ORDER — QUETIAPINE FUMARATE 300 MG/1
600 TABLET, FILM COATED ORAL AT BEDTIME
Status: DISCONTINUED | OUTPATIENT
Start: 2022-12-22 | End: 2023-01-07

## 2022-12-22 RX ORDER — LABETALOL HYDROCHLORIDE 5 MG/ML
5 INJECTION, SOLUTION INTRAVENOUS
Status: DISCONTINUED | OUTPATIENT
Start: 2022-12-22 | End: 2022-12-24

## 2022-12-22 RX ORDER — ALBUTEROL SULFATE 90 UG/1
2 AEROSOL, METERED RESPIRATORY (INHALATION) EVERY 4 HOURS PRN
Status: DISCONTINUED | OUTPATIENT
Start: 2022-12-22 | End: 2022-12-28

## 2022-12-22 RX ORDER — GABAPENTIN 600 MG/1
600 TABLET ORAL 3 TIMES DAILY
Status: DISCONTINUED | OUTPATIENT
Start: 2022-12-22 | End: 2022-12-22

## 2022-12-22 RX ORDER — IVABRADINE 5 MG/1
10 TABLET, FILM COATED ORAL ONCE
Status: COMPLETED | OUTPATIENT
Start: 2022-12-22 | End: 2022-12-22

## 2022-12-22 RX ORDER — ENOXAPARIN SODIUM 100 MG/ML
40 INJECTION SUBCUTANEOUS EVERY 12 HOURS
Status: DISCONTINUED | OUTPATIENT
Start: 2022-12-22 | End: 2022-12-24

## 2022-12-22 RX ORDER — DEXTROSE MONOHYDRATE 25 G/50ML
25-50 INJECTION, SOLUTION INTRAVENOUS
Status: DISCONTINUED | OUTPATIENT
Start: 2022-12-22 | End: 2023-01-15 | Stop reason: HOSPADM

## 2022-12-22 RX ORDER — BUMETANIDE 1 MG/1
1 TABLET ORAL 3 TIMES DAILY
Status: ON HOLD | COMMUNITY
End: 2023-01-15

## 2022-12-22 RX ORDER — NICOTINE POLACRILEX 4 MG
15-30 LOZENGE BUCCAL
Status: DISCONTINUED | OUTPATIENT
Start: 2022-12-22 | End: 2022-12-22

## 2022-12-22 RX ORDER — NICOTINE POLACRILEX 4 MG
15-30 LOZENGE BUCCAL
Status: DISCONTINUED | OUTPATIENT
Start: 2022-12-22 | End: 2023-01-15 | Stop reason: HOSPADM

## 2022-12-22 RX ORDER — NICOTINE POLACRILEX 4 MG
15-30 LOZENGE BUCCAL
Status: CANCELLED | OUTPATIENT
Start: 2022-12-22

## 2022-12-22 RX ORDER — LABETALOL HYDROCHLORIDE 5 MG/ML
5 INJECTION, SOLUTION INTRAVENOUS ONCE
Status: COMPLETED | OUTPATIENT
Start: 2022-12-22 | End: 2022-12-22

## 2022-12-22 RX ADMIN — POTASSIUM CHLORIDE 40 MEQ: 20 SOLUTION ORAL at 09:01

## 2022-12-22 RX ADMIN — IOPAMIDOL 120 ML: 755 INJECTION, SOLUTION INTRAVENOUS at 14:43

## 2022-12-22 RX ADMIN — LABETALOL HYDROCHLORIDE 5 MG: 5 INJECTION, SOLUTION INTRAVENOUS at 03:51

## 2022-12-22 RX ADMIN — INSULIN GLARGINE 25 UNITS: 100 INJECTION, SOLUTION SUBCUTANEOUS at 16:31

## 2022-12-22 RX ADMIN — LABETALOL HYDROCHLORIDE 100 MG: 100 TABLET, FILM COATED ORAL at 10:24

## 2022-12-22 RX ADMIN — OLANZAPINE 5 MG: 5 TABLET, FILM COATED ORAL at 02:55

## 2022-12-22 RX ADMIN — LABETALOL HYDROCHLORIDE 5 MG: 5 INJECTION, SOLUTION INTRAVENOUS at 12:42

## 2022-12-22 RX ADMIN — LABETALOL HYDROCHLORIDE 5 MG: 5 INJECTION, SOLUTION INTRAVENOUS at 06:26

## 2022-12-22 RX ADMIN — INSULIN ASPART 1 UNITS: 100 INJECTION, SOLUTION INTRAVENOUS; SUBCUTANEOUS at 07:19

## 2022-12-22 RX ADMIN — LABETALOL HYDROCHLORIDE 100 MG: 100 TABLET, FILM COATED ORAL at 19:58

## 2022-12-22 RX ADMIN — INSULIN ASPART 1 UNITS: 100 INJECTION, SOLUTION INTRAVENOUS; SUBCUTANEOUS at 22:42

## 2022-12-22 RX ADMIN — NITROGLYCERIN 0.8 MG: 0.4 TABLET SUBLINGUAL at 13:46

## 2022-12-22 RX ADMIN — METOPROLOL TARTRATE 50 MG: 50 TABLET, FILM COATED ORAL at 11:28

## 2022-12-22 RX ADMIN — MAGNESIUM SULFATE IN WATER 2 G: 40 INJECTION, SOLUTION INTRAVENOUS at 02:41

## 2022-12-22 RX ADMIN — INSULIN ASPART 1 UNITS: 100 INJECTION, SOLUTION INTRAVENOUS; SUBCUTANEOUS at 19:59

## 2022-12-22 RX ADMIN — HUMAN ALBUMIN MICROSPHERES AND PERFLUTREN 6 ML: 10; .22 INJECTION, SOLUTION INTRAVENOUS at 08:33

## 2022-12-22 RX ADMIN — QUETIAPINE FUMARATE 600 MG: 300 TABLET ORAL at 21:42

## 2022-12-22 RX ADMIN — GABAPENTIN 300 MG: 300 CAPSULE ORAL at 14:49

## 2022-12-22 RX ADMIN — ALBUTEROL SULFATE 2 PUFF: 90 AEROSOL, METERED RESPIRATORY (INHALATION) at 03:56

## 2022-12-22 RX ADMIN — QUETIAPINE FUMARATE 600 MG: 300 TABLET ORAL at 02:55

## 2022-12-22 RX ADMIN — GABAPENTIN 300 MG: 300 CAPSULE ORAL at 19:58

## 2022-12-22 RX ADMIN — GABAPENTIN 300 MG: 300 CAPSULE ORAL at 07:19

## 2022-12-22 RX ADMIN — INSULIN ASPART 3 UNITS: 100 INJECTION, SOLUTION INTRAVENOUS; SUBCUTANEOUS at 03:40

## 2022-12-22 RX ADMIN — LABETALOL HYDROCHLORIDE 5 MG: 5 INJECTION, SOLUTION INTRAVENOUS at 02:44

## 2022-12-22 RX ADMIN — IVABRADINE 10 MG: 5 TABLET, FILM COATED ORAL at 11:59

## 2022-12-22 RX ADMIN — ACETAMINOPHEN 650 MG: 325 TABLET, FILM COATED ORAL at 14:45

## 2022-12-22 ASSESSMENT — ACTIVITIES OF DAILY LIVING (ADL)
ADLS_ACUITY_SCORE: 35
ADLS_ACUITY_SCORE: 20
ADLS_ACUITY_SCORE: 35
ADLS_ACUITY_SCORE: 20
ADLS_ACUITY_SCORE: 35
ADLS_ACUITY_SCORE: 20

## 2022-12-22 NOTE — UTILIZATION REVIEW
Admission Status; Secondary Review Determination    Under the authority of the Utilization Management Committee, the utilization review process indicated a secondary review on the above patient. The review outcome is based on review of the medical records, discussions with staff, and applying clinical experience noted on the date of the review.    (x) Inpatient Status Appropriate - This patient's medical care is consistent with medical management for inpatient care and reasonable inpatient medical practice.    RATIONALE FOR DETERMINATION: 62-year-old male with history of chronic type B aortic dissection status post exploration and median sternotomy in June 2019, type 2 diabetes poorly controlled, hypertension, dyspnea, history of significant smoker, venous stasis changes with left lower extremity ulcer, peripheral vascular disease on a diuretic regimen.  Patient initially presented to hospital with concerns of acute kidney injury, hypokalemia with imaging revealing probably stable chronic type B aortic dissection but found to have new ascending aorta changes concerning for pseudoaneurysm versus a short segment of aortic root dissection also noted severe coronary artery calcification.  Patient thus will require good blood pressure control, need for transesophageal echocardiogram to evaluate ascending aorta at significant risk of adverse events as well as possible need for angiography.  The severity of these findings will require greater than 2 nights in the hospital for evaluation and management appropriate for inpatient care.    At the time of admission with the information available to the attending physician more than 2 nights Hospital complex care was anticipated, based on patient risk of adverse outcome if treated as outpatient and complex care required. Inpatient admission is appropriate based on the Medicare guidelines.    This document was produced using voice recognition software    The information on  this document is developed by the utilization review team in order for the business office to ensure compliance. This only denotes the appropriateness of proper admission status and does not reflect the quality of care rendered.    The definitions of Inpatient Status and Observation Status used in making the determination above are those provided in the CMS Coverage Manual, Chapter 1 and Chapter 6, section 70.4.    Sincerely,    Conner Davies MD  Utilization Review  Physician Advisor  [facility].

## 2022-12-22 NOTE — CONSULTS
CT Surgery Consult Note  12/22/2022     Date of admission: 12/21/2022    Reason for Consult: aortic dissection    Consulting Service: ED    Assessment/Plan:  63yo man with history of chronic type B aortic dissection who is presented to [facility] ED for hypokalemia and LAKISHA as well as dyspnea and was found to have incidental ascending aorta changes on CTA today. CT surgeon reviewed images and recommended no acute surgical intervention, echo in the AM.     *From [facility] Note: June 18, 2019- came in with chest pain and a CT that was concerning for an acute type a aortic dissection. He was taken to the operating room and had a sternotomy. There they found rather than acute type a he had a chronic type B aortic dissection. He was closed without any operative intervention with recommendation of tight blood pressure control.     - Admit to medicine for management of LAKISHA, electrolytes, dyspnea  - Echocardiogram 12/22 overall without acute findings but Cardiologist said the images were inadequate for completely ruling out ascending dissection.  - CT Angiogram 12/21 showing severe coronary artery calcifications and distal ascending aorta aneurysm up to 6.4 cm in diameter.   - Recommend Cardiology consult for potential TRINH, CTA aortogram, or coronary angiography imaging needs (asymptomatic but has hypokinetic RV).  Dr Jimenez wants to review before officially recommending further imaging.   - SBP goal <120, HR <60  - Large Hiatal Hernia     Discussed with CVTS fellow and Dr Laury Mcmahan PA-C  Cardiothoracic Surgery  Pager [phone]    10:44 AM   December 22, 2022  -----------------------------------------------------------  CC: abnormal labs    HPI: Antony is a 63yo man with history of HTN, morbid obesity, paranoid schizophrenia, glaucoma, Venous insufficiency, chronic bilateral leg pain and draining wounds, DMT2, and chronic type B aortic dissection who presented to [facility] ED for hypokalemia  and LAKISHA found on outpatient labs. He complained of dyspnea and was found to have incidental ascending aorta changes on CTA today.      He has no chest pain or abdominal pain. No other complaints.   Mentioned that he had a sternotomy in 2019 with concerns for ascending aortic dissection was found not to have and ascending dissection and his chest was closed.     Chest CT showed incidental finding of possible short segment aortic root dissections vs pseudoaneurysms, No PE, severe coronary artery calcification. Cardiac surgery (Dr Jimenez) reviewed imaging overnight and did not see an indication for acute surgical intervention.  ECHO today showed no acute findings besides mild to moderate AI and mild RV dysfunction.     ECHO 12/22: LV 55-60%, mild RV dysfunction, no effusion, normal RA pressure, mild to moderate AI.    ROS: Negative except mentioned in HPI.     Past Medical Hx:  Past Medical History:   Diagnosis Date     Chronic dissection of thoracic aorta     S/p exploration and median sternotomy by Dr. Marcelo on 6/18/19     Diabetes (H)      Hypertension         Medications:  Prior to Admission medications    Medication Sig Last Dose Taking? Auth Provider Long Term End Date   acetaminophen (TYLENOL) 500 MG tablet Take 500-1,000 mg by mouth every 8 hours as needed for mild pain 12/21/2022 at pm Yes Reported, Patient     albuterol (PROAIR HFA/PROVENTIL HFA/VENTOLIN HFA) 108 (90 Base) MCG/ACT inhaler Inhale 2 puffs into the lungs every 4 hours as needed for shortness of breath / dyspnea or wheezing 12/20/2022 at am Yes Reported, Patient Yes    gabapentin (NEURONTIN) 600 MG tablet Take 600 mg by mouth 3 times daily 12/20/2022 at 1 am/3 Yes Reported, Patient     hydrochlorothiazide (HYDRODIURIL) 50 MG tablet Take 50 mg by mouth daily 12/20/2022 Yes Reported, Patient Yes    insulin aspart (NOVOLOG PEN) 100 UNIT/ML pen Inject 17 Units Subcutaneous 3 times daily (with meals) and 9 units with snacks 12/20/2022 at pm Yes Hermes  Keiko PHAM PA-C Yes    insulin glargine (LANTUS PEN) 100 UNIT/ML pen Inject 70 Units Subcutaneous every morning 12/20/2022 at am Yes Keiko Mirza PA-C Yes    levETIRAcetam (KEPPRA) 500 MG tablet Take 500 mg by mouth 2 times daily Past Week Yes Reported, Patient     meloxicam (MOBIC) 7.5 MG tablet Take 7.5 mg by mouth every 12 hours as needed Unknown Yes Reported, Patient Yes    metFORMIN (GLUCOPHAGE XR) 500 MG 24 hr tablet Take 2 tablets (1,000 mg) by mouth daily (with dinner) 12/19/2022 at 2/2 am Yes Keiko Mirza PA-C Yes    OLANZapine (ZYPREXA) 5 MG tablet Take 5 mg by mouth At Bedtime 12/20/2022 at pm Yes Reported, Patient     QUEtiapine (SEROQUEL) 200 MG tablet Take 200 mg by mouth At Bedtime 12/20/2022 at pm Yes Unknown, Entered By History     QUEtiapine (SEROQUEL) 400 MG tablet Take 400 mg by mouth At Bedtime 12/20/2022 at pm Yes Reported, Patient     ARIPiprazole (ABILIFY) 10 MG tablet Take 10 mg by mouth daily   Reported, Patient     Continuous Blood Gluc  (FREESTYLE LIA 2 READER) NATALIA 1 each 4 times daily   Keiko Mirza PA-C     Continuous Blood Gluc Sensor (FREESTYLE LIA 2 SENSOR) MISC 1 each 4 times daily   Keiko Mirza PA-C     diphenhydrAMINE (BENADRYL) 50 MG capsule Take 50 mg by mouth every 6 hours as needed for itching or allergies  Patient not taking: Reported on 10/25/2022   Reported, Patient     furosemide (LASIX) 40 MG tablet Take 40 mg by mouth daily   Reported, Patient     insulin pen needle (31G X 5 MM) 31G X 5 MM miscellaneous Use 6 pen needles daily or as directed.   Keiko Mirza PA-C     KETOSTIX test strip Use as directed in case of high glucose or illness.   Keiko Mirza PA-C     mineral oil-hydrophilic petrolatum (AQUAPHOR) external ointment Apply 2 g topically 3 times daily as needed  Patient not taking: Reported on 10/25/2022   Reported, Patient     potassium chloride ER (K-TAB) 20 MEQ CR tablet Take 40 mEq by mouth  Patient not taking: Reported on  10/25/2022   Reported, Patient         Past Surgical Hx:  Past Surgical History:   Procedure Laterality Date     MEDIAN STERNOTOMY  06/18/2019    Chronic dissection of thoracic aorta s/p exploration and median sternotomy by Dr. Marcelo     TRACHEOSTOMY  07/07/2019    decannulated 8/8/19        Family Hx:  No family history on file.     Social Hx:  Social History     Tobacco Use     Smoking status: Every Day     Years: 45.00     Types: Cigarettes     Smokeless tobacco: Never     Tobacco comments:     Smokes 1 pack per week currently (12/2022).   Vaping Use     Vaping Use: Never used   Substance Use Topics     Alcohol use: Not Currently     Drug use: Yes     Types: Marijuana        Vitals: Temp: 98.1  F (36.7  C) Temp src: Oral BP: 108/57 Pulse: 77   Resp: 20 SpO2: 92 % O2 Device: None (Room air)       Exam:  General: awake, interactive, NAD  Resp: breathing comfortably on room air, no respiratory distress  CV: RR, appears well perfused  GI: abdomen soft, obese, nontender to palpation  Extremities: wwp  Neuro: A&O, cranial nerves grossly intact  Psych: appropriate affect    Labs/Imaging:  Results for orders placed or performed during the hospital encounter of 12/21/22 (from the past 24 hour(s))   Basic metabolic panel   Result Value Ref Range    Sodium 140 133 - 144 mmol/L    Potassium 3.3 (L) 3.4 - 5.3 mmol/L    Chloride 102 94 - 109 mmol/L    Carbon Dioxide (CO2) 32 20 - 32 mmol/L    Anion Gap 6 3 - 14 mmol/L    Urea Nitrogen 10 7 - 30 mg/dL    Creatinine 1.23 0.66 - 1.25 mg/dL    Calcium 9.7 8.5 - 10.1 mg/dL    Glucose 220 (H) 70 - 99 mg/dL    GFR Estimate 66 >60 mL/min/1.73m2   CBC with platelets differential    Narrative    The following orders were created for panel order CBC with platelets differential.  Procedure                               Abnormality         Status                     ---------                               -----------         ------                     CBC with platelets and d...[[id number]]   Abnormal            Final result                 Please view results for these tests on the individual orders.   Magnesium   Result Value Ref Range    Magnesium 1.7 1.6 - 2.3 mg/dL   CBC with platelets and differential   Result Value Ref Range    WBC Count 7.5 4.0 - 11.0 10e3/uL    RBC Count 4.75 4.40 - 5.90 10e6/uL    Hemoglobin 14.0 13.3 - 17.7 g/dL    Hematocrit 41.3 40.0 - 53.0 %    MCV 87 78 - 100 fL    MCH 29.5 26.5 - 33.0 pg    MCHC 33.9 31.5 - 36.5 g/dL    RDW 15.8 (H) 10.0 - 15.0 %    Platelet Count 240 150 - 450 10e3/uL    % Neutrophils 64 %    % Lymphocytes 23 %    % Monocytes 9 %    % Eosinophils 2 %    % Basophils 1 %    % Immature Granulocytes 1 %    NRBCs per 100 WBC 0 <1 /100    Absolute Neutrophils 4.9 1.6 - 8.3 10e3/uL    Absolute Lymphocytes 1.7 0.8 - 5.3 10e3/uL    Absolute Monocytes 0.7 0.0 - 1.3 10e3/uL    Absolute Eosinophils 0.2 0.0 - 0.7 10e3/uL    Absolute Basophils 0.0 0.0 - 0.2 10e3/uL    Absolute Immature Granulocytes 0.0 <=0.4 10e3/uL    Absolute NRBCs 0.0 10e3/uL   BNP   Result Value Ref Range    N terminal Pro BNP Inpatient 15 0 - 900 pg/mL   Troponin I   Result Value Ref Range    Troponin I High Sensitivity 14 <79 ng/L   Glucose by meter   Result Value Ref Range    GLUCOSE BY METER POCT 236 (H) 70 - 99 mg/dL   EKG 12 lead   Result Value Ref Range    Systolic Blood Pressure  mmHg    Diastolic Blood Pressure  mmHg    Ventricular Rate 89 BPM    Atrial Rate 89 BPM    NC Interval 152 ms    QRS Duration 86 ms     ms    QTc 481 ms    P Axis 29 degrees    R AXIS 21 degrees    T Axis 31 degrees    Interpretation ECG       Sinus rhythm  Possible Left atrial enlargement  Nonspecific ST abnormality  Prolonged QT  Abnormal ECG  Unconfirmed report - interpretation of this ECG is computer generated - see medical record for final interpretation  Confirmed by - EMERGENCY ROOM, PHYSICIAN ([id number]),  MIRIAN CR ([id number]) on 12/21/2022 8:50:12 PM     Chest XR,  PA & LAT    Narrative    EXAM:  XR CHEST 2 VIEWS  LOCATION: [facility]  DATE/TIME: 12/21/2022 5:37 PM    INDICATION: Dyspnea.  COMPARISON: 09/15/2022, 09/25/2021      Impression    IMPRESSION:     Widened mediastinal silhouette, compatible with underlying thoracic aortic aneurysm. The size appears slightly increased from prior exam, possibly projectional. If clinical concern for acute aortic pathology, consider CTA.    Stable nonenlarged cardiopericardial silhouette.    No focal airspace disease. No pleural effusion or pneumothorax.    Hiatal hernia.    Median sternotomy wires.     Multilevel degenerative changes of the spine.   D dimer quantitative   Result Value Ref Range    D-Dimer Quantitative 1.18 (H) 0.00 - 0.50 ug/mL FEU    Narrative    This D-dimer assay is intended for use in conjunction with a clinical pretest probability assessment model to exclude pulmonary embolism (PE) and deep venous thrombosis (DVT) in outpatients suspected of PE or DVT. The cut-off value is 0.50 ug/mL FEU.   CTA CHEST ABDOMEN PELVIS WITH CONTRAST PANEL    Narrative    EXAM: CTA CHEST ABDOMEN PELVIS W CONTRAST  LOCATION: [facility]  DATE/TIME: 12/21/2022 8:31 PM    INDICATION: History of aortic dissection. Exertional dyspnea. Lower extremity edema. Hypokalemia.  COMPARISON: Chest radiograph 12/21/2022, CTA report dated 06/18/2019 from [facility].  TECHNIQUE: CT angiogram chest abdomen pelvis during arterial phase of injection of IV contrast. 2D and 3D MIP reconstructions were performed by the CT technologist. Dose reduction techniques were used.   CONTRAST: 100 mL Isovue 370    FINDINGS:   CT ANGIOGRAM CHEST, ABDOMEN, AND PELVIS:   Pulmonary Arteries: The pulmonary arteries are well-opacified with contrast. No filling defects are seen to suggest thromboembolism.    Thoracic and Abdominal Aorta: Aneurysmal dilatation of the distal ascending thoracic aorta measuring  6.4 cm in diameter. Aneurysmal dilatation of the descending thoracic aorta, measuring up to 5.8 cm proximally. Short segment intimal flaps of the aortic   root and distal ascending thoracic aorta/proximal aortic arch, with focal bulging, either representing short segment dissection versus pseudoaneurysm. Aortic dissection beginning distal to the left subclavian artery and ending at the left common iliac   artery.    Celiac Artery: Dissection flap extends into the celiac artery, resulting in severe narrowing of the true lumen.  Superior Mesenteric Artery: Dissection flap extends into the superior mesenteric artery, resulting in severe narrowing of the true lumen.  Right Renal Artery: Arises from the true lumen. Patent.  Left Renal Artery: Dissection flap extends into the left renal artery, resulting in severe narrowing of the true lumen.  Inferior Mesenteric Artery: Not well assessed due to photon starvation and small caliber. Dissection flap in the region of the artery origin.    Right Common Iliac Artery: Patent.  Right External Iliac Artery: Patent.  Right Internal Iliac Artery: Patent.  Right Common Femoral Artery: Patent.  Proximal Right Superficial Femoral Artery: Patent.  Proximal Right Deep Femoral Artery: Patent.    Left Common Iliac Artery: Patent.  Left External Iliac Artery: Patent.  Left Internal Iliac Artery: Patent.  Left Common Femoral Artery: Patent.  Proximal Left Superficial Femoral Artery: Patent.  Proximal Left Deep Femoral Artery: Patent.        LUNGS AND PLEURA: Compressive atelectasis in the left lung adjacent to the thoracic aorta and hiatal hernia. No focal airspace disease. No pleural effusion or pneumothorax.    MEDIASTINUM/AXILLAE: Cardiomegaly. No pericardial effusion. No lymphadenopathy. No mediastinal hemorrhage.    CORONARY ARTERY CALCIFICATION: Severe.    Suboptimal evaluation of the solid organs of the abdomen and pelvis due to photon starvation and arterial phase  imaging.    HEPATOBILIARY: Diffuse hepatic steatosis. Gallbladder appears unremarkable. No definite biliary dilatation.    PANCREAS: Unremarkable.    SPLEEN: Unremarkable.    ADRENAL GLANDS: Unremarkable.    KIDNEYS/BLADDER: Unremarkable.    BOWEL: Large hiatal hernia. Mild colonic diverticulosis. No obstruction or inflammatory change. Normal appendix. No evidence of acute appendicitis.    LYMPH NODES: No lymphadenopathy.    PELVIC ORGANS: Unremarkable.    MUSCULOSKELETAL: Small fat-containing left inguinal hernia. Multilevel degenerative changes of the spine. Median sternotomy wires.      Impression    IMPRESSION:  1.  Type B aortic dissection, beginning distal to the left subclavian artery and extending to the left common iliac artery. Dissection flap involves the celiac, superior mesenteric, and left renal arteries, resulting in severe narrowing of the true   lumen.    2.  Short segment dissections versus pseudoaneurysms at the aortic root and at the distal ascending thoracic aorta/proximal aortic arch.    3.  Aneurysmal dilatation of the ascending and descending thoracic aorta.    4.  No mediastinal hemorrhage or hemothorax.    Findings were communicated to Dr. Romero over the telephone by Dr. Barbosa at 12/21/2022 9:10 PM.   US Lower Extremity Venous Duplex Bilateral    Narrative    EXAM: US LOWER EXTREMITY VENOUS DUPLEX BILATERAL  LOCATION: [facility]  DATE/TIME: 12/21/2022 8:46 PM    INDICATION: Swelling bilateral legs, +dimer  COMPARISON: 09/25/2021  TECHNIQUE: Venous Duplex ultrasound of bilateral lower extremities with and without compression, augmentation and duplex. Color flow and spectral Doppler with waveform analysis performed.    FINDINGS: Exam includes the common femoral, femoral, popliteal veins as well as segmentally visualized deep calf veins and greater saphenous vein. Peroneal and calf veins were not well visualized bilaterally.    RIGHT: No deep vein  thrombosis. No superficial thrombophlebitis. No popliteal cyst.    LEFT: No deep vein thrombosis. No superficial thrombophlebitis. No popliteal cyst.      Impression    IMPRESSION:  No deep venous thrombosis in the bilateral lower extremities.   EKG 12-lead, complete   Result Value Ref Range    Systolic Blood Pressure  mmHg    Diastolic Blood Pressure  mmHg    Ventricular Rate 87 BPM    Atrial Rate 87 BPM    WY Interval 156 ms    QRS Duration 88 ms     ms    QTc 469 ms    P Axis 43 degrees    R AXIS 43 degrees    T Axis 61 degrees    Interpretation ECG       Sinus rhythm with Premature atrial complexes  Low voltage QRS  Nonspecific T wave abnormality  Abnormal ECG  Unconfirmed report - interpretation of this ECG is computer generated - see medical record for final interpretation  Confirmed by - EMERGENCY ROOM, PHYSICIAN ([id number]),  THAIS BRASHER ([id number]) on 12/22/2022 6:39:06 AM     Troponin T, High Sensitivity   Result Value Ref Range    Troponin T, High Sensitivity 19 <=22 ng/L   Glucose by meter   Result Value Ref Range    GLUCOSE BY METER POCT 200 (H) 70 - 99 mg/dL   Asymptomatic COVID-19 Virus (Coronavirus) by PCR Nasopharyngeal    Specimen: Nasopharyngeal; Swab   Result Value Ref Range    SARS CoV2 PCR Negative Negative    Narrative    Testing was performed using the Xpert Xpress SARS-CoV-2 Assay on the Cepheid Gene-Xpert Instrument Systems. Additional information about this Emergency Use Authorization (EUA) assay can be found via the Lab Guide. This test should be ordered for the detection of SARS-CoV-2 in individuals who meet SARS-CoV-2 clinical and/or epidemiological criteria as well as from individuals without symptoms or other reasons to suspect COVID-19. Test performance for asymptomatic patients has only been established in anterior nasal swab specimens. This test is for in vitro diagnostic use under the FDA EUA for laboratories certified under CLIA to perform high complexity testing. This  test has not been FDA cleared or approved. A negative result does not rule out the presence of PCR inhibitors in the specimen or target RNA concentration below the limit of detection for the assay. The possibility of a false negative should be considered if the patient's recent exposure or clinical presentation suggests COVID-19. This test was validated by [email or web address]. These Laboratories are certified under the Clinical Laboratory Improvement Amendments (CLIA) as qualified to perform high complexity testing.     Basic metabolic panel   Result Value Ref Range    Sodium 141 136 - 145 mmol/L    Potassium 3.3 (L) 3.4 - 5.3 mmol/L    Chloride 101 98 - 107 mmol/L    Carbon Dioxide (CO2) 32 (H) 22 - 29 mmol/L    Anion Gap 8 7 - 15 mmol/L    Urea Nitrogen 8.4 8.0 - 23.0 mg/dL    Creatinine 1.26 (H) 0.67 - 1.17 mg/dL    Calcium 9.3 8.8 - 10.2 mg/dL    Glucose 159 (H) 70 - 99 mg/dL    GFR Estimate 64 >60 mL/min/1.73m2   CBC with platelets   Result Value Ref Range    WBC Count 6.6 4.0 - 11.0 10e3/uL    RBC Count 4.58 4.40 - 5.90 10e6/uL    Hemoglobin 13.0 (L) 13.3 - 17.7 g/dL    Hematocrit 40.6 40.0 - 53.0 %    MCV 89 78 - 100 fL    MCH 28.4 26.5 - 33.0 pg    MCHC 32.0 31.5 - 36.5 g/dL    RDW 16.0 (H) 10.0 - 15.0 %    Platelet Count 235 150 - 450 10e3/uL   INR   Result Value Ref Range    INR 1.08 0.85 - 1.15   Magnesium   Result Value Ref Range    Magnesium 2.0 1.7 - 2.3 mg/dL   Phosphorus   Result Value Ref Range    Phosphorus 3.3 2.5 - 4.5 mg/dL   ABO/Rh type and screen    Narrative    The following orders were created for panel order ABO/Rh type and screen.  Procedure                               Abnormality         Status                     ---------                               -----------         ------                     Adult Type and Screen[[id number]]                            In process                   Please view results for these tests on the individual orders.   Glucose by meter   Result  Value Ref Range    GLUCOSE BY METER POCT 144 (H) 70 - 99 mg/dL   Echocardiogram Complete   Result Value Ref Range    LVEF  55-60%     Narrative    [id number]  [id number]  [id number]  [id number]^JUAN ALBERTO^STANLEY^KATLYN     [facility]  Echocardiography Laboratory  [address] [place] [address]     Name: DAVID JONES JR.  MRN: [id number]  : 1960  Study Date: 2022 07:45 AM  Age: 62 yrs  Gender: Male  Patient Location: [facility]  Reason For Study: Aortic Aneurysm  Ordering Physician: STANLEY AVENDANO  Performed By: Penelope Raymond     BSA: 2.7 m2  Height: 72 in  Weight: 350 lb  HR: 73  BP: 107/63 mmHg  ______________________________________________________________________________  Procedure  Complete Portable Echo Adult. Contrast Optison. Technically difficult  study.Extremely poor acoustic windows. Optison (NDC #3171-9580-35) given  intravenously. Patient was given 6 ml mixture of 3 ml Optison and 6 ml saline.  3 ml wasted.  ______________________________________________________________________________  Interpretation Summary  Very technically difficult study. Unable to visualize aortic root well.  Consider alternative imaging for additional information.     Left ventricular size, wall motion and function are normal. The ejection  fraction is 55-60%.     The right ventricle is not well visualized. It is likely at least mildly to  moderatly reduced in function and mildly enlarged.     No pericardial effusion is present.     IVC diameter <2.1 cm collapsing >50% with sniff suggests a normal RA pressure  of 3 mmHg.     There is no prior study for direct comparison.  ______________________________________________________________________________  Left Ventricle  Left ventricular size, wall motion and function are normal. The ejection  fraction is 55-60%. Abnormal non-specific septal motion is present.     Right Ventricle  The right ventricle is not well visualized. It is likely  at least mildly to  moderatly reduced in function and mildly enlarged.     Atria  The atria cannot be assessed.     Mitral Valve  The mitral valve is normal. The valve leaflets are not well visualized. Trace  mitral insufficiency is present.     Aortic Valve  The aortic valve is tricuspid. Mild to moderate aortic insufficiency is  present.     Tricuspid Valve  The valve leaflets are not well visualized. The peak velocity of the tricuspid  regurgitant jet is not obtainable. Pulmonary artery systolic pressure cannot  be assessed.     Pulmonic Valve  The valve leaflets are not well visualized.     Vessels  Sinuses of Valsalva 4.3 cm. Ascending aorta 5.6 cm. IVC diameter <2.1 cm  collapsing >50% with sniff suggests a normal RA pressure of 3 mmHg.     Pericardium  No pericardial effusion is present.     Miscellaneous  Very technically difficult study. Unable to visualize aortic root well.  Consider alternative imaging for additional information.     Compared to Previous Study  There is no prior study for direct comparison.  ______________________________________________________________________________  MMode/2D Measurements & Calculations  Ao root diam: 4.3 cm     ______________________________________________________________________________  Report approved by: Jared Bingham 12/22/2022 09:18 AM         UA with Microscopic reflex to Culture    Specimen: Urine, Clean Catch   Result Value Ref Range    Color Urine Light Yellow Colorless, Straw, Light Yellow, Yellow    Appearance Urine Clear Clear    Glucose Urine Negative Negative mg/dL    Bilirubin Urine Negative Negative    Ketones Urine Negative Negative mg/dL    Specific Gravity Urine 1.028 1.003 - 1.035    Blood Urine Negative Negative    pH Urine 7.0 5.0 - 7.0    Protein Albumin Urine Negative Negative mg/dL    Urobilinogen Urine Normal Normal, 2.0 mg/dL    Nitrite Urine Negative Negative    Leukocyte Esterase Urine Negative Negative    RBC Urine 1 <=2 /HPF     WBC Urine <1 <=5 /HPF    Narrative    Urine Culture not indicated

## 2022-12-22 NOTE — CONSULTS
"[facility] Nurse Inpatient Assessment     Consulted for: LLE wounds     Patient History (according to provider note(s):      62 year old male admitted on 12/21/2022. 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 Hospitalization complicated by hypokalemia.    Areas Assessed:      Wound location: LLE      12/22 anterior wounds, posterior calf wound    Last photo: 12/22  Wound due to: Venous Ulcer  Wound history/plan of care: Dimer elevated with acute on chronic leg swelling L < R per patient history.  DVT US in ED negative for DVT b/l.  Has had LE swelling for last 2 years, notes it is worse lately.  Previously on diuretics, not currently per chart.    Leg appropriately warm, cap refill 3 seconds.  No edema in Left foot, nonpitting edema from ankle to knee.    Chronic venous staining in gator area.  Skin woody with large amt of edema wrinkling.      Wearing shorts majority of time to avoid getting pants wet from wound drainage as he has not been using dressings or compression.     Anterior wounds:  Started as blisters several months ago.  Now with 2 separate less than 1 cm diameter superficial wounds with pink/red moist dermal bases surrounded by large amt of dried drainage.  Currently with minimal serosanguinous drainage.   Posterior wound:  Started several weeks ago \"my pants got so tight they were always rubbing\"    Pink/red moist dermal base surrounded by peeling epithelium.  2.5 cm x 3.8 cm x 0.2 cm.  Currently with minimal serosanguinous drainage.      Palpation of the wound bed: normal      Periwound skin: Intact          Temperature: normal   Odor: none  Pain: mild,   Pain interventions prior to dressing change: N/A  Treatment goal: Heal  and " Protection  STATUS: initial assessment  Supplies ordered: gathered and at bedside       Treatment Plan:     Left lower extremity wounds:  Daily  Cleanse skin and wounds with wound cleanser or sterile NS.  Pat dry   Apply Aquaphor lotion or vaseline to intact skin on LE.   Cover wounds with xeroform gauze.   Cover xeroform gauze with ABD pads or 4x4s, enough to contain drainage for 24 hrs.   Secure with kerlix roll gauze.   Secure kerlix with per lymphedema therapy orders or if no orders, with coban or ACE wrap    Orders: Written    RECOMMEND PRIMARY TEAM ORDER: Lymphedema consult  Education provided: plan of care and wound progress  Discussed plan of care with: Patient and Nurse  WOC nurse follow-up plan: weekly  Notify WOC if wound(s) deteriorate.  Nursing to notify the Provider(s) and re-consult the WOC Nurse if new skin concern.    DATA:     Current support surface: Standard  Standard gel/foam mattress (IsoFlex, Atmos air, etc)  BMI: Body mass index is 47.47 kg/m .   Active diet order: Orders Placed This Encounter      NPO per Anesthesia Guidelines for Procedure/Surgery Except for: Meds     Output: I/O last 3 completed shifts:  In: 120 [P.O.:120]  Out: 900 [Urine:900]     Labs: Recent Labs   Lab 12/22/22  0549 12/21/22  1654   ALBUMIN  --  3.3*   HGB 13.0* 14.0   INR 1.08  --    WBC 6.6 7.5   CRP  --  13.0*     Pressure injury risk assessment:                          [facility]  Phone: [phone]  Pager: [phone]

## 2022-12-22 NOTE — H&P
[facility]    History and Physical - Medicine Service, MAROON TEAM 1       Date of Admission:  12/21/2022    Assessment & Plan   Antony Aguila Jr. is a 62 year old male admitted on 12/21/2022. 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 Hospitalization complicated by hypokalemia.    # [place] type B dissecting aneurysm of thoracic aorta  # Aortic root dilation  Last echo (6/27/19): technically difficult, LV normal size and fxn w/ EF 60-65%, RV normal size and fxn, no hemodynamically significant valvular heart dz, no pericardial effusion. Normal size of aortic root (4.2 cm) and proximal ascending aorta (4.4 cm).  CXR with widened mediastinum slightly increased from prior.  CT chest with aneurysmal dilatation ascending and descending thoracic aorta with type B aortic dissection distal to L subclavian artery extending to L common iliac artery with severe narrowing of true lumen and possible pseudoaneurysm at aortic root.  Trop negative (14)  Elevated D dimer 1.18  Exam notable for differences in R/L arm pressures: L ~130/80 (HR 90s) vs R ~115/65 (HR 70s). B/l radial pulses intact, b/l hands wwp.  Repeat Trop remains negative (19), no need for further trending per [facility] High-Sensitivity Troponin Pathway  EKG with prolonged QTc (481 ms) and other nonspecific findings.  Repeat EKG with possible arrhythmia c/w PACs rather than Afib.  - CVTS following, recs per eval on admission:   - Echocardiogram in AM   - SBP goal <120, HR <60   - Further surgical management TBD pending imaging  - Labetalol 5 mg IV x1 followed by 5 mg IV q2h prn to maintain SBP <120 and HR <60  - Cardiac monitoring with continuous pulse ox  - NPO except meds for possible procedure  - DVT  ppx with lovenox 40 mg q12 h - holding for possible procedure  - Surgery notified of difference in upper extremity pressures, radial dopplers pending  - Will replete lytes to K>4, Phos>3, Mg>2    # Dyspnea  Likely 2/2 aneurysm now extending to aortic root. Less likely heart failure given negative pro BNP 15, though could have element of being falsely lowered in setting of obesity (BMI 47). Prior echo with EF 60-65% and no dysfunction, though without affected aortic root at that time as well. Has been on diuretics in past but not currently per chart.  Currently without dyspnea or O2 requirement in ED.  Per hospitalization notes here in Sept 2022, noted to have hx of dyspnea on exertion in setting of being current smoker, tx with albuterol inhaler, recommended outpatient PFTs be obtained. Pulmonary progress note from 7/2019 notes current smoker with unknown COPD history, no PFTs available. Had tracheostomy placed 7/5/19, decannulated 8/2019.  - continue PTA albuterol inhaler q4h prn  - repeat echo pending  - CRP pending  - strict I/Os qShift  - continuous pulse ox    # Mild hypokalemia, improving  Outpatient labs 12/16 with K 3.1.  In ED 12/21 - K 3.3,  Mag 1.7.  EKG with borderline prolonged QTc 481ms, otherwise nonspecific findings.  - KCl 40 mEq po in ED  - Mag 2 g IV  - recheck with AM labs, replete prn    # LAKISHA on CKD 2, improving  Outpatient labs 12/16 with Cr 1.4 and eGFR 55.  Baseline Cr ~1.1-1.2, eGFR ~65-75.  - UA pending  - monitor daily bmp  - decrease PTA gabapentin 600 mg TID -> 300 mg TID  - holding PTA metformin for now    # Type 2 DM with hyperglycemia  Most recent A1c 12.5 (9/16/22)  BG on admission 236  Home regimen: metformin 1000 qPM and Lantus 50 unit(s) qAM + aspart 17 unit(s) TIDAC + aspart 9 unit(s) w/ snacks  - Holding AM lantus while NPO for possible surgery  - q4h glucose checks with high sliding scale insulin while NPO  - holding PTA metformin as above  - hypoglycemia protocol    # BLE  edema with elevated d dimer  Dimer elevated with acute on chronic leg swelling L < R per patient history.  DVT US in ED negative for DVT b/l.  Has had LE swelling for last 2 years, notes it is worse lately.  Previously on diuretics, not currently per chart.  - monitor for changes    # Venous stasis ulcer LLE  # Venous insufficiency  # Peripheral vascular disease  - WOC consult placed    # HTN  Not currently taking and antihypertensive medications per chart.  Prior meds per chart: lasix 40 mg daily, hydrochlorothiazide 50 mg daily, KCl 40 mEq daily  - PharmD med rec consult pending  - Labetalol q2h prn for BP/HR goals as above    # Schizoaffective disorder  # PTSD  Meds per chart include Zyprexa, Abilify, and Seroquel though not currently taking Abilify per chart.  - continue PTA olanzapine 5 mg and quetiapine 600 mg at bedtime  - PharmD med rec as above    # Tobacco use  Smoked cigarettes for 45 years.  Currently 1 pack per week.  - NRT while inpatient?    # Hiatal hernia  Noted on imaging.    # H/o possible ACE inhibitor related angioedema  Noted during July 2019 hospitalization at same time having AHRF requiring tracheostomy (later decannulted in August 2019).    Diet: NPO per Anesthesia Guidelines for Procedure/Surgery Except for: Meds  DVT Prophylaxis: Enoxaparin (Lovenox) subcutaneous - holding for potential procedure  Noble Catheter: Not present  Fluids: no  Central Lines: None  Cardiac Monitoring: ACTIVE order. Indication: aortic dissection  Code Status: Full Code    Patient to be formally staffed in the AM.    Randee Moses MD MPH  PGY1, Internal Medicine  Medicine Service, [facility] TEAM NIGHT  Securely message with the Vocera Web Console (learn more here)  Text page via [facility] Paging/Directory   Please see signed in provider for up to date coverage information  ______________________________________________________________________    Chief Complaint   Abnormal Labs (Advised by nurse at his clinic to come  to  the ER after abnormal test results, he said there was a critical kidney result and low potassium. Patient is unsure exactly what his labs were)  Leg swelling  Shortness of breath    History is obtained from the patient and electronic health record    History of Present Illness   Antony Aguila Jr. is a 62 year old male who has a history of chronic thoracic aortic dissection ([place] type B) s/p exploration and median sternotomy (6/18/19), type 2 DM, and HTN and presents due to abnormal clinic labs (K 3.1, Cr 2.4) and Has had LE swelling for last 2 years, notes it is worse lately. Reports intermittent dyspnea, but not at time of exam. No chest pain, no sob, no nausea or vomiting. Newly diaphoretic x 30 mins when seen in ED. Has a nurse that helps with meds 1x/week and PCA with cares.    Relevant hx regarding dissection per inpatient Pulmonology note 7/29/19:  Brief summary:   59 y.o. y/o male who was transferred from [facility]. PMHx sig for HTN and schizoaffective disorder. Presented to [facility] 6-17[facility]19 with acute onset of back pain and left flank pain. Taken to OR by Dr Marcelo with suspected aortic dissection type 1. Underwent exploration and median sternotomy with discovery of chronic dissection Type B. Extubated post-op in PACU but reintubated. On 6/21 desaturated into the 70's and was found to have a large mucus plug. He underwent bronchoscopy and culture was positive for Enterobacter aerogenes. Flolan started 6/30 for persistent hypoxia with high PEEP requirements. Completed 7 days levofloxacin. Ongoing smoking with unknown history regarding COPD - no PFTs available - no sign of exacerbation of airway disease. Tracheostomy placed 7/5/19. Began some PSV weaning prior to discharge.    Review of Systems    The 10 point Review of Systems is negative other than noted in the HPI or here.    Past Medical History    I have reviewed this patient's medical history and updated it with pertinent information if needed.  Past  Medical History:   Diagnosis Date     Chronic dissection of thoracic aorta     S/p exploration and median sternotomy by Dr. Marcelo on 19     Diabetes (H)      Hypertension      Past Surgical History    I have reviewed this patient's surgical history and updated it with pertinent information if needed.  Past Surgical History:   Procedure Laterality Date     MEDIAN STERNOTOMY  2019    Chronic dissection of thoracic aorta s/p exploration and median sternotomy by Dr. Marcelo     TRACHEOSTOMY  2019    decannulated 19     Social History   I have reviewed this patient's social history and updated it with pertinent information if needed. Antony Aguila .  reports that he has been smoking cigarettes. He has never used smokeless tobacco. He reports that he does not currently use alcohol. He reports current drug use. Drug: Marijuana.    Family History   Younger sister s/p cardiac stents, no other known hx of MI or heart disease.    Prior to Admission Medications   Prior to Admission Medications   Prescriptions Last Dose Informant Patient Reported? Taking?   ARIPiprazole (ABILIFY) 10 MG tablet  Self Yes No   Sig: Take 10 mg by mouth daily   Continuous Blood Gluc  (FREESTYLE LIA 2 READER) NATALIA  Self No No   Si each 4 times daily   Continuous Blood Gluc Sensor (FREESTYLE LIA 2 SENSOR) MISC  Self No No   Si each 4 times daily   KETOSTIX test strip  Self No No   Sig: Use as directed in case of high glucose or illness.   OLANZapine (ZYPREXA) 5 MG tablet 2022 at pm Self Yes Yes   Sig: Take 5 mg by mouth At Bedtime   QUEtiapine (SEROQUEL) 200 MG tablet 2022 at pm Self Yes Yes   Sig: Take 200 mg by mouth At Bedtime   QUEtiapine (SEROQUEL) 400 MG tablet 2022 at pm Self Yes Yes   Sig: Take 400 mg by mouth At Bedtime   acetaminophen (TYLENOL) 500 MG tablet 2022 at pm Self Yes Yes   Sig: Take 500-1,000 mg by mouth every 8 hours as needed for mild pain   albuterol (PROAIR  HFA/PROVENTIL HFA/VENTOLIN HFA) 108 (90 Base) MCG/ACT inhaler 12/20/2022 at am Self Yes Yes   Sig: Inhale 2 puffs into the lungs every 4 hours as needed for shortness of breath / dyspnea or wheezing   diphenhydrAMINE (BENADRYL) 50 MG capsule  Self Yes No   Sig: Take 50 mg by mouth every 6 hours as needed for itching or allergies   Patient not taking: Reported on 10/25/2022   furosemide (LASIX) 40 MG tablet  Self Yes No   Sig: Take 40 mg by mouth daily   gabapentin (NEURONTIN) 600 MG tablet 12/20/2022 at 1 am/3 Self Yes Yes   Sig: Take 600 mg by mouth 3 times daily   hydrochlorothiazide (HYDRODIURIL) 50 MG tablet 12/20/2022 Self Yes Yes   Sig: Take 50 mg by mouth daily   insulin aspart (NOVOLOG PEN) 100 UNIT/ML pen 12/20/2022 at pm Self No Yes   Sig: Inject 17 Units Subcutaneous 3 times daily (with meals) and 9 units with snacks   insulin glargine (LANTUS PEN) 100 UNIT/ML pen 12/20/2022 at am Self No Yes   Sig: Inject 70 Units Subcutaneous every morning   insulin pen needle (31G X 5 MM) 31G X 5 MM miscellaneous  Self No No   Sig: Use 6 pen needles daily or as directed.   levETIRAcetam (KEPPRA) 500 MG tablet Past Week Self Yes Yes   Sig: Take 500 mg by mouth 2 times daily   meloxicam (MOBIC) 7.5 MG tablet Unknown Self Yes Yes   Sig: Take 7.5 mg by mouth every 12 hours as needed   metFORMIN (GLUCOPHAGE XR) 500 MG 24 hr tablet 12/19/2022 at 2/2 am Self No Yes   Sig: Take 2 tablets (1,000 mg) by mouth daily (with dinner)   mineral oil-hydrophilic petrolatum (AQUAPHOR) external ointment  Self Yes No   Sig: Apply 2 g topically 3 times daily as needed   Patient not taking: Reported on 10/25/2022   potassium chloride ER (K-TAB) 20 MEQ CR tablet  Self Yes No   Sig: Take 40 mEq by mouth   Patient not taking: Reported on 10/25/2022      Facility-Administered Medications: None     Allergies   Allergies   Allergen Reactions     Lidocaine      Pt states this is not an allergy and doesn't recall this to be an allergy     Lisinopril  Swelling     Pt states this is not an allergy and doesn't recall this to be an allergy     Pollen Extract        Physical Exam   Vital Signs: Temp: 98.1  F (36.7  C) Temp src: Oral BP: 112/68 Pulse: 83   Resp: 19 SpO2: 96 % O2 Device: None (Room air)    Weight: 350 lbs 0 oz    VS notable for differences in R/L arm pressures:  - L ~130/80 (HR 90s)  - R ~115/65 (HR 70s)    General: Awake, NAD but diaphoretic  HEENT: PERRL, EOMI, sclera nonicteric/nonhemorrhagic, normal hearing  Neck: Supple, no JVD visualized  Lungs: distant lung sounds, no increased work of breathing or audible wheeze, on room air  Heart: Normal rate, regular rhythm, murmur not appreciated but difficult to auscultate 2/2 habitus, non-pitting BLE edema, b/l radial pulses intact and b/l hands wwp  Abd: obese, non-distended  MSK: no gross deformity  Skin: Warm and dry, BLE with stasis overlying skin changes and LLE with small stasis ulcer  Neuro: AOX3, conversational, CN II-XII grossly intact  Psych: Cooperative, mood and affect congruent    Data   Data reviewed today: I reviewed all medications, new labs and imaging results over the last 24 hours.    Results for orders placed or performed during the hospital encounter of 12/21/22 (from the past 24 hour(s))   Basic metabolic panel   Result Value Ref Range    Sodium 140 133 - 144 mmol/L    Potassium 3.3 (L) 3.4 - 5.3 mmol/L    Chloride 102 94 - 109 mmol/L    Carbon Dioxide (CO2) 32 20 - 32 mmol/L    Anion Gap 6 3 - 14 mmol/L    Urea Nitrogen 10 7 - 30 mg/dL    Creatinine 1.23 0.66 - 1.25 mg/dL    Calcium 9.7 8.5 - 10.1 mg/dL    Glucose 220 (H) 70 - 99 mg/dL    GFR Estimate 66 >60 mL/min/1.73m2   CBC with platelets differential    Narrative    The following orders were created for panel order CBC with platelets differential.  Procedure                               Abnormality         Status                     ---------                               -----------         ------                     CBC with  platelets and d...[[id number]]  Abnormal            Final result                 Please view results for these tests on the individual orders.   Magnesium   Result Value Ref Range    Magnesium 1.7 1.6 - 2.3 mg/dL   CBC with platelets and differential   Result Value Ref Range    WBC Count 7.5 4.0 - 11.0 10e3/uL    RBC Count 4.75 4.40 - 5.90 10e6/uL    Hemoglobin 14.0 13.3 - 17.7 g/dL    Hematocrit 41.3 40.0 - 53.0 %    MCV 87 78 - 100 fL    MCH 29.5 26.5 - 33.0 pg    MCHC 33.9 31.5 - 36.5 g/dL    RDW 15.8 (H) 10.0 - 15.0 %    Platelet Count 240 150 - 450 10e3/uL    % Neutrophils 64 %    % Lymphocytes 23 %    % Monocytes 9 %    % Eosinophils 2 %    % Basophils 1 %    % Immature Granulocytes 1 %    NRBCs per 100 WBC 0 <1 /100    Absolute Neutrophils 4.9 1.6 - 8.3 10e3/uL    Absolute Lymphocytes 1.7 0.8 - 5.3 10e3/uL    Absolute Monocytes 0.7 0.0 - 1.3 10e3/uL    Absolute Eosinophils 0.2 0.0 - 0.7 10e3/uL    Absolute Basophils 0.0 0.0 - 0.2 10e3/uL    Absolute Immature Granulocytes 0.0 <=0.4 10e3/uL    Absolute NRBCs 0.0 10e3/uL   BNP   Result Value Ref Range    N terminal Pro BNP Inpatient 15 0 - 900 pg/mL   Troponin I   Result Value Ref Range    Troponin I High Sensitivity 14 <79 ng/L   Glucose by meter   Result Value Ref Range    GLUCOSE BY METER POCT 236 (H) 70 - 99 mg/dL   EKG 12 lead   Result Value Ref Range    Systolic Blood Pressure  mmHg    Diastolic Blood Pressure  mmHg    Ventricular Rate 89 BPM    Atrial Rate 89 BPM    NC Interval 152 ms    QRS Duration 86 ms     ms    QTc 481 ms    P Axis 29 degrees    R AXIS 21 degrees    T Axis 31 degrees    Interpretation ECG       Sinus rhythm  Possible Left atrial enlargement  Nonspecific ST abnormality  Prolonged QT  Abnormal ECG  Unconfirmed report - interpretation of this ECG is computer generated - see medical record for final interpretation  Confirmed by - EMERGENCY ROOM, PHYSICIAN ([id number]),  MIRIAN CR ([id number]) on 12/21/2022 8:50:12 PM     Chest XR,   PA & LAT    Narrative    EXAM: XR CHEST 2 VIEWS  LOCATION: [facility]  DATE/TIME: 12/21/2022 5:37 PM    INDICATION: Dyspnea.  COMPARISON: 09/15/2022, 09/25/2021      Impression    IMPRESSION:     Widened mediastinal silhouette, compatible with underlying thoracic aortic aneurysm. The size appears slightly increased from prior exam, possibly projectional. If clinical concern for acute aortic pathology, consider CTA.    Stable nonenlarged cardiopericardial silhouette.    No focal airspace disease. No pleural effusion or pneumothorax.    Hiatal hernia.    Median sternotomy wires.     Multilevel degenerative changes of the spine.   D dimer quantitative   Result Value Ref Range    D-Dimer Quantitative 1.18 (H) 0.00 - 0.50 ug/mL FEU    Narrative    This D-dimer assay is intended for use in conjunction with a clinical pretest probability assessment model to exclude pulmonary embolism (PE) and deep venous thrombosis (DVT) in outpatients suspected of PE or DVT. The cut-off value is 0.50 ug/mL FEU.   CTA CHEST ABDOMEN PELVIS WITH CONTRAST PANEL    Narrative    EXAM: CTA CHEST ABDOMEN PELVIS W CONTRAST  LOCATION: [facility]  DATE/TIME: 12/21/2022 8:31 PM    INDICATION: History of aortic dissection. Exertional dyspnea. Lower extremity edema. Hypokalemia.  COMPARISON: Chest radiograph 12/21/2022, CTA report dated 06/18/2019 from [facility].  TECHNIQUE: CT angiogram chest abdomen pelvis during arterial phase of injection of IV contrast. 2D and 3D MIP reconstructions were performed by the CT technologist. Dose reduction techniques were used.   CONTRAST: 100 mL Isovue 370    FINDINGS:   CT ANGIOGRAM CHEST, ABDOMEN, AND PELVIS:   Pulmonary Arteries: The pulmonary arteries are well-opacified with contrast. No filling defects are seen to suggest thromboembolism.    Thoracic and Abdominal Aorta: Aneurysmal dilatation of the distal  ascending thoracic aorta measuring 6.4 cm in diameter. Aneurysmal dilatation of the descending thoracic aorta, measuring up to 5.8 cm proximally. Short segment intimal flaps of the aortic   root and distal ascending thoracic aorta/proximal aortic arch, with focal bulging, either representing short segment dissection versus pseudoaneurysm. Aortic dissection beginning distal to the left subclavian artery and ending at the left common iliac   artery.    Celiac Artery: Dissection flap extends into the celiac artery, resulting in severe narrowing of the true lumen.  Superior Mesenteric Artery: Dissection flap extends into the superior mesenteric artery, resulting in severe narrowing of the true lumen.  Right Renal Artery: Arises from the true lumen. Patent.  Left Renal Artery: Dissection flap extends into the left renal artery, resulting in severe narrowing of the true lumen.  Inferior Mesenteric Artery: Not well assessed due to photon starvation and small caliber. Dissection flap in the region of the artery origin.    Right Common Iliac Artery: Patent.  Right External Iliac Artery: Patent.  Right Internal Iliac Artery: Patent.  Right Common Femoral Artery: Patent.  Proximal Right Superficial Femoral Artery: Patent.  Proximal Right Deep Femoral Artery: Patent.    Left Common Iliac Artery: Patent.  Left External Iliac Artery: Patent.  Left Internal Iliac Artery: Patent.  Left Common Femoral Artery: Patent.  Proximal Left Superficial Femoral Artery: Patent.  Proximal Left Deep Femoral Artery: Patent.        LUNGS AND PLEURA: Compressive atelectasis in the left lung adjacent to the thoracic aorta and hiatal hernia. No focal airspace disease. No pleural effusion or pneumothorax.    MEDIASTINUM/AXILLAE: Cardiomegaly. No pericardial effusion. No lymphadenopathy. No mediastinal hemorrhage.    CORONARY ARTERY CALCIFICATION: Severe.    Suboptimal evaluation of the solid organs of the abdomen and pelvis due to photon starvation  and arterial phase imaging.    HEPATOBILIARY: Diffuse hepatic steatosis. Gallbladder appears unremarkable. No definite biliary dilatation.    PANCREAS: Unremarkable.    SPLEEN: Unremarkable.    ADRENAL GLANDS: Unremarkable.    KIDNEYS/BLADDER: Unremarkable.    BOWEL: Large hiatal hernia. Mild colonic diverticulosis. No obstruction or inflammatory change. Normal appendix. No evidence of acute appendicitis.    LYMPH NODES: No lymphadenopathy.    PELVIC ORGANS: Unremarkable.    MUSCULOSKELETAL: Small fat-containing left inguinal hernia. Multilevel degenerative changes of the spine. Median sternotomy wires.      Impression    IMPRESSION:  1.  Type B aortic dissection, beginning distal to the left subclavian artery and extending to the left common iliac artery. Dissection flap involves the celiac, superior mesenteric, and left renal arteries, resulting in severe narrowing of the true   lumen.    2.  Short segment dissections versus pseudoaneurysms at the aortic root and at the distal ascending thoracic aorta/proximal aortic arch.    3.  Aneurysmal dilatation of the ascending and descending thoracic aorta.    4.  No mediastinal hemorrhage or hemothorax.    Findings were communicated to Dr. Romero over the telephone by Dr. Barbosa at 12/21/2022 9:10 PM.   US Lower Extremity Venous Duplex Bilateral    Narrative    EXAM: US LOWER EXTREMITY VENOUS DUPLEX BILATERAL  LOCATION: [facility]  DATE/TIME: 12/21/2022 8:46 PM    INDICATION: Swelling bilateral legs, +dimer  COMPARISON: 09/25/2021  TECHNIQUE: Venous Duplex ultrasound of bilateral lower extremities with and without compression, augmentation and duplex. Color flow and spectral Doppler with waveform analysis performed.    FINDINGS: Exam includes the common femoral, femoral, popliteal veins as well as segmentally visualized deep calf veins and greater saphenous vein. Peroneal and calf veins were not well visualized  bilaterally.    RIGHT: No deep vein thrombosis. No superficial thrombophlebitis. No popliteal cyst.    LEFT: No deep vein thrombosis. No superficial thrombophlebitis. No popliteal cyst.      Impression    IMPRESSION:  No deep venous thrombosis in the bilateral lower extremities.      19

## 2022-12-22 NOTE — ED PROVIDER NOTES
Emergency Department Patient Sign-out       Brief HPI:  This is a 62 year old male signed out to me by Dr. Huang .  See initial ED Provider note for details of the presentation.            Significant Events prior to my assuming care: Patient presented for low potassium and 1 month h/o dyspnea on exertion. Chest CT showed incidental finding of possible short segment aortic root dissections vs pseudoaneurysms. Cardiac surgery reviewed imaging and do not see an indication for acute surgical intervention. Patient's blood pressure has been controlled with his usual home meds. He has not had any pain. Admitted to medicine but wanted patient to board in [facility] as he would not get an inpatient bed on [facility]. Patient already has bed request to medicine and handoff was given to hospitalist. Both the medicine team and cardiac surgery want to be notified when patient arrives. He needs to have ECHO performed tonight.       Exam:   Patient Vitals for the past 24 hrs:   BP Temp Temp src Pulse Resp SpO2 Height Weight   12/21/22 2339 134/85 98.1  F (36.7  C) Oral -- 20 97 % -- --   12/21/22 2200 132/87 -- -- -- -- 95 % -- --   12/21/22 2156 132/87 -- -- 82 -- 96 % -- --   12/21/22 2145 -- -- -- -- -- 94 % -- --   12/21/22 2130 -- -- -- -- -- 94 % -- --   12/21/22 2116 -- -- -- -- -- 96 % -- --   12/21/22 2115 -- -- -- -- -- 91 % -- --   12/21/22 2100 -- -- -- -- -- 95 % -- --   12/21/22 2055 -- -- -- -- -- 94 % -- --   12/21/22 1635 (!) 158/87 99.3  F (37.4  C) Oral 92 20 93 % 1.829 m (6') (!) 158.8 kg (350 lb)           ED RESULTS:   Results for orders placed or performed during the hospital encounter of 12/21/22 (from the past 24 hour(s))   Basic metabolic panel     Status: Abnormal    Collection Time: 12/21/22  4:54 PM   Result Value Ref Range    Sodium 140 133 - 144 mmol/L    Potassium 3.3 (L) 3.4 - 5.3 mmol/L    Chloride 102 94 - 109 mmol/L    Carbon Dioxide (CO2) 32 20 - 32 mmol/L    Anion Gap 6 3 -  14 mmol/L    Urea Nitrogen 10 7 - 30 mg/dL    Creatinine 1.23 0.66 - 1.25 mg/dL    Calcium 9.7 8.5 - 10.1 mg/dL    Glucose 220 (H) 70 - 99 mg/dL    GFR Estimate 66 >60 mL/min/1.73m2   CBC with platelets differential     Status: Abnormal    Collection Time: 12/21/22  4:54 PM    Narrative    The following orders were created for panel order CBC with platelets differential.  Procedure                               Abnormality         Status                     ---------                               -----------         ------                     CBC with platelets and d...[[id number]]  Abnormal            Final result                 Please view results for these tests on the individual orders.   Magnesium     Status: Normal    Collection Time: 12/21/22  4:54 PM   Result Value Ref Range    Magnesium 1.7 1.6 - 2.3 mg/dL   CBC with platelets and differential     Status: Abnormal    Collection Time: 12/21/22  4:54 PM   Result Value Ref Range    WBC Count 7.5 4.0 - 11.0 10e3/uL    RBC Count 4.75 4.40 - 5.90 10e6/uL    Hemoglobin 14.0 13.3 - 17.7 g/dL    Hematocrit 41.3 40.0 - 53.0 %    MCV 87 78 - 100 fL    MCH 29.5 26.5 - 33.0 pg    MCHC 33.9 31.5 - 36.5 g/dL    RDW 15.8 (H) 10.0 - 15.0 %    Platelet Count 240 150 - 450 10e3/uL    % Neutrophils 64 %    % Lymphocytes 23 %    % Monocytes 9 %    % Eosinophils 2 %    % Basophils 1 %    % Immature Granulocytes 1 %    NRBCs per 100 WBC 0 <1 /100    Absolute Neutrophils 4.9 1.6 - 8.3 10e3/uL    Absolute Lymphocytes 1.7 0.8 - 5.3 10e3/uL    Absolute Monocytes 0.7 0.0 - 1.3 10e3/uL    Absolute Eosinophils 0.2 0.0 - 0.7 10e3/uL    Absolute Basophils 0.0 0.0 - 0.2 10e3/uL    Absolute Immature Granulocytes 0.0 <=0.4 10e3/uL    Absolute NRBCs 0.0 10e3/uL   BNP     Status: Normal    Collection Time: 12/21/22  4:54 PM   Result Value Ref Range    N terminal Pro BNP Inpatient 15 0 - 900 pg/mL   Troponin I     Status: Normal    Collection Time: 12/21/22  4:54 PM   Result Value Ref Range     Troponin I High Sensitivity 14 <79 ng/L   Glucose by meter     Status: Abnormal    Collection Time: 12/21/22  5:03 PM   Result Value Ref Range    GLUCOSE BY METER POCT 236 (H) 70 - 99 mg/dL   EKG 12 lead     Status: None    Collection Time: 12/21/22  5:17 PM   Result Value Ref Range    Systolic Blood Pressure  mmHg    Diastolic Blood Pressure  mmHg    Ventricular Rate 89 BPM    Atrial Rate 89 BPM    PA Interval 152 ms    QRS Duration 86 ms     ms    QTc 481 ms    P Axis 29 degrees    R AXIS 21 degrees    T Axis 31 degrees    Interpretation ECG       Sinus rhythm  Possible Left atrial enlargement  Nonspecific ST abnormality  Prolonged QT  Abnormal ECG  Unconfirmed report - interpretation of this ECG is computer generated - see medical record for final interpretation  Confirmed by - EMERGENCY ROOM, PHYSICIAN ([id number]),  MIRIAN CR ([id number]) on 12/21/2022 8:50:12 PM     Chest XR,  PA & LAT     Status: None    Collection Time: 12/21/22  5:37 PM    Narrative    EXAM: XR CHEST 2 VIEWS  LOCATION: [facility]  DATE/TIME: 12/21/2022 5:37 PM    INDICATION: Dyspnea.  COMPARISON: 09/15/2022, 09/25/2021      Impression    IMPRESSION:     Widened mediastinal silhouette, compatible with underlying thoracic aortic aneurysm. The size appears slightly increased from prior exam, possibly projectional. If clinical concern for acute aortic pathology, consider CTA.    Stable nonenlarged cardiopericardial silhouette.    No focal airspace disease. No pleural effusion or pneumothorax.    Hiatal hernia.    Median sternotomy wires.     Multilevel degenerative changes of the spine.   D dimer quantitative     Status: Abnormal    Collection Time: 12/21/22  6:16 PM   Result Value Ref Range    D-Dimer Quantitative 1.18 (H) 0.00 - 0.50 ug/mL FEU    Narrative    This D-dimer assay is intended for use in conjunction with a clinical pretest probability assessment model to exclude pulmonary  embolism (PE) and deep venous thrombosis (DVT) in outpatients suspected of PE or DVT. The cut-off value is 0.50 ug/mL FEU.   CTA CHEST ABDOMEN PELVIS WITH CONTRAST PANEL     Status: None    Collection Time: 12/21/22  8:31 PM    Narrative    EXAM: CTA CHEST ABDOMEN PELVIS W CONTRAST  LOCATION: [facility]  DATE/TIME: 12/21/2022 8:31 PM    INDICATION: History of aortic dissection. Exertional dyspnea. Lower extremity edema. Hypokalemia.  COMPARISON: Chest radiograph 12/21/2022, CTA report dated 06/18/2019 from [facility].  TECHNIQUE: CT angiogram chest abdomen pelvis during arterial phase of injection of IV contrast. 2D and 3D MIP reconstructions were performed by the CT technologist. Dose reduction techniques were used.   CONTRAST: 100 mL Isovue 370    FINDINGS:   CT ANGIOGRAM CHEST, ABDOMEN, AND PELVIS:   Pulmonary Arteries: The pulmonary arteries are well-opacified with contrast. No filling defects are seen to suggest thromboembolism.    Thoracic and Abdominal Aorta: Aneurysmal dilatation of the distal ascending thoracic aorta measuring 6.4 cm in diameter. Aneurysmal dilatation of the descending thoracic aorta, measuring up to 5.8 cm proximally. Short segment intimal flaps of the aortic   root and distal ascending thoracic aorta/proximal aortic arch, with focal bulging, either representing short segment dissection versus pseudoaneurysm. Aortic dissection beginning distal to the left subclavian artery and ending at the left common iliac   artery.    Celiac Artery: Dissection flap extends into the celiac artery, resulting in severe narrowing of the true lumen.  Superior Mesenteric Artery: Dissection flap extends into the superior mesenteric artery, resulting in severe narrowing of the true lumen.  Right Renal Artery: Arises from the true lumen. Patent.  Left Renal Artery: Dissection flap extends into the left renal artery, resulting in severe narrowing of the  true lumen.  Inferior Mesenteric Artery: Not well assessed due to photon starvation and small caliber. Dissection flap in the region of the artery origin.    Right Common Iliac Artery: Patent.  Right External Iliac Artery: Patent.  Right Internal Iliac Artery: Patent.  Right Common Femoral Artery: Patent.  Proximal Right Superficial Femoral Artery: Patent.  Proximal Right Deep Femoral Artery: Patent.    Left Common Iliac Artery: Patent.  Left External Iliac Artery: Patent.  Left Internal Iliac Artery: Patent.  Left Common Femoral Artery: Patent.  Proximal Left Superficial Femoral Artery: Patent.  Proximal Left Deep Femoral Artery: Patent.        LUNGS AND PLEURA: Compressive atelectasis in the left lung adjacent to the thoracic aorta and hiatal hernia. No focal airspace disease. No pleural effusion or pneumothorax.    MEDIASTINUM/AXILLAE: Cardiomegaly. No pericardial effusion. No lymphadenopathy. No mediastinal hemorrhage.    CORONARY ARTERY CALCIFICATION: Severe.    Suboptimal evaluation of the solid organs of the abdomen and pelvis due to photon starvation and arterial phase imaging.    HEPATOBILIARY: Diffuse hepatic steatosis. Gallbladder appears unremarkable. No definite biliary dilatation.    PANCREAS: Unremarkable.    SPLEEN: Unremarkable.    ADRENAL GLANDS: Unremarkable.    KIDNEYS/BLADDER: Unremarkable.    BOWEL: Large hiatal hernia. Mild colonic diverticulosis. No obstruction or inflammatory change. Normal appendix. No evidence of acute appendicitis.    LYMPH NODES: No lymphadenopathy.    PELVIC ORGANS: Unremarkable.    MUSCULOSKELETAL: Small fat-containing left inguinal hernia. Multilevel degenerative changes of the spine. Median sternotomy wires.      Impression    IMPRESSION:  1.  Type B aortic dissection, beginning distal to the left subclavian artery and extending to the left common iliac artery. Dissection flap involves the celiac, superior mesenteric, and left renal arteries, resulting in severe  narrowing of the true   lumen.    2.  Short segment dissections versus pseudoaneurysms at the aortic root and at the distal ascending thoracic aorta/proximal aortic arch.    3.  Aneurysmal dilatation of the ascending and descending thoracic aorta.    4.  No mediastinal hemorrhage or hemothorax.    Findings were communicated to Dr. Romero over the telephone by Dr. Barbosa at 12/21/2022 9:10 PM.   US Lower Extremity Venous Duplex Bilateral     Status: None    Collection Time: 12/21/22  8:46 PM    Narrative    EXAM: US LOWER EXTREMITY VENOUS DUPLEX BILATERAL  LOCATION: [facility]  DATE/TIME: 12/21/2022 8:46 PM    INDICATION: Swelling bilateral legs, +dimer  COMPARISON: 09/25/2021  TECHNIQUE: Venous Duplex ultrasound of bilateral lower extremities with and without compression, augmentation and duplex. Color flow and spectral Doppler with waveform analysis performed.    FINDINGS: Exam includes the common femoral, femoral, popliteal veins as well as segmentally visualized deep calf veins and greater saphenous vein. Peroneal and calf veins were not well visualized bilaterally.    RIGHT: No deep vein thrombosis. No superficial thrombophlebitis. No popliteal cyst.    LEFT: No deep vein thrombosis. No superficial thrombophlebitis. No popliteal cyst.      Impression    IMPRESSION:  No deep venous thrombosis in the bilateral lower extremities.       ED MEDICATIONS:   Medications   potassium chloride (KAYCIEL) solution 40 mEq (40 mEq Oral Given 12/21/22 1723)   acetaminophen (TYLENOL) tablet 1,000 mg (1,000 mg Oral Given 12/21/22 1821)   morphine (PF) injection 4 mg (4 mg Intravenous Given 12/21/22 2051)   iopamidol (ISOVUE-370) solution 100 mL (100 mLs Intravenous Given 12/21/22 2027)   sodium chloride 0.9 % bag 100mL for CT scan flush use (75 mLs Intravenous Given 12/21/22 2026)   amLODIPine (NORVASC) tablet 10 mg (10 mg Oral Given 12/21/22 2205)         Impression:    ICD-10-CM    1.  Aortic root dilation (H)  I77.810       2. Hypokalemia  E87.6       3. Venous stasis ulcer of left calf limited to breakdown of skin, unspecified whether varicose veins present (H)  I83.022 Wound Care Referral    L97.221       4. Dissecting aneurysm of thoracic aorta, Howard type B  I71.012     I71.23           Plan:    Pending studies include ECHO and cardiac surg consult.      On arrival, patient denies pain. NAD. BP 130s. Surgery consulted. Discussed with triage hospitalist who accepted for admission. ECHO pending.       MD [place] Jill C, MD  12/21/22 6948

## 2022-12-22 NOTE — CONSULTS
Cardiology New Consult Note       Date of Service: 12/22/22    ASSESSMENT:   Antony Aguila Jr. 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 A chest CTA was obtained (given his history of aortic dissection) which showed stable type B aortic dissection as well as short segment of dissection vs pseudoaneurysms at the aortic root and distal ascending thoracic aorta.  A dedicated CTA of the coronary arteries and thoracic aorta was subsequently obtained which showed the known type B aortic dissection extending from the proximal descending aorta to the abdominal aorta as well as 2 saccular aneurysms, 1 each in the proximal ascending aorta and distal ascending aorta.  Coronary evaluation showed mild nonobstructive disease.  Cardiothoracic surgery is involved and considering possible intervention.     Clinically, patient does not appear to be in significant heart failure.  He does have some lower extremity edema which is unchanged in the last few weeks.  He also has chronic dyspnea and some activity tolerance which is also unchanged recently.    Problem list:  # Chronic type B aortic dissection (proximal descending aorta to the abdominal aorta)  # Proximal ascending aorta saccular aneurysm   # Distal ascending aorta saccular aneurysm   # Mild nonobstructive CAD    RECOMMENDATIONS:  -Radiology staff working on retrieving previous CTA chest results from [facility] for direct comparison  -Transesophageal echocardiogram for surgical planning (already ordered)  -We will continue to monitor volume status and consider intermittent diuresis as appropriate  -Cardiology will continue to follow    Discussed with attending, Dr. Prescott    Thank you for  "consulting the cardiovascular services at the [facility]. Please do not hesitate to call us with any questions.     Roni Patel MD  Cardiology Fellow  [phone]    ----------------------------------------------------------------------------  REASON FOR CONSULT: \"chronic type B dissection with concern for ascending aorta dissection\"    History of Present Illness   Antony Aguila Jr. 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  Patient reports that he has been at his baseline in terms of exertional dyspnea and activity tolerance.  Denies any chest pain, back pain or abdominal pain.  He has chronic lower extremity edema which is unchanged in the last few weeks.  ED provider reviewed his prior CTA from July 2019 which noted a type aortic dissection, so CT was repeated on admission which showed stable type B aortic dissection as well as short segment of dissection versus pseudoaneurysms at the aortic root and at the distal ascending thoracic aorta.  A transthoracic echocardiogram was performed which was notably of poor acoustic windows and unable to fully visualize the aortic root.  Cardiology was consulted to assist with selecting the best imaging modality given the area of concern, ultimately proceeded with CTA of the thoracic aorta as well as coronary arteries.  The study showed the known type B aortic dissection extending from the proximal descending aorta to the abdominal aorta.  Also showed 2 saccular aneurysms, 1 each in the proximal ascending aorta and distal ascending aorta.  Patient had mild nonobstructive coronary artery disease without any high-grade stenoses.    Of note, patient was admitted to [facility] in June 2019 for acute onset back pain and left flank pain with CT showing an acute type aortic dissection originating at the distal ascending aorta at the takeoff of the proximal arch innominate artery.  There was discussion about whether the findings on imaging were consistent with a type a or type B dissection.  Ultimately patient was taken to the OR for fear of missing an acute dissection with retrograde involvement.  The ascending aorta was explored intraoperatively with no evidence of hematoma or other involvement of the ascending aorta.  The assessment was that this represented a chronic dissection involving the distal ascending or proximal aortic arch at the takeoff of the right innominate artery.  As such intervention was not pursued.    PAST MEDICAL HISTORY:  Past Medical History:   Diagnosis Date     Chronic dissection of thoracic aorta     S/p exploration and median sternotomy by Dr. Marcelo on 6/18/19     Diabetes (H)      Hypertension      CURRENT MEDICATIONS:  Current Outpatient Medications   Medication Sig Dispense Refill     acetaminophen (TYLENOL) 500 MG tablet Take 500-1,000 mg by mouth every 8 hours as needed for mild pain       albuterol (PROAIR HFA/PROVENTIL HFA/VENTOLIN HFA) 108 (90 Base) MCG/ACT inhaler Inhale 2 puffs into the lungs every 4 hours as needed for shortness of breath / dyspnea or wheezing       bumetanide (BUMEX) 1 MG tablet Take 1 mg by mouth 3 times daily Frequency unknown (filled as 90 tab for 30 days)       canagliflozin (INVOKANA) 100 MG tablet Take 100 mg by mouth every morning (before breakfast)       hydrochlorothiazide (HYDRODIURIL) 50 MG tablet Take 50 mg by mouth daily       insulin aspart (NOVOLOG PEN) 100 UNIT/ML pen Inject 17 Units Subcutaneous 3 times daily (with meals) and 9 units with snacks 45 mL 3     insulin glargine (LANTUS PEN) 100 UNIT/ML pen Inject 70 Units Subcutaneous every morning 75 mL 3     metFORMIN (GLUCOPHAGE XR) 500 MG 24 hr tablet Take 2  tablets (1,000 mg) by mouth daily (with dinner) 180 tablet 3     potassium chloride ER (K-TAB) 20 MEQ CR tablet Take 40 mEq by mouth daily Pt reports taking KCL 40 mEq daily       QUEtiapine (SEROQUEL) 200 MG tablet Take 400 mg by mouth At Bedtime Takes 400 mg po at bedtime         ALLERGIES     Allergies   Allergen Reactions     Lidocaine      Pt states this is not an allergy and doesn't recall this to be an allergy     Lisinopril Swelling     Hospitalization in 7/2019 notes possible ACE-inhibitor related angioedema.     Pollen Extract        FAMILY HISTORY:  No family history on file.    SOCIAL HISTORY:  Social History     Socioeconomic History     Marital status:    Tobacco Use     Smoking status: Every Day     Years: 45.00     Types: Cigarettes     Smokeless tobacco: Never     Tobacco comments:     Smokes 1 pack per week currently (12/2022).   Vaping Use     Vaping Use: Never used   Substance and Sexual Activity     Alcohol use: Not Currently     Drug use: Yes     Types: Marijuana       Review of Systems:   10-point ROS reviewed, & found negative w/ exceptions noted in the HPI.    Physical Exam   Temp: 98.1  F (36.7  C) Temp src: Oral BP: (!) 143/85 Pulse: 67   Resp: 26 SpO2: 98 % O2 Device: None (Room air)    Vital Signs with Ranges  Temp:  [98.1  F (36.7  C)-99.3  F (37.4  C)] 98.1  F (36.7  C)  Pulse:  [62-92] 67  Resp:  [9-28] 26  BP: (103-169)/() 143/85  SpO2:  [88 %-100 %] 98 %  350 lbs 0 oz    GEN: NAD, pleasant  HEENT: EOMI, moist mucous membranes  CV: RRR, normal s1/s2, no murmurs. JVP ~10   CHEST: Good air movement bilaterally, scattered wheezing appreciated, no crackles  ABD: soft, NT/ND   EXT: 1+ nonpitting edema bilaterally  NEURO: Awake, alert, answering questions appropriately, motor grossly nonfocal  PSYCH: cooperative, affect appropriate    LABS:  Recent Labs   Lab 12/22/22  1134 12/22/22  0718 12/22/22  0549 12/21/22  1703 12/21/22  1654 12/16/22  1058   WBC  --   --  6.6  --  7.5   --    HGB  --   --  13.0*  --  14.0  --    MCV  --   --  89  --  87  --    PLT  --   --  235  --  240  --    INR  --   --  1.08  --   --   --    NA  --   --  141  --  140 140   POTASSIUM  --   --  3.3*  --  3.3* 3.1*   CHLORIDE  --   --  101  --  102 96*   CO2  --   --  32*  --  32 32*   BUN  --   --  8.4  --  10 12.1   CR  --   --  1.26*  --  1.23 1.44*   ANIONGAP  --   --  8  --  6 12   ANGELLA  --   --  9.3  --  9.7 8.9   * 144* 159*   < > 220* 158*   ALBUMIN  --   --   --   --  3.3* 3.7   PROTTOTAL  --   --   --   --  7.5 7.0   BILITOTAL  --   --   --   --  0.5 0.4   ALKPHOS  --   --   --   --  66 70   ALT  --   --   --   --  29 25   AST  --   --   --   --  30 34    < > = values in this interval not displayed.     EKG: Sinus rhythm, conducted PACs, normal axis, no acute ST or T changes      IMAGING:   Reviewed and discussed in assessment and plan

## 2022-12-22 NOTE — PROGRESS NOTES
"Brief Progress Note    No acute events since admission. This morning, feeling well and stated he was here because he was told he had lab abnormalities. He is feeling well, no chest pain, shortness of breath. At first denies knowledge of known chronic aortic dissection, but upon further interview, endorses knowledge of a prior \"vein issue.\"     Updates to plan:  - Add labetalol 100 mg BID  - Continue IV labetalol 5 mg PRN for goal SBP<120, HR<60  - TTE with mild to moderate aortic insufficiency but difficult study, recommended additional imaging  - Case discussed with CVTS and cardiology, ordered additional CTA coronary arteries, CTA chest with contrast, and TRINH to further characterize any concern for type A dissection   - Per review of existing imaging, CVTS determined no acute indication for surgery at present  - Will allow patient diet tonight  - NPO at midnight for TRINH in the AM and consideration for further interventions tomorrow  - Restart PTA glargine at half dose 25u given diet restrictions  - Incentive spirometry for atelectasis    Patient discussed with Dr. Kalpana Hall MD  PGY-3 Internal Medicine     "

## 2022-12-22 NOTE — ED TRIAGE NOTES
Pt come to [facility] ED as a transfer from [facility]. Pt hx of cardiac complications. Pt denies chest pain currently. Pt reports SOB. Pt reports chronic LLE pain.  Pt A & O.     Triage Assessment     Row Name 12/21/22 1429       Triage Assessment (Adult)    Airway WDL WDL       Respiratory WDL    Respiratory WDL X  SOB       Skin Circulation/Temperature WDL    Skin Circulation/Temperature WDL WDL       Cardiac WDL    Cardiac WDL WDL       Peripheral/Neurovascular WDL    Peripheral Neurovascular WDL WDL       Cognitive/Neuro/Behavioral WDL    Cognitive/Neuro/Behavioral WDL WDL       Judith Coma Scale    Best Eye Response 4-->(E4) spontaneous    Best Motor Response 6-->(M6) obeys commands    Best Verbal Response 5-->(V5) oriented    Judith Coma Scale Score 15    Row Name 12/21/22 1638       Triage Assessment (Adult)    Airway WDL WDL       Respiratory WDL    Respiratory WDL WDL       Skin Circulation/Temperature WDL    Skin Circulation/Temperature WDL WDL       Cardiac WDL    Cardiac WDL WDL       Peripheral/Neurovascular WDL    Peripheral Neurovascular WDL WDL       Cognitive/Neuro/Behavioral WDL    Cognitive/Neuro/Behavioral WDL WDL

## 2022-12-22 NOTE — PHARMACY-ADMISSION MEDICATION HISTORY
Admission Medication History Completed by Pharmacy    See [facility] Admission Navigator for allergy information, preferred outpatient pharmacy, prior to admission medications and immunization status.     Medication History Sources:     Dispense history    Patient interview     Med scribe admission med history (completed 12/21)    Changes made to PTA medication list (reason):    Added: Canagliflozen 100 mg (started in the summer), Bumex 1mg (started in December instead of furosemide)     Deleted:   o Abilify (no longer taking)  o olanzapine (no longer taking)  o Keppra (no longer taking)  o meloxicam (no longer taking)  o gabapentin (no longer taking)  o Potassium chloride (no longer taking)    Changed: None    Additional Information:    Patient had a formal med rec done by med scribe on 12/21 where medication doses, frequency and time last taken was discussed. Pharmacy completed abridged med rec to confirm some of the missing elements (new medications, discontinued medications) at the request of the team.     Patient isnt the best historian when it comes to his medications, however he is able to recognize the names/uses of the medications.     Patient was recently switched from furosemide to bumex  However he is unsure on how often he takes the new medication.    Prior to Admission medications    Medication Sig Last Dose Taking? Auth Provider Long Term End Date   acetaminophen (TYLENOL) 500 MG tablet Take 500-1,000 mg by mouth every 8 hours as needed for mild pain 12/21/2022 at pm Yes Reported, Patient     albuterol (PROAIR HFA/PROVENTIL HFA/VENTOLIN HFA) 108 (90 Base) MCG/ACT inhaler Inhale 2 puffs into the lungs every 4 hours as needed for shortness of breath / dyspnea or wheezing 12/20/2022 at am Yes Reported, Patient Yes    bumetanide (BUMEX) 1 MG tablet Take 1 mg by mouth Frequency unknown (filled as 90 tab for 30 days) Past Week Yes Unknown, Entered By History Yes    canagliflozin (INVOKANA) 100 MG tablet Take  100 mg by mouth every morning (before breakfast)  Yes Unknown, Entered By History     hydrochlorothiazide (HYDRODIURIL) 50 MG tablet Take 50 mg by mouth daily 12/20/2022 Yes Reported, Patient Yes    insulin aspart (NOVOLOG PEN) 100 UNIT/ML pen Inject 17 Units Subcutaneous 3 times daily (with meals) and 9 units with snacks 12/20/2022 at pm Yes Keiko Mirza PA-C Yes    insulin glargine (LANTUS PEN) 100 UNIT/ML pen Inject 70 Units Subcutaneous every morning 12/20/2022 at am Yes Keiko Mirza PA-C Yes    metFORMIN (GLUCOPHAGE XR) 500 MG 24 hr tablet Take 2 tablets (1,000 mg) by mouth daily (with dinner) 12/19/2022 at 2/2 am Yes Keiko Mirza PA-C Yes    QUEtiapine (SEROQUEL) 200 MG tablet Take 200 mg by mouth At Bedtime 12/20/2022 at pm Yes Unknown, Entered By History     QUEtiapine (SEROQUEL) 400 MG tablet Take 400 mg by mouth At Bedtime 12/20/2022 at pm Yes Reported, Patient     Continuous Blood Gluc  (FREESTYLE LIA 2 READER) NATALIA 1 each 4 times daily   Keiko Mirza PA-C     Continuous Blood Gluc Sensor (FREESTYLE LIA 2 SENSOR) [facility] 1 each 4 times daily   Keiko Mirza PA-C     diphenhydrAMINE (BENADRYL) 50 MG capsule Take 50 mg by mouth every 6 hours as needed for itching or allergies  Patient not taking: Reported on 10/25/2022   Reported, Patient     furosemide (LASIX) 40 MG tablet Take 40 mg by mouth daily   Reported, Patient     insulin pen needle (31G X 5 MM) 31G X 5 MM miscellaneous Use 6 pen needles daily or as directed.   Keiko Mirza PA-C     KETOSTIX test strip Use as directed in case of high glucose or illness.   Keiko Mirza PA-C     mineral oil-hydrophilic petrolatum (AQUAPHOR) external ointment Apply 2 g topically 3 times daily as needed  Patient not taking: Reported on 10/25/2022   Reported, Patient         Date completed: 12/22/22    Medication history completed by: Julia Grossman [facility]

## 2022-12-23 ENCOUNTER — APPOINTMENT (OUTPATIENT)
Dept: CARDIOLOGY | Facility: CLINIC | Age: 62
End: 2022-12-23
Attending: STUDENT IN AN ORGANIZED HEALTH CARE EDUCATION/TRAINING PROGRAM
Payer: COMMERCIAL

## 2022-12-23 ENCOUNTER — ANESTHESIA EVENT (OUTPATIENT)
Dept: SURGERY | Facility: CLINIC | Age: 62
End: 2022-12-23
Payer: COMMERCIAL

## 2022-12-23 ENCOUNTER — ANESTHESIA (OUTPATIENT)
Dept: SURGERY | Facility: CLINIC | Age: 62
End: 2022-12-23
Payer: COMMERCIAL

## 2022-12-23 LAB
ANION GAP SERPL CALCULATED.3IONS-SCNC: 12 MMOL/L (ref 7–15)
ATRIAL RATE - MUSE: 72 BPM
BUN SERPL-MCNC: 8.1 MG/DL (ref 8–23)
CALCIUM SERPL-MCNC: 9 MG/DL (ref 8.8–10.2)
CHLORIDE SERPL-SCNC: 102 MMOL/L (ref 98–107)
CREAT SERPL-MCNC: 1.24 MG/DL (ref 0.67–1.17)
CREAT SERPL-MCNC: 1.31 MG/DL (ref 0.67–1.17)
DEPRECATED HCO3 PLAS-SCNC: 25 MMOL/L (ref 22–29)
DIASTOLIC BLOOD PRESSURE - MUSE: NORMAL MMHG
ERYTHROCYTE [DISTWIDTH] IN BLOOD BY AUTOMATED COUNT: 16.1 % (ref 10–15)
GFR SERPL CREATININE-BSD FRML MDRD: 61 ML/MIN/1.73M2
GFR SERPL CREATININE-BSD FRML MDRD: 65 ML/MIN/1.73M2
GLUCOSE BLDC GLUCOMTR-MCNC: 116 MG/DL (ref 70–99)
GLUCOSE BLDC GLUCOMTR-MCNC: 120 MG/DL (ref 70–99)
GLUCOSE BLDC GLUCOMTR-MCNC: 124 MG/DL (ref 70–99)
GLUCOSE BLDC GLUCOMTR-MCNC: 133 MG/DL (ref 70–99)
GLUCOSE BLDC GLUCOMTR-MCNC: 154 MG/DL (ref 70–99)
GLUCOSE BLDC GLUCOMTR-MCNC: 160 MG/DL (ref 70–99)
GLUCOSE BLDC GLUCOMTR-MCNC: 221 MG/DL (ref 70–99)
GLUCOSE SERPL-MCNC: 143 MG/DL (ref 70–99)
HCT VFR BLD AUTO: 41.3 % (ref 40–53)
HGB BLD-MCNC: 13.4 G/DL (ref 13.3–17.7)
INTERPRETATION ECG - MUSE: NORMAL
LVEF ECHO: NORMAL
MAGNESIUM SERPL-MCNC: 1.9 MG/DL (ref 1.7–2.3)
MCH RBC QN AUTO: 29.2 PG (ref 26.5–33)
MCHC RBC AUTO-ENTMCNC: 32.4 G/DL (ref 31.5–36.5)
MCV RBC AUTO: 90 FL (ref 78–100)
P AXIS - MUSE: 50 DEGREES
PHOSPHATE SERPL-MCNC: 2.9 MG/DL (ref 2.5–4.5)
PLATELET # BLD AUTO: 233 10E3/UL (ref 150–450)
POTASSIUM SERPL-SCNC: 3.6 MMOL/L (ref 3.4–5.3)
PR INTERVAL - MUSE: 166 MS
QRS DURATION - MUSE: 90 MS
QT - MUSE: 436 MS
QTC - MUSE: 477 MS
R AXIS - MUSE: 53 DEGREES
RBC # BLD AUTO: 4.59 10E6/UL (ref 4.4–5.9)
SODIUM SERPL-SCNC: 139 MMOL/L (ref 136–145)
SYSTOLIC BLOOD PRESSURE - MUSE: NORMAL MMHG
T AXIS - MUSE: 80 DEGREES
VENTRICULAR RATE- MUSE: 72 BPM
WBC # BLD AUTO: 7.5 10E3/UL (ref 4–11)

## 2022-12-23 PROCEDURE — 250N000009 HC RX 250: Performed by: INTERNAL MEDICINE

## 2022-12-23 PROCEDURE — 999N000248 HC STATISTIC IV INSERT WITH US BY RN

## 2022-12-23 PROCEDURE — 93325 DOPPLER ECHO COLOR FLOW MAPG: CPT | Mod: 26 | Performed by: INTERNAL MEDICINE

## 2022-12-23 PROCEDURE — 250N000011 HC RX IP 250 OP 636: Performed by: ANESTHESIOLOGY

## 2022-12-23 PROCEDURE — 93325 DOPPLER ECHO COLOR FLOW MAPG: CPT

## 2022-12-23 PROCEDURE — 258N000003 HC RX IP 258 OP 636: Performed by: ANESTHESIOLOGY

## 2022-12-23 PROCEDURE — 85027 COMPLETE CBC AUTOMATED: CPT | Performed by: STUDENT IN AN ORGANIZED HEALTH CARE EDUCATION/TRAINING PROGRAM

## 2022-12-23 PROCEDURE — 94660 CPAP INITIATION&MGMT: CPT

## 2022-12-23 PROCEDURE — 36415 COLL VENOUS BLD VENIPUNCTURE: CPT | Performed by: STUDENT IN AN ORGANIZED HEALTH CARE EDUCATION/TRAINING PROGRAM

## 2022-12-23 PROCEDURE — 93312 ECHO TRANSESOPHAGEAL: CPT | Mod: 26 | Performed by: INTERNAL MEDICINE

## 2022-12-23 PROCEDURE — 710N000010 HC RECOVERY PHASE 1, LEVEL 2, PER MIN

## 2022-12-23 PROCEDURE — 250N000009 HC RX 250: Performed by: ANESTHESIOLOGY

## 2022-12-23 PROCEDURE — 250N000012 HC RX MED GY IP 250 OP 636 PS 637: Performed by: STUDENT IN AN ORGANIZED HEALTH CARE EDUCATION/TRAINING PROGRAM

## 2022-12-23 PROCEDURE — 99233 SBSQ HOSP IP/OBS HIGH 50: CPT | Mod: 25 | Performed by: INTERNAL MEDICINE

## 2022-12-23 PROCEDURE — 250N000013 HC RX MED GY IP 250 OP 250 PS 637: Performed by: STUDENT IN AN ORGANIZED HEALTH CARE EDUCATION/TRAINING PROGRAM

## 2022-12-23 PROCEDURE — 360N000075 HC SURGERY LEVEL 2, PER MIN

## 2022-12-23 PROCEDURE — 93010 ELECTROCARDIOGRAM REPORT: CPT | Performed by: INTERNAL MEDICINE

## 2022-12-23 PROCEDURE — 93320 DOPPLER ECHO COMPLETE: CPT | Mod: 26 | Performed by: INTERNAL MEDICINE

## 2022-12-23 PROCEDURE — 84100 ASSAY OF PHOSPHORUS: CPT | Performed by: STUDENT IN AN ORGANIZED HEALTH CARE EDUCATION/TRAINING PROGRAM

## 2022-12-23 PROCEDURE — 120N000002 HC R&B MED SURG/OB UMMC

## 2022-12-23 PROCEDURE — 999N000141 HC STATISTIC PRE-PROCEDURE NURSING ASSESSMENT

## 2022-12-23 PROCEDURE — 82310 ASSAY OF CALCIUM: CPT | Performed by: STUDENT IN AN ORGANIZED HEALTH CARE EDUCATION/TRAINING PROGRAM

## 2022-12-23 PROCEDURE — 99207 ECHO TRANSEOPHAGEAL (TEE): CPT | Mod: 26 | Performed by: INTERNAL MEDICINE

## 2022-12-23 PROCEDURE — 999N000157 HC STATISTIC RCP TIME EA 10 MIN

## 2022-12-23 PROCEDURE — 93312 ECHO TRANSESOPHAGEAL: CPT

## 2022-12-23 PROCEDURE — 250N000011 HC RX IP 250 OP 636: Performed by: INTERNAL MEDICINE

## 2022-12-23 PROCEDURE — 83735 ASSAY OF MAGNESIUM: CPT | Performed by: STUDENT IN AN ORGANIZED HEALTH CARE EDUCATION/TRAINING PROGRAM

## 2022-12-23 PROCEDURE — 258N000003 HC RX IP 258 OP 636: Performed by: INTERNAL MEDICINE

## 2022-12-23 PROCEDURE — 93005 ELECTROCARDIOGRAM TRACING: CPT

## 2022-12-23 PROCEDURE — 93325 DOPPLER ECHO COLOR FLOW MAPG: CPT | Mod: 74

## 2022-12-23 PROCEDURE — 250N000025 HC SEVOFLURANE, PER MIN

## 2022-12-23 PROCEDURE — 250N000013 HC RX MED GY IP 250 OP 250 PS 637

## 2022-12-23 PROCEDURE — 250N000011 HC RX IP 250 OP 636: Performed by: STUDENT IN AN ORGANIZED HEALTH CARE EDUCATION/TRAINING PROGRAM

## 2022-12-23 PROCEDURE — 370N000017 HC ANESTHESIA TECHNICAL FEE, PER MIN

## 2022-12-23 PROCEDURE — 82565 ASSAY OF CREATININE: CPT | Performed by: STUDENT IN AN ORGANIZED HEALTH CARE EDUCATION/TRAINING PROGRAM

## 2022-12-23 RX ORDER — SODIUM CHLORIDE 9 MG/ML
INJECTION, SOLUTION INTRAVENOUS CONTINUOUS PRN
Status: DISCONTINUED | OUTPATIENT
Start: 2022-12-23 | End: 2022-12-23 | Stop reason: HOSPADM

## 2022-12-23 RX ORDER — SODIUM CHLORIDE, SODIUM LACTATE, POTASSIUM CHLORIDE, CALCIUM CHLORIDE 600; 310; 30; 20 MG/100ML; MG/100ML; MG/100ML; MG/100ML
INJECTION, SOLUTION INTRAVENOUS CONTINUOUS PRN
Status: DISCONTINUED | OUTPATIENT
Start: 2022-12-23 | End: 2022-12-23

## 2022-12-23 RX ORDER — ONDANSETRON 4 MG/1
4 TABLET, ORALLY DISINTEGRATING ORAL EVERY 30 MIN PRN
Status: DISCONTINUED | OUTPATIENT
Start: 2022-12-23 | End: 2022-12-23 | Stop reason: HOSPADM

## 2022-12-23 RX ORDER — ONDANSETRON 2 MG/ML
4 INJECTION INTRAMUSCULAR; INTRAVENOUS EVERY 30 MIN PRN
Status: DISCONTINUED | OUTPATIENT
Start: 2022-12-23 | End: 2022-12-23 | Stop reason: HOSPADM

## 2022-12-23 RX ORDER — ACYCLOVIR 200 MG/1
9.5 CAPSULE ORAL
Status: DISCONTINUED | OUTPATIENT
Start: 2022-12-23 | End: 2022-12-23 | Stop reason: HOSPADM

## 2022-12-23 RX ORDER — LIDOCAINE HYDROCHLORIDE 20 MG/ML
15 SOLUTION OROPHARYNGEAL ONCE
Status: COMPLETED | OUTPATIENT
Start: 2022-12-23 | End: 2022-12-23

## 2022-12-23 RX ORDER — NALOXONE HYDROCHLORIDE 0.4 MG/ML
0.4 INJECTION, SOLUTION INTRAMUSCULAR; INTRAVENOUS; SUBCUTANEOUS
Status: DISCONTINUED | OUTPATIENT
Start: 2022-12-23 | End: 2022-12-23 | Stop reason: HOSPADM

## 2022-12-23 RX ORDER — SODIUM CHLORIDE, SODIUM LACTATE, POTASSIUM CHLORIDE, CALCIUM CHLORIDE 600; 310; 30; 20 MG/100ML; MG/100ML; MG/100ML; MG/100ML
INJECTION, SOLUTION INTRAVENOUS CONTINUOUS
Status: DISCONTINUED | OUTPATIENT
Start: 2022-12-23 | End: 2022-12-23 | Stop reason: HOSPADM

## 2022-12-23 RX ORDER — VASOPRESSIN IN 0.9 % NACL 2 UNIT/2ML
SYRINGE (ML) INTRAVENOUS PRN
Status: DISCONTINUED | OUTPATIENT
Start: 2022-12-23 | End: 2022-12-23

## 2022-12-23 RX ORDER — MAGNESIUM SULFATE 1 G/100ML
1 INJECTION INTRAVENOUS ONCE
Status: DISCONTINUED | OUTPATIENT
Start: 2022-12-23 | End: 2022-12-23

## 2022-12-23 RX ORDER — LIDOCAINE 40 MG/G
CREAM TOPICAL
Status: DISCONTINUED | OUTPATIENT
Start: 2022-12-23 | End: 2022-12-23 | Stop reason: HOSPADM

## 2022-12-23 RX ORDER — FENTANYL CITRATE 50 UG/ML
INJECTION, SOLUTION INTRAMUSCULAR; INTRAVENOUS PRN
Status: DISCONTINUED | OUTPATIENT
Start: 2022-12-23 | End: 2022-12-23

## 2022-12-23 RX ORDER — ONDANSETRON 2 MG/ML
INJECTION INTRAMUSCULAR; INTRAVENOUS PRN
Status: DISCONTINUED | OUTPATIENT
Start: 2022-12-23 | End: 2022-12-23

## 2022-12-23 RX ORDER — SIMETHICONE 80 MG
80 TABLET,CHEWABLE ORAL EVERY 6 HOURS PRN
Status: DISCONTINUED | OUTPATIENT
Start: 2022-12-23 | End: 2023-01-15 | Stop reason: HOSPADM

## 2022-12-23 RX ORDER — PROPOFOL 10 MG/ML
INJECTION, EMULSION INTRAVENOUS PRN
Status: DISCONTINUED | OUTPATIENT
Start: 2022-12-23 | End: 2022-12-23

## 2022-12-23 RX ORDER — FENTANYL CITRATE 50 UG/ML
50 INJECTION, SOLUTION INTRAMUSCULAR; INTRAVENOUS ONCE
Status: COMPLETED | OUTPATIENT
Start: 2022-12-23 | End: 2022-12-23

## 2022-12-23 RX ORDER — NALOXONE HYDROCHLORIDE 0.4 MG/ML
0.2 INJECTION, SOLUTION INTRAMUSCULAR; INTRAVENOUS; SUBCUTANEOUS
Status: DISCONTINUED | OUTPATIENT
Start: 2022-12-23 | End: 2022-12-23 | Stop reason: HOSPADM

## 2022-12-23 RX ORDER — LABETALOL 100 MG/1
200 TABLET, FILM COATED ORAL EVERY 12 HOURS SCHEDULED
Status: DISCONTINUED | OUTPATIENT
Start: 2022-12-23 | End: 2022-12-24

## 2022-12-23 RX ORDER — FENTANYL CITRATE 50 UG/ML
25 INJECTION, SOLUTION INTRAMUSCULAR; INTRAVENOUS
Status: DISCONTINUED | OUTPATIENT
Start: 2022-12-23 | End: 2022-12-23 | Stop reason: HOSPADM

## 2022-12-23 RX ORDER — HYDROMORPHONE HCL IN WATER/PF 6 MG/30 ML
0.2 PATIENT CONTROLLED ANALGESIA SYRINGE INTRAVENOUS EVERY 5 MIN PRN
Status: DISCONTINUED | OUTPATIENT
Start: 2022-12-23 | End: 2022-12-23 | Stop reason: HOSPADM

## 2022-12-23 RX ORDER — FENTANYL CITRATE 50 UG/ML
25 INJECTION, SOLUTION INTRAMUSCULAR; INTRAVENOUS EVERY 5 MIN PRN
Status: DISCONTINUED | OUTPATIENT
Start: 2022-12-23 | End: 2022-12-23 | Stop reason: HOSPADM

## 2022-12-23 RX ORDER — FLUMAZENIL 0.1 MG/ML
0.2 INJECTION, SOLUTION INTRAVENOUS
Status: DISCONTINUED | OUTPATIENT
Start: 2022-12-23 | End: 2022-12-23 | Stop reason: HOSPADM

## 2022-12-23 RX ORDER — HYDROMORPHONE HCL IN WATER/PF 6 MG/30 ML
0.4 PATIENT CONTROLLED ANALGESIA SYRINGE INTRAVENOUS EVERY 5 MIN PRN
Status: DISCONTINUED | OUTPATIENT
Start: 2022-12-23 | End: 2022-12-23 | Stop reason: HOSPADM

## 2022-12-23 RX ORDER — MAGNESIUM OXIDE 400 MG/1
400 TABLET ORAL ONCE
Status: COMPLETED | OUTPATIENT
Start: 2022-12-23 | End: 2022-12-23

## 2022-12-23 RX ORDER — FENTANYL CITRATE 50 UG/ML
50 INJECTION, SOLUTION INTRAMUSCULAR; INTRAVENOUS EVERY 5 MIN PRN
Status: DISCONTINUED | OUTPATIENT
Start: 2022-12-23 | End: 2022-12-23 | Stop reason: HOSPADM

## 2022-12-23 RX ADMIN — ACETAMINOPHEN 650 MG: 325 TABLET, FILM COATED ORAL at 20:10

## 2022-12-23 RX ADMIN — Medication 400 MG: at 11:44

## 2022-12-23 RX ADMIN — PHENYLEPHRINE HYDROCHLORIDE 200 MCG: 10 INJECTION INTRAVENOUS at 12:37

## 2022-12-23 RX ADMIN — INSULIN GLARGINE 25 UNITS: 100 INJECTION, SOLUTION SUBCUTANEOUS at 16:29

## 2022-12-23 RX ADMIN — MIDAZOLAM 2 MG: 1 INJECTION INTRAMUSCULAR; INTRAVENOUS at 09:35

## 2022-12-23 RX ADMIN — MIDAZOLAM 2 MG: 1 INJECTION INTRAMUSCULAR; INTRAVENOUS at 09:39

## 2022-12-23 RX ADMIN — LABETALOL HYDROCHLORIDE 5 MG: 5 INJECTION, SOLUTION INTRAVENOUS at 08:50

## 2022-12-23 RX ADMIN — QUETIAPINE FUMARATE 600 MG: 300 TABLET ORAL at 22:33

## 2022-12-23 RX ADMIN — ONDANSETRON 4 MG: 2 INJECTION INTRAMUSCULAR; INTRAVENOUS at 12:52

## 2022-12-23 RX ADMIN — Medication 0.5 UNITS: at 12:39

## 2022-12-23 RX ADMIN — LIDOCAINE HYDROCHLORIDE 30 ML: 20 SOLUTION ORAL at 09:26

## 2022-12-23 RX ADMIN — SODIUM CHLORIDE, POTASSIUM CHLORIDE, SODIUM LACTATE AND CALCIUM CHLORIDE: 600; 310; 30; 20 INJECTION, SOLUTION INTRAVENOUS at 12:06

## 2022-12-23 RX ADMIN — LABETALOL HYDROCHLORIDE 200 MG: 100 TABLET ORAL at 20:31

## 2022-12-23 RX ADMIN — ACETAMINOPHEN 650 MG: 325 TABLET, FILM COATED ORAL at 05:20

## 2022-12-23 RX ADMIN — GABAPENTIN 300 MG: 300 CAPSULE ORAL at 20:10

## 2022-12-23 RX ADMIN — LABETALOL HYDROCHLORIDE 5 MG: 5 INJECTION, SOLUTION INTRAVENOUS at 06:44

## 2022-12-23 RX ADMIN — FENTANYL CITRATE 50 MCG: 50 INJECTION, SOLUTION INTRAMUSCULAR; INTRAVENOUS at 09:36

## 2022-12-23 RX ADMIN — Medication 30 MG: at 12:18

## 2022-12-23 RX ADMIN — SIMETHICONE 80 MG: 80 TABLET, CHEWABLE ORAL at 20:31

## 2022-12-23 RX ADMIN — PHENYLEPHRINE HYDROCHLORIDE 200 MCG: 10 INJECTION INTRAVENOUS at 12:33

## 2022-12-23 RX ADMIN — PHENYLEPHRINE HYDROCHLORIDE 100 MCG: 10 INJECTION INTRAVENOUS at 12:29

## 2022-12-23 RX ADMIN — ALBUTEROL SULFATE 2 PUFF: 90 AEROSOL, METERED RESPIRATORY (INHALATION) at 14:25

## 2022-12-23 RX ADMIN — LABETALOL HYDROCHLORIDE 5 MG: 5 INJECTION, SOLUTION INTRAVENOUS at 04:45

## 2022-12-23 RX ADMIN — SUCCINYLCHOLINE CHLORIDE 120 MG: 20 INJECTION, SOLUTION INTRAMUSCULAR; INTRAVENOUS; PARENTERAL at 12:13

## 2022-12-23 RX ADMIN — SODIUM CHLORIDE 100 ML/HR: 9 INJECTION, SOLUTION INTRAVENOUS at 10:05

## 2022-12-23 RX ADMIN — FENTANYL CITRATE 50 MCG: 50 INJECTION, SOLUTION INTRAMUSCULAR; INTRAVENOUS at 12:12

## 2022-12-23 RX ADMIN — Medication 0.5 UNITS: at 12:56

## 2022-12-23 RX ADMIN — Medication 20 MG: at 12:45

## 2022-12-23 RX ADMIN — GABAPENTIN 300 MG: 300 CAPSULE ORAL at 08:47

## 2022-12-23 RX ADMIN — LABETALOL HYDROCHLORIDE 5 MG: 5 INJECTION, SOLUTION INTRAVENOUS at 01:35

## 2022-12-23 RX ADMIN — TOPICAL ANESTHETIC 0.5 ML: 200 SPRAY DENTAL; PERIODONTAL at 09:32

## 2022-12-23 RX ADMIN — LABETALOL HYDROCHLORIDE 100 MG: 100 TABLET, FILM COATED ORAL at 10:53

## 2022-12-23 RX ADMIN — PROPOFOL 200 MG: 10 INJECTION, EMULSION INTRAVENOUS at 12:12

## 2022-12-23 RX ADMIN — FENTANYL CITRATE 50 MCG: 50 INJECTION, SOLUTION INTRAMUSCULAR; INTRAVENOUS at 09:40

## 2022-12-23 RX ADMIN — LABETALOL HYDROCHLORIDE 5 MG: 5 INJECTION, SOLUTION INTRAVENOUS at 18:54

## 2022-12-23 RX ADMIN — PHENYLEPHRINE HYDROCHLORIDE 200 MCG: 10 INJECTION INTRAVENOUS at 12:57

## 2022-12-23 ASSESSMENT — ACTIVITIES OF DAILY LIVING (ADL)
ADLS_ACUITY_SCORE: 20

## 2022-12-23 ASSESSMENT — LIFESTYLE VARIABLES: TOBACCO_USE: 1

## 2022-12-23 NOTE — PROGRESS NOTES
[facility]    Progress Note - Medicine Service, [facility] TEAM 1       Date of Admission:  12/21/2022    Assessment & Plan   Antony Aguila Jr. is a 62 year old male admitted on 12/21/2022. He has history chronic type B aortic dissection s/p exploration and median sternotomy (6/18/19), type 2 DM (poorly controlled), and HTN who is admitted for acute on chronic thoracic aortic aneurysm with new aortic root dilation. Hospitalization complicated by hypokalemia.    Today:   - TRINH performed in OR  - Discussed with cardiology, CTA compared with prior 2019 images, concern for new pseudoaneurysm vs. Type A dissection  - Ongoing discussions with CVTS re: possible surgical planning  - Pt remains NPO for now    # Chronic type B aortic dissection (proximal descending aorta to the abdominal aorta)  # Proximal ascending aorta saccular aneurysm   # Distal ascending aorta saccular aneurysm   # Mild nonobstructive CAD  # HTN  Pt has history of stable type B aortic dissection as well as short segment of dissection vs pseudoaneurysms at the aortic root and distal ascending thoracic aorta. Previously underwent surgical exploration in 6/2019 with concern for type 1 aortic dissection but found to have chronic type B dissection. On admission, CXR with widened mediastinum slightly increased from prior. CT chest with aneurysmal dilatation ascending and descending thoracic aorta with type B aortic dissection distal to L subclavian artery extending to L common iliac artery with severe narrowing of true lumen and possible pseudoaneurysm at aortic root, new compared to prior 2019 images.  - TRINH in the OR today  - Ongoing discussions with cardiology and CVTS re: possible surgical planning  - Increase labetalol from 100 mg to 200 mg BID    - IV labetalol 5 mg q2h PRN for SBP<120, HR<60  - Telemetry  - Hold DVT ppx for now  - NPO for now  - Repeat bilateral upper extremity blood pressures  - Doppler  pulses    # Dyspnea  # Concern for sleep apnea  Per hospitalization notes here in Sept 2022, noted to have hx of dyspnea on exertion in setting of being current smoker, tx with albuterol inhaler, recommended outpatient PFTs be obtained. Pulmonary progress note from 7/2019 notes current smoker with unknown COPD history, no PFTs available. Had tracheostomy placed 7/5/19, decannulated 8/2019. CTA chest with compressive atelectasis in L lung adjacent to thoracic aorta and hiatal hernia.   - continue PTA albuterol inhaler q4h prn  - Incentive spirometry   - Trial CPAP at night    # Mild hypokalemia, improving  # Mild hypomagnesemia  - Repleting     # LAKISHA on CKD 2, improving  Outpatient labs 12/16 with Cr 1.4 and eGFR 55. Now back at baseline Cr ~1.1-1.2, eGFR ~65-75. UA unremarkable.  - decrease PTA gabapentin 600 mg TID -> 300 mg TID  - holding PTA metformin for now     # Schizoaffective disorder  # PTSD  Meds per chart include Zyprexa, Abilify, and Seroquel. Per pharmacy med rec, appears to only have filled Seroquel recently.   - continue PTA quetiapine 600 mg at bedtime  - PharmD med rec as above    # Type 2 DM with hyperglycemia  Most recent A1c 12.5 (9/16/22). Home regimen: metformin 1000 qPM and Lantus 50 unit(s) qAM + aspart 17 unit(s) TIDAC + aspart 9 unit(s) w/ snacks  - Decrease lantus to 25u while NPO   - HDSSI   - holding PTA metformin as above     # BLE edema  Dimer elevated with acute on chronic leg swelling L < R per patient history. DVT US in ED negative for DVT b/l.  Has had LE swelling for last 2 years, notes it is worse lately. Previously on diuretics, not currently per chart.  - lymphedema consult     # Venous stasis ulcer LLE  # Venous insufficiency  # Peripheral vascular disease  - WOC consult placed       # Tobacco use  Smoked cigarettes for 45 years. Currently 1 pack per week.  - Encourage cessation   - Could offer nicotine replacement if pt interested    # Hiatal hernia  Noted on imaging.     #  H/o possible ACE inhibitor related angioedema  Noted during July 2019 hospitalization at same time having AHRF requiring tracheostomy (later decannulted in August 2019).       Diet: NPO per Anesthesia Guidelines for Procedure/Surgery Except for: Meds, Ice Chips  DVT Prophylaxis: Hold enoxaparin  Noble Catheter: Not present  Fluids: None  Central Lines: None  Cardiac Monitoring: ACTIVE order. Indication: Chest pain/ ACS rule out (24 hours)  Code Status: Full Code      Disposition Plan      Expected Discharge Date: 12/24/2022      Destination: home with help/services  Discharge Comments: TRINH today, pending possible surgical planning for dissection        The patient's care was discussed with Dr. Kalpana Hall MD  Medicine Service, [facility] TEAM [address] [place] [address]  Securely message with the Vocera Web Console (learn more here)  Text page via [facility] Paging/Directory   Please see signed in provider for up to date coverage information      Clinically Significant Risk Factors        # Hypokalemia: Lowest K = 3.3 mmol/L in last 2 days, will replace as needed    # Hypercalcemia: corrected calcium is >10.1, will monitor as appropriate    # Hypoalbuminemia: Lowest albumin = 3.3 g/dL at 12/21/2022  4:54 PM, will monitor as appropriate           # Severe Obesity: Estimated body mass index is 47.47 kg/m  as calculated from the following:    Height as of this encounter: 1.829 m (6').    Weight as of this encounter: 158.8 kg (350 lb)., PRESENT ON ADMISSION         ______________________________________________________________________    Interval History   No acute events overnight. NPO after midnight for TRINH. Difficulty tolerating TRINH therefore performed in the OR. Post procedurally, he was feeling well. No chest pain, shortness of breath, abdominal pain. Feeling hungry but understanding of ongoing surgical discussions.     Data reviewed today: I reviewed all medications, new labs  and imaging results over the last 24 hours.     Physical Exam   Vital Signs: Temp: 97.8  F (36.6  C) Temp src: Oral BP: 125/84 Pulse: 67   Resp: 18 SpO2: 99 % O2 Device: Nasal cannula Oxygen Delivery: 2 LPM  Weight: 350 lbs 0 oz  Gen: lying in bed, in NAD  CV: RRR, normal S1 and S2  Pulm: CTAB, no increased WOB on RA  GI: soft, NT, ND  Neuro: alert, conversant, responds to questions appropriately  Ext: bilateral non-pitting edema, left lower extremity wounds covered with aqaphor  Psych: normal affect    Data   Recent Labs   Lab 12/23/22  1347 12/23/22  1132 12/23/22  1052 12/23/22  0608 12/22/22  0718 12/22/22  0549 12/21/22  1703 12/21/22  1654   WBC  --   --   --  7.5  --  6.6  --  7.5   HGB  --   --   --  13.4  --  13.0*  --  14.0   MCV  --   --   --  90  --  89  --  87   PLT  --   --   --  233  --  235  --  240   INR  --   --   --   --   --  1.08  --   --    NA  --   --   --  139  --  141  --  140   POTASSIUM  --   --   --  3.6  --  3.3*  --  3.3*   CHLORIDE  --   --   --  102  --  101  --  102   CO2  --   --   --  25  --  32*  --  32   BUN  --   --   --  8.1  --  8.4  --  10   CR  --  1.31*  --  1.24*  --  1.26*  --  1.23   ANIONGAP  --   --   --  12  --  8  --  6   ANGELLA  --   --   --  9.0  --  9.3  --  9.7   *  --  120* 143*   < > 159*   < > 220*   ALBUMIN  --   --   --   --   --   --   --  3.3*   PROTTOTAL  --   --   --   --   --   --   --  7.5   BILITOTAL  --   --   --   --   --   --   --  0.5   ALKPHOS  --   --   --   --   --   --   --  66   ALT  --   --   --   --   --   --   --  29   AST  --   --   --   --   --   --   --  30    < > = values in this interval not displayed.     Recent Results (from the past 24 hour(s))   CTA Angiogram coronary artery    Narrative    Procedure: CTA ANGIOGRAM CORONARY ARTERY   Examination Date: 12/22/2022 2:42 PM   Indication: concern for type A aortic dissection possible coronary  involvement   Ordering Provider: Dr. Patel  Overall quality of the study: Fair. Despite  the use of the XXL  protocol, there is still significant photon starvation artifact.    PROCEDURE: ECG gated multi-slice computed tomography of the heart with  and without intravenous contrast  (Isovue 370 120 mL at rate of 5.5  mL/sec) was performed on a Siemens Dual Source Flash scanner without  incident. Medical therapy was used to optimize heart rate (metoprolol  50 mg oral, ivabradine 10 oral,  metoprolol 0 mg IV). Sublingual  Nitrostat 0.8 mg was given prior to scanning. Coronary artery calcium  scoring was performed using the Flash scanner protocol. The CTA  portion was performed in the spiral mode with the XXL coronary  protocol at a heart rate of 64 bpm with 120 kVp. Images were  reconstructed and analyzed on a [employer] workstation. The scan  protocol was optimized to minimize radiation exposure. The total  radiation exposure, including calcium artery calcium scoring, was  calculated to be 3476 DLP and 49 mSv.      Height: 72 inches  Weight: 350 lbs  BSA: 2.7 m2      Impression    IMPRESSION:    1.  Known type B aortic dissection extending from the proximal  descending aorta to the abdominal aorta (left common iliac artery by  history).  2.  There are two saccular aneurysms, one each in the proximal  ascending aorta and distal ascending aorta. The maximal transaortic  diameters of 46.9 x 39.2 mm and 62.7 x 54.4 mm, respectively.  3.  Mild, non-obstructive coronary artery disease without any  high-grade stenoses.  4.  Total Agatston score 275 placing the patient in the 90th  percentile when compared to an age- and gender-matched control group.  5.  Compared to the prior examination dated 12/21/2022, there is no  significant change in the maximum transaortic diameters at the level  of the saccular aneurysms or the visualized extent of the type B  dissection within the thorax.  6.  Please review the separate Radiology report for incidental  noncardiac findings.    FINDINGS:    CORONARY CALCIUM SCORE    Total  Agatston calcium score: 275   Left main: 152  Left anterior descendin  Left circumflex: 10  Right coronary artery: 2   This places the patient in the 90th  percentile when compared to an  age- and gender-matched control group.    CORONARY ANGIOGRAPHY    DOMINANCE: Right dominant system.   Normal coronary origins and course.    LEFT MAIN:   The LM is a large caliber vessel.   The left main arises normally from the left coronary cusp and has a  minimal (<25%) stenosis composed of calcified plaque.    LEFT ANTERIOR DESCENDING:   The LAD is a large  caliber vessel. It has a type III morphology. It  gives rise to a moderate caliber first diagonal branch.   Proximal LAD: Mild (25-49%) stenosis composed of calcified plaque.  Mid LAD: Minimal (<25%) stenosis composed of calcified plaque.  First diagonal: Mild (25-49%) stenosis composed of calcified plaque.  The remainder of the left anterior descending and its major diagonal  branches are patent with minimal luminal irregularities.    LEFT CIRCUMFLEX:   The LCx is a large caliber vessel. It gives rise to a large caliber  first obtuse marginal branch.   Mid LCX: Minimal (<25%) stenosis composed of calcified plaque.  First marginal: Minimal (<25%) stenosis composed of calcified plaque.  The remainder of the left circumflex and its major marginal branches  are patent with minimal luminal irregularities.    RIGHT CORONARY ARTERY:   The RCA is a large caliber, dominant vessel.    Mid RCA: Minimal (<25%) stenosis composed of calcified plaque.  The remainder of the right coronary and the posterior descending  artery are patent with minimal luminal irregularities.    AORTA EXAMINATION    FINDINGS:     1.  The aortic root is normal in size when indexed to BSA. The  sinotubular junction is effaced  2.  Two saccular aneurysms are seen in the ascending aorta. The first  appears to originate at the level of the sinotubular junction and has  an aneurysm neck length of 31 mm in the  coronal plane. The aneurysm  dimensions are 48.7 x 12.5 mm in the coronal plane. The second  saccular aneurysm originates just proximal to the origin of the  innominate artery. The maximal transaortic diameter at this level is  46.9 x 39.2 mm. The aneurysm neck length measures 30 mm in the coronal  plane and the maximal dimensions in the coronal plane are 45.5 x  26.2mm. The maximum transaortic diameter at this level is 62.7 x 54.4  mm.   3.  A type B aortic dissection is seen originating in the proximal  descending aorta just distal to the origin of the left subclavian  artery. The dissection extends to at least the suprarenal abdominal  aorta (left common iliac artery by history). The areas inferior to the  suprarenal abdominal aorta are not visualized on this examination as  this examination was tailored to visualize only the thoracic  structures. the dissection flap is seen with free contrast flow  between the true and false lumens. Minimal thrombus is seen in the  false lumen.  4.  The aortic arch is left-sided. There is normal branching of the  arch vessels. All of the branch vessels originate from the true lumen.  There is no coarctation.  5.  The main and proximal branch pulmonary arteries are normal in  size.   6.  The systemic venous connections are normal.   7.  The cardiac chambers demonstrate normal atrioventricular and  ventriculoarterial concordance.  8.  The pericardium is unremarkable.  There is no pericardial  effusion.   9.  There is no intracardiac thrombus.      The bi-orthogonal luminal aortic dimensions are as follows:    Aortic root measures 44.9 mm x 40.5 mm at the level of sinuses of  Valsalva.  Proximal ascending aorta measures 47.6 mm x 45.7 mm at the widest  portion between the two saccular aneurysms.  Proximal descending thoracic aorta (true lumen at largest diameter)  measures 26.1 mm x 16.2 mm, just distal to the origin of the left  subclavian artery.  Proximal descending thoracic  aorta (false lumen at largest diameter)  measures 50.7 mm x 44.2 mm, just distal to the origin of the left  subclavian artery.  Mid descending thoracic aorta (true lumen at largest diameter)  measures 38.6 mm x 17.4 mm  Mid descending thoracic aorta (false lumen at largest diameter)  measures 51.1 mm x 39.7 mm  Distal thoracic aorta (true lumen at largest diameter) measures 32.0  mm x 11.5 mm, at the level of the diaphragm.  Distal thoracic aorta (false lumen at largest diameter) measures 41.3  mm x 31.5 mm, at the level of the diaphragm.  Main pulmonary artery measures 32.5 mm x 28.4 mm.  The proximal left branch pulmonary artery measures 31.1 mm x 30.6 mm.  The proximal right branch pulmonary artery measures 32.2 mm x 22.6 mm.    The prior examination dated 12/21/2022 was reviewed and the following  dimensions were re-obtained today:  Maximal transaortic diameter at the level of the saccular aneurysm in  the proximal ascending aorta: 48.1 x 37.9mm  Maximal transaortic diameter at the level of the saccular aneurysm in  the distal ascending aorta: 65.9 x 56.3 mm      ADDITIONAL FINDINGS:   There are changes consistent with a prior sternotomy.  Normal pulmonary venous anatomy. All four pulmonary veins drain into  the left atrium.    There is no left ventricular mass or thrombus.   Normal pericardial thickness. There is no pericardial effusion.  Please review the separate Radiology report for incidental noncardiac  findings.    OLEKSANDR CORONA MD         SYSTEM ID:  [id number]   Radiologist Consult For Cardiology    Narrative    EXAMINATION: RADIOLOGIST CONSULT FOR CARDIOLOGY, 12/22/2022 2:42 PM     COMPARISON: CT 12/1/2022    HISTORY: History of type B aortic dissection.    TECHNIQUE: Limited field of view CT images. Please refer to separate  dictation for the cardiology portion of the study.    FINDINGS:     Heart size is within normal limits. Normal caliber of the main  pulmonary artery. Moderate coronary artery  atherosclerotic  calcifications. No pericardial effusion. The visualized mediastinal  and hilar lymph nodes are not enlarged. Large hiatal hernia.    Unchanged type B aortic dissection with dissection flap originating  distal to left subclavian artery and continuing into the abdominal  aorta and beyond the field-of-view. Unchanged aneurysmal dilation  ascending aorta measuring up to 6.5 cm in diameter with  redemonstration of a focal outpouching containing a short segment  dissection flap distal descending thoracic aorta and aortic root.  Unchanged aneurysmal dilation of the descending thoracic aorta  measuring up to 5.6 cm.    No pleural effusion or pneumothorax at this level. The visualized  lungs demonstrate increased dependent atelectasis of the lower lobes.    Postsurgical changes of the chest with intact median sternotomy wires.  No acute osseous lesions of the visualized thorax.      Impression    IMPRESSION:  1. Unchanged thoracic component of the type B aortic dissection.  Unchanged short segment dissection versus pseudoaneurysm at the aortic  root and the distal descending thoracic aorta.  2. Large hiatal hernia.  3. Please refer to separate cardiology report for further information.    I have personally reviewed the examination and initial interpretation  and I agree with the findings.    DIANA MULLINS MD         SYSTEM ID:  [id number]   CTA Chest with Contrast    Narrative    Procedure: CTA ANGIOGRAM CORONARY ARTERY/CT AORTA   Examination Date: 12/22/2022 2:42 PM   Indication: concern for type A aortic dissection possible coronary  involvement   Ordering Provider: Dr. Patel  Overall quality of the study: Fair. Despite the use of the XXL  protocol, there is still significant photon starvation artifact.    PROCEDURE: ECG gated multi-slice computed tomography of the heart with  and without intravenous contrast  (Isovue 370 120 mL at rate of 5.5  mL/sec) was performed on a Siemens Dual Source Flash scanner  without  incident. Medical therapy was used to optimize heart rate (metoprolol  50 mg oral, ivabradine 10 oral,  metoprolol 0 mg IV). Sublingual  Nitrostat 0.8 mg was given prior to scanning. Coronary artery calcium  scoring was performed using the Flash scanner protocol. The CTA  portion was performed in the spiral mode with the XXL coronary  protocol at a heart rate of 64 bpm with 120 kVp. Images were  reconstructed and analyzed on a [employer] workstation. The scan  protocol was optimized to minimize radiation exposure. The total  radiation exposure, including calcium artery calcium scoring, was  calculated to be 3476 DLP and 49 mSv.      Height: 72 inches  Weight: 350 lbs  BSA: 2.7 m2      Impression    IMPRESSION:    1.  Known type B aortic dissection extending from the proximal  descending aorta to the abdominal aorta (left common iliac artery by  history).  2.  There are two saccular aneurysms, one each in the proximal  ascending aorta and distal ascending aorta. The maximal transaortic  diameters of 46.9 x 39.2 mm and 62.7 x 54.4 mm, respectively.  3.  Mild, non-obstructive coronary artery disease without any  high-grade stenoses.  4.  Total Agatston score 275 placing the patient in the 90th  percentile when compared to an age- and gender-matched control group.  5.  Compared to the prior examination dated 2022, there is no  significant change in the maximum transaortic diameters at the level  of the saccular aneurysms or the visualized extent of the type B  dissection within the thorax.  6.  Please review the separate Radiology report for incidental  noncardiac findings.    FINDINGS:    CORONARY CALCIUM SCORE    Total Agatston calcium score: 275   Left main: 152  Left anterior descendin  Left circumflex: 10  Right coronary artery: 2   This places the patient in the 90th  percentile when compared to an  age- and gender-matched control group.    CORONARY ANGIOGRAPHY    DOMINANCE: Right dominant  system.   Normal coronary origins and course.    LEFT MAIN:   The LM is a large caliber vessel.   The left main arises normally from the left coronary cusp and has a  minimal (<25%) stenosis composed of calcified plaque.    LEFT ANTERIOR DESCENDING:   The LAD is a large  caliber vessel. It has a type III morphology. It  gives rise to a moderate caliber first diagonal branch.   Proximal LAD: Mild (25-49%) stenosis composed of calcified plaque.  Mid LAD: Minimal (<25%) stenosis composed of calcified plaque.  First diagonal: Mild (25-49%) stenosis composed of calcified plaque.  The remainder of the left anterior descending and its major diagonal  branches are patent with minimal luminal irregularities.    LEFT CIRCUMFLEX:   The LCx is a large caliber vessel. It gives rise to a large caliber  first obtuse marginal branch.   Mid LCX: Minimal (<25%) stenosis composed of calcified plaque.  First marginal: Minimal (<25%) stenosis composed of calcified plaque.  The remainder of the left circumflex and its major marginal branches  are patent with minimal luminal irregularities.    RIGHT CORONARY ARTERY:   The RCA is a large caliber, dominant vessel.    Mid RCA: Minimal (<25%) stenosis composed of calcified plaque.  The remainder of the right coronary and the posterior descending  artery are patent with minimal luminal irregularities.    AORTA EXAMINATION    FINDINGS:     1.  The aortic root is normal in size when indexed to BSA. The  sinotubular junction is effaced  2.  Two saccular aneurysms are seen in the ascending aorta. The first  appears to originate at the level of the sinotubular junction and has  an aneurysm neck length of 31 mm in the coronal plane. The aneurysm  dimensions are 48.7 x 12.5 mm in the coronal plane. The second  saccular aneurysm originates just proximal to the origin of the  innominate artery. The maximal transaortic diameter at this level is  46.9 x 39.2 mm. The aneurysm neck length measures 30 mm in  the coronal  plane and the maximal dimensions in the coronal plane are 45.5 x  26.2mm. The maximum transaortic diameter at this level is 62.7 x 54.4  mm.   3.  A type B aortic dissection is seen originating in the proximal  descending aorta just distal to the origin of the left subclavian  artery. The dissection extends to at least the suprarenal abdominal  aorta (left common iliac artery by history). The areas inferior to the  suprarenal abdominal aorta are not visualized on this examination as  this examination was tailored to visualize only the thoracic  structures. the dissection flap is seen with free contrast flow  between the true and false lumens. Minimal thrombus is seen in the  false lumen.  4.  The aortic arch is left-sided. There is normal branching of the  arch vessels. All of the branch vessels originate from the true lumen.  There is no coarctation.  5.  The main and proximal branch pulmonary arteries are normal in  size.   6.  The systemic venous connections are normal.   7.  The cardiac chambers demonstrate normal atrioventricular and  ventriculoarterial concordance.  8.  The pericardium is unremarkable.  There is no pericardial  effusion.   9.  There is no intracardiac thrombus.      The bi-orthogonal luminal aortic dimensions are as follows:    Aortic root measures 44.9 mm x 40.5 mm at the level of sinuses of  Valsalva.  Proximal ascending aorta measures 47.6 mm x 45.7 mm at the widest  portion between the two saccular aneurysms.  Proximal descending thoracic aorta (true lumen at largest diameter)  measures 26.1 mm x 16.2 mm, just distal to the origin of the left  subclavian artery.  Proximal descending thoracic aorta (false lumen at largest diameter)  measures 50.7 mm x 44.2 mm, just distal to the origin of the left  subclavian artery.  Mid descending thoracic aorta (true lumen at largest diameter)  measures 38.6 mm x 17.4 mm  Mid descending thoracic aorta (false lumen at largest  diameter)  measures 51.1 mm x 39.7 mm  Distal thoracic aorta (true lumen at largest diameter) measures 32.0  mm x 11.5 mm, at the level of the diaphragm.  Distal thoracic aorta (false lumen at largest diameter) measures 41.3  mm x 31.5 mm, at the level of the diaphragm.  Main pulmonary artery measures 32.5 mm x 28.4 mm.  The proximal left branch pulmonary artery measures 31.1 mm x 30.6 mm.  The proximal right branch pulmonary artery measures 32.2 mm x 22.6 mm.    The prior examination dated 2022 was reviewed and the following  dimensions were re-obtained today:  Maximal transaortic diameter at the level of the saccular aneurysm in  the proximal ascending aorta: 48.1 x 37.9mm  Maximal transaortic diameter at the level of the saccular aneurysm in  the distal ascending aorta: 65.9 x 56.3 mm      ADDITIONAL FINDINGS:   There are changes consistent with a prior sternotomy.  Normal pulmonary venous anatomy. All four pulmonary veins drain into  the left atrium.    There is no left ventricular mass or thrombus.   Normal pericardial thickness. There is no pericardial effusion.  Please review the separate Radiology report for incidental noncardiac  findings.    I have personally reviewed the examination and initial interpretation  and I agree with the findings.    OLEKSANDR CORONA MD         SYSTEM ID:  [id number]   Echo TRINH    Narrative    [id number]  [id number]  [id number]  [id number]^LALO^KARO                                                                       Version 2     [facility]  Echocardiography Laboratory  [address] [place] [id number]     Name: MARTÍNEZ JONESJR. DANIEL  MRN: [id number]  : 1960  Study Date: 2022 09:34 AM  Age: 62 yrs  Gender: Male  Patient Location: [facility]  Reason For Study: Aortic Dissection  History: AVR,Dissection  Ordering Physician: KARO MAY  Performed By: Arun Santo MD     BSA: 2.7 m2  Height: 72 in  Weight: 350 lb  HR: 63  BP:  139/85 mmHg  ______________________________________________________________________________  Interpretation Summary  Unable to advance probe past posterior oropharynx. Oxygen saturations dropped  to 85% hence could not administer more sedation. TRINH aborted. Recommend TRINH in  the OR.  ______________________________________________________________________________  Procedure  Transesophageal Echocardiogram with color and spectral Doppler performed. I  was present during TRINH probe placement by the Fellow. I personally viewed the  imaging and agree with the interpretation and report as documented by the  Fellow. Procedure location Echo Lab. The procedure was performed in the Echo  Lab. Informed consent for Transesophegeal echo obtained. TRINH Probe #63 was  used during the procedure. Patient was sedated using Fentanyl 100 mcg. Patient  was sedated using Versed 4 mg. The heart rate, respiratory rate, oxygen  saturations, blood pressure, and response to care were monitored throughout  the procedure with the assistance of the nurse. I determined this patient to  be an appropriate candidate for the planned sedation and procedure and have  reassessed the patient immediately prior to sedation and procedure. Total  sedation time: 11 minutes of continuous bedside 1:1 monitoring. The TRINH was  terminated due to inability to place probe. Complications None. The patient's  rhythm is normal sinus.     ______________________________________________________________________________  Report approved by: Gonzalez HUGHES 12/23/2022 01:24 PM     ______________________________________________________________________________

## 2022-12-23 NOTE — ANESTHESIA CARE TRANSFER NOTE
Patient: Antony Aguila Jr.    Procedure: Procedure(s):  ANESTHESIA OUT OF OR for jarod       Diagnosis: Aneurysm, thoracic aortic [I71.20]  Diagnosis Additional Information: No value filed.    Anesthesia Type:   General     Note:    Oropharynx: oropharynx clear of all foreign objects and spontaneously breathing  Level of Consciousness: awake  Oxygen Supplementation: face mask  Level of Supplemental Oxygen (L/min / FiO2): 10  Independent Airway: airway patency satisfactory and stable  Dentition: dentition unchanged  Vital Signs Stable: post-procedure vital signs reviewed and stable  Report to RN Given: handoff report given  Patient transferred to: PACU  Comments: -VSS  Handoff Report: Identifed the Patient, Identified the Reponsible Provider, Reviewed the pertinent medical history, Discussed the surgical course, Reviewed Intra-OP anesthesia mangement and issues during anesthesia, Set expectations for post-procedure period and Allowed opportunity for questions and acknowledgement of understanding      Vitals:  Vitals Value Taken Time   /106 12/23/22 1322   Temp     Pulse 68 12/23/22 1328   Resp     SpO2 98 % 12/23/22 1328   Vitals shown include unvalidated device data.    Electronically Signed By: Dona Ortiz MD  December 23, 2022  1:29 PM

## 2022-12-23 NOTE — ANESTHESIA POSTPROCEDURE EVALUATION
Patient: Antony Aguila Jr.    Procedure: Procedure(s):  ANESTHESIA OUT OF OR for jarod       Anesthesia Type:  General    Note:  Disposition: Outpatient   Postop Pain Control: Uneventful   PONV: No   Neuro/Psych: Uneventful            Sign Out: Acceptable/Baseline neuro status   Airway/Respiratory: Uneventful            Sign Out: Acceptable/Baseline resp. status   CV/Hemodynamics: Uneventful            Sign Out: Acceptable CV status; No obvious hypovolemia; No obvious fluid overload   Other NRE: NONE   DID A NON-ROUTINE EVENT OCCUR? No           Last vitals:  Vitals Value Taken Time   /74 12/23/22 1350   Temp 36.6  C (97.8  F) 12/23/22 1350   Pulse 67 12/23/22 1400   Resp 18 12/23/22 1350   SpO2 99 % 12/23/22 1400   Vitals shown include unvalidated device data.    Electronically Signed By: Irineo Guallpa MD  December 23, 2022  2:01 PM

## 2022-12-23 NOTE — SEDATION DOCUMENTATION
Pt arrived in ECHO department  for scheduled TRINH.   Procedure explained, questions answered and consent signed.  Pt's throat sprayed at 0930, therefore pt will not be able to eat or drink until after OR TRINH  later today.  Informed pt of this time and encouraged to start with warm fluids and soft foods.    Pt was given a total of 100 mcg IV fentanyl and 4 mg IV versed for conscious sedation.    TRINH probe 62 used for attempted procedure.  Pt denied pain after procedure and was transported via stretcher back to  after awake and VSS.   Report given to Sierra ANDERSON RN.

## 2022-12-23 NOTE — PROGRESS NOTES
[facility]    Cardiology Progress Note    ASSESSMENT:   Antony Aguila Jr. 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 A chest CTA was obtained (given his history of aortic dissection) which showed stable type B aortic dissection as well as short segment of dissection vs pseudoaneurysms at the aortic root and distal ascending thoracic aorta.  A dedicated CTA of the coronary arteries and thoracic aorta was subsequently obtained which showed the known type B aortic dissection extending from the proximal descending aorta to the abdominal aorta as well as 2 saccular aneurysms, 1 each in the proximal ascending aorta and distal ascending aorta.  Coronary evaluation showed mild nonobstructive disease.  Cardiothoracic surgery is involved and considering possible intervention. TRINH completed 12/23 for surgical planning.      12/23: Compared patient's current CTA images to his previous study performed at  [facility] in 2019.  It appears that the aneurysm/dissection in the proximal ascending aorta is new.  The aneurysm in the distal ascending aorta/aortic arch is unchanged. Findings discussed with CVTS.      Problem list:  # Chronic type B aortic dissection (proximal descending aorta to the abdominal aorta)  # Proximal ascending aorta saccular aneurysm   # Distal ascending aorta saccular aneurysm   # Mild nonobstructive CAD     RECOMMENDATIONS:  - Discussed CT findings with CVTS team who are considering possible interventions and will update primary team with decision.    - Cardiology will sign off. Please do not hesitate to contact us with any questions.      Discussed with attending, Dr. Genie Patel,  MD  Cardiology Fellow  [phone]     Objective:   /84 (Cuff Size: Adult Large)   Pulse 67   Temp 97.8  F (36.6  C) (Oral)   Resp 18   Ht 1.829 m (6')   Wt (!) 158.8 kg (350 lb)   SpO2 99%   BMI 47.47 kg/m    Temp (24hrs), Av.2  F (36.8  C), Min:97.8  F (36.6  C), Max:98.7  F (37.1  C)    Physical Exam   GEN: NAD, pleasant  CV: RRR, normal s1/s2, no murmurs  CHEST: Normal work of  EXT: 1+ nonpitting edema bilaterally  NEURO: Awake, alert, answering questions appropriately    DATA:   Labs, EKGs and imaging reviewed. Pertinent results discussed in assessment and plan above.

## 2022-12-23 NOTE — ANESTHESIA PROCEDURE NOTES
Airway       Patient location during procedure: OR       Procedure Start/Stop Times: 12/23/2022 12:16 PM  Staff -        CRNA: Gracy Dsouza APRN CRNA       Performed By: CRNA  Consent for Airway        Urgency: elective  Indications and Patient Condition       Indications for airway management: jahaira-procedural       Induction type:RSI       Mask difficulty assessment: 0 - not attempted    Final Airway Details       Final airway type: endotracheal airway       Successful airway: ETT - single and Oral  Endotracheal Airway Details        ETT size (mm): 8.0       Cuffed: yes       Successful intubation technique: video laryngoscopy       VL Blade Size: MAC 4       Grade View of Cords: 1 (grade 1 view after manipulation around redundant tissue)       Adjucts: stylet       Position: Right       Measured from: lips       Secured at (cm): 27       Bite block used: None    Post intubation assessment        Placement verified by: capnometry, equal breath sounds and chest rise        Number of attempts at approach: 1       Secured with: pink tape       Ease of procedure: easy       Dentition: Intact and Unchanged    Medication(s) Administered   Medication Administration Time: 12/23/2022 12:16 PM

## 2022-12-23 NOTE — PLAN OF CARE
Goal Outcome Evaluation:      Plan of Care Reviewed With: patient    Overall Patient Progress: improvingOverall Patient Progress: improving     A&OX4, VSS, on RA. Tele reading NS. Denies pain. Indp in room. NPO @midnight. BG stable, Insulin given. Active Tele. Great toes Callous, L leg wrap.

## 2022-12-23 NOTE — PLAN OF CARE
Goal Outcome Evaluation:      Plan of Care Reviewed With: patient    Overall Patient Progress: no change    Outcome Evaluation: Pt BP not well controlled with IV labetalol. given 3 times this shift but BP remains >120. 4L oxymask applied while pt sleeps for suspected sleep apnea. has been NPO since midnight for TRINH today. Possible angiogram this admission. new R arm PIV WDL. L leg wound being followed by WOC. Will continue to monitor and follow POC as hospitalization continues.

## 2022-12-23 NOTE — PROGRESS NOTES
Admission/Transfer from: ED  2 RN skin assessment completed.YES  Significant findings include: Callous on Great toes. Left leg wrap.   WOC Nurse Consult Ordered? NO

## 2022-12-23 NOTE — ANESTHESIA PREPROCEDURE EVALUATION
Anesthesia Pre-Procedure Evaluation    Patient: Antony Aguila Jr.   MRN: [id number] : 1960        Procedure : Procedure(s):  ANESTHESIA OUT OF OR for jarod          Past Medical History:   Diagnosis Date     Chronic dissection of thoracic aorta     S/p exploration and median sternotomy by Dr. Marcelo on 19     Diabetes (H)      Hypertension       Past Surgical History:   Procedure Laterality Date     MEDIAN STERNOTOMY  2019    Chronic dissection of thoracic aorta s/p exploration and median sternotomy by Dr. Marcelo     TRACHEOSTOMY  2019    decannulated 19      Allergies   Allergen Reactions     Lidocaine      Pt states this is not an allergy and doesn't recall this to be an allergy     Lisinopril Swelling     Hospitalization in 2019 notes possible ACE-inhibitor related angioedema.     Pollen Extract       Social History     Tobacco Use     Smoking status: Every Day     Years: 45.00     Types: Cigarettes     Smokeless tobacco: Never     Tobacco comments:     Smokes 1 pack per week currently (2022).   Substance Use Topics     Alcohol use: Not Currently      Wt Readings from Last 1 Encounters:   22 (!) 158.8 kg (350 lb)        Anesthesia Evaluation   Pt has had prior anesthetic. Type: General.    No history of anesthetic complications       ROS/MED HX  ENT/Pulmonary:     (+) tobacco use,     Neurologic:     (+) dementia,     Cardiovascular: Comment: Chronic dissection of thoracic aorta  Dissecting aneurysm of thoracic aorta, Howard type B    Concern for new AO root dilation needing better imaging    Nonobstructive CAD per notes    (+) hypertension--CAD ---Previous cardiac testing   Echo: Date: 22 Results:  Left Ventricle  Left ventricular size, wall motion and function are normal. The ejection  fraction is 55-60%. Abnormal non-specific septal motion is present.     Right Ventricle  The right ventricle is not well visualized. It is likely at least mildly to  moderatly reduced in  function and mildly enlarged.     Atria  The atria cannot be assessed.     Mitral Valve  The mitral valve is normal. The valve leaflets are not well visualized. Trace  mitral insufficiency is present.     Aortic Valve  The aortic valve is tricuspid. Mild to moderate aortic insufficiency is  present.     Tricuspid Valve  The valve leaflets are not well visualized. The peak velocity of the tricuspid  regurgitant jet is not obtainable. Pulmonary artery systolic pressure cannot  be assessed.     Pulmonic Valve  The valve leaflets are not well visualized.     Vessels  Sinuses of Valsalva 4.3 cm. Ascending aorta 5.6 cm. IVC diameter <2.1 cm  collapsing >50% with sniff suggests a normal RA pressure of 3 mmHg.     Pericardium  No pericardial effusion is present.     Miscellaneous  Very technically difficult study. Unable to visualize aortic root well.  Consider alternative imaging for additional information.     Compared to Previous Study  There is no prior study for direct comparison.  Stress Test: Date: Results:    ECG Reviewed: Date: Results:    Cath: Date: Results:      METS/Exercise Tolerance:     Hematologic:       Musculoskeletal:       GI/Hepatic:     (+) hiatal hernia,  (-) GERD   Renal/Genitourinary:       Endo:     (+) type II DM,  (-) thyroid disease   Psychiatric/Substance Use: Comment: schizoaffective    (+) psychiatric history     Infectious Disease:    (-) Recent Fever   Malignancy:       Other:     (-) Any chance pregnant       Physical Exam    Airway        Mallampati: III   TM distance: > 3 FB   Neck ROM: full   Mouth opening: < 3 cm    Respiratory Devices and Support         Dental  no notable dental history         Cardiovascular   cardiovascular exam normal          Pulmonary   pulmonary exam normal                OUTSIDE LABS:  CBC:   Lab Results   Component Value Date    WBC 7.5 12/23/2022    WBC 6.6 12/22/2022    HGB 13.4 12/23/2022    HGB 13.0 (L) 12/22/2022    HCT 41.3 12/23/2022    HCT 40.6  12/22/2022     12/23/2022     12/22/2022     BMP:   Lab Results   Component Value Date     12/23/2022     12/22/2022    POTASSIUM 3.6 12/23/2022    POTASSIUM 3.3 (L) 12/22/2022    CHLORIDE 102 12/23/2022    CHLORIDE 101 12/22/2022    CO2 25 12/23/2022    CO2 32 (H) 12/22/2022    BUN 8.1 12/23/2022    BUN 8.4 12/22/2022    CR 1.24 (H) 12/23/2022    CR 1.26 (H) 12/22/2022     (H) 12/23/2022     (H) 12/23/2022     COAGS:   Lab Results   Component Value Date    INR 1.08 12/22/2022     POC: No results found for: BGM, HCG, HCGS  HEPATIC:   Lab Results   Component Value Date    ALBUMIN 3.3 (L) 12/21/2022    PROTTOTAL 7.5 12/21/2022    ALT 29 12/21/2022    AST 30 12/21/2022    ALKPHOS 66 12/21/2022    BILITOTAL 0.5 12/21/2022     OTHER:   Lab Results   Component Value Date    LACT 2.0 09/25/2021    A1C 12.5 (H) 09/16/2022    ANGELLA 9.0 12/23/2022    PHOS 2.9 12/23/2022    MAG 1.9 12/23/2022    CRP 13.0 (H) 12/21/2022       Anesthesia Plan    ASA Status:  3   NPO Status:  NPO Appropriate    Anesthesia Type: General.     - Airway: ETT   Induction: Intravenous.   Maintenance: Balanced.   Techniques and Equipment:     - Airway: Video-Laryngoscope       - Blood: T&S     Consents    Anesthesia Plan(s) and associated risks, benefits, and realistic alternatives discussed. Questions answered and patient/representative(s) expressed understanding.     - Discussed: Risks, Benefits and Alternatives for BOTH SEDATION and the PROCEDURE were discussed     - Discussed with:  Patient      - Specific Concerns: risk of mace, stroke, sore throat oral dental damage, blood tx all discussed.     - Extended Intubation/Ventilatory Support Discussed: No.      - Patient is DNR/DNI Status: No    Use of blood products discussed: Yes.     - Discussed with: Patient.     - Consented: consented to blood products            Reason for refusal: other.     Postoperative Care    Pain management: Multi-modal analgesia.   PONV  prophylaxis: Ondansetron (or other 5HT-3)     Comments:                Dona Ortiz MD

## 2022-12-23 NOTE — PLAN OF CARE
Goal Outcome Evaluation:      Plan of Care Reviewed With: patient    Overall Patient Progress: no changeOverall Patient Progress: no change    Outcome Evaluation: VSS on RA this am, 2-4 LPM in evening following OR with CPAP orders in place overnight. Denies pain. TRINH in OR today to evaluate ascending aorta. BP monitored and labetalol PO dose increased to 200 mg. Wound cares complete. Magnesium 1.9, PO supplements given.  Diet order resumed for dinner with plan for NPO at midnight for possible surgical plan tomorrow. Obtained VS on MISA and HERBERT per MD request, unremarkable difference.

## 2022-12-24 ENCOUNTER — APPOINTMENT (OUTPATIENT)
Dept: OCCUPATIONAL THERAPY | Facility: CLINIC | Age: 62
End: 2022-12-24
Attending: STUDENT IN AN ORGANIZED HEALTH CARE EDUCATION/TRAINING PROGRAM
Payer: COMMERCIAL

## 2022-12-24 LAB
ANION GAP SERPL CALCULATED.3IONS-SCNC: 11 MMOL/L (ref 7–15)
BUN SERPL-MCNC: 9.4 MG/DL (ref 8–23)
CALCIUM SERPL-MCNC: 8.7 MG/DL (ref 8.8–10.2)
CHLORIDE SERPL-SCNC: 104 MMOL/L (ref 98–107)
CREAT SERPL-MCNC: 1.29 MG/DL (ref 0.67–1.17)
DEPRECATED HCO3 PLAS-SCNC: 25 MMOL/L (ref 22–29)
ERYTHROCYTE [DISTWIDTH] IN BLOOD BY AUTOMATED COUNT: 15.9 % (ref 10–15)
GFR SERPL CREATININE-BSD FRML MDRD: 62 ML/MIN/1.73M2
GLUCOSE BLDC GLUCOMTR-MCNC: 137 MG/DL (ref 70–99)
GLUCOSE BLDC GLUCOMTR-MCNC: 143 MG/DL (ref 70–99)
GLUCOSE BLDC GLUCOMTR-MCNC: 149 MG/DL (ref 70–99)
GLUCOSE BLDC GLUCOMTR-MCNC: 163 MG/DL (ref 70–99)
GLUCOSE BLDC GLUCOMTR-MCNC: 185 MG/DL (ref 70–99)
GLUCOSE BLDC GLUCOMTR-MCNC: 189 MG/DL (ref 70–99)
GLUCOSE BLDC GLUCOMTR-MCNC: 198 MG/DL (ref 70–99)
GLUCOSE SERPL-MCNC: 172 MG/DL (ref 70–99)
HCT VFR BLD AUTO: 41.1 % (ref 40–53)
HGB BLD-MCNC: 12.9 G/DL (ref 13.3–17.7)
MAGNESIUM SERPL-MCNC: 1.9 MG/DL (ref 1.7–2.3)
MCH RBC QN AUTO: 28.8 PG (ref 26.5–33)
MCHC RBC AUTO-ENTMCNC: 31.4 G/DL (ref 31.5–36.5)
MCV RBC AUTO: 92 FL (ref 78–100)
PHOSPHATE SERPL-MCNC: 2.8 MG/DL (ref 2.5–4.5)
PLATELET # BLD AUTO: 225 10E3/UL (ref 150–450)
POTASSIUM SERPL-SCNC: 3.9 MMOL/L (ref 3.4–5.3)
RBC # BLD AUTO: 4.48 10E6/UL (ref 4.4–5.9)
SODIUM SERPL-SCNC: 140 MMOL/L (ref 136–145)
WBC # BLD AUTO: 7 10E3/UL (ref 4–11)

## 2022-12-24 PROCEDURE — 97165 OT EVAL LOW COMPLEX 30 MIN: CPT | Mod: GO | Performed by: OCCUPATIONAL THERAPIST

## 2022-12-24 PROCEDURE — 250N000013 HC RX MED GY IP 250 OP 250 PS 637

## 2022-12-24 PROCEDURE — 85027 COMPLETE CBC AUTOMATED: CPT | Performed by: STUDENT IN AN ORGANIZED HEALTH CARE EDUCATION/TRAINING PROGRAM

## 2022-12-24 PROCEDURE — 250N000011 HC RX IP 250 OP 636: Performed by: STUDENT IN AN ORGANIZED HEALTH CARE EDUCATION/TRAINING PROGRAM

## 2022-12-24 PROCEDURE — 250N000013 HC RX MED GY IP 250 OP 250 PS 637: Performed by: STUDENT IN AN ORGANIZED HEALTH CARE EDUCATION/TRAINING PROGRAM

## 2022-12-24 PROCEDURE — 999N000157 HC STATISTIC RCP TIME EA 10 MIN

## 2022-12-24 PROCEDURE — 250N000011 HC RX IP 250 OP 636

## 2022-12-24 PROCEDURE — 80048 BASIC METABOLIC PNL TOTAL CA: CPT | Performed by: STUDENT IN AN ORGANIZED HEALTH CARE EDUCATION/TRAINING PROGRAM

## 2022-12-24 PROCEDURE — 36415 COLL VENOUS BLD VENIPUNCTURE: CPT | Performed by: STUDENT IN AN ORGANIZED HEALTH CARE EDUCATION/TRAINING PROGRAM

## 2022-12-24 PROCEDURE — 97140 MANUAL THERAPY 1/> REGIONS: CPT | Mod: GO | Performed by: OCCUPATIONAL THERAPIST

## 2022-12-24 PROCEDURE — 83735 ASSAY OF MAGNESIUM: CPT | Performed by: STUDENT IN AN ORGANIZED HEALTH CARE EDUCATION/TRAINING PROGRAM

## 2022-12-24 PROCEDURE — 84100 ASSAY OF PHOSPHORUS: CPT | Performed by: STUDENT IN AN ORGANIZED HEALTH CARE EDUCATION/TRAINING PROGRAM

## 2022-12-24 PROCEDURE — 120N000002 HC R&B MED SURG/OB UMMC

## 2022-12-24 PROCEDURE — 250N000009 HC RX 250: Performed by: STUDENT IN AN ORGANIZED HEALTH CARE EDUCATION/TRAINING PROGRAM

## 2022-12-24 RX ORDER — MAGNESIUM OXIDE 400 MG/1
400 TABLET ORAL ONCE
Status: COMPLETED | OUTPATIENT
Start: 2022-12-24 | End: 2022-12-24

## 2022-12-24 RX ORDER — HYDRALAZINE HYDROCHLORIDE 20 MG/ML
10 INJECTION INTRAMUSCULAR; INTRAVENOUS EVERY 6 HOURS PRN
Status: DISCONTINUED | OUTPATIENT
Start: 2022-12-24 | End: 2022-12-26

## 2022-12-24 RX ORDER — LABETALOL 200 MG/1
400 TABLET, FILM COATED ORAL EVERY 12 HOURS SCHEDULED
Status: DISCONTINUED | OUTPATIENT
Start: 2022-12-24 | End: 2022-12-28

## 2022-12-24 RX ORDER — HYDRALAZINE HYDROCHLORIDE 20 MG/ML
10 INJECTION INTRAMUSCULAR; INTRAVENOUS ONCE
Status: COMPLETED | OUTPATIENT
Start: 2022-12-24 | End: 2022-12-24

## 2022-12-24 RX ORDER — LABETALOL 100 MG/1
100 TABLET, FILM COATED ORAL ONCE
Status: COMPLETED | OUTPATIENT
Start: 2022-12-24 | End: 2022-12-24

## 2022-12-24 RX ORDER — LABETALOL 100 MG/1
300 TABLET, FILM COATED ORAL EVERY 12 HOURS SCHEDULED
Status: DISCONTINUED | OUTPATIENT
Start: 2022-12-24 | End: 2022-12-24

## 2022-12-24 RX ORDER — ENOXAPARIN SODIUM 100 MG/ML
40 INJECTION SUBCUTANEOUS EVERY 12 HOURS
Status: DISCONTINUED | OUTPATIENT
Start: 2022-12-24 | End: 2022-12-26

## 2022-12-24 RX ADMIN — HYDRALAZINE HYDROCHLORIDE 10 MG: 20 INJECTION INTRAMUSCULAR; INTRAVENOUS at 18:58

## 2022-12-24 RX ADMIN — ALBUTEROL SULFATE 2 PUFF: 90 AEROSOL, METERED RESPIRATORY (INHALATION) at 17:51

## 2022-12-24 RX ADMIN — INSULIN GLARGINE 25 UNITS: 100 INJECTION, SOLUTION SUBCUTANEOUS at 17:43

## 2022-12-24 RX ADMIN — MAGNESIUM OXIDE TAB 400 MG (241.3 MG ELEMENTAL MG) 400 MG: 400 (241.3 MG) TAB at 10:46

## 2022-12-24 RX ADMIN — SIMETHICONE 80 MG: 80 TABLET, CHEWABLE ORAL at 07:58

## 2022-12-24 RX ADMIN — HYDRALAZINE HYDROCHLORIDE 10 MG: 20 INJECTION INTRAMUSCULAR; INTRAVENOUS at 21:03

## 2022-12-24 RX ADMIN — LABETALOL HYDROCHLORIDE 200 MG: 100 TABLET ORAL at 07:58

## 2022-12-24 RX ADMIN — GABAPENTIN 300 MG: 300 CAPSULE ORAL at 14:00

## 2022-12-24 RX ADMIN — BENZOCAINE 1 ML: 220 SPRAY, METERED PERIODONTAL at 10:47

## 2022-12-24 RX ADMIN — BENZOCAINE 1 ML: 220 SPRAY, METERED PERIODONTAL at 17:41

## 2022-12-24 RX ADMIN — ACETAMINOPHEN 650 MG: 325 TABLET, FILM COATED ORAL at 10:45

## 2022-12-24 RX ADMIN — GABAPENTIN 300 MG: 300 CAPSULE ORAL at 20:39

## 2022-12-24 RX ADMIN — LABETALOL HYDROCHLORIDE 100 MG: 100 TABLET ORAL at 10:45

## 2022-12-24 RX ADMIN — BENZOCAINE 1 ML: 220 SPRAY, METERED PERIODONTAL at 23:49

## 2022-12-24 RX ADMIN — LABETALOL HYDROCHLORIDE 5 MG: 5 INJECTION, SOLUTION INTRAVENOUS at 13:59

## 2022-12-24 RX ADMIN — LABETALOL HYDROCHLORIDE 5 MG: 5 INJECTION, SOLUTION INTRAVENOUS at 08:01

## 2022-12-24 RX ADMIN — ACETAMINOPHEN 650 MG: 325 TABLET, FILM COATED ORAL at 18:16

## 2022-12-24 RX ADMIN — ACETAMINOPHEN 650 MG: 325 TABLET, FILM COATED ORAL at 04:40

## 2022-12-24 RX ADMIN — QUETIAPINE FUMARATE 600 MG: 300 TABLET ORAL at 22:26

## 2022-12-24 RX ADMIN — GABAPENTIN 300 MG: 300 CAPSULE ORAL at 07:58

## 2022-12-24 RX ADMIN — LABETALOL HYDROCHLORIDE 400 MG: 200 TABLET ORAL at 17:43

## 2022-12-24 ASSESSMENT — ACTIVITIES OF DAILY LIVING (ADL)
ADLS_ACUITY_SCORE: 26
ADLS_ACUITY_SCORE: 20
ADLS_ACUITY_SCORE: 26
ADLS_ACUITY_SCORE: 20
ADLS_ACUITY_SCORE: 26
ADLS_ACUITY_SCORE: 26

## 2022-12-24 NOTE — PROGRESS NOTES
[facility]    Progress Note - Medicine Service, MARO[facility] TEAM 1       Date of Admission:  12/21/2022    Assessment & Plan   Antony Aguila Jr. is a 62 year old male admitted on 12/21/2022. He has history chronic type B aortic dissection s/p exploration and median sternotomy (6/18/19), type 2 DM (poorly controlled), and HTN who is admitted for acute on chronic thoracic aortic aneurysm with new aortic root dilation. Hospitalization complicated by hypokalemia.    Today:   - labetalol increased to 300 TID for BP goal < 120   - Follow up with CVTS regarding surgical intervention and anticoagulation for thrombus seen on the TRINH  - Hurricane spray ordered for sore throat (likely from TRINH)   - Restarted on PPx dose of lovenox     # Chronic type B aortic dissection (proximal descending aorta to the abdominal aorta)  # Proximal ascending aorta saccular aneurysm   # Distal ascending aorta saccular aneurysm   # Mild nonobstructive CAD  # HTN  Pt has history of stable type B aortic dissection as well as short segment of dissection vs pseudoaneurysms at the aortic root and distal ascending thoracic aorta. Previously underwent surgical exploration in 6/2019 with concern for type 1 aortic dissection but found to have chronic type B dissection. On admission, CXR with widened mediastinum slightly increased from prior. CT chest with aneurysmal dilatation ascending and descending thoracic aorta with type B aortic dissection distal to L subclavian artery extending to L common iliac artery with severe narrowing of true lumen and possible pseudoaneurysm at aortic root, new compared to prior 2019 images.   - TRINH 12/23 showed saccular aneurysm of ascending aorta measuring 5.8 cm associated with a dissection flap. Thrombus is seen in the false lumen. Aortic root is dilated at 4.6 cm.  - CTA 12/22 two saccular aneurysms, one each in the proximal ascending aorta and distal ascending aorta. The  maximal transaortic diameters of 46.9 x 39.2 mm and 62.7 x 54.4 mm, respectively. Mild, non-obstructive coronary artery disease without any high-grade stenoses.  - Ongoing discussions with cardiology and CVTS re: possible surgical planning  - Increase labetalol from 200 mg to 300 mg BID 12/24   - IV labetalol 5 mg q2h PRN for SBP<120, HR<60  - Telemetry  - Hold DVT ppx 12/23; restarted 12/24 for thrombus seen on TRINH, further anticoagulation pending CVTS recs  - Repeat bilateral upper extremity blood pressures  - Doppler pulses    # Dyspnea  # Concern for sleep apnea  Per hospitalization notes here in Sept 2022, noted to have hx of dyspnea on exertion in setting of being current smoker, tx with albuterol inhaler, recommended outpatient PFTs be obtained. Pulmonary progress note from 7/2019 notes current smoker with unknown COPD history, no PFTs available. Had tracheostomy placed 7/5/19, decannulated 8/2019. CTA chest with compressive atelectasis in L lung adjacent to thoracic aorta and hiatal hernia.   - continue PTA albuterol inhaler q4h prn  - Incentive spirometry   - Trial CPAP at night    # Mild hypokalemia, improving  # Mild hypomagnesemia  - Repleting     # LAKISHA on CKD 2, improving  Outpatient labs 12/16 with Cr 1.4 and eGFR 55. Now back at baseline Cr ~1.1-1.2, eGFR ~65-75. UA unremarkable.  - decrease PTA gabapentin 600 mg TID -> 300 mg TID  - holding PTA metformin for now     # Schizoaffective disorder  # PTSD  Meds per chart include Zyprexa, Abilify, and Seroquel. Per pharmacy med rec, appears to only have filled Seroquel recently.   - continue PTA quetiapine 600 mg at bedtime  - PharmD med rec as above    # Type 2 DM with hyperglycemia  Most recent A1c 12.5 (9/16/22). Home regimen: metformin 1000 qPM and Lantus 50 unit(s) qAM + aspart 17 unit(s) TIDAC + aspart 9 unit(s) w/ snacks  - Decrease lantus to 25u while NPO   - HDSSI   - holding PTA metformin as above  - Snack time Insulin 1:20g Carb ratio PRN  12/24     # BLE edema  Dimer elevated with acute on chronic leg swelling L < R per patient history. DVT US in ED negative for DVT b/l.  Has had LE swelling for last 2 years, notes it is worse lately. Previously on diuretics, not currently per chart.  - lymphedema consult     # Venous stasis ulcer LLE  # Venous insufficiency  # Peripheral vascular disease  - WOC consult placed       # Tobacco use  Smoked cigarettes for 45 years. Currently 1 pack per week.  - Encourage cessation   - Could offer nicotine replacement if pt interested    # Hiatal hernia  Noted on imaging.     # H/o possible ACE inhibitor related angioedema  Noted during July 2019 hospitalization at same time having AHRF requiring tracheostomy (later decannulted in August 2019).       Diet: Moderate Consistent Carb (60 g CHO per Meal) Diet  DVT Prophylaxis: Hold enoxaparin due to anticipated surgical intervention  Noble Catheter: Not present  Fluids: None  Central Lines: None  Cardiac Monitoring: ACTIVE order. Indication: Open heart surgery (72 hours)  Code Status: Full Code      Disposition Plan      Expected Discharge Date: 12/24/2022      Destination: home with help/services  Discharge Comments: Pending CVTS decision re: possible surgery for ?type A dissection/saccular aneurysm        The patient's care was discussed with Dr. Kalpana Sánchez MD  Medicine Service, [facility] TEAM [address] [place] [address]  Securely message with the Vocera Web Console (learn more here)  Text page via [facility] Paging/Directory   Please see signed in provider for up to date coverage information      Clinically Significant Risk Factors              # Hypoalbuminemia: Lowest albumin = 3.3 g/dL at 12/21/2022  4:54 PM, will monitor as appropriate           # Severe Obesity: Estimated body mass index is 48.43 kg/m  as calculated from the following:    Height as of this encounter: 1.829 m (6').    Weight as of this encounter: 162 kg (357 lb  1.6 oz)., PRESENT ON ADMISSION         ______________________________________________________________________    Interval History   No acute events overnight. NPO after midnight for possible surgical intervention. This morning complaining of sore throat and soreness around chest. No chest pain, shortness of breath, abdominal pain. Feeling hungry but understanding of ongoing discussion about surgery.     Data reviewed today: I reviewed all medications, new labs and imaging results over the last 24 hours.     Physical Exam   Vital Signs: Temp: 98.5  F (36.9  C) Temp src: Oral BP: 138/68 Pulse: 61   Resp: 17 SpO2: 99 % O2 Device: None (Room air) Oxygen Delivery: 4 LPM  Weight: 357 lbs 1.6 oz  Gen: lying in bed, in NAD  CV: RRR, normal S1 and S2  Pulm: CTAB, no increased WOB on RA  GI: soft, NT, ND  Neuro: alert, conversant, responds to questions appropriately  Ext: b/l extremities covered with wraps.   Psych: normal affect    Data   Recent Labs   Lab 12/24/22  1435 12/24/22  1332 12/24/22  1106 12/24/22  0741 12/24/22  0611 12/23/22  1347 12/23/22  1132 12/23/22  1052 12/23/22  0608 12/22/22  0718 12/22/22  0549 12/21/22  1703 12/21/22  1654   WBC  --   --   --   --  7.0  --   --   --  7.5  --  6.6  --  7.5   HGB  --   --   --   --  12.9*  --   --   --  13.4  --  13.0*  --  14.0   MCV  --   --   --   --  92  --   --   --  90  --  89  --  87   PLT  --   --   --   --  225  --   --   --  233  --  235  --  240   INR  --   --   --   --   --   --   --   --   --   --  1.08  --   --    NA  --   --   --   --  140  --   --   --  139  --  141  --  140   POTASSIUM  --   --   --   --  3.9  --   --   --  3.6  --  3.3*  --  3.3*   CHLORIDE  --   --   --   --  104  --   --   --  102  --  101  --  102   CO2  --   --   --   --  25  --   --   --  25  --  32*  --  32   BUN  --   --   --   --  9.4  --   --   --  8.1  --  8.4  --  10   CR  --   --   --   --  1.29*  --  1.31*  --  1.24*  --  1.26*  --  1.23   ANIONGAP  --   --   --   --  11   --   --   --  12  --  8  --  6   ANGELLA  --   --   --   --  8.7*  --   --   --  9.0  --  9.3  --  9.7   * 189* 185*   < > 172*   < >  --    < > 143*   < > 159*   < > 220*   ALBUMIN  --   --   --   --   --   --   --   --   --   --   --   --  3.3*   PROTTOTAL  --   --   --   --   --   --   --   --   --   --   --   --  7.5   BILITOTAL  --   --   --   --   --   --   --   --   --   --   --   --  0.5   ALKPHOS  --   --   --   --   --   --   --   --   --   --   --   --  66   ALT  --   --   --   --   --   --   --   --   --   --   --   --  29   AST  --   --   --   --   --   --   --   --   --   --   --   --  30    < > = values in this interval not displayed.     No results found for this or any previous visit (from the past 24 hour(s)).

## 2022-12-24 NOTE — PROGRESS NOTES
Pt notified writer this afternoon that he left his watch and gold chain necklace in pre-op. Writer tried to call pre-op, but staff had already left for the day. Writer will attempt to call tomorrow to locate patient belongings     Update 1852: NST arrived from unit with bag of patient's watch and chain. Belongings all remain with patient now

## 2022-12-24 NOTE — PROGRESS NOTES
RT talked to pt regarding CPAP orders. Pt had a V60 machine in room- set at +12 40%/. Pt wore machine night before but not for long. He did not like it as it was noisy and a barrier to getting up and around. Pt willing to try again tonight, otherwise if still not useful does not want to continue to wear it. We will assess pt again tonight and tomorrow.     Max Flores, RRT

## 2022-12-24 NOTE — PROVIDER NOTIFICATION
Writer informed MD of patient /81. PRN IV labetalol orders for SBP >120. Pt has received multiple doses of IV labetalol today with little improvement of BP on rechecks. Writer asked MD if PRN IV labetalol dose could be adjusted in effort to get SBP in goal of <120. MD called writer and instructed writer to wait on giving 5 mg IV labetalol and wait for further orders. Pending response.

## 2022-12-24 NOTE — PROGRESS NOTES
Spoke with Dr. Jimenez, no further workup needed at this time.  Follow up with CVTS as an outpatient.    Cardiology will sign off, please call with questions!    Florencio Rivera MD  Cardiovascular disease fellow  [facility]  [phone]  12/24/2022 10:44 AM

## 2022-12-24 NOTE — PLAN OF CARE
Goal Outcome Evaluation:      Plan of Care Reviewed With: patient    Overall Patient Progress: no changeOverall Patient Progress: no change    Outcome Evaluation: VSS on RA while awake, 4-5 LPM NC while asleep (declining CPAP). c/o moderate pain to throat, hurricane spray ordered and prn tylenol utilized and patient reported pain tolerable and that he is comfortable. Started on CHO controlled diet. Wound care complete. Pt up to bathroom and ambulating around room independently. Encouraged and assisted with hygeine cares. BP monitored. Doppler pulse checks q4h ordered, yet after lymphedema wraps applied writer unable to use doppler. However, all pulses palpable. Tele monitored, reading NSR. Declined evening enoxaparin, wants to speak with day team before he agrees to taking it.

## 2022-12-24 NOTE — PLAN OF CARE
Goal Outcome Evaluation:      Plan of Care Reviewed With: patient    Overall Patient Progress: no change    Outcome Evaluation: CPAP worn for 2 hours but then pt refused d/t discomfort and noise. MD aware and stable on 4-5L NC. Throat pain noted, tylenol given with control. BP better controlled with increased PO labetalol. HR WDL. Simethicone given for bloating an dgas pain. Has been NPO since midnight for possible procedure. LLE dressing CDI. Cards discussed CT findings with CVTS team who are considering possible interventions and will update primary team with decision.

## 2022-12-25 LAB
ANION GAP SERPL CALCULATED.3IONS-SCNC: 11 MMOL/L (ref 7–15)
BUN SERPL-MCNC: 8.4 MG/DL (ref 8–23)
CALCIUM SERPL-MCNC: 9 MG/DL (ref 8.8–10.2)
CHLORIDE SERPL-SCNC: 104 MMOL/L (ref 98–107)
CREAT SERPL-MCNC: 1.2 MG/DL (ref 0.67–1.17)
CREAT SERPL-MCNC: 1.26 MG/DL (ref 0.66–1.25)
DEPRECATED HCO3 PLAS-SCNC: 26 MMOL/L (ref 22–29)
ERYTHROCYTE [DISTWIDTH] IN BLOOD BY AUTOMATED COUNT: 16.2 % (ref 10–15)
GFR SERPL CREATININE-BSD FRML MDRD: 64 ML/MIN/1.73M2
GFR SERPL CREATININE-BSD FRML MDRD: 68 ML/MIN/1.73M2
GLUCOSE BLDC GLUCOMTR-MCNC: 147 MG/DL (ref 70–99)
GLUCOSE BLDC GLUCOMTR-MCNC: 171 MG/DL (ref 70–99)
GLUCOSE BLDC GLUCOMTR-MCNC: 197 MG/DL (ref 70–99)
GLUCOSE BLDC GLUCOMTR-MCNC: 198 MG/DL (ref 70–99)
GLUCOSE BLDC GLUCOMTR-MCNC: 225 MG/DL (ref 70–99)
GLUCOSE SERPL-MCNC: 156 MG/DL (ref 70–99)
HBA1C MFR BLD: 8 %
HCT VFR BLD AUTO: 42.9 % (ref 40–53)
HGB BLD-MCNC: 13.5 G/DL (ref 13.3–17.7)
MAGNESIUM SERPL-MCNC: 2 MG/DL (ref 1.7–2.3)
MCH RBC QN AUTO: 28.6 PG (ref 26.5–33)
MCHC RBC AUTO-ENTMCNC: 31.5 G/DL (ref 31.5–36.5)
MCV RBC AUTO: 91 FL (ref 78–100)
PHOSPHATE SERPL-MCNC: 2.1 MG/DL (ref 2.5–4.5)
PLATELET # BLD AUTO: 258 10E3/UL (ref 150–450)
POTASSIUM SERPL-SCNC: 4.1 MMOL/L (ref 3.4–5.3)
RBC # BLD AUTO: 4.72 10E6/UL (ref 4.4–5.9)
SODIUM SERPL-SCNC: 141 MMOL/L (ref 136–145)
WBC # BLD AUTO: 7.1 10E3/UL (ref 4–11)

## 2022-12-25 PROCEDURE — 84156 ASSAY OF PROTEIN URINE: CPT | Performed by: STUDENT IN AN ORGANIZED HEALTH CARE EDUCATION/TRAINING PROGRAM

## 2022-12-25 PROCEDURE — 85027 COMPLETE CBC AUTOMATED: CPT | Performed by: STUDENT IN AN ORGANIZED HEALTH CARE EDUCATION/TRAINING PROGRAM

## 2022-12-25 PROCEDURE — 36415 COLL VENOUS BLD VENIPUNCTURE: CPT | Performed by: STUDENT IN AN ORGANIZED HEALTH CARE EDUCATION/TRAINING PROGRAM

## 2022-12-25 PROCEDURE — 250N000011 HC RX IP 250 OP 636: Performed by: STUDENT IN AN ORGANIZED HEALTH CARE EDUCATION/TRAINING PROGRAM

## 2022-12-25 PROCEDURE — 999N000128 HC STATISTIC PERIPHERAL IV START W/O US GUIDANCE

## 2022-12-25 PROCEDURE — 83036 HEMOGLOBIN GLYCOSYLATED A1C: CPT | Performed by: STUDENT IN AN ORGANIZED HEALTH CARE EDUCATION/TRAINING PROGRAM

## 2022-12-25 PROCEDURE — 83735 ASSAY OF MAGNESIUM: CPT | Performed by: STUDENT IN AN ORGANIZED HEALTH CARE EDUCATION/TRAINING PROGRAM

## 2022-12-25 PROCEDURE — 120N000003 HC R&B IMCU UMMC

## 2022-12-25 PROCEDURE — 84100 ASSAY OF PHOSPHORUS: CPT | Performed by: STUDENT IN AN ORGANIZED HEALTH CARE EDUCATION/TRAINING PROGRAM

## 2022-12-25 PROCEDURE — 5A1955Z RESPIRATORY VENTILATION, GREATER THAN 96 CONSECUTIVE HOURS: ICD-10-PCS | Performed by: INTERNAL MEDICINE

## 2022-12-25 PROCEDURE — 250N000013 HC RX MED GY IP 250 OP 250 PS 637: Performed by: STUDENT IN AN ORGANIZED HEALTH CARE EDUCATION/TRAINING PROGRAM

## 2022-12-25 PROCEDURE — 80048 BASIC METABOLIC PNL TOTAL CA: CPT | Performed by: STUDENT IN AN ORGANIZED HEALTH CARE EDUCATION/TRAINING PROGRAM

## 2022-12-25 PROCEDURE — 999N000202 HC STATISTICAL VASC ACCESS NURSE TIME, 1-15 MINUTES

## 2022-12-25 PROCEDURE — 250N000013 HC RX MED GY IP 250 OP 250 PS 637

## 2022-12-25 RX ORDER — POLYETHYLENE GLYCOL 3350 17 G/17G
17 POWDER, FOR SOLUTION ORAL DAILY
Status: DISCONTINUED | OUTPATIENT
Start: 2022-12-25 | End: 2022-12-25

## 2022-12-25 RX ORDER — LABETALOL HYDROCHLORIDE 5 MG/ML
10 INJECTION, SOLUTION INTRAVENOUS ONCE
Status: COMPLETED | OUTPATIENT
Start: 2022-12-25 | End: 2022-12-25

## 2022-12-25 RX ORDER — POLYETHYLENE GLYCOL 3350 17 G/17G
17 POWDER, FOR SOLUTION ORAL DAILY PRN
Status: DISCONTINUED | OUTPATIENT
Start: 2022-12-25 | End: 2022-12-25

## 2022-12-25 RX ORDER — POLYETHYLENE GLYCOL 3350 17 G/17G
17 POWDER, FOR SOLUTION ORAL 2 TIMES DAILY PRN
Status: DISCONTINUED | OUTPATIENT
Start: 2022-12-25 | End: 2023-01-09

## 2022-12-25 RX ORDER — LOSARTAN POTASSIUM 25 MG/1
25 TABLET ORAL DAILY
Status: DISCONTINUED | OUTPATIENT
Start: 2022-12-25 | End: 2022-12-27

## 2022-12-25 RX ADMIN — ENOXAPARIN SODIUM 40 MG: 40 INJECTION SUBCUTANEOUS at 20:08

## 2022-12-25 RX ADMIN — LOSARTAN POTASSIUM 25 MG: 25 TABLET, FILM COATED ORAL at 11:04

## 2022-12-25 RX ADMIN — ACETAMINOPHEN 650 MG: 325 TABLET, FILM COATED ORAL at 03:34

## 2022-12-25 RX ADMIN — GABAPENTIN 300 MG: 300 CAPSULE ORAL at 14:06

## 2022-12-25 RX ADMIN — NICARDIPINE HYDROCHLORIDE 12.5 MG/HR: 0.2 INJECTION, SOLUTION INTRAVENOUS at 23:22

## 2022-12-25 RX ADMIN — NICARDIPINE HYDROCHLORIDE 5 MG/HR: 0.2 INJECTION, SOLUTION INTRAVENOUS at 01:22

## 2022-12-25 RX ADMIN — LABETALOL HYDROCHLORIDE 10 MG: 5 INJECTION, SOLUTION INTRAVENOUS at 00:55

## 2022-12-25 RX ADMIN — NICARDIPINE HYDROCHLORIDE 10 MG/HR: 0.2 INJECTION, SOLUTION INTRAVENOUS at 20:35

## 2022-12-25 RX ADMIN — NICARDIPINE HYDROCHLORIDE 12.5 MG/HR: 0.2 INJECTION, SOLUTION INTRAVENOUS at 17:20

## 2022-12-25 RX ADMIN — INSULIN GLARGINE 25 UNITS: 100 INJECTION, SOLUTION SUBCUTANEOUS at 18:06

## 2022-12-25 RX ADMIN — GABAPENTIN 300 MG: 300 CAPSULE ORAL at 08:05

## 2022-12-25 RX ADMIN — NICARDIPINE HYDROCHLORIDE 12.5 MG/HR: 0.2 INJECTION, SOLUTION INTRAVENOUS at 05:57

## 2022-12-25 RX ADMIN — LABETALOL HYDROCHLORIDE 400 MG: 200 TABLET ORAL at 18:05

## 2022-12-25 RX ADMIN — QUETIAPINE FUMARATE 600 MG: 300 TABLET ORAL at 23:01

## 2022-12-25 RX ADMIN — GABAPENTIN 300 MG: 300 CAPSULE ORAL at 22:06

## 2022-12-25 RX ADMIN — BENZOCAINE 1 ML: 220 SPRAY, METERED PERIODONTAL at 05:56

## 2022-12-25 RX ADMIN — ENOXAPARIN SODIUM 40 MG: 40 INJECTION SUBCUTANEOUS at 08:05

## 2022-12-25 RX ADMIN — ACETAMINOPHEN 650 MG: 325 TABLET, FILM COATED ORAL at 20:13

## 2022-12-25 RX ADMIN — POLYETHYLENE GLYCOL 3350 17 G: 17 POWDER, FOR SOLUTION ORAL at 06:45

## 2022-12-25 RX ADMIN — POLYETHYLENE GLYCOL 3350 17 G: 17 POWDER, FOR SOLUTION ORAL at 21:58

## 2022-12-25 RX ADMIN — LABETALOL HYDROCHLORIDE 400 MG: 200 TABLET ORAL at 08:04

## 2022-12-25 RX ADMIN — BENZOCAINE 1 ML: 220 SPRAY, METERED PERIODONTAL at 20:14

## 2022-12-25 RX ADMIN — NICARDIPINE HYDROCHLORIDE 12.5 MG/HR: 0.2 INJECTION, SOLUTION INTRAVENOUS at 14:11

## 2022-12-25 ASSESSMENT — ACTIVITIES OF DAILY LIVING (ADL)
ADLS_ACUITY_SCORE: 26

## 2022-12-25 NOTE — PROGRESS NOTES
Transfer @ 0130  Transferred from:   Via:bed  Reason for transfer: Pt appropriate for 6B-worsened patient condition - started on nicardipine drip for HTN  Family: Aware of transfer  Belongings: Received with pt - clothes, watch and chain in patient belongings bag in closet.   Chart: Received with pt  Medications: Meds received from old unit with pt  Code Status verified on armband: yes  2 RN Skin Assessment Completed By: Patient refused 2 RN skin assessment. Attempted by Mary Ann RUBALCAVA  Med rec completed: yes  Bed surface reassessed with algorithm and charted: yes  New bed surface ordered: yes  Suction/Ambu bag/Flowmeter at bedside: yes    Report received from: Deya ANDERSON RN   Pt status: Patient frustrated & noncompliant with cares. A&Ox4. HR in 80's - SR. Started on nicardipine by MELINDA Whiteside - float nurse - verified at bedside with writer. 's/80's. Refused skin assessment.    Neuro: A&Ox4. Patient very frustrated. Noncompliant with cares. Rude at times.   Cardiac: SR, HR 70-80's. On Nicardipine drip. Goal BP < 120 systolic, goal HR 55-60.   Respiratory: Sating > 92% on RA. Dips into 80's when sleeping but patient refusing oxygen and CPAP.   GI/: Adequate urine output. BM X1  Diet/appetite: Tolerating moderate consistent carb (60g) diet. Eating well.  Activity: Standby assist up to bathroom.   Pain: At acceptable level on current regimen. Patient complains of pain in legs and throat pain.  Skin: Patient refused skin assessment. Venous stasis ulcer on L leg - covered with Kerlix.   LDA's: R peripheral IV - nicardipine gtt.     Patient refusing multiple cares overnight: oxygen, peripheral IV insertion, and labs. Team is aware.     Plan: Continue with POC. Notify primary team with changes.

## 2022-12-25 NOTE — PROVIDER NOTIFICATION
"Janeth 1 paged the following at 0619.    \"6B. Room [facility]     Titrated up to 15 mg/hr at 0605. Patient BP remains elevated at 133/79 (93).    Tiffanie RUBALCAVA, [id number]\"  "

## 2022-12-25 NOTE — PROVIDER NOTIFICATION
"Janeth 1 paged the following @ 8643.     \"6B. Room [facility]. [facility]     FYI - pt frequently sating in 80's when sleeping. Refusing oxygen and CPAP/BiPAP.     Tiffanie RUBALCAVA, [id number]\"  "

## 2022-12-25 NOTE — PROVIDER NOTIFICATION
"Janeth 1 paged the following:     \"6B. Room [facility]     Patient is frustrated that they have not been informed about the nicardipine drip and why he's on it. Can you come to bedside and explain? Being noncompliant with cares.     Tiffanie RUBALCAVA, [id number]\"    Awaiting response - provider up to bedside to talk with patient.   "

## 2022-12-25 NOTE — PLAN OF CARE
Goal Outcome Evaluation:      Plan of Care Reviewed With: patient    Overall Patient Progress: no changeOverall Patient Progress: no change    Outcome Evaluation: On RA. BP Elevated this evening, team made aware. HR remains stable. Hydralazine and Labetalol given per orders. Pt transfered to , report given. Pt ind in room. A&OX4 cooperative but easily frusterated. Lymph wraps on. Continue to monitor.

## 2022-12-25 NOTE — PROGRESS NOTES
[facility]    Progress Note - Medicine Service, [facility] TEAM 1       Date of Admission:  12/21/2022    Assessment & Plan   Antony Aguila Jr. is a 62 year old male admitted on 12/21/2022. He has history chronic type B aortic dissection s/p exploration and median sternotomy (6/18/19), type 2 DM (poorly controlled), and HTN who is admitted for acute on chronic thoracic aortic aneurysm with new aortic root dilation. Hospitalization complicated by hypokalemia.    Today:   - Continue Nicardipine gtt and labetalol 400 BID for BP goal < 120; HR <60  - No anticoagulation at this time for per CVTS curbside, official recs pending for surgery  - Started on Losartan 25 mg daily    # Chronic type B aortic dissection (proximal descending aorta to the abdominal aorta)  # Proximal ascending aorta saccular aneurysm   # Distal ascending aorta saccular aneurysm   # Mild nonobstructive CAD  Pt has history of stable type B aortic dissection as well as short segment of dissection vs pseudoaneurysms at the aortic root and distal ascending thoracic aorta. Previously underwent surgical exploration in 6/2019 with concern for type 1 aortic dissection but found to have chronic type B dissection. On admission, CXR with widened mediastinum slightly increased from prior. CT chest with aneurysmal dilatation ascending and descending thoracic aorta with type B aortic dissection distal to L subclavian artery extending to L common iliac artery with severe narrowing of true lumen and possible pseudoaneurysm at aortic root, new compared to prior 2019 images.   - TRINH 12/23 showed saccular aneurysm of ascending aorta measuring 5.8 cm associated with a dissection flap. Thrombus is seen in the false lumen. Aortic root is dilated at 4.6 cm.  - CTA 12/22 two saccular aneurysms, one each in the proximal ascending aorta and distal ascending aorta. The maximal transaortic diameters of 46.9 x 39.2 mm and 62.7 x  54.4 mm, respectively. Mild, non-obstructive coronary artery disease without any high-grade stenoses.  - Ongoing discussions with cardiology and CVTS re: possible surgical planning  - Increase labetalol from 300 mg to 400 mg BID 12/25, placed on Nicardipine gtt 12/25   - Started Losartan 25 mg 12/25   - IV labetalol 5 mg q2h PRN for SBP<120, HR<60  - Telemetry  - Hold DVT ppx 12/23; restarted 12/24 for thrombus seen on TRINH, further anticoagulation pending CVTS recs  - Repeat bilateral upper extremity blood pressures  - Doppler pulses    # HTN  12/25 Hypertensive overnight with highest SBP of 170s. Given hx of Type B sonam dissection and now new aortic aneurysm goals SBP < 120 and HR <60 per CVTS.   - Labetalol  BID (titrated from intially PO dose of 100 mg); 5 mg PRN Labetalol and Hydralazine 10 mg for SBPs > 120  - Nicardipine gtt started on 12.25 given persistent elevated SBPs  - holding PTA hydrochlorothiazide  - based on Outpatient note 12/14/2022 pt was previously hydordiuril 50 mg, amlodipine 10 mg, bumex TID and canaglaflozin.       # Dyspnea  # Concern for sleep apnea  Per hospitalization notes here in Sept 2022, noted to have hx of dyspnea on exertion in setting of being current smoker, tx with albuterol inhaler, recommended outpatient PFTs be obtained. Pulmonary progress note from 7/2019 notes current smoker with unknown COPD history, no PFTs available. Had tracheostomy placed 7/5/19, decannulated 8/2019. CTA chest with compressive atelectasis in L lung adjacent to thoracic aorta and hiatal hernia.   - continue PTA albuterol inhaler q4h prn  - Incentive spirometry   - Trial CPAP at night    # Mild hypokalemia, improving  # Mild hypomagnesemia  - Repleting     # LAKISHA on CKD 2, improving  Outpatient labs 12/16 with Cr 1.4 and eGFR 55. Now back at baseline Cr ~1.1-1.2, eGFR ~65-75. UA unremarkable.  - decrease PTA gabapentin 600 mg TID -> 300 mg TID  - holding PTA metformin for now     #  Schizoaffective disorder  # PTSD  Meds per chart include Zyprexa, Abilify, and Seroquel. Per pharmacy med rec, appears to only have filled Seroquel recently.   - continue PTA quetiapine 600 mg at bedtime  - PharmD med rec as above    # Type 2 DM with hyperglycemia  Most recent A1c 12.5 (9/16/22). Home regimen: metformin 1000 qPM and Lantus 50 unit(s) qAM + aspart 17 unit(s) TIDAC + aspart 9 unit(s) w/ snacks  - Decrease lantus to 25u while NPO   - HDSSI   - holding PTA metformin as above  - Snack time Insulin 1:20g Carb ratio PRN 12/24     # BLE edema  Dimer elevated with acute on chronic leg swelling L < R per patient history. DVT US in ED negative for DVT b/l.  Has had LE swelling for last 2 years, notes it is worse lately. Previously on diuretics, not currently per chart.  - lymphedema consult     # Venous stasis ulcer LLE  # Venous insufficiency  # Peripheral vascular disease  - WOC consult placed       # Tobacco use  Smoked cigarettes for 45 years. Currently 1 pack per week.  - Encourage cessation   - Could offer nicotine replacement if pt interested    # Hiatal hernia  Noted on imaging.     # H/o possible ACE inhibitor related angioedema  Noted during July 2019 hospitalization at same time having AHRF requiring tracheostomy (later decannulted in August 2019).       Diet: Moderate Consistent Carb (60 g CHO per Meal) Diet  DVT Prophylaxis: Hold enoxaparin due to anticipated surgical intervention  Noble Catheter: Not present  Fluids: None  Central Lines: None  Cardiac Monitoring: ACTIVE order. Indication: aortic dissection  Code Status: Full Code      Disposition Plan      Expected Discharge Date: 12/27/2022      Destination: home with help/services  Discharge Comments: Pending CVTS decision re: possible surgery for ?type A dissection/saccular aneurysm        The patient's care was discussed with Dr. Kalpana Sánchez MD  Medicine Service, [facility] TEAM 1  [facility]  Securely message with the Vocera Web Console (learn more here)  Text page via [facility] Paging/Directory   Please see signed in provider for up to date coverage information      Clinically Significant Risk Factors              # Hypoalbuminemia: Lowest albumin = 3.3 g/dL at 12/21/2022  4:54 PM, will monitor as appropriate          # DMII: A1C = 8.0 % (Ref range: <5.7 %) within past 3 months, PRESENT ON ADMISSION  # Severe Obesity: Estimated body mass index is 48.43 kg/m  as calculated from the following:    Height as of this encounter: 1.829 m (6').    Weight as of this encounter: 162 kg (357 lb 1.6 oz)., PRESENT ON ADMISSION         ______________________________________________________________________    Interval History   No acute events overnight. This morning pt had concerned about nicardipine drip and regarding his BP goals. I talked to him and explained again regarding the findings of the CT scan and TRINH findings and why BP goals are set as such. Pt was more agreeable after discussion and the understanding was confirmed with teach back method. He denies any chest pain, SOB, headache, N/V/D/. He denies any additional concerns.     Data reviewed today: I reviewed all medications, new labs and imaging results over the last 24 hours.     Physical Exam   Vital Signs: Temp: 98  F (36.7  C) Temp src: Oral BP: 116/77 Pulse: 76   Resp: 20 SpO2: 96 % O2 Device: None (Room air)    Weight: 357 lbs 1.6 oz  Gen: lying in bed, in NAD  CV: RRR, normal S1 and S2  Pulm: CTAB, no increased WOB on RA  GI: soft, NT, ND  Neuro: alert, conversant, responds to questions appropriately  Ext: b/l extremities covered with wraps.   Psych: normal affect    Data   Recent Labs   Lab 12/25/22  1321 12/25/22  0911 12/25/22  0736 12/24/22  0741 12/24/22  0611 12/23/22  1347 12/23/22  1132 12/23/22  1052 12/23/22  0608 12/22/22  0718 12/22/22  0549 12/21/22  1703 12/21/22  1654   WBC  --   --  7.1  --  7.0  --   --   --  7.5  --  6.6  --   7.5   HGB  --   --  13.5  --  12.9*  --   --   --  13.4  --  13.0*  --  14.0   MCV  --   --  91  --  92  --   --   --  90  --  89  --  87   PLT  --   --  258  --  225  --   --   --  233  --  235  --  240   INR  --   --   --   --   --   --   --   --   --   --  1.08  --   --    NA  --   --  141  --  140  --   --   --  139  --  141  --  140   POTASSIUM  --   --  4.1  --  3.9  --   --   --  3.6  --  3.3*   < > 3.3*   CHLORIDE  --   --  104  --  104  --   --   --  102  --  101   < > 102   CO2  --   --  26  --  25  --   --   --  25  --  32*   < > 32   BUN  --   --  8.4  --  9.4  --   --   --  8.1  --  8.4   < > 10   CR  --   --  1.20*  --  1.29*  --  1.31*  --  1.24*  --  1.26*  --  1.26*  1.23   ANIONGAP  --   --  11  --  11  --   --   --  12  --  8   < > 6   ANGELLA  --   --  9.0  --  8.7*  --   --   --  9.0  --  9.3  --  9.7   * 147* 156*   < > 172*   < >  --    < > 143*   < > 159*   < > 220*   ALBUMIN  --   --   --   --   --   --   --   --   --   --   --   --  3.3*   PROTTOTAL  --   --   --   --   --   --   --   --   --   --   --   --  7.5   BILITOTAL  --   --   --   --   --   --   --   --   --   --   --   --  0.5   ALKPHOS  --   --   --   --   --   --   --   --   --   --   --   --  66   ALT  --   --   --   --   --   --   --   --   --   --   --   --  29   AST  --   --   --   --   --   --   --   --   --   --   --   --  30    < > = values in this interval not displayed.     No results found for this or any previous visit (from the past 24 hour(s)).

## 2022-12-25 NOTE — PLAN OF CARE
/77 (BP Location: Right arm)   Pulse 76   Temp 97.7  F (36.5  C) (Axillary)   Resp 20   Ht 1.829 m (6')   Wt (!) 162 kg (357 lb 1.6 oz)   SpO2 93%   BMI 48.43 kg/m      Hours of Care: 8747-2654.    Reason for admission: Admitted on 12/21 for shortness of breath, found to have an acute on chronic thoracic aortic aneurism with new aortic root dilation.  Hx of DMII, HTN, chronic Type B thoracic dissecting aorta.   Vitals: VSS.   Activity: Up with SBA.  Pain: Mild right arm pain improved with ice pack.  Neuro: AO x 4.  Cardiac: SR. Titrating nicardipine gtt to keep SBP between 100-120.   Respiratory: On 2L NC.  GI/: Voiding spontaneously.  No BM today.  Diet: Consistent carb.    Lines: L PIV infusing.  Skin/Wounds: BLE edema.  Venous stasis ulcers on left leg dressed. Pt wanted laymph wraps removed early this AM.   Plan: CV surgery consult pending.      Continue to monitor and follow POC    Ector Figueroa RN on 12/25/2022 at 6:20 PM

## 2022-12-25 NOTE — PROVIDER NOTIFICATION
"Janeth 1 paged the following @ 6986:    \"6B. Room [facility]. .C.     Last /76 (86). Has been at 15 mg/hr of nicardipine since 0605.     Tiffanie RUBALCAVA, [id number]\"  "

## 2022-12-25 NOTE — PROVIDER NOTIFICATION
"Janeth 1 paged the following @ 6130, 12/25/22    \"6B. Room [facility].     FYI = pt still being very noncompliant. Getting up on his own and not sitting still for BP reading. Pt would also like miralax.     Tiffanie RUBALCAVA, [id number]\"      "

## 2022-12-25 NOTE — PROGRESS NOTES
Pt. Declined to have a new PIV placed, currently has working IV on right lower forearm, bedside nurse aware.

## 2022-12-26 ENCOUNTER — APPOINTMENT (OUTPATIENT)
Dept: GENERAL RADIOLOGY | Facility: CLINIC | Age: 62
End: 2022-12-26
Payer: COMMERCIAL

## 2022-12-26 ENCOUNTER — APPOINTMENT (OUTPATIENT)
Dept: CARDIOLOGY | Facility: CLINIC | Age: 62
End: 2022-12-26
Payer: COMMERCIAL

## 2022-12-26 ENCOUNTER — ANESTHESIA EVENT (OUTPATIENT)
Dept: INTENSIVE CARE | Facility: CLINIC | Age: 62
End: 2022-12-26
Payer: COMMERCIAL

## 2022-12-26 ENCOUNTER — ANESTHESIA (OUTPATIENT)
Dept: INTENSIVE CARE | Facility: CLINIC | Age: 62
End: 2022-12-26
Payer: COMMERCIAL

## 2022-12-26 DIAGNOSIS — T88.4XXD HARD TO INTUBATE, SUBSEQUENT ENCOUNTER: Primary | ICD-10-CM

## 2022-12-26 PROBLEM — T88.4XXA HARD TO INTUBATE: Status: ACTIVE | Noted: 2022-12-26

## 2022-12-26 LAB
ALBUMIN MFR UR ELPH: 11.3 MG/DL
ALBUMIN SERPL BCG-MCNC: 2.9 G/DL (ref 3.5–5.2)
ALLEN'S TEST: YES
ALP SERPL-CCNC: 58 U/L (ref 40–129)
ALT SERPL W P-5'-P-CCNC: 18 U/L (ref 10–50)
ANION GAP SERPL CALCULATED.3IONS-SCNC: 10 MMOL/L (ref 7–15)
AST SERPL W P-5'-P-CCNC: 26 U/L (ref 10–50)
ATRIAL RATE - MUSE: 88 BPM
BASE EXCESS BLDA CALC-SCNC: -0.1 MMOL/L (ref -9–1.8)
BASE EXCESS BLDA CALC-SCNC: -0.5 MMOL/L (ref -9–1.8)
BASE EXCESS BLDA CALC-SCNC: -1.3 MMOL/L (ref -9–1.8)
BASE EXCESS BLDV CALC-SCNC: 0.5 MMOL/L (ref -7.7–1.9)
BILIRUB SERPL-MCNC: 0.4 MG/DL
BUN SERPL-MCNC: 11.2 MG/DL (ref 8–23)
CA-I BLD-MCNC: 4.2 MG/DL (ref 4.4–5.2)
CALCIUM SERPL-MCNC: 8.2 MG/DL (ref 8.8–10.2)
CHLORIDE SERPL-SCNC: 105 MMOL/L (ref 98–107)
CREAT SERPL-MCNC: 1.41 MG/DL (ref 0.67–1.17)
CREAT UR-MCNC: 123 MG/DL
DEPRECATED HCO3 PLAS-SCNC: 22 MMOL/L (ref 22–29)
DIASTOLIC BLOOD PRESSURE - MUSE: NORMAL MMHG
ERYTHROCYTE [DISTWIDTH] IN BLOOD BY AUTOMATED COUNT: 16.2 % (ref 10–15)
GFR SERPL CREATININE-BSD FRML MDRD: 56 ML/MIN/1.73M2
GLUCOSE BLDC GLUCOMTR-MCNC: 158 MG/DL (ref 70–99)
GLUCOSE BLDC GLUCOMTR-MCNC: 172 MG/DL (ref 70–99)
GLUCOSE BLDC GLUCOMTR-MCNC: 175 MG/DL (ref 70–99)
GLUCOSE BLDC GLUCOMTR-MCNC: 177 MG/DL (ref 70–99)
GLUCOSE BLDC GLUCOMTR-MCNC: 183 MG/DL (ref 70–99)
GLUCOSE BLDC GLUCOMTR-MCNC: 186 MG/DL (ref 70–99)
GLUCOSE BLDC GLUCOMTR-MCNC: 188 MG/DL (ref 70–99)
GLUCOSE BLDC GLUCOMTR-MCNC: 199 MG/DL (ref 70–99)
GLUCOSE SERPL-MCNC: 163 MG/DL (ref 70–99)
HCO3 BLD-SCNC: 24 MMOL/L (ref 21–28)
HCO3 BLD-SCNC: 24 MMOL/L (ref 21–28)
HCO3 BLD-SCNC: 26 MMOL/L (ref 21–28)
HCO3 BLDV-SCNC: 27 MMOL/L (ref 21–28)
HCT VFR BLD AUTO: 35.3 % (ref 40–53)
HGB BLD-MCNC: 11.3 G/DL (ref 13.3–17.7)
INR PPP: 1.13 (ref 0.85–1.15)
INTERPRETATION ECG - MUSE: NORMAL
LACTATE SERPL-SCNC: 1.1 MMOL/L (ref 0.7–2)
LVEF ECHO: NORMAL
MAGNESIUM SERPL-MCNC: 1.8 MG/DL (ref 1.7–2.3)
MCH RBC QN AUTO: 29 PG (ref 26.5–33)
MCHC RBC AUTO-ENTMCNC: 32 G/DL (ref 31.5–36.5)
MCV RBC AUTO: 91 FL (ref 78–100)
O2/TOTAL GAS SETTING VFR VENT: 70 %
O2/TOTAL GAS SETTING VFR VENT: 90 %
OXYHGB MFR BLD: 89 % (ref 92–100)
P AXIS - MUSE: 45 DEGREES
PCO2 BLD: 39 MM HG (ref 35–45)
PCO2 BLD: 40 MM HG (ref 35–45)
PCO2 BLD: 50 MM HG (ref 35–45)
PCO2 BLDV: 53 MM HG (ref 40–50)
PH BLD: 7.33 [PH] (ref 7.35–7.45)
PH BLD: 7.39 [PH] (ref 7.35–7.45)
PH BLD: 7.39 [PH] (ref 7.35–7.45)
PH BLDV: 7.32 [PH] (ref 7.32–7.43)
PHOSPHATE SERPL-MCNC: 3.8 MG/DL (ref 2.5–4.5)
PLATELET # BLD AUTO: 210 10E3/UL (ref 150–450)
PO2 BLD: 62 MM HG (ref 80–105)
PO2 BLD: 64 MM HG (ref 80–105)
PO2 BLD: 75 MM HG (ref 80–105)
PO2 BLDV: 53 MM HG (ref 25–47)
POTASSIUM SERPL-SCNC: 4 MMOL/L (ref 3.4–5.3)
PR INTERVAL - MUSE: 156 MS
PROT SERPL-MCNC: 6 G/DL (ref 6.4–8.3)
PROT/CREAT 24H UR: 0.09 MG/MG CR (ref 0–0.2)
QRS DURATION - MUSE: 82 MS
QT - MUSE: 380 MS
QTC - MUSE: 459 MS
R AXIS - MUSE: 44 DEGREES
RBC # BLD AUTO: 3.9 10E6/UL (ref 4.4–5.9)
SODIUM SERPL-SCNC: 137 MMOL/L (ref 136–145)
SYSTOLIC BLOOD PRESSURE - MUSE: NORMAL MMHG
T AXIS - MUSE: 78 DEGREES
VENTRICULAR RATE- MUSE: 88 BPM
WBC # BLD AUTO: 8.7 10E3/UL (ref 4–11)

## 2022-12-26 PROCEDURE — 44500 INTRO GASTROINTESTINAL TUBE: CPT | Mod: 53 | Performed by: STUDENT IN AN ORGANIZED HEALTH CARE EDUCATION/TRAINING PROGRAM

## 2022-12-26 PROCEDURE — 82330 ASSAY OF CALCIUM: CPT | Performed by: STUDENT IN AN ORGANIZED HEALTH CARE EDUCATION/TRAINING PROGRAM

## 2022-12-26 PROCEDURE — 85610 PROTHROMBIN TIME: CPT | Performed by: STUDENT IN AN ORGANIZED HEALTH CARE EDUCATION/TRAINING PROGRAM

## 2022-12-26 PROCEDURE — 93321 DOPPLER ECHO F-UP/LMTD STD: CPT | Mod: 26 | Performed by: INTERNAL MEDICINE

## 2022-12-26 PROCEDURE — 36600 WITHDRAWAL OF ARTERIAL BLOOD: CPT

## 2022-12-26 PROCEDURE — 85018 HEMOGLOBIN: CPT | Performed by: STUDENT IN AN ORGANIZED HEALTH CARE EDUCATION/TRAINING PROGRAM

## 2022-12-26 PROCEDURE — 74340 X-RAY GUIDE FOR GI TUBE: CPT | Mod: 26 | Performed by: STUDENT IN AN ORGANIZED HEALTH CARE EDUCATION/TRAINING PROGRAM

## 2022-12-26 PROCEDURE — 36569 INSJ PICC 5 YR+ W/O IMAGING: CPT

## 2022-12-26 PROCEDURE — 82803 BLOOD GASES ANY COMBINATION: CPT | Performed by: NURSE PRACTITIONER

## 2022-12-26 PROCEDURE — 250N000009 HC RX 250: Performed by: STUDENT IN AN ORGANIZED HEALTH CARE EDUCATION/TRAINING PROGRAM

## 2022-12-26 PROCEDURE — 370N000003 HC ANESTHESIA WARD SERVICE

## 2022-12-26 PROCEDURE — 999N000248 HC STATISTIC IV INSERT WITH US BY RN

## 2022-12-26 PROCEDURE — 80053 COMPREHEN METABOLIC PANEL: CPT | Performed by: STUDENT IN AN ORGANIZED HEALTH CARE EDUCATION/TRAINING PROGRAM

## 2022-12-26 PROCEDURE — 272N000452 HC KIT SHRLOCK 5FR POWER PICC TRIPLE LUMEN

## 2022-12-26 PROCEDURE — 250N000009 HC RX 250: Performed by: HOSPITALIST

## 2022-12-26 PROCEDURE — 93325 DOPPLER ECHO COLOR FLOW MAPG: CPT | Mod: 26 | Performed by: INTERNAL MEDICINE

## 2022-12-26 PROCEDURE — 250N000011 HC RX IP 250 OP 636: Performed by: ANESTHESIOLOGY

## 2022-12-26 PROCEDURE — 84100 ASSAY OF PHOSPHORUS: CPT | Performed by: STUDENT IN AN ORGANIZED HEALTH CARE EDUCATION/TRAINING PROGRAM

## 2022-12-26 PROCEDURE — 250N000011 HC RX IP 250 OP 636

## 2022-12-26 PROCEDURE — 250N000013 HC RX MED GY IP 250 OP 250 PS 637: Performed by: INTERNAL MEDICINE

## 2022-12-26 PROCEDURE — 94640 AIRWAY INHALATION TREATMENT: CPT | Mod: 76

## 2022-12-26 PROCEDURE — 82803 BLOOD GASES ANY COMBINATION: CPT | Performed by: INTERNAL MEDICINE

## 2022-12-26 PROCEDURE — C9113 INJ PANTOPRAZOLE SODIUM, VIA: HCPCS | Performed by: STUDENT IN AN ORGANIZED HEALTH CARE EDUCATION/TRAINING PROGRAM

## 2022-12-26 PROCEDURE — 99291 CRITICAL CARE FIRST HOUR: CPT | Mod: GC | Performed by: SURGERY

## 2022-12-26 PROCEDURE — 250N000011 HC RX IP 250 OP 636: Performed by: STUDENT IN AN ORGANIZED HEALTH CARE EDUCATION/TRAINING PROGRAM

## 2022-12-26 PROCEDURE — 999N000034 HC STATISTIC CODE BLUE ACCESS REQUIRED

## 2022-12-26 PROCEDURE — 94640 AIRWAY INHALATION TREATMENT: CPT

## 2022-12-26 PROCEDURE — 99232 SBSQ HOSP IP/OBS MODERATE 35: CPT | Performed by: PSYCHIATRY & NEUROLOGY

## 2022-12-26 PROCEDURE — 93325 DOPPLER ECHO COLOR FLOW MAPG: CPT

## 2022-12-26 PROCEDURE — 44500 INTRO GASTROINTESTINAL TUBE: CPT | Mod: 52

## 2022-12-26 PROCEDURE — 71045 X-RAY EXAM CHEST 1 VIEW: CPT

## 2022-12-26 PROCEDURE — 99291 CRITICAL CARE FIRST HOUR: CPT | Performed by: HOSPITALIST

## 2022-12-26 PROCEDURE — 999N000185 HC STATISTIC TRANSPORT TIME EA 15 MIN

## 2022-12-26 PROCEDURE — 93010 ELECTROCARDIOGRAM REPORT: CPT | Performed by: INTERNAL MEDICINE

## 2022-12-26 PROCEDURE — 71045 X-RAY EXAM CHEST 1 VIEW: CPT | Mod: 26 | Performed by: RADIOLOGY

## 2022-12-26 PROCEDURE — 200N000002 HC R&B ICU UMMC

## 2022-12-26 PROCEDURE — 93308 TTE F-UP OR LMTD: CPT | Mod: 26 | Performed by: INTERNAL MEDICINE

## 2022-12-26 PROCEDURE — 36415 COLL VENOUS BLD VENIPUNCTURE: CPT | Performed by: STUDENT IN AN ORGANIZED HEALTH CARE EDUCATION/TRAINING PROGRAM

## 2022-12-26 PROCEDURE — 999N000215 HC STATISTIC HFNC ADULT NON-CPAP

## 2022-12-26 PROCEDURE — 999N000065 XR CHEST PORT 1 VIEW

## 2022-12-26 PROCEDURE — 83605 ASSAY OF LACTIC ACID: CPT | Performed by: STUDENT IN AN ORGANIZED HEALTH CARE EDUCATION/TRAINING PROGRAM

## 2022-12-26 PROCEDURE — 255N000002 HC RX 255 OP 636: Performed by: STUDENT IN AN ORGANIZED HEALTH CARE EDUCATION/TRAINING PROGRAM

## 2022-12-26 PROCEDURE — 99292 CRITICAL CARE ADDL 30 MIN: CPT | Performed by: INTERNAL MEDICINE

## 2022-12-26 PROCEDURE — 93005 ELECTROCARDIOGRAM TRACING: CPT

## 2022-12-26 PROCEDURE — 94002 VENT MGMT INPAT INIT DAY: CPT

## 2022-12-26 PROCEDURE — 250N000011 HC RX IP 250 OP 636: Performed by: NURSE PRACTITIONER

## 2022-12-26 PROCEDURE — 999N000285 HC STATISTIC VASC ACCESS LAB DRAW WITH PIV START

## 2022-12-26 PROCEDURE — 999N000157 HC STATISTIC RCP TIME EA 10 MIN

## 2022-12-26 PROCEDURE — 258N000003 HC RX IP 258 OP 636: Performed by: STUDENT IN AN ORGANIZED HEALTH CARE EDUCATION/TRAINING PROGRAM

## 2022-12-26 PROCEDURE — 82805 BLOOD GASES W/O2 SATURATION: CPT | Performed by: STUDENT IN AN ORGANIZED HEALTH CARE EDUCATION/TRAINING PROGRAM

## 2022-12-26 PROCEDURE — 83735 ASSAY OF MAGNESIUM: CPT | Performed by: STUDENT IN AN ORGANIZED HEALTH CARE EDUCATION/TRAINING PROGRAM

## 2022-12-26 PROCEDURE — 82803 BLOOD GASES ANY COMBINATION: CPT | Performed by: STUDENT IN AN ORGANIZED HEALTH CARE EDUCATION/TRAINING PROGRAM

## 2022-12-26 PROCEDURE — 999N000203 HC STATISTICAL VASC ACCESS NURSE TIME, 16-31 MINUTES

## 2022-12-26 RX ORDER — GUAIFENESIN 600 MG/1
15 TABLET, EXTENDED RELEASE ORAL DAILY
Status: DISCONTINUED | OUTPATIENT
Start: 2022-12-26 | End: 2023-01-03

## 2022-12-26 RX ORDER — HYDRALAZINE HYDROCHLORIDE 20 MG/ML
10 INJECTION INTRAMUSCULAR; INTRAVENOUS EVERY 6 HOURS PRN
Status: DISCONTINUED | OUTPATIENT
Start: 2022-12-26 | End: 2022-12-27

## 2022-12-26 RX ORDER — LORAZEPAM 2 MG/ML
1 INJECTION INTRAMUSCULAR ONCE
Status: DISCONTINUED | OUTPATIENT
Start: 2022-12-26 | End: 2022-12-26

## 2022-12-26 RX ORDER — HALOPERIDOL 5 MG/ML
2 INJECTION INTRAMUSCULAR ONCE
Status: DISCONTINUED | OUTPATIENT
Start: 2022-12-26 | End: 2022-12-26

## 2022-12-26 RX ORDER — HALOPERIDOL 5 MG/ML
5 INJECTION INTRAMUSCULAR EVERY 6 HOURS PRN
Status: DISCONTINUED | OUTPATIENT
Start: 2022-12-26 | End: 2022-12-26 | Stop reason: DRUGHIGH

## 2022-12-26 RX ORDER — HEPARIN SODIUM,PORCINE 10 UNIT/ML
5-20 VIAL (ML) INTRAVENOUS
Status: DISCONTINUED | OUTPATIENT
Start: 2022-12-26 | End: 2023-01-11

## 2022-12-26 RX ORDER — NALOXONE HYDROCHLORIDE 0.4 MG/ML
0.2 INJECTION, SOLUTION INTRAMUSCULAR; INTRAVENOUS; SUBCUTANEOUS
Status: DISCONTINUED | OUTPATIENT
Start: 2022-12-26 | End: 2023-01-03

## 2022-12-26 RX ORDER — FENTANYL CITRATE 50 UG/ML
25-50 INJECTION, SOLUTION INTRAMUSCULAR; INTRAVENOUS
Status: DISCONTINUED | OUTPATIENT
Start: 2022-12-26 | End: 2022-12-26

## 2022-12-26 RX ORDER — ENOXAPARIN SODIUM 100 MG/ML
40 INJECTION SUBCUTANEOUS EVERY 12 HOURS
Status: COMPLETED | OUTPATIENT
Start: 2022-12-26 | End: 2022-12-26

## 2022-12-26 RX ORDER — DEXMEDETOMIDINE HYDROCHLORIDE 4 UG/ML
.2-.7 INJECTION, SOLUTION INTRAVENOUS CONTINUOUS
Status: DISCONTINUED | OUTPATIENT
Start: 2022-12-26 | End: 2022-12-26

## 2022-12-26 RX ORDER — DEXTROSE MONOHYDRATE 25 G/50ML
25-50 INJECTION, SOLUTION INTRAVENOUS
Status: DISCONTINUED | OUTPATIENT
Start: 2022-12-26 | End: 2022-12-26

## 2022-12-26 RX ORDER — NICOTINE POLACRILEX 4 MG
15-30 LOZENGE BUCCAL
Status: DISCONTINUED | OUTPATIENT
Start: 2022-12-26 | End: 2022-12-26

## 2022-12-26 RX ORDER — NALOXONE HYDROCHLORIDE 0.4 MG/ML
0.4 INJECTION, SOLUTION INTRAMUSCULAR; INTRAVENOUS; SUBCUTANEOUS
Status: DISCONTINUED | OUTPATIENT
Start: 2022-12-26 | End: 2023-01-03

## 2022-12-26 RX ORDER — DEXTROSE MONOHYDRATE 100 MG/ML
INJECTION, SOLUTION INTRAVENOUS CONTINUOUS PRN
Status: DISCONTINUED | OUTPATIENT
Start: 2022-12-26 | End: 2023-01-01

## 2022-12-26 RX ORDER — ALBUTEROL SULFATE 90 UG/1
6 AEROSOL, METERED RESPIRATORY (INHALATION) EVERY 4 HOURS PRN
Status: DISCONTINUED | OUTPATIENT
Start: 2022-12-26 | End: 2022-12-28

## 2022-12-26 RX ORDER — OLANZAPINE 10 MG/2ML
5 INJECTION, POWDER, FOR SOLUTION INTRAMUSCULAR ONCE
Status: COMPLETED | OUTPATIENT
Start: 2022-12-26 | End: 2022-12-26

## 2022-12-26 RX ORDER — ARIPIPRAZOLE ORAL 1 MG/ML
10 SOLUTION ORAL AT BEDTIME
Status: DISCONTINUED | OUTPATIENT
Start: 2022-12-26 | End: 2023-01-02

## 2022-12-26 RX ORDER — LIDOCAINE HYDROCHLORIDE 20 MG/ML
5 SOLUTION OROPHARYNGEAL ONCE
Status: COMPLETED | OUTPATIENT
Start: 2022-12-26 | End: 2022-12-26

## 2022-12-26 RX ORDER — PROPOFOL 10 MG/ML
5-75 INJECTION, EMULSION INTRAVENOUS CONTINUOUS
Status: DISCONTINUED | OUTPATIENT
Start: 2022-12-26 | End: 2022-12-26

## 2022-12-26 RX ORDER — FENTANYL CITRATE 50 UG/ML
50-100 INJECTION, SOLUTION INTRAMUSCULAR; INTRAVENOUS
Status: DISCONTINUED | OUTPATIENT
Start: 2022-12-26 | End: 2022-12-26

## 2022-12-26 RX ORDER — HEPARIN SODIUM,PORCINE 10 UNIT/ML
5-20 VIAL (ML) INTRAVENOUS EVERY 24 HOURS
Status: DISCONTINUED | OUTPATIENT
Start: 2022-12-26 | End: 2023-01-11

## 2022-12-26 RX ORDER — LORAZEPAM 2 MG/ML
1 INJECTION INTRAMUSCULAR ONCE
Status: COMPLETED | OUTPATIENT
Start: 2022-12-26 | End: 2022-12-26

## 2022-12-26 RX ORDER — LORAZEPAM 2 MG/ML
2 INJECTION INTRAMUSCULAR ONCE
Status: DISCONTINUED | OUTPATIENT
Start: 2022-12-26 | End: 2022-12-26

## 2022-12-26 RX ORDER — CALCIUM GLUCONATE 20 MG/ML
2 INJECTION, SOLUTION INTRAVENOUS ONCE
Status: COMPLETED | OUTPATIENT
Start: 2022-12-26 | End: 2022-12-26

## 2022-12-26 RX ORDER — ALBUTEROL SULFATE 90 UG/1
6 AEROSOL, METERED RESPIRATORY (INHALATION) EVERY 4 HOURS
Status: DISCONTINUED | OUTPATIENT
Start: 2022-12-26 | End: 2022-12-26

## 2022-12-26 RX ORDER — LORAZEPAM 2 MG/ML
INJECTION INTRAMUSCULAR
Status: DISCONTINUED
Start: 2022-12-26 | End: 2022-12-26 | Stop reason: HOSPADM

## 2022-12-26 RX ORDER — PHENYLEPHRINE HCL IN 0.9% NACL 50MG/250ML
.1-6 PLASTIC BAG, INJECTION (ML) INTRAVENOUS CONTINUOUS
Status: DISCONTINUED | OUTPATIENT
Start: 2022-12-26 | End: 2022-12-28

## 2022-12-26 RX ORDER — ALBUTEROL SULFATE 90 UG/1
1-2 AEROSOL, METERED RESPIRATORY (INHALATION) EVERY 4 HOURS
Status: DISCONTINUED | OUTPATIENT
Start: 2022-12-26 | End: 2022-12-27

## 2022-12-26 RX ORDER — ENOXAPARIN SODIUM 100 MG/ML
40 INJECTION SUBCUTANEOUS EVERY 12 HOURS
Status: DISCONTINUED | OUTPATIENT
Start: 2022-12-26 | End: 2022-12-26

## 2022-12-26 RX ORDER — HALOPERIDOL 5 MG/ML
2-4 INJECTION INTRAMUSCULAR EVERY 4 HOURS PRN
Status: DISCONTINUED | OUTPATIENT
Start: 2022-12-26 | End: 2023-01-03

## 2022-12-26 RX ORDER — PROPOFOL 10 MG/ML
5-75 INJECTION, EMULSION INTRAVENOUS CONTINUOUS
Status: DISCONTINUED | OUTPATIENT
Start: 2022-12-26 | End: 2023-01-01

## 2022-12-26 RX ORDER — DEXAMETHASONE SODIUM PHOSPHATE 10 MG/ML
8 INJECTION, SOLUTION INTRAMUSCULAR; INTRAVENOUS ONCE
Status: COMPLETED | OUTPATIENT
Start: 2022-12-26 | End: 2022-12-26

## 2022-12-26 RX ORDER — LORAZEPAM 2 MG/ML
INJECTION INTRAMUSCULAR
Status: COMPLETED
Start: 2022-12-26 | End: 2022-12-26

## 2022-12-26 RX ORDER — PROPOFOL 10 MG/ML
INJECTION, EMULSION INTRAVENOUS PRN
Status: DISCONTINUED | OUTPATIENT
Start: 2022-12-26 | End: 2022-12-26

## 2022-12-26 RX ORDER — LIDOCAINE 40 MG/G
CREAM TOPICAL
Status: DISCONTINUED | OUTPATIENT
Start: 2022-12-26 | End: 2022-12-26

## 2022-12-26 RX ORDER — HALOPERIDOL 5 MG/ML
5 INJECTION INTRAMUSCULAR ONCE
Status: COMPLETED | OUTPATIENT
Start: 2022-12-26 | End: 2022-12-26

## 2022-12-26 RX ADMIN — PROPOFOL 40 MCG/KG/MIN: 10 INJECTION, EMULSION INTRAVENOUS at 10:10

## 2022-12-26 RX ADMIN — PROPOFOL 100 MG: 10 INJECTION, EMULSION INTRAVENOUS at 04:42

## 2022-12-26 RX ADMIN — PROPOFOL 60 MCG/KG/MIN: 10 INJECTION, EMULSION INTRAVENOUS at 08:53

## 2022-12-26 RX ADMIN — CALCIUM GLUCONATE 2 G: 20 INJECTION, SOLUTION INTRAVENOUS at 08:26

## 2022-12-26 RX ADMIN — PROPOFOL 50 MCG/KG/MIN: 10 INJECTION, EMULSION INTRAVENOUS at 05:49

## 2022-12-26 RX ADMIN — LORAZEPAM 1 MG: 2 INJECTION INTRAMUSCULAR; INTRAVENOUS at 02:42

## 2022-12-26 RX ADMIN — ALBUTEROL SULFATE 2 PUFF: 90 AEROSOL, METERED RESPIRATORY (INHALATION) at 19:41

## 2022-12-26 RX ADMIN — SUCCINYLCHOLINE CHLORIDE 160 MG: 20 INJECTION, SOLUTION INTRAMUSCULAR; INTRAVENOUS; PARENTERAL at 04:42

## 2022-12-26 RX ADMIN — NOREPINEPHRINE BITARTRATE 0.05 MCG/KG/MIN: 1 INJECTION, SOLUTION, CONCENTRATE INTRAVENOUS at 07:08

## 2022-12-26 RX ADMIN — PROPOFOL 30 MCG/KG/MIN: 10 INJECTION, EMULSION INTRAVENOUS at 13:11

## 2022-12-26 RX ADMIN — OLANZAPINE 5 MG: 10 INJECTION, POWDER, FOR SOLUTION INTRAMUSCULAR at 02:43

## 2022-12-26 RX ADMIN — PROPOFOL 30 MCG/KG/MIN: 10 INJECTION, EMULSION INTRAVENOUS at 16:58

## 2022-12-26 RX ADMIN — ALBUTEROL SULFATE 2 PUFF: 90 AEROSOL, METERED RESPIRATORY (INHALATION) at 09:31

## 2022-12-26 RX ADMIN — SODIUM CHLORIDE 1000 ML: 9 INJECTION, SOLUTION INTRAVENOUS at 06:01

## 2022-12-26 RX ADMIN — DEXMEDETOMIDINE HYDROCHLORIDE 0.2 MCG/KG/HR: 400 INJECTION INTRAVENOUS at 04:04

## 2022-12-26 RX ADMIN — ALBUTEROL SULFATE 2 PUFF: 90 AEROSOL, METERED RESPIRATORY (INHALATION) at 12:58

## 2022-12-26 RX ADMIN — Medication 50 MCG/HR: at 08:13

## 2022-12-26 RX ADMIN — PROPOFOL 30 MCG/KG/MIN: 10 INJECTION, EMULSION INTRAVENOUS at 19:43

## 2022-12-26 RX ADMIN — DEXAMETHASONE SODIUM PHOSPHATE 8 MG: 10 INJECTION, SOLUTION INTRAMUSCULAR; INTRAVENOUS at 06:11

## 2022-12-26 RX ADMIN — ENOXAPARIN SODIUM 40 MG: 40 INJECTION SUBCUTANEOUS at 08:10

## 2022-12-26 RX ADMIN — HUMAN ALBUMIN MICROSPHERES AND PERFLUTREN 6 ML: 10; .22 INJECTION, SOLUTION INTRAVENOUS at 08:50

## 2022-12-26 RX ADMIN — PROPOFOL 60 MCG/KG/MIN: 10 INJECTION, EMULSION INTRAVENOUS at 07:09

## 2022-12-26 RX ADMIN — PROPOFOL 30 MCG/KG/MIN: 10 INJECTION, EMULSION INTRAVENOUS at 22:24

## 2022-12-26 RX ADMIN — LORAZEPAM 1 MG: 2 INJECTION INTRAMUSCULAR at 02:42

## 2022-12-26 RX ADMIN — ALBUTEROL SULFATE 2 PUFF: 90 AEROSOL, METERED RESPIRATORY (INHALATION) at 16:58

## 2022-12-26 RX ADMIN — ENOXAPARIN SODIUM 40 MG: 40 INJECTION SUBCUTANEOUS at 19:43

## 2022-12-26 RX ADMIN — PANTOPRAZOLE SODIUM 40 MG: 40 INJECTION, POWDER, FOR SOLUTION INTRAVENOUS at 08:11

## 2022-12-26 ASSESSMENT — ACTIVITIES OF DAILY LIVING (ADL)
ADLS_ACUITY_SCORE: 26
ADLS_ACUITY_SCORE: 28
ADLS_ACUITY_SCORE: 26

## 2022-12-26 NOTE — PROGRESS NOTES
CLINICAL NUTRITION SERVICES - ASSESSMENT NOTE     Nutrition Prescription    RECOMMENDATIONS FOR MDs/PROVIDERS TO ORDER:  --Discussed with MICU DENA, released feeding tube placement to Radiology due to large hiatal hernia and severe swelling indicating Cortrak as a high risk/challenging procedure.   --Additional water flushes as per primary team.     Malnutrition Status:    Unable to determine due to unable to perform NFPE    Recommendations already ordered by Registered Dietitian (RD):  TF:   --Enteral access: NGT vs NDT - Radiology to place when able  --Pending AXR/Radiology confirmation of feeding tube position  --GOAL: Vital AF 1.2 @ 60 ml/hr + 3 Prosource TF20 packets to provide 1440 ml volume, 1968 kcals (24 kcal/kg IBW), 168 g pro (2.1 g/kg IBW), 159 g CHO, 7 g soluble Fiber, and 1168 ml free water.   --Start TF @ 15 ml/hr and advance by 15 ml q 8 hrs until goal rate.  --Do not start or advance TF rate unless K+ >3.0, Mg++ > 1.5,  and Phos > 1.9.  --Minimum 30 ml q 4 hrs water flushes for tube patency.  --If gastric enteral access: HOB > 30 degrees.  --MVI/minerals supplement if not already ordered (Certavite).    Future/Additional Recommendations:  --Feeding tube placement/position; TF start, advancement, lytes.   --Propofol.  --Weight trends.      REASON FOR ASSESSMENT  Antony Aguila JrLachelle is a/an 62 year old male assessed by the dietitian for Nutrition Risk Monitoring (intubated, NPO), RD to place post pyloric/small bore feeding tube at bedside and RD to order TF.     Discussed with MICU DENA, released feeding tube placement to Radiology due to large hiatal hernia indicating high risk/challenging procedure. Given large hiatal hernia and severe edema, recommended Radiology place enteral access.     NUTRITION HISTORY  PMH chronic type B aortic dissection s/p exploration and median sternotomy (6/18/19), trach (2019, since decannulated), type 2 DM (poorly controlled), and HTN who is admitted for acute on chronic  "thoracic aortic aneurysm with new aortic root dilation. Hospitalization complicated by hypokalemia who was admitted on 12/21/2022 for acute hypoxic respiratory failure in setting of agitation and need for sedation. Pt was intubated this morning. Additional per H&P includes large hiatal hernia seen on CT abdomen. Unable to place OGT per RN note.     CURRENT NUTRITION ORDERS  Diet: NPO; previously on a moderate consistent CHO diet consuming 100% of documented meals    LABS  Cr 1.41 (H)  BUN 11.2 (WNL)  A1c 8%  -225    MEDICATIONS  High sliding scale insulin  Lantus (25 units q am)  Metformin (on hold)  Fentanyl  Propofol @ 58.3 ml/hr  Norepi @ 0.03 mcg/kg/min    ANTHROPOMETRICS  Height: 182.9 cm (6' 0\")  Most Recent Weight: (!) 162 kg (357 lb 2.3 oz)    IBW: 80.9 kg (200% IBW)  BMI: Obesity Grade III BMI >40  Weight History:   Wt Readings from Last 30 Encounters:   12/26/22 (!) 162 kg (357 lb 2.3 oz)   09/15/22 138.3 kg (305 lb)   10/28/21 (!) 158.8 kg (350 lb)   09/26/21 (!) 153.8 kg (339 lb)     Dosing Weight: 81 kg (IBW)    ASSESSED NUTRITION NEEDS  Estimated Energy Needs: 9160-5809+ kcals/day (22 - 25+ kcal/kg IBW)  Justification: Increased needs, Obese (ASPEN guidelines) and Vented  Estimated Protein Needs: 162-202 grams protein/day (2 - 2.5 grams of pro/kg IBW)  Justification: Hypercatabolism with critical illness and Obesity guidelines  Estimated Fluid Needs: (1 mL/kcal)   Justification: Maintenance and Per provider pending fluid status    PHYSICAL FINDINGS  See malnutrition section below.     MALNUTRITION  % Intake: Decreased intake does not meet criteria  % Weight Loss: None noted  Subcutaneous Fat Loss: Unable to assess - writer working remote   Muscle Loss: Unable to assess - writer working remote  Fluid Accumulation/Edema: moderate 2-3+ LE edema (4+ tongue edema also noted)  Malnutrition Diagnosis: Unable to determine due to unable to perform NFPE    NUTRITION DIAGNOSIS  Inadequate oral intake " related to mechanical ventilation and lack of enteral access as evidenced by NPO status x <1 day.     INTERVENTIONS  Implementation  Collaboration with other providers  Enteral Nutrition - Initiate     Goals  Total avg nutritional intake to meet a minimum of 22 kcal/kg and 2 g PRO/kg daily (per dosing wt 81 kg IBW).     Monitoring/Evaluation  Progress toward goals will be monitored and evaluated per protocol.    Julia Wright, MS, RD, LD, [facility]  MICU pager: [phone]  ASCOM: [id number]

## 2022-12-26 NOTE — PROGRESS NOTES
"Cross Cover Note    Was called to bedside due to patient being agitated and confused. He repeatedly stated \"why am I in my mother's garage?\"  When I got to the patient's room, he continued state that he was in some analysis [place] and asking us to prove that he was in a hospital after multiple attempts to reorient him to the location and what was going on, patient decided to get up and out of bed and walk out of the room.  At the time he was connected to his nicardipine drip and required a nurse to bring his IV pole with him.  We walked to the end of the marcos to the window to demonstrate to him that we were at the [facility].  After some further discussion he walked back to his room to find his close [employer] watch and was under the impression that he should go home at that point.    Given that we did not have evidence for his capacity at this point, he was was not oriented to location time and did not understand or was unable to articulate the risks of him leaving in the middle of the night with his aortic dissection.    The decision was made to give him 1 mg of Ativan in the arm which required several folks to hold him down unfortunately to get that medication in.  The security officers at the time had calmly asked him to take the medication before we were going to have to give it to him.  Ultimately he stated he did not want to receive the medication and was held down to receive the shoulder injection of the 1 mg of Ativan.     The 1 mL of Ativan did provide some relief and calm however he continued to try and get up and leave his bedroom.  After waiting nearly 20 minutes for the Ativan to take full effect, he continued to attempt to leave.  An additional 5 mg of Zyprexa was given in the gluteus raffi on his right side with him being pinned to the bed by multiple staff.     Following the zyprexa injection, the patient was secured with 4 point restraints by multiple staff. Following the stabilization " and retaints, the writer left the room.    A/P    Encephalopathy   Concern about CO2 retention and difficulty wearing bipap earlier. Unable to get labs in that setting with his significant agitation. There is concern of potential infection as well, though CO2 retention given MICHEAL seems more likely.  - VBC, CMP, CBC, lactate  - Violent restraints  - Bed side sitter    Bhupendra Arora MD  Internal Medicine, PGY-3

## 2022-12-26 NOTE — H&P
MEDICAL ICU H&P  12/26/2022    Date of Hospital Admission: 12/2622  Date of ICU Admission: 12/26/22  Reason for Critical Care Admion: Hypoxic respiratory failure, patient agitation  Date of Service (when I saw the patient): 12/26/2022    ASSESSMENT:Antony Aguila Jr. is a 62 year old male admitted on 12/21/2022. He has history chronic type B aortic dissection s/p exploration and median sternotomy (6/18/19), type 2 DM (poorly controlled), and HTN who is admitted for acute on chronic thoracic aortic aneurysm with new aortic root dilation. Hospitalization complicated by hypokalemia who was admitted on 12/21/2022 for acute hypoxic respiratory failure in setting of agitation and need for sedation.    PLAN:    Neuro:  # Pain and sedation  - propofol gtt and bolus  - fentanyl gtt and bolus  - OK for midazolam pushes if needed   - RASS goal -2 to -3    # Agitation  - patient agitated on the floor requiring precedex, ativan, zyprexa and not compliant with cares  - needed further sedation with ativan to the point that intubation was required      # Encephalopathy, likely metabolic  - patient developing confusion, possibly 2/2 hypercapnia and noncompliance with cpap    # Schizoaffective disorder  # PTSD  Meds per chart include Zyprexa, Abilify, and Seroquel. Per pharmacy med rec, appears to only have filled Seroquel recently.   - continue PTA quetiapine 600 mg at bedtime  - PharmD med rec as above  - f/u Psychiatry recs    # Tobacco use  Smoked cigarettes for 45 years. Currently 1 pack per week.  - Encourage cessation     Pulmonary:  # Acute hypoxic and hypercarbic respiratory failure  # Significant airway edema  # Concern for sleep apnea  # Tracheostomy placed 7/5/19, decannulated 8/2019  Per hospitalization notes here in Sept 2022, noted to have hx of dyspnea on exertion in setting of being current smoker, tx with albuterol inhaler, recommended outpatient PFTs be obtained. Pulmonary progress note from 7/2019 notes current  smoker with unknown COPD history, no PFTs available. Had tracheostomy placed 7/5/19, decannulated 8/2019. CTA chest with compressive atelectasis in L lung adjacent to thoracic aorta and hiatal hernia. Patient became agitated and non-compliant with attempts for patient to use BIPAP or HFNC and needed heavy sedation to treat agitation such that intubation was required to protect airway. Patient is historically a difficult intubation and was very difficult this time. Patient has a possible hx of angioedema reaction to lisinopril, was started on losartan yesterday, could account for the airway edema and extubation will be expected to be delayed due to this. Likely needs higher PEEP due to his body habitus.   - patient emergently intubated  - s/p decadron 8mg IV   - Repeat ABG after increase in PEEP  Vent Mode: CMV/AC  (Continuous Mandatory Ventilation/ Assist Control)  FiO2 (%): 90 %  Resp Rate (Set): 20 breaths/min  Tidal Volume (Set, mL): 500 mL  PEEP (cm H2O): (S) 12 cmH2O (Per MD)  Resp: 16    # H/o possible ACE inhibitor related angioedema  - Noted during July 2019 hospitalization at same time having AHRF requiring tracheostomy (later decannulted in August 2019).  - Notably patient started on losartan 12/25/22    Cardiovascular:  # Shock, NOS  - patient developed hypotension after intubation and being sedated on propofol   - s/p 1L NS  - attempting to wean propofol, but patient hypoxic on high FiO2  - norepinephrine peripherally for now  - PICC order placed  - TTE given hx of aortic dissection  - switch to phenyl once CVC placed  - tight BP goal of 100-120    # Chronic type B aortic dissection (proximal descending aorta to the abdominal aorta)  # Proximal ascending aorta saccular aneurysm   # Distal ascending aorta saccular aneurysm   # Mild nonobstructive CAD  Pt has history of stable type B aortic dissection as well as short segment of dissection vs pseudoaneurysms at the aortic root and distal ascending thoracic  aorta. Previously underwent surgical exploration in 6/2019 with concern for type 1 aortic dissection but found to have chronic type B dissection. On admission, CXR with widened mediastinum slightly increased from prior. CT chest with aneurysmal dilatation ascending and descending thoracic aorta with type B aortic dissection distal to L subclavian artery extending to L common iliac artery with severe narrowing of true lumen and possible pseudoaneurysm at aortic root, new compared to prior 2019 images.   - TRINH 12/23 showed saccular aneurysm of ascending aorta measuring 5.8 cm associated with a dissection flap. Thrombus is seen in the false lumen. Aortic root is dilated at 4.6 cm.  - CTA 12/22 two saccular aneurysms, one each in the proximal ascending aorta and distal ascending aorta. The maximal transaortic diameters of 46.9 x 39.2 mm and 62.7 x 54.4 mm, respectively. Mild, non-obstructive coronary artery disease without any high-grade stenoses.  - Ongoing discussions with cardiology and CVTS re: possible surgical planning  - Increase labetalol from 300 mg to 400 mg BID 12/25, s/p Nicardipine gtt 12/25   - hold Losartan 25 mg 12/25  - Telemetry  - Hold DVT ppx 12/23; restarted 12/24 for thrombus seen on TRINH, further anticoagulation pending CVTS recs  - Doppler pulses    # HTN  12/25 Hypertensive overnight with highest SBP of 170s. Given hx of Type B sonam dissection and now new aortic aneurysm goals SBP < 120 and HR <60 per CVTS.   - hold Labetalol  BID (titrated from intially PO dose of 100 mg); 5 mg PRN Labetalol and Hydralazine 10 mg for SBPs > 120  - Nicardipine gtt started on 12.25 given persistent elevated SBPs  - holding PTA hydrochlorothiazide  - based on Outpatient note 12/14/2022 pt was previously hydordiuril 50 mg, amlodipine 10 mg, bumex TID and canaglaflozin.   - prn hydralazine 10mg for SBP >120    GI/Nutrition:  # Hiatal hernia  Noted on imaging.    # Nutrition  - NPO while intubated  -  Nutrition consult placed for small bore feeding tube    Renal/Fluids/Electrolytes:  # LAKISHA on CKD 2, improving  Outpatient labs 12/16 with Cr 1.4 and eGFR 55. Now back at baseline Cr ~1.1-1.2, eGFR ~65-75. UA unremarkable.  - decrease PTA gabapentin 600 mg TID -> 300 mg TID  - holding PTA metformin for now    # Mild hypokalemia, improving  # Mild hypomagnesemia  - Repleting    Endocrine:  # Type 2 DM with hyperglycemia  Most recent A1c 12.5 (9/16/22). Home regimen: metformin 1000 qPM and Lantus 50 unit(s) qAM + aspart 17 unit(s) TIDAC + aspart 9 unit(s) w/ snacks  - decreased lantus to 25u while NPO   - HDSSI   - holding PTA metformin as above  - Snack time Insulin 1:20g Carb ratio PRN 12/24     # Hypocalcemia  - 2g calcium gluconate    ID:  No acute issues.     Hematology:    # Anemia, normocytic  - Trend CBC    Musculoskeletal:  # BLE edema  Dimer elevated with acute on chronic leg swelling L < R per patient history. DVT US in ED negative for DVT b/l.  Has had LE swelling for last 2 years, notes it is worse lately. Previously on diuretics, not currently per chart.  - lymphedema consult    Skin:  # Venous stasis ulcer LLE  # Venous insufficiency  # Peripheral vascular disease  - WOC consult placed    General Cares/Prophylaxis:    DVT Prophylaxis: lovenox   GI Prophylaxis: PPI  Restraints: b/l wrist soft  Family Communication: None as of yet, will attempt today  Code Status: Full Code    Lines/tubes/drains:  - ETT  - Noble  - PIV x3   - O/I  - PICC consult placed    Disposition:  - Medical ICU     Patient seen and findings/plan discussed with medical ICU staff, Dr. Velarde.    Breann Khan, DO      Clinically Significant Risk Factors              # Hypoalbuminemia: Lowest albumin = 3.3 g/dL at 12/21/2022  4:54 PM, will monitor as appropriate          # DMII: A1C = 8.0 % (Ref range: <5.7 %) within past 3 months   # Severe Obesity: Estimated body mass index is 48.43 kg/m  as calculated from the following:     "Height as of this encounter: 1.829 m (6').    Weight as of this encounter: 162 kg (357 lb 1.6 oz).               -----------------------------------------------------------------------    HISTORY PRESENTING ILLNESS: Antony Aguila Jr. is a 62 year old male admitted on 12/21/2022. He has history chronic type B aortic dissection s/p exploration and median sternotomy (6/18/19), type 2 DM (poorly controlled), and HTN who is admitted for acute on chronic thoracic aortic aneurysm with new aortic root dilation. Hospitalization complicated by hypokalemia and hypertension.    Patient presented on 11/21/22 due to abnormal clinic labs (K 3.1, Cr 2.4). Had LE swelling for last 2 years, notes it is worse lately. Reports intermittent dyspnea, but not at time of exam. No chest pain, no sob, no nausea or vomiting. Newly diaphoretic x 30 mins when seen in ED. Has a nurse that helps with meds 1x/week and PCA with cares.    Patient was admitted due to concern about worsening dilation of aortic root dissection as imaging also showed \"short segment dissections versus pseudoaneurysms at the aortic root and at the distal ascending thoracic aorta/proximal aortic arch and aneurysmal dilatation of the ascending and descending thoracic aorta.\"     Patient was most recently on a nicardipine gtt for tight blood pressure control 2/2 aortic dissection which has now been discontinued.     Code blue was called after several behavioral codes were called on patient for what appears to be confusion and patient attempting to leave. Patient required ativan and Zyprexa prior to code blue called. At time of code blue patient was given 5mg of haldol IM which was ineffective so was given 2mg ativan. Patient was experiencing desats into the 80s while on HFNC. He had pulled all of his IVs. Anesthesia was was called preemptively as it was anticipated that patient would require high levels of sedation requiring intubation for airway protection. He has " historically been a difficult airway and 2 attempts were made to intubate with SICU on standby for possible need of emergent trach. Intubation on second attempt was successful. Patient became hypotensive after arrival to MICU, IO placed and given 1L NS.     REVIEW OF SYSTEMS: Unable to assess as patient is sedated and intubated.    PAST MEDICAL HISTORY:   Past Medical History:   Diagnosis Date     Chronic dissection of thoracic aorta     S/p exploration and median sternotomy by Dr. Marcelo on 6/18/19     Diabetes (H)      Hypertension      SURGICAL HISTORY:  Past Surgical History:   Procedure Laterality Date     MEDIAN STERNOTOMY  06/18/2019    Chronic dissection of thoracic aorta s/p exploration and median sternotomy by Dr. Marcelo     TRACHEOSTOMY  07/07/2019    decannulated 8/8/19     SOCIAL HISTORY:  Social History     Socioeconomic History     Marital status:      Spouse name: None     Number of children: None     Years of education: None     Highest education level: None   Tobacco Use     Smoking status: Every Day     Years: 45.00     Types: Cigarettes     Smokeless tobacco: Never     Tobacco comments:     Smokes 1 pack per week currently (12/2022).   Vaping Use     Vaping Use: Never used   Substance and Sexual Activity     Alcohol use: Not Currently     Drug use: Yes     Types: Marijuana     FAMILY HISTORY:   History reviewed. No pertinent family history.  ALLERGIES:   Allergies   Allergen Reactions     Lidocaine      Pt states this is not an allergy and doesn't recall this to be an allergy     Lisinopril Swelling     Hospitalization in 7/2019 notes possible ACE-inhibitor related angioedema.     Pollen Extract      MEDICATIONS:  No current facility-administered medications on file prior to encounter.  acetaminophen (TYLENOL) 500 MG tablet, Take 500-1,000 mg by mouth every 8 hours as needed for mild pain  albuterol (PROAIR HFA/PROVENTIL HFA/VENTOLIN HFA) 108 (90 Base) MCG/ACT inhaler, Inhale 2 puffs into the  lungs every 4 hours as needed for shortness of breath / dyspnea or wheezing  bumetanide (BUMEX) 1 MG tablet, Take 1 mg by mouth 3 times daily Frequency unknown (filled as 90 tab for 30 days)  canagliflozin (INVOKANA) 100 MG tablet, Take 100 mg by mouth every morning (before breakfast)  hydrochlorothiazide (HYDRODIURIL) 50 MG tablet, Take 50 mg by mouth daily  insulin aspart (NOVOLOG PEN) 100 UNIT/ML pen, Inject 17 Units Subcutaneous 3 times daily (with meals) and 9 units with snacks  insulin glargine (LANTUS PEN) 100 UNIT/ML pen, Inject 70 Units Subcutaneous every morning  metFORMIN (GLUCOPHAGE XR) 500 MG 24 hr tablet, Take 2 tablets (1,000 mg) by mouth daily (with dinner)  potassium chloride ER (K-TAB) 20 MEQ CR tablet, Take 40 mEq by mouth daily Pt reports taking KCL 40 mEq daily  QUEtiapine (SEROQUEL) 200 MG tablet, Take 400 mg by mouth At Bedtime Takes 400 mg po at bedtime        PHYSICAL EXAMINATION:  Temp:  [97.7  F (36.5  C)-98.2  F (36.8  C)] 98  F (36.7  C)  Pulse:  [] 100  Resp:  [20-22] 22  BP: ()/(55-96) 123/85  FiO2 (%):  [100 %] 100 %  SpO2:  [84 %-98 %] 88 %  General: Patient agitated and yelling, working to breathe  HEENT: No scleral icterus, dry mucous membranes  Neuro: Unable to assess while patient in distress and now intubated  Pulm/Resp: Coarse breath sounds bilaterally , mechanically ventilated  CV: RRR, no murmurs  Abdomen: Soft, non-distended, non-tender  : No connor catheter in place  Incisions/Skin: b/l extremities covered with wraps.     LABS: Reviewed.   Arterial Blood Gases   No lab results found in last 7 days.  Complete Blood Count   Recent Labs   Lab 12/25/22  0736 12/24/22  0611 12/23/22  0608 12/22/22  0549   WBC 7.1 7.0 7.5 6.6   HGB 13.5 12.9* 13.4 13.0*    225 233 235     Basic Metabolic Panel  Recent Labs   Lab 12/25/22  2209 12/25/22  1829 12/25/22  1321 12/25/22  0911 12/25/22  0736 12/24/22  0741 12/24/22  0611 12/23/22  1347 12/23/22  1132 12/23/22  1052  12/23/22  0608 12/22/22  0718 12/22/22  0549   NA  --   --   --   --  141  --  140  --   --   --  139  --  141   POTASSIUM  --   --   --   --  4.1  --  3.9  --   --   --  3.6  --  3.3*   CHLORIDE  --   --   --   --  104  --  104  --   --   --  102  --  101   CO2  --   --   --   --  26  --  25  --   --   --  25  --  32*   BUN  --   --   --   --  8.4  --  9.4  --   --   --  8.1  --  8.4   CR  --   --   --   --  1.20*  --  1.29*  --  1.31*  --  1.24*  --  1.26*   * 198* 197* 147* 156*   < > 172*   < >  --    < > 143*   < > 159*    < > = values in this interval not displayed.     Liver Function Tests  Recent Labs   Lab 12/22/22 0549 12/21/22  1654   AST  --  30   ALT  --  29   ALKPHOS  --  66   BILITOTAL  --  0.5   ALBUMIN  --  3.3*   INR 1.08  --      Coagulation Profile  Recent Labs   Lab 12/22/22  0549   INR 1.08       IMAGING:  No results found for this or any previous visit (from the past 24 hour(s)).

## 2022-12-26 NOTE — SIGNIFICANT EVENT
Significant Event Note    Time of event: 11:38 AM December 26, 2022    Description of event:  Code blue was called after several behavioral codes were called on patient for what appears to be confusion and patient attempting to leave. Patient required ativan and Zyprexa prior to code blue called. At time of code blue patient was given 5mg of haldol IM which was ineffective so was given 2mg ativan. Patient was experiencing desats into the 80s while on HFNC. He had pulled all of his IVs. Anesthesia was was called preemptively as it was anticipated that patient would require high levels of sedation requiring intubation for airway protection. He has historically been a difficult airway and 2 attempts were made to intubate with SICU on standby for possible need of emergent trach. Intubation on second attempt was successful.     Plan:  Admit MICU  See H&P for plan    Discussed with: bedside nurse and supervising physician, Dr. Syd Khan,

## 2022-12-26 NOTE — PROGRESS NOTES
ICU Daily Rounding Checklist     Checklist Response Notes   Can sedation be reduced?  Yes    Can analgesia be reduced? Yes    Is delirium being assessed, addressed and prevented? Yes    Spontaneous awakening trial and/or Spontaneous breathing trial candidate?  NA    Total fluid balance goal reviewed?  No Target Goals:           Is the patient at goals for lung protective ventilation? Yes    Head of bed elevation (30 degrees)? Yes    Skin breakdown assessment (prevention) completed? Yes    Is enteral nutrition at goal? No    Is blood glucose at goal? Yes    Deep venous thrombosis prophylaxis? Yes      Gastric ulcer prophylaxis?  Yes If coagulopathy (INR-1.5 PTT2x normal. Ph<50k), mechanical ventilation 48hr, history of GI bleed/ulcer within past year. TBL, SCI, or burn, or if >= 2 minor risk factors (sepsis, ICU stay 1 week, occult GI bleed > 6 days. glucocorticoid therapy, NSAID use, antiplatelet use)   Can Antibiotics be narrowed or discontinued? NA    Early mobility candidate and physical therapy consulted? Yes    Is connor catheter needed? Yes    Is central venous/arterial catheter needed? Yes Pressors    Has the family been updated? No, will update     Are the patient's goals of care and code status current? Will address with family     Aga Mo NP    [facility]

## 2022-12-26 NOTE — PROGRESS NOTES
Code 21 called at 1250. Patient agitated. Provider at bedside. Patient stood up and walked out of the room swinging at staff and security. Walked down to [facility] bay. Sat down for a while and stormed back to room. During this time patient was off all vital signs monitoring. Multiple modalities and attempts of calming this patient down were performed without success. 1 mg Ativan administered. Waited approximately 20 minutes. Patient still not calmed down. While trying to administer 5 mg Zyprexa, patient stood up and started swinging at staff. Hands on was performed by security and patient was lowered to bed, and IM Zyprexa was administered. Patient was put into 4 point restraints at approximately 0200. Ordered was received by Dr. Bhupendra Arora. Patient laying in bed with restraints on yelling out. Patient hooked up to monitoring once laying in bed. VSS see flowsheets for details.

## 2022-12-26 NOTE — CONSULTS
Gastroenterology Brief Note:      Chart reviewed. This is a 61 y/o male with medical history significant for chronic type B aortic dissection, type 2 diabetes, hypertension, and morbid obesity (BMI is 48), schizoaffective disorder who was admitted on 12/22 due to lab abnormalities with c/f progression of dissection and transferred to MICU 12/26 due to confusion/agitation and hypoxic respiratory failure requiring code/emergent intubation (?angioedema given swollen tongue/airway after given ARB with known hx of ACEi allergy).      GI consulted for endoscopic enteral access placement.     -Previous FT attempts include:        -12/26: RN unable to place OGT (unclear if related to hernia vs edema).       -12/26: Radiology attempts under fluoro - able to advance tip to mid/lower thoracic cavity but unable to advance further and based on imaging suspect related to within hiatal hernia and aborted further attempts.    -Current TF: none started yet    -Meds: Propofol gtt, Fentanyl gtt, Norepi gtt now off, Lovenox 40 mg q 12 hrs (starting tonight at 20:00), Decadron 8 mg x1 (12/26 at 06:00).     -Labs:    INR   Date Value Ref Range Status   12/26/2022 1.13 0.85 - 1.15 Final       Lab Results   Component Value Date    HGB 11.3 12/26/2022     Lab Results   Component Value Date     12/26/2022      Plan:   Plan for endoscopic placement of NJT (with bridle) in ICU under fluoro 12/27/2022.   Hold Lovenox after PM dose 12/26.  Discussed plan with MICU team.    Call [id number] (GI Endo Charge RN) for estimated time of procedure     Above in collaboration/discussed with Dr. Jeffries, GI attending    Kaycee Cox PA-C   [facility]  Pgr [phone]

## 2022-12-26 NOTE — PROGRESS NOTES
[facility]  Procedure Note           Intubation:       Antony Aguila Jr.  MRN# [id number]   December 26, 2022, 6:06 AM Indication: Respiratory failure  Inability to protect airway  Change in level of conciousness  Airway edema           Patient intubated at: December 26, 2022, 6:06 AM   :    Informed consent: Not obtainable   Cervical spine: Was not stabilized during the procedure   Sedative medication: Was administered during the procedure   Technique used: Direct laryngoscopy  Fiberoptic visualization with GlideScope  Laryngeal mask airway   Endotracheal tube size: 8.0 cm with cuff   Number of attempts: 26   Placement confirmed by: Auscultation of bilateral breath sounds  Visualization of bilateral chest wall rise  End-tidal CO2 monitor   ET tube repositioning: Was performed   Tube secured at: 2 cm      This procedure was performed with difficulty and he tolerated the procedure poorly with no complications.      Recorded by Dakotah Cowan

## 2022-12-26 NOTE — PROGRESS NOTES
Transfer  Transferred to: 4C   Via: bed  Reason for transfer: Pt no longer appropriate for 6B- worsened patient condition    Belongings: Packed and sent with pt  Chart: Delivered with pt to next unit  Medications: Meds sent to new unit with pt  Report given to: NOELLE Meredith RN   Pt status:     Patient remained agitated following the code 21. Patient in bed in 4 point restraints. Security remained at bedside due to agitation and assisting with ankle restraints. Ankle restraints removed from patient for patient safety. At this time patient had increased oxygen needs. Patient went from 4L nasal cannula to 9L oxymask. Providers paged to bedside to assess due to continuing agitation and increased O2 needs. Vascular access at bedside to insert another peripheral IV - unsuccessful due to agitation. With providers at bedside, code blue called @ 0414 due to patients inability to protect airway and needing HFNC with saturations still below 90%. Please see flow sheets and anesthesiology notes for further details.

## 2022-12-26 NOTE — PROGRESS NOTES
Initially, writer tried to insert PIV on Left forearm but failed due to patient's agitation, unable to succesfully placed it, doctor instructed to hold off the IV insertion for the time being. Adult code blue was overhead paged which was eventually cancelled and writer came to insert a new PIV on the left AC and was successful, pt. is still agitated at that time, 10ml of blood in syringe was handed over to one of the staff nurse.

## 2022-12-26 NOTE — PLAN OF CARE
Goal Outcome Evaluation:      Plan of Care Reviewed With: other (see comments) (MICU team)    Overall Patient Progress: no changeOverall Patient Progress: no change    Outcome Evaluation: Pt is now intubated, RN unable to place OGT. Released small bore feeding tube placement to Radiology 2/2 large hiatal hernia and severe swelling. TF orders placed pending ability to obtain enteral access. Pt was eating well prior to intubation.

## 2022-12-26 NOTE — PROVIDER NOTIFICATION
"\"6B. Room [facility]-. [facility]     Patient woke up confused, thinks he is in a garage. Trying to pull off his leads and lines.     Tiffanie RUBALCAVA, [id number]\"    Provider responded - stated he would come up to bedside       "

## 2022-12-26 NOTE — ANESTHESIA PROCEDURE NOTES
Airway       Patient location during procedure: ICU       Procedure Start/Stop Times: 12/26/2022 4:55 AM  Staff -        Anesthesiologist:  Edward Leon MD       CRNA: Gracy Dsouza APRN CRNA       Performed By: anesthesiologist  Consent for Airway        Urgency: emergent       Consent: The procedure was performed in an emergent situation.  Report Obtained from Primary Care Team       History regarding most recent potassium obtained: Yes       History regarding presence/absence of renal failure obtained:Yes       History regarding stroke/CVA obtained:Yes       History regarding presence/absence of NM disorder: YesIndications and Patient Condition       Indications for airway management: airway protection       Mallampati: Not Assessed     Induction type:RSI       Mask difficulty assessment: 3 - difficult mask (inadequate, unstable, or two providers) +/- NMBA    Final Airway Details       Final airway type: endotracheal airway       Successful airway: ETT - single and Oral  Endotracheal Airway Details        ETT size (mm): 8.0       Cuffed: yes       Successful intubation technique: video laryngoscopy       VL Blade Size: MAC D Blade       Grade View of Cords: 3       Intubation device: rigid stylet.       Position: Center       Measured from: lips       Secured at (cm): 27    Post intubation assessment        ETT secured, Vent settings by primary/ICU team, Primary/ICU team to review CXR and Sedation to be ordered by primary/ICU team       Placement verified by: capnometry, equal breath sounds and chest rise        Number of attempts at approach: 2       Secured with: commercial tube dukes       Ease of procedure: difficult       Dentition: Unchanged    Medication(s) Administered   Medication Administration Time: 12/26/2022 4:55 AM    Additional Comments       Code called for patient due to extreme agitation and unable to control BP. Pt thrashing in bed and disoriented. Dr. Leon called to assist with airway  due to patient size, large tongue, and previous intubation history. First attempt with CMAC D blade unsuccessful - unable to definitively see cords due to excessive redundant tissue. Patient desated- Oral airway placed and able to ventilate. LMA 5 placed with improvement in ventilation. Dr. Leon reattempted airway with success. Difficult to insert blade d/t large tongue and blurry cmac screen. Emergency surgical airway kit opened but intervention unused. Would recommend patient completely awake with anesthesia at bedside for extubation when appropriate.

## 2022-12-26 NOTE — PROGRESS NOTES
Admitted/transferred from: 6B at 05:30  Reason for admission/transfer: Agitated in restraints with increasing O2 needs.   Patient status upon admission/transfer: Pt O2 needs increased from RA to oxymask to high flow, now intubated, sedated RASS -4/-5 while on Propofol at 60 and in 2 pt restraints. On arrival pt MAPs in 40's. I/O placed in LLE and 1L NS infused via pressure bag. Levo started on pt to achieve SBP between 100-120. Fentanyl added for more sedation due to pt biting down on tube. On CMV settings 100% FiO2 and PEEP 8 increased to 12 as pt O2 sats in 80's. Noble placed. OG placement unsuccessful due to significant mouth edema.   Interventions:   Plan: Place oral access, PICC line placement  2 RN skin assessment: completed by Noel MILLAN  Result of skin assessment and interventions/actions:  Height, weight, drug calc weight: Done  Patient belongings (see Flowsheet - Adult Profile for details): with pt

## 2022-12-26 NOTE — PROGRESS NOTES
Physician Attestation   I, Norma Nava MD, saw this patient with the resident and agree with the resident's findings and plan of care as documented in the note.      I personally reviewed vital signs, medications, relevant images, and labs    Antony Aguila is a 62-year-old male with medical history significant for chronic type B aortic dissection, type 2 diabetes, hypertension, and morbid obesity (BMI is 48), schizoaffective disorder who was admitted on 12/22 due to lab abnormalities and concerned that his dissection has progressed.  He was transferred to the ICU on 12/25/2022 overnight due to confusion, agitation and hypoxic respiratory failure requiring intubation.  Now with significant ventilatory needs and post intubation x-ray is pending.  PEEP is set at 12 and FiO2 is 90% and we will work towards weaning FiO2 but will continue high PEEP for body habitus.  He did have some postintubation hypotension requiring peripheral levophed and once central access is placed we will change to phenylephrine.  SBP goal 100 - 120 mm Hg and we will ask CVTS to weigh in regarding dissection. Start oral psych meds and hold oral antihypertensives at this time but will start once BP stabilizes. Also concern for angioedema given swollen tongue/airway in setting of known ACEI allergy and ARB given on 12/25. High likelihood that he will need a trach.     The patient was seen and examined with the resident/fellow physician.  We have discussed the patient in detail and I agree with the findings, assessment, and plan as documented when this note was cosigned on this day. The plan was formulated in conjunction with pharmacy, ICU nurses, and respiratory therapist. I have evaluated all laboratory values and imaging studies for the past 24 hours. I have reviewed all the consults that have been ordered and are active for this patient.       Critical Care Time: 40 (excluding procedures)    Norma Nava MD  Date of  Service (when I saw the patient): 12/26/22

## 2022-12-26 NOTE — CONSULTS
"      Psychiatry Consultation; Follow up              Reason for Consult, requesting source:    AMS, agitation. I initially saw him during a previous hospitalization 9/18/22  Requesting source: Norma Nava    Labs and imaging reviewed, discussed with team                Interim history:    From H and P 12/22:  \"Antony Aguila Jr. is a 62 year old male who has a history of chronic thoracic aortic dissection ([place] type B) s/p exploration and median sternotomy (6/18/19), type 2 DM, and HTN and presents due to abnormal clinic labs (K 3.1, Cr 2.4) and Has had LE swelling for last 2 years, notes it is worse lately. Reports intermittent dyspnea, but not at time of exam. No chest pain, no sob, no nausea or vomiting. Newly diaphoretic x 30 mins when seen in ED. Has a nurse that helps with meds 1x/week and PCA with cares.\"    Overnight last night he became confused, then agitated for which he was given some IM Ativan, then 5 mg Zyprexa IM and restraints (during a code 21). Rather rapidly he developed increasing oxygen needs to the point where he needed intubation and transfer to the ICU. He was sedated with fentanyl and propofol when I came to seen him. No PO access yet, so Seroquel not restarted yet.     When I saw him in September we decided to stop Zyprexa due to potential to cause weight gain and worsen diabetes. He wanted to continue with Seroquel 600 mg along with 10 mg Abilify at bedtime. As you can see in the pharmacy note done on the 22nd he had also stopped Ability, but was still taking 600 mg of Seroquel.         Current Medications:       albuterol  1-2 puff Inhalation Q4H     enoxaparin ANTICOAGULANT  40 mg Subcutaneous Q12H     gabapentin  300 mg Oral TID     insulin aspart  1-12 Units Subcutaneous Q4H     insulin glargine  25 Units Subcutaneous QAM     [Held by provider] labetalol  400 mg Oral Q12H [facility] ([facility]     lidocaine (viscous)  5 mL Topical Once     [Held by provider] losartan  25 mg Oral " "Daily     [Held by provider] metFORMIN  1,000 mg Oral Daily with supper     pantoprazole  40 mg Per Feeding Tube QAM AC    Or     pantoprazole  40 mg Intravenous QAM AC     QUEtiapine  600 mg Oral At Bedtime     sodium chloride (PF)  3 mL Intracatheter Q8H     sodium chloride (PF)  3 mL Intracatheter Q8H              Family and Social History:   Unable to obtain today.            MSE:   Appearance: adequately groomed   He is intubated and sedated (would not respond to voice)     Vital signs:  Temp: 97.2  F (36.2  C) Temp src: Axillary BP: 106/63 Pulse: 53   Resp: 16 SpO2: 96 % O2 Device: Mechanical Ventilator Oxygen Delivery: 70 LPM Height: 182.9 cm (6') Weight: (!) 162 kg (357 lb 2.3 oz)  Estimated body mass index is 48.44 kg/m  as calculated from the following:    Height as of this encounter: 1.829 m (6').    Weight as of this encounter: 162 kg (357 lb 2.3 oz).    Qtc: 459 ms today           DSM-5 Diagnosis:   295.90  (F20.9) Schizophrenia   Delirium         Assessment:   It appears that he will have a feeding tube soon, so can resume PO meds. Fortunately his QTc is not elevated, so Haldol should be safe.           Summary of Recommendations:   Resume Seroquel 600 mg HS   Consider using Abilify 10 mg HS also (when I saw him in September he wanted to be on this)   Guanfacine 0.5 mg BID may help reduce agitation (alpha-2 agonist like Precedex)   Haldol 2-5 mg IV q 4 hours for severe agitation (and follow QTc).     Page me or re-consult psychiatry as needed (psychiatry is signing off).     Jai Aly M.D.   Consult liaison psychiatry   [facility]   Contact information available via [facility] Paging/Directory.  If I am not available, then [facility] intake ([phone]) should know who   Is on call        \"Much or all of the text in this note was generated through the use of Dragon Dictate voice to text software. Errors in spelling or words which appear to be out of contact are " "unintentional, may be present due having escaped editing\"           "

## 2022-12-26 NOTE — PROVIDER NOTIFICATION
"Janeth Mar paged the following @ 2114.     6B. Room [facility]238[facility]1. [facility]     Is it possible to change miralax order to twice daily? Pt would like another dose. Pt also states he gets 1000mg of gabapentin, not 300mg.    Tiffanie RUBALCAVA [id number]      Responded - she will change orders, but may take a couple of minutes.     Janeth Mar paged the following @ 2225:    \"6B. Room 6328-1. [facility]     Pt frustrated by diet change to NPO and would like to speak to a provider. Pt also refusing placement of another IV.\"    Response - provider stated its okay if patient is refusing another peripheral IV to be placed.       Tiffanie RUBALCAVA, [id number]  "

## 2022-12-26 NOTE — PROGRESS NOTES
Saw the patient along with  for agitation. Was in the room from 2am-3am  On my arrival, Patient is awake, alert, but not oriented. He does not know he is in the hospital and was attempting to leave his room. Security present. At the time of my arrival, he was being given 1mg ativan.  After the ativan, he appeared sleepy, but would still get agitated intermittently. Despite several attempts to keep him in bed, he kept getting up and leaving while appearing more sedated than earlier  5mg of Zyprexa given and patient placed in 4 point restraints. At this point, all lights and stimulation were turned off.    Dexmedetomidine drip ordered.   Placed on 2L w goal of O2 >92%. He does have a hx of MICHEAL   When I left his room at 3am, he was sleeping intermittently, but would wake up and scream. But not trying to pull at his restraints or get out of bed.  RN notified to let us of know of any change in status.   Unclear etiology for this acute decompensation- ?Agitation 2/2 hypercapnia vs not being on his medications recently vs infection vs hospital acquired delirium  Plan for CMP, CMP, VBG. Blood cultures, Ucx, Procal, CRP, EKG  if able given patient is extremely agitated and we are unable to safely get labs. Prioritize patient being calm. Orders for sitter placed.       At 3:50am, called that patient had acute respiratory decompensation. I came to bedside immediately. Was in the room from 3:50-4:30am  Per RN, patient with worsening agitation since 3am, and then needed to have his ankle restraints changed to prevent injury/have a peripheral IV placed as the IV in place kept kinking with movement, thus interrupting precedex administration, which further worsened the agitation.   Patient was drowsy on my assessment, still was able to speak, but thick speech, not answering any questions or following commands.   Was on 9L of O2 on my arrival. Patient kept sliding down the bed and getting further agitated with boosting  attempts or instruction to take deep breaths. Does not allow physical exam.  Despite being drowsy, he was kicking staff, screaming, and otherwise escalating in aggression. Not redirectable.  O2 needs quickly escalated to 15L. Placed on highflow to assist with oxygenation and positive pressure. However, needs quickly escalated to high flow on 100% Fio2 and 70L with good waveform satting 86%.  Likely 2/2 sedation in pt w MICHEAL and difficult airway + ?possibility of aspiration  Code blue called for airway protection  MICU team arrived and assumed care. 5mg IM haldol given, around 8 people had to hold patient down to establish access. 2mg IV ativan as soon as access established. Difficult airway intubation done with anesthesia.   Verbal handoff given.   Resident team called family and left a VM, and also CVTS team to update them of change in condition.

## 2022-12-27 ENCOUNTER — APPOINTMENT (OUTPATIENT)
Dept: GENERAL RADIOLOGY | Facility: CLINIC | Age: 62
End: 2022-12-27
Attending: INTERNAL MEDICINE
Payer: COMMERCIAL

## 2022-12-27 LAB
ALLEN'S TEST: YES
ANION GAP SERPL CALCULATED.3IONS-SCNC: 11 MMOL/L (ref 7–15)
BASE EXCESS BLDA CALC-SCNC: -1.1 MMOL/L (ref -9–1.8)
BUN SERPL-MCNC: 14.4 MG/DL (ref 8–23)
CALCIUM SERPL-MCNC: 9 MG/DL (ref 8.8–10.2)
CHLORIDE SERPL-SCNC: 103 MMOL/L (ref 98–107)
CREAT SERPL-MCNC: 1.27 MG/DL (ref 0.67–1.17)
DEPRECATED HCO3 PLAS-SCNC: 20 MMOL/L (ref 22–29)
ERYTHROCYTE [DISTWIDTH] IN BLOOD BY AUTOMATED COUNT: 16.2 % (ref 10–15)
GFR SERPL CREATININE-BSD FRML MDRD: 63 ML/MIN/1.73M2
GLUCOSE BLDC GLUCOMTR-MCNC: 124 MG/DL (ref 70–99)
GLUCOSE BLDC GLUCOMTR-MCNC: 140 MG/DL (ref 70–99)
GLUCOSE BLDC GLUCOMTR-MCNC: 150 MG/DL (ref 70–99)
GLUCOSE BLDC GLUCOMTR-MCNC: 150 MG/DL (ref 70–99)
GLUCOSE BLDC GLUCOMTR-MCNC: 159 MG/DL (ref 70–99)
GLUCOSE BLDC GLUCOMTR-MCNC: 177 MG/DL (ref 70–99)
GLUCOSE BLDC GLUCOMTR-MCNC: 178 MG/DL (ref 70–99)
GLUCOSE BLDC GLUCOMTR-MCNC: 188 MG/DL (ref 70–99)
GLUCOSE SERPL-MCNC: 160 MG/DL (ref 70–99)
HCO3 BLD-SCNC: 24 MMOL/L (ref 21–28)
HCT VFR BLD AUTO: 38.2 % (ref 40–53)
HGB BLD-MCNC: 12.3 G/DL (ref 13.3–17.7)
MAGNESIUM SERPL-MCNC: 2 MG/DL (ref 1.7–2.3)
MCH RBC QN AUTO: 28.7 PG (ref 26.5–33)
MCHC RBC AUTO-ENTMCNC: 32.2 G/DL (ref 31.5–36.5)
MCV RBC AUTO: 89 FL (ref 78–100)
O2/TOTAL GAS SETTING VFR VENT: 60 %
PCO2 BLD: 39 MM HG (ref 35–45)
PH BLD: 7.39 [PH] (ref 7.35–7.45)
PHOSPHATE SERPL-MCNC: 4 MG/DL (ref 2.5–4.5)
PLATELET # BLD AUTO: 214 10E3/UL (ref 150–450)
PO2 BLD: 133 MM HG (ref 80–105)
POTASSIUM SERPL-SCNC: 4.3 MMOL/L (ref 3.4–5.3)
RBC # BLD AUTO: 4.29 10E6/UL (ref 4.4–5.9)
SODIUM SERPL-SCNC: 134 MMOL/L (ref 136–145)
UPPER GI ENDOSCOPY: NORMAL
WBC # BLD AUTO: 9.2 10E3/UL (ref 4–11)

## 2022-12-27 PROCEDURE — 94640 AIRWAY INHALATION TREATMENT: CPT | Mod: 76

## 2022-12-27 PROCEDURE — 999N000157 HC STATISTIC RCP TIME EA 10 MIN

## 2022-12-27 PROCEDURE — 80048 BASIC METABOLIC PNL TOTAL CA: CPT | Performed by: STUDENT IN AN ORGANIZED HEALTH CARE EDUCATION/TRAINING PROGRAM

## 2022-12-27 PROCEDURE — C9113 INJ PANTOPRAZOLE SODIUM, VIA: HCPCS | Performed by: STUDENT IN AN ORGANIZED HEALTH CARE EDUCATION/TRAINING PROGRAM

## 2022-12-27 PROCEDURE — 99291 CRITICAL CARE FIRST HOUR: CPT | Mod: GC | Performed by: INTERNAL MEDICINE

## 2022-12-27 PROCEDURE — 83735 ASSAY OF MAGNESIUM: CPT | Performed by: STUDENT IN AN ORGANIZED HEALTH CARE EDUCATION/TRAINING PROGRAM

## 2022-12-27 PROCEDURE — 85027 COMPLETE CBC AUTOMATED: CPT | Performed by: STUDENT IN AN ORGANIZED HEALTH CARE EDUCATION/TRAINING PROGRAM

## 2022-12-27 PROCEDURE — 250N000013 HC RX MED GY IP 250 OP 250 PS 637: Performed by: STUDENT IN AN ORGANIZED HEALTH CARE EDUCATION/TRAINING PROGRAM

## 2022-12-27 PROCEDURE — 43241 EGD TUBE/CATH INSERTION: CPT | Performed by: INTERNAL MEDICINE

## 2022-12-27 PROCEDURE — 0DJ08ZZ INSPECTION OF UPPER INTESTINAL TRACT, VIA NATURAL OR ARTIFICIAL OPENING ENDOSCOPIC: ICD-10-PCS | Performed by: INTERNAL MEDICINE

## 2022-12-27 PROCEDURE — 76000 FLUOROSCOPY <1 HR PHYS/QHP: CPT | Mod: TC

## 2022-12-27 PROCEDURE — 250N000011 HC RX IP 250 OP 636: Performed by: STUDENT IN AN ORGANIZED HEALTH CARE EDUCATION/TRAINING PROGRAM

## 2022-12-27 PROCEDURE — 82803 BLOOD GASES ANY COMBINATION: CPT

## 2022-12-27 PROCEDURE — 84100 ASSAY OF PHOSPHORUS: CPT | Performed by: STUDENT IN AN ORGANIZED HEALTH CARE EDUCATION/TRAINING PROGRAM

## 2022-12-27 PROCEDURE — 200N000002 HC R&B ICU UMMC

## 2022-12-27 PROCEDURE — 94003 VENT MGMT INPAT SUBQ DAY: CPT

## 2022-12-27 PROCEDURE — 250N000011 HC RX IP 250 OP 636

## 2022-12-27 PROCEDURE — 250N000009 HC RX 250: Performed by: STUDENT IN AN ORGANIZED HEALTH CARE EDUCATION/TRAINING PROGRAM

## 2022-12-27 PROCEDURE — 250N000013 HC RX MED GY IP 250 OP 250 PS 637

## 2022-12-27 RX ORDER — LABETALOL HYDROCHLORIDE 5 MG/ML
10 INJECTION, SOLUTION INTRAVENOUS EVERY 4 HOURS PRN
Status: DISCONTINUED | OUTPATIENT
Start: 2022-12-27 | End: 2022-12-27

## 2022-12-27 RX ORDER — LEVALBUTEROL TARTRATE 45 UG/1
1-2 AEROSOL, METERED ORAL EVERY 4 HOURS
Status: DISCONTINUED | OUTPATIENT
Start: 2022-12-27 | End: 2022-12-29

## 2022-12-27 RX ORDER — LIDOCAINE HYDROCHLORIDE 20 MG/ML
5 SOLUTION OROPHARYNGEAL
Status: CANCELLED | OUTPATIENT
Start: 2022-12-27

## 2022-12-27 RX ORDER — METOPROLOL TARTRATE 1 MG/ML
2.5-5 INJECTION, SOLUTION INTRAVENOUS EVERY 4 HOURS PRN
Status: DISCONTINUED | OUTPATIENT
Start: 2022-12-27 | End: 2022-12-28

## 2022-12-27 RX ORDER — LABETALOL HYDROCHLORIDE 5 MG/ML
10 INJECTION, SOLUTION INTRAVENOUS EVERY 6 HOURS PRN
Status: DISCONTINUED | OUTPATIENT
Start: 2022-12-27 | End: 2022-12-27

## 2022-12-27 RX ORDER — LABETALOL HYDROCHLORIDE 5 MG/ML
5 INJECTION, SOLUTION INTRAVENOUS ONCE
Status: COMPLETED | OUTPATIENT
Start: 2022-12-27 | End: 2022-12-27

## 2022-12-27 RX ORDER — ENOXAPARIN SODIUM 100 MG/ML
40 INJECTION SUBCUTANEOUS EVERY 12 HOURS
Status: COMPLETED | OUTPATIENT
Start: 2022-12-27 | End: 2022-12-27

## 2022-12-27 RX ORDER — METOPROLOL TARTRATE 1 MG/ML
2.5-5 INJECTION, SOLUTION INTRAVENOUS EVERY 4 HOURS PRN
Status: DISCONTINUED | OUTPATIENT
Start: 2022-12-27 | End: 2022-12-27

## 2022-12-27 RX ORDER — HYDRALAZINE HYDROCHLORIDE 20 MG/ML
5-10 INJECTION INTRAMUSCULAR; INTRAVENOUS EVERY 4 HOURS PRN
Status: DISCONTINUED | OUTPATIENT
Start: 2022-12-27 | End: 2022-12-28

## 2022-12-27 RX ADMIN — Medication 5 ML: at 19:45

## 2022-12-27 RX ADMIN — Medication 0.5 MG: at 19:42

## 2022-12-27 RX ADMIN — PROPOFOL 25 MCG/KG/MIN: 10 INJECTION, EMULSION INTRAVENOUS at 01:57

## 2022-12-27 RX ADMIN — PANTOPRAZOLE SODIUM 40 MG: 40 INJECTION, POWDER, FOR SOLUTION INTRAVENOUS at 07:53

## 2022-12-27 RX ADMIN — LEVALBUTEROL TARTRATE 2 PUFF: 45 AEROSOL, METERED ORAL at 20:26

## 2022-12-27 RX ADMIN — ARIPIPRAZOLE 10 MG: 1 SOLUTION ORAL at 21:40

## 2022-12-27 RX ADMIN — PROPOFOL 20 MCG/KG/MIN: 10 INJECTION, EMULSION INTRAVENOUS at 21:03

## 2022-12-27 RX ADMIN — POLYETHYLENE GLYCOL 3350 17 G: 17 POWDER, FOR SOLUTION ORAL at 20:04

## 2022-12-27 RX ADMIN — ALBUTEROL SULFATE 2 PUFF: 90 AEROSOL, METERED RESPIRATORY (INHALATION) at 15:26

## 2022-12-27 RX ADMIN — PROPOFOL 20 MCG/KG/MIN: 10 INJECTION, EMULSION INTRAVENOUS at 11:56

## 2022-12-27 RX ADMIN — ALBUTEROL SULFATE 2 PUFF: 90 AEROSOL, METERED RESPIRATORY (INHALATION) at 08:02

## 2022-12-27 RX ADMIN — GABAPENTIN 300 MG: 300 CAPSULE ORAL at 13:43

## 2022-12-27 RX ADMIN — ALBUTEROL SULFATE 2 PUFF: 90 AEROSOL, METERED RESPIRATORY (INHALATION) at 00:29

## 2022-12-27 RX ADMIN — INSULIN GLARGINE 25 UNITS: 100 INJECTION, SOLUTION SUBCUTANEOUS at 17:04

## 2022-12-27 RX ADMIN — HYDRALAZINE HYDROCHLORIDE 5 MG: 20 INJECTION INTRAMUSCULAR; INTRAVENOUS at 22:15

## 2022-12-27 RX ADMIN — LABETALOL HYDROCHLORIDE 5 MG: 5 INJECTION, SOLUTION INTRAVENOUS at 15:54

## 2022-12-27 RX ADMIN — METOPROLOL TARTRATE 2.5 MG: 5 INJECTION INTRAVENOUS at 21:39

## 2022-12-27 RX ADMIN — PROPOFOL 20 MCG/KG/MIN: 10 INJECTION, EMULSION INTRAVENOUS at 06:36

## 2022-12-27 RX ADMIN — ALBUTEROL SULFATE 2 PUFF: 90 AEROSOL, METERED RESPIRATORY (INHALATION) at 05:27

## 2022-12-27 RX ADMIN — PROPOFOL 20 MCG/KG/MIN: 10 INJECTION, EMULSION INTRAVENOUS at 15:56

## 2022-12-27 RX ADMIN — GABAPENTIN 300 MG: 300 CAPSULE ORAL at 19:42

## 2022-12-27 RX ADMIN — ENOXAPARIN SODIUM 40 MG: 40 INJECTION SUBCUTANEOUS at 17:55

## 2022-12-27 RX ADMIN — QUETIAPINE FUMARATE 600 MG: 300 TABLET ORAL at 21:40

## 2022-12-27 RX ADMIN — LEVALBUTEROL TARTRATE 2 PUFF: 45 AEROSOL, METERED ORAL at 23:18

## 2022-12-27 RX ADMIN — ALBUTEROL SULFATE 2 PUFF: 90 AEROSOL, METERED RESPIRATORY (INHALATION) at 12:22

## 2022-12-27 ASSESSMENT — ACTIVITIES OF DAILY LIVING (ADL)
ADLS_ACUITY_SCORE: 28
DEPENDENT_IADLS:: INDEPENDENT
ADLS_ACUITY_SCORE: 28

## 2022-12-27 NOTE — PLAN OF CARE
Major Shift Events:  RASS -3 maintained on 20 of prop and 50 of fent. Pupils are equal and reactive. Sinus bradycardia. Afebrile. SBP parameters of 100-120 met w/o intervention. Edema of tongue, scrotum, and BLE. CMV settings 40%/500/20 PEEP of 14. Moderate amount of thick, creamy/clear secretions. NG placed at bedside by endo w/ fluoroscopy. TF started at 1300; 15mL/hr w/ standard FWF. No BM this shift. Noble in place w/ adequate UOP.    Plan: Wean vent settings and sedation as able. Notify team of any changes or concerns.     For vital signs and complete assessments, please see documentation flowsheets.

## 2022-12-27 NOTE — PROGRESS NOTES
Care Management Follow Up    Length of Stay (days): 6    Expected Discharge Date: 12/27/2022         Patient plan of care discussed at interdisciplinary rounds: Yes    Anticipated Discharge Disposition:  TBD     Anticipated Discharge Services:  TBD  Anticipated Discharge DME:  [id number]    Additional Information:  Pt transferred to ICU today after Code blue.  Pt is vented and sedated.  RNCC attempt to meet family for support and complete elevated risk score assessment.  No family was in the room at time of RNCC visit, sister is ese for contact.  Per discussion with bedside RN.  No family has been in or called for update.  The team have tried to contact pt sister.  RNCC will cont to follow plan of care.      Marcel Martin RN, PHN, BSN  4A and 4E/ ICU  Care Coordinator  Phone: [phone]  Pager: [phone]    To contact the weekend RNCC  [place] (0800 - 1630) Saturday and Sunday   Units: 4A, 4C, 4E, 5A and 5B- Pager 1: [phone]   Units: 6A, 6B, 6C, 6D- Pager 2: [phone]   Units: 7A, 7B, 7C, 7D, and 5C-Pager 3: [phone]

## 2022-12-27 NOTE — PROGRESS NOTES
Order for ABG completed.     ABG obtained from R radial artery, needed to redirect 2+ times, only able to get 1/2mL of sample. Difficult to palpate radial pulses, minimal access on brachial sites.     Writer recommends placing A-Line if treatment team continues to want to trend gases as acquiring a sample is becoming difficult.     Fabiana Pierce, LRT, BSRT-RRT

## 2022-12-27 NOTE — PROGRESS NOTES
MEDICAL ICU PROGRESS NOTE  12/27/2022      Date of Service (when I saw the patient): 12/27/2022    ASSESSMENT: Antony Aguila Jr. is a 62 year old male admitted on 12/21/2022. He has history chronic type B aortic dissection s/p exploration and median sternotomy (6/18/19), type 2 DM (poorly controlled), and HTN who is admitted for acute on chronic thoracic aortic aneurysm with new aortic root dilation. Hospitalization complicated by hypokalemia who was admitted on 12/21/2022 for acute hypoxic respiratory failure in setting of agitation and need for sedation.      CHANGES and MAJOR THINGS TODAY:   - decrease PEEP 16-> 14, eval for further wean this afternoon  - endoscopically placed feeding tube with GI   - add PRN labetalol   - Head CT if minimal responsiveness with weaned sedation       PLAN:    Neuro:  # Pain and sedation  - propofol gtt and bolus  - fentanyl gtt and bolus  - RASS goal -2 to -3, wean sedation after feeding tube placement     # Agitation     # Encephalopathy, likely metabolic  - patient developing confusion, possibly 2/2 hypercapnia and noncompliance with cpap  - patient agitated on the floor requiring precedex, ativan, zyprexa and not compliant with cares  - needed further sedation with ativan to the point that intubation was required   - Will wean sedation after feeding tube placement  - CT Head if minimal responsiveness with weaned sedation  - Psychiatry consulted, appreciate recs  - Aripiprazole 10 mg HS  - Guanfacine 0.5 mg BID (alpha-2 agonist like Precedex)   - Haldol 2-5 mg IV q 4 hours for severe agitation (and follow QTc)    # Schizoaffective disorder  # PTSD  Meds per chart include Zyprexa, Abilify, and Seroquel. Per pharmacy med rec, appears to only have filled Seroquel recently.   - continue PTA quetiapine 600 mg at bedtime  - PharmD med rec  - Psych recs as above      # Tobacco use  Smoked cigarettes for 45 years. Currently 1 pack per week.  - Encourage cessation      Pulmonary:  #  Acute hypoxic and hypercarbic respiratory failure  # Significant airway edema  # Concern for sleep apnea  # Tracheostomy placed 7/5/19, decannulated 8/2019  Per hospitalization notes here in Sept 2022, noted to have hx of dyspnea on exertion in setting of being current smoker, tx with albuterol inhaler, recommended outpatient PFTs be obtained. Pulmonary progress note from 7/2019 notes current smoker with unknown COPD history, no PFTs available. Had tracheostomy placed 7/5/19, decannulated 8/2019. CTA chest with compressive atelectasis in L lung adjacent to thoracic aorta and hiatal hernia. Patient became agitated and non-compliant with attempts for patient to use BIPAP or HFNC and needed heavy sedation to treat agitation such that intubation was required to protect airway. Patient is historically a difficult intubation and was very difficult this time. Patient has a possible hx of angioedema reaction to lisinopril, was started on losartan yesterday, could account for the airway edema and extubation will be expected to be delayed due to this. Likely needs higher PEEP due to his body habitus.   - patient emergently intubated  - s/p decadron 8mg IV   - Wean PEEP today 16->14, re-evaluate further weaning in the afternoon     Vent Mode: CMV/AC  (Continuous Mandatory Ventilation/ Assist Control)  FiO2 (%): 40 %  Resp Rate (Set): 20 breaths/min  Tidal Volume (Set, mL): 500 mL  PEEP (cm H2O): 14 cmH2O  Resp: 20    # H/o possible ACE inhibitor related angioedema  - Noted during July 2019 hospitalization at same time having AHRF requiring tracheostomy (later decannulted in August 2019).  - Notably patient started on losartan 12/25/22  - Losartan added to allergy list   - S/p decadron 8mg IV x1      Cardiovascular:  # Shock, NOS-resolved   - patient developed hypotension after intubation and being sedated on propofol   - s/p 1L NS  - Off norepinephrine   - TTE 12/26 EF 60-65%, normal RV      # Chronic type B aortic  dissection (proximal descending aorta to the abdominal aorta)  # Proximal ascending aorta saccular aneurysm   # Distal ascending aorta saccular aneurysm   # Mild nonobstructive CAD  Pt has history of stable type B aortic dissection as well as short segment of dissection vs pseudoaneurysms at the aortic root and distal ascending thoracic aorta. Previously underwent surgical exploration in 6/2019 with concern for type 1 aortic dissection but found to have chronic type B dissection. On admission, CXR with widened mediastinum slightly increased from prior. CT chest with aneurysmal dilatation ascending and descending thoracic aorta with type B aortic dissection distal to L subclavian artery extending to L common iliac artery with severe narrowing of true lumen and possible pseudoaneurysm at aortic root, new compared to prior 2019 images.   - TRINH 12/23 showed saccular aneurysm of ascending aorta measuring 5.8 cm associated with a dissection flap. Thrombus is seen in the false lumen. Aortic root is dilated at 4.6 cm.  - CTA 12/22 two saccular aneurysms, one each in the proximal ascending aorta and distal ascending aorta. The maximal transaortic diameters of 46.9 x 39.2 mm and 62.7 x 54.4 mm, respectively. Mild, non-obstructive coronary artery disease without any high-grade stenoses.  - Ongoing discussions with cardiology and CVTS re: possible surgical planning, likely outpatient follow up with CVTS   - Hold labetalol 400 mg BID 12/25  - Hold DVT ppx 12/23; restarted 12/24 for thrombus seen on TRINH, continue per CVTS recs  - Doppler pulses  - Goal -120 and HR<60     # HTN  Based on outpatient note 12/14/2022 pt was previously hydordiuril 50 mg, amlodipine 10 mg, bumex TID and canaglaflozin. 12/25 Hypertensive overnight with highest SBP of 170s. Given hx of Type B sonam dissection and now new aortic aneurysm goals SBP < 120 and HR <60 per CVTS.   - hold Labetalol  BID (titrated from intially PO dose of 100  mg)  - holding PTA hydrochlorothiazide  - Was on Nicardipine gtt given persistent elevated SBPs  - prn labetalol 10mg for SBP >120  - Goal -120 and HR<60     GI/Nutrition:  # Large hiatal hernia  # Nutrition  - Unable to place feeding tube under fluoro due to large hiatal hernia   - GI consulted for endoscopic feeding tube placement   - Start tube feeds once feeding tube placed      Renal/Fluids/Electrolytes:  # LAKISHA on CKD 2, improving  Outpatient labs 12/16 with Cr 1.4 and eGFR 55. Now back at baseline Cr ~1.1-1.2, eGFR ~65-75. UA unremarkable.  - decrease PTA gabapentin 600 mg TID -> 300 mg TID  - holding PTA metformin for now     # Mild hypokalemia, improving  # Mild hypomagnesemia  - Repleting     Endocrine:  # Type 2 DM with hyperglycemia  Most recent A1c 12.5 (9/16/22). Home regimen: metformin 1000 qPM and Lantus 50 unit(s) qAM + aspart 17 unit(s) TIDAC + aspart 9 unit(s) w/ snacks  - decreased lantus to 25u while NPO   - HDSSI   - holding PTA metformin as above  - Snack time Insulin 1:20g Carb ratio PRN 12/24      # Hypocalcemia  - 2g calcium gluconate     ID:  No acute issues.      Hematology:    # Anemia, normocytic  - Trend CBC     Musculoskeletal:  # BLE edema  Dimer elevated with acute on chronic leg swelling L < R per patient history. DVT US in ED negative for DVT b/l. Has had LE swelling for last 2 years, notes it is worse lately. Previously on diuretics, not currently per chart.  - lymphedema consult     Skin:  # Venous stasis ulcer LLE  # Venous insufficiency  # Peripheral vascular disease  - WOC consult placed     General Cares/Prophylaxis:    DVT Prophylaxis: lovenox   GI Prophylaxis: PPI  Restraints: b/l wrist soft  Family Communication: Sister updated at bedside   Code Status: Full Code     Lines/tubes/drains:  - ETT  - Noble  - PIV x3   - PICC     Disposition:  - Medical ICU      Patient seen and findings/plan discussed with medical ICU staff, Dr. Goldstein.     Ruthann Moser MD  PGY-1  Internal Medicine      Clinically Significant Risk Factors         # Hyponatremia: Lowest Na = 134 mmol/L in last 2 days, will monitor as appropriate  # Hypocalcemia: Lowest iCa = 4.2 mg/dL in last 2 days, will monitor and replace as appropriate     # Hypoalbuminemia: Lowest albumin = 2.9 g/dL at 12/26/2022  6:03 AM, will monitor as appropriate          # DMII: A1C = 8.0 % (Ref range: <5.7 %) within past 3 months   # Severe Obesity: Estimated body mass index is 48.14 kg/m  (pended) as calculated from the following:    Height as of this encounter: 1.829 m (6').    Weight as of this encounter: (P) 161 kg (354 lb 15.1 oz).                   ====================================  INTERVAL HISTORY:   No acute events overnight. Off pressors since 12/26 afternoon.     OBJECTIVE:   1. VITAL SIGNS:   Temp:  [96.6  F (35.9  C)-97.8  F (36.6  C)] 97.8  F (36.6  C)  Pulse:  [50-56] 52  Resp:  [13-28] 20  BP: ()/(59-75) 106/59  FiO2 (%):  [45 %-90 %] 45 %  SpO2:  [94 %-100 %] 100 %  Vent Mode: CMV/AC  (Continuous Mandatory Ventilation/ Assist Control)  FiO2 (%): 45 %  Resp Rate (Set): 20 breaths/min  Tidal Volume (Set, mL): 500 mL  PEEP (cm H2O): 16 cmH2O  Resp: 20    2. INTAKE/ OUTPUT:   I/O last 3 completed shifts:  In: 897.79 [I.V.:897.79]  Out: 1222 [Urine:1222]    3. PHYSICAL EXAMINATION:  General: Laying in bed   HEENT: No scleral icterus, MMM  Neuro: Sedated, opens eyes to commands   Pulm/Resp: Coarse breath sounds bilaterally , mechanically ventilated  CV: RRR, no murmurs  Abdomen: Soft, non-distended, non-tender  : connor catheter in place  Incisions/Skin: b/l extremities covered with wraps, lower extremity edema     4. LABS:   Arterial Blood Gases   Recent Labs   Lab 12/27/22  0002 12/26/22  1642 12/26/22  0950 12/26/22  0606   PH 7.39 7.39 7.39 7.33*   PCO2 39 39 40 50*   PO2 133* 62* 75* 64*   HCO3 24 24 24 26     Complete Blood Count   Recent Labs   Lab 12/27/22  0356 12/26/22  0603 12/25/22  0736  22  0611   WBC 9.2 8.7 7.1 7.0   HGB 12.3* 11.3* 13.5 12.9*    210 258 225     Basic Metabolic Panel  Recent Labs   Lab 22  0403 22  0356 22  0247 22  0005 22  0820 22  0603 22  0911 22  0736 22  0741 22  0611   NA  --  134*  --   --   --  137  --  141  --  140   POTASSIUM  --  4.3  --   --   --  4.0  --  4.1  --  3.9   CHLORIDE  --  103  --   --   --  105  --  104  --  104   CO2  --  20*  --   --   --  22  --  26  --  25   BUN  --  14.4  --   --   --  11.2  --  8.4  --  9.4   CR  --  1.27*  --   --   --  1.41*  --  1.20*  --  1.29*   * 160* 188* 177*   < > 163*   < > 156*   < > 172*    < > = values in this interval not displayed.     Liver Function Tests  Recent Labs   Lab 22  0603 22  0549 22  1654   AST 26  --  30   ALT 18  --  29   ALKPHOS 58  --  66   BILITOTAL 0.4  --  0.5   ALBUMIN 2.9*  --  3.3*   INR 1.13 1.08  --      Coagulation Profile  Recent Labs   Lab 22  0603 22  0549   INR 1.13 1.08       5. RADIOLOGY:   Recent Results (from the past 24 hour(s))   Echo Limited   Result Value    LVEF  60-65%    Narrative    [id number]  [id number]  [id number]  [id number]^EDER^[facility],[place]  Echocardiography Laboratory  [address] [place] [id number]     Name: DAVID JONESJR.  MRN: [id number]  : 1960  Study Date: 2022 08:37 AM  Age: 62 yrs  Gender: Male  [place] [facility]  Reason For Study: Aortic Aneurysm, Shock  Ordering Physician: ZACHARY GAINES  Performed By: Jarred Stovall RDCS     BSA: 2.7 m2  Height: 72 in  Weight: 357 lb  HR: 53  BP: 81/63 mmHg  ______________________________________________________________________________  Procedure  Limited Portable Echo Adult. Contrast Optison. Technically difficult  study.Extremely difficult acoustic windows despite the use of contrast for  endcardial border definition. Patient was given 6 ml  mixture of 3 ml Optison  and 6 ml saline. 3 ml wasted.  ______________________________________________________________________________  Interpretation Summary  Technically difficult study.Extremely difficult acoustic windows despite the  use of contrast for endcardial border definition.  Global and regional left ventricular function is normal with an EF of 60-65%.  Right ventricular function, chamber size, wall motion, and thickness are  normal.  Sinuses of Valsalva 4.2 cm.  Ascending aorta 4.9 cm.  IVC diameter and respiratory changes fall into an intermediate range  suggesting an RA pressure of 8 mmHg.  No pericardial effusion is present.  ______________________________________________________________________________  Left Ventricle  Global and regional left ventricular function is normal with an EF of 60-65%.     Right Ventricle  Right ventricular function, chamber size, wall motion, and thickness are  normal.     Aortic Valve  Mild aortic insufficiency is present.     Tricuspid Valve  The right ventricular systolic pressure is approximated at 20.8 mmHg plus the  right atrial pressure.     Vessels  Sinuses of Valsalva 4.2 cm. Ascending aorta 4.9 cm. IVC diameter and  respiratory changes fall into an intermediate range suggesting an RA pressure  of 8 mmHg.     Pericardium  No pericardial effusion is present.  ______________________________________________________________________________  MMode/2D Measurements & Calculations  IVSd: 1.4 cm  LVIDd: 4.2 cm  LVIDs: 3.1 cm  LVPWd: 1.4 cm  FS: 27.8 %  LV mass(C)d: 221.3 grams  LV mass(C)dI: 81.2 grams/m2  Ao root diam: 4.2 cm  asc Aorta Diam: 5.3 cm  RWT: 0.65     Doppler Measurements & Calculations  AI P1/2t: 489.5 msec  TR max ximena: 228.0 cm/sec  TR max P.8 mmHg     ______________________________________________________________________________  Report approved by: Jared Geiger 2022 09:44 AM         XR Chest Port 1 View    Narrative    Portable  chest    INDICATION: Endotracheal tube positioning    COMPARISON: CT chest 12/22/2022. Chest plain film 12/21/2022    FINDINGS: Heart is enlarged. Silhouetting of much of the left  hemidiaphragm noted along with overall dense right retrocardiac  appearance and patchy opacities throughout the left lung. Median  sternotomy. Endotracheal tube tip approximate 5.4 cm above the mackenzie.  Mild blunting of left costophrenic angle is more notable. Prominent  hiatal hernia.      Impression    IMPRESSION: Possible left lung developing infiltrate versus edema.  Recommend follow-up to clearing. Prominent retrocardiac opacification  likely indicative of atelectasis. Hiatal hernia.    SAÚL ARRINGTON MD         SYSTEM ID:  [id number]   XR Feeding Tube Placement    Narrative    EXAMINATION: Feeding tube placement on 12/26/2022 2:35 PM.    INDICATION: Nutritional needs    COMPARISON: CT chest 12/21/2022    TECHNIQUE: After injection of Xylocaine gel into the right nostril, a  feeding tube was advanced under fluoroscopic guidance.    Fluoroscopy time: 2 minutes 32 seconds.    FINDINGS: The feeding tube was advanced with the tip in the hiatal  hernia in the mid/lower thoracic cavity. A small amount of Omnipaque  was injected to demonstrate the location within the hiatal hernia.  Unable to advance feeding tube beyond hiatal hernia, after multiple  attempts, was removed without complication.      Impression    IMPRESSION: Unsuccessful feeding tube placement.    ICU nurse was notified by Dr. Calero.    I, JAMES TORRES MD, attest that I was present for all critical  portions of the procedure and was immediately available to provide  guidance and assistance during the remainder of the procedure.    I have personally reviewed the examination and initial interpretation  and I agree with the findings.    JAMES TORRES MD         SYSTEM ID:  [id number]   XR Chest Port 1 View    Narrative    EXAM: XR CHEST PORT 1 VIEW  12/26/2022 6:32 PM      HISTORY:  Assess PICC       COMPARISON: 12/26/2022    FINDINGS:   The patient is rotated to the left. The endotracheal tube terminates  over the mid thoracic trachea. Right upper extremity PICC projects  over the expected location of the right atrium. Stable postsurgical  changes in the chest. The cardiac silhouette is unchanged. No  pneumothorax. No significant change in small left pleural effusion.  Unchanged retrocardiac opacities. Silhouetting of the left  hemidiaphragm. Decreased left perihilar and left basilar opacities.      Impression    IMPRESSION:   1. Right upper extremity PICC terminates in the expected location of  the right atrium.  2. Unchanged small left pleural effusion.  3. Unchanged retrocardiac atelectasis/consolidation.  4. Decreased left perihilar and left basilar opacities, atelectasis  versus edema.    I have personally reviewed the examination and initial interpretation  and I agree with the findings.    MICHAEL ORTIZ MD         SYSTEM ID:  [id number]

## 2022-12-27 NOTE — PROCEDURES
[facility]    Triple Lumen PICC Placement    Date/Time: 12/26/2022 6:44 PM  Performed by: Mary Roy RN  Authorized by: Breann Khan DO   Indications: vascular access      UNIVERSAL PROTOCOL   Site Marked: Yes  Prior Images Obtained and Reviewed:  Yes  Required items: Required blood products, implants, devices and special equipment available    Patient identity confirmed:  Arm band and hospital-assigned identification number  NA - No sedation, light sedation, or local anesthesia  Confirmation Checklist:  Patient's identity using two indicators, relevant allergies, procedure was appropriate and matched the consent or emergent situation and correct equipment/implants were available  Time out: Immediately prior to the procedure a time out was called    Universal Protocol: the Joint Commission Universal Protocol was followed    Preparation: Patient was prepped and draped in usual sterile fashion      SEDATION    Patient Sedated: No        Preparation: skin prepped with ChloraPrep  Skin prep agent: skin prep agent completely dried prior to procedure  Sterile barriers: maximum sterile barriers were used: cap, mask, sterile gown, sterile gloves, and large sterile sheet  Hand hygiene: hand hygiene performed prior to central venous catheter insertion  Type of line used: PICC  Catheter type: triple lumen  Lumen type: power PICC and non-valved  Lumen Identification: Red, White and Gray  Catheter size: 5 Fr  Brand: Bard  Lot number: [id number]  Placement method: venipuncture, MST, ultrasound and tip navigation system  Number of attempts: 1  Difficulty threading catheter: yes  Successful placement: yes  Orientation: right    Location: cephalic vein  Tip Location: Other (see comment) (cavoatrial junction)  Arm circumference: adults 10 cm  Extremity circumference: 44  Visible catheter length: 2  Total catheter length: 51  Dressing and securement: adhesive securement  device, alcohol impregnated caps, blood cleaned with CHG, chlorhexidine disc applied, glue, site cleansed, statlock, sterile dressing applied, securement device and transparent dressing  Post procedure assessment: blood return through all ports, free fluid flow and placement verified by x-ray  PROCEDURE   Patient Tolerance:  Patient tolerated the procedure well with no immediate complicationsDescribe Procedure: PICC okay to use.

## 2022-12-27 NOTE — CONSULTS
Care Management Initial Consult    General Information  Assessment completed with: Family,    Type of CM/SW Visit: Initial Assessment  Primary Care Provider verified and updated as needed: Yes   Readmission within the last 30 days: no previous admission in last 30 days   Advance Care Planning: Advance Care Planning Reviewed: questions answered, concerns discussed, no concerns identified        Communication Assessment  Patient's communication style: spoken language (English or Bilingual)    Hearing Difficulty or Deaf: no   Wear Glasses or Blind: yes    Cognitive  Cognitive/Neuro/Behavioral: .WDL except  Level of Consciousness: sedated  Arousal Level: unresponsive  Orientation: other (see comments) (EDUARDO)  Mood/Behavior: other (see comments), agitated, anxious (Sedated, episodes of agitation/anxiety)  Best Language:  (EDUARDO- ETT)  Speech: endotracheal tube    Living Environment:   People in home: alone     Current living Arrangements: apartment      Able to return to prior arrangements: yes     Family/Social Support:  Care provided by: self, other (see comments)  Provides care for: no one, unable/limited ability to care for self  Marital Status:   Support System: Sibling(s), PCA, Neighbor          Description of Support System: Supportive, Involved    Support Assessment: Adequate family and caregiver support, Adequate social supports    Current Resources:   Patient receiving home care services: Yes  Community Resources: County Worker, County Programs, Housekeeping/Chore Agency, PCA  Equipment currently used at home: cane, straight, walker, standard  Supplies currently used at home: None    Employment/Financial:  Employment Status: disabled     Financial Concerns: No concerns identified   Referral to Financial Worker: No       Lifestyle & Psychosocial Needs:  Social Determinants of Health     Tobacco Use: High Risk     Smoking Tobacco Use: Every Day     Smokeless Tobacco Use: Never     Passive Exposure: Not on  "file   Alcohol Use: Not on file   Financial Resource Strain: Not on file   Food Insecurity: Not on file   Transportation Needs: Not on file   Physical Activity: Not on file   Stress: Not on file   Social Connections: Not on file   Intimate Partner Violence: Not on file   Depression: Not on file   Housing Stability: Not on file       Functional Status:  Prior to admission patient needed assistance:   Dependent ADLs: Independent  Dependent IADLs: Independent  Assesssment of Functional Status: Not at baseline with ADL Functioning, Not at baseline with mobility, Not at  functional baseline    Mental Health Status:  Mental Health Status: Current Concern  Mental Health Management: Medication, Psychiatrist    Chemical Dependency Status:  Chemical Dependency Status: No Current Concerns           Values/Beliefs:  Spiritual, Cultural Beliefs, Jehovah's witness Practices, Values that affect care: no               Additional Information:  Per H&P, \"Antony Aguila Jr. is a 62 year old male admitted on 12/21/2022. He has history chronic type B aortic dissection s/p exploration and median sternotomy (6/18/19), type 2 DM (poorly controlled), and HTN who is admitted for acute on chronic thoracic aortic aneurysm with new aortic root dilation. Hospitalization complicated by hypokalemia who was admitted on 12/21/2022 for acute hypoxic respiratory failure in setting of agitation and need for sedation.\"    AIDAN spoke with pt's sister Frank via phone to introduce role and complete assessment. Frank confirmed that pt lives alone in a [facility] apartment. She knows he has ILS and nursing services, but is unsure what agency provides the services. Frank believes pt has SSDI. Frank shared that she lives in [place] and checks in on pt at least once a week. She doesn't believe pt always follows diet restrictions/recommendations. Pt receives medication for mental health but does not have a therapist. Frank does not think that pt has " ever filled out a health care directive, but knows that she and their other sister Rowena are NOK/surrogate decision makers at this time. Rowena lives in [place]. Frank did not have any specific questions or concerns for AIDAN but reported she will be visiting pt tomorrow and requested to meet with SW then.    AIDAN contacted [facility] to find out what services/support pt has. Per [facility] rep, pt has a CADI CM through [facility]: Carmel Perez (ph: [phone], [email or web address]). AIDAN left a VM for Carmel requesting call back.     AIDAN/RNCC will continue to follow for support and discharge planning    CELI Peñaloza, LGSW  4A/4C   Ph: [phone]  Pager: [phone]

## 2022-12-27 NOTE — PLAN OF CARE
ICU End of Shift Summary. See flowsheets for vital signs and detailed assessment.    Changes this shift: Fentanyl started, weaning off propofol. RASS remains -4, goal -2/-3. PICC line placed incase pressors are needed.        Plan: OG/NG placement via fluoro due to difficulty of placement. Titrate sedation to meet RASS goal. [place] placement if serial ABG's are needed.   - Titrate FIO2 to keep sats greater than 92  - SBP goal of 100-120       Goal Outcome Evaluation:      Plan of Care Reviewed With: patient, family    Overall Patient Progress: no changeOverall Patient Progress: no change    Outcome Evaluation: Pt. remains intubated and sdated. IRR unable to place NG due to could not bypass hietal hernia.

## 2022-12-27 NOTE — PROGRESS NOTES
ICU Daily Rounding Checklist     Checklist Response Notes   Can sedation be reduced?  Perhaps after procedure    Can analgesia be reduced? Perhaps after procedure    Is delirium being assessed, addressed and prevented? Yes    Spontaneous awakening trial and/or Spontaneous breathing trial candidate?  No    Total fluid balance goal reviewed?  Yes Target Goals:        Even to net neg [12h]             Even to net neg [24h]   Is the patient at goals for lung protective ventilation? No    Head of bed elevation (30 degrees)? Yes    Skin breakdown assessment (prevention) completed? Yes    Is enteral nutrition at goal? No    Is blood glucose at goal? Yes    Deep venous thrombosis prophylaxis? Yes      Gastric ulcer prophylaxis?  Yes If coagulopathy (INR-1.5 PTT2x normal. Ph<50k), mechanical ventilation 48hr, history of GI bleed/ulcer within past year. TBL, SCI, or burn, or if >= 2 minor risk factors (sepsis, ICU stay 1 week, occult GI bleed > 6 days. glucocorticoid therapy, NSAID use, antiplatelet use)   Can Antibiotics be narrowed or discontinued? NA    Early mobility candidate and physical therapy consulted? Yes    Is connor catheter needed? Yes    Is central venous/arterial catheter needed? Yes    Has the family been updated? Yes    Are the patient's goals of care and code status current? Yes      BRANDY Courtney CNP

## 2022-12-27 NOTE — OP NOTE
Upper GI Endoscopy 12/27/2022 11:01 AM Americo White   [address] [place] [address] [phone]     Endoscopy Department   _______________________________________________________________________________   Patient Name: Antony Aguila          Procedure Date: 12/27/2022 11:01 AM   MRN: [id number]                       Account Number: [id number]   YOB: 1960              Admit Type: Inpatient   Age: 62                               Room: [place] [address]   Gender: Male                          Note Status: Finalized   Attending MD: ALBERTO RAINEY MD       Pause for the Cause: time out performed   Total Sedation Time:                     _______________________________________________________________________________       Procedure:             Upper GI endoscopy   Indications:           Malnutrition   Providers:             ALBERTO RAINEY MD   Referring MD:             Medicines:             General Anesthesia   Complications:         No immediate complications. Estimated blood loss:                          Minimal.   _______________________________________________________________________________   Procedure:             Pre-Anesthesia Assessment:                          - Prior to the procedure, a History and Physical was                          performed, and patient medications and allergies were                          reviewed. The patient is unable to give consent                          secondary to the patient being legally incompetent to                          consent. The risks and benefits of the procedure and                          the sedation options and risks were discussed with the                          patient's sister. All questions were answered and                          informed consent was obtained. Patient identification                          and proposed procedure were verified by the physician,                           the nurse, the anesthesiologist and the anesthetist in                          the procedure room. Mental Status Examination:                          sedated. Airway Examination: orotracheal intubation.                          Respiratory Examination: clear to auscultation. CV                          Examination: normal. Prophylactic Antibiotics: The                          patient does not require prophylactic antibiotics.                          Prior Anticoagulants: The patient has taken no                          anticoagulant or antiplatelet agents. ASA Grade                          Assessment: III - A patient with severe systemic                          disease. After reviewing the risks and benefits, the                          patient was deemed in satisfactory condition to                          undergo the procedure. The anesthesia plan was to use                          general anesthesia. Immediately prior to                          administration of medications, the patient was                          re-assessed for adequacy to receive sedatives. The                          heart rate, respiratory rate, oxygen saturations,                          blood pressure, adequacy of pulmonary ventilation, and                          response to care were monitored throughout the                          procedure. The physical status of the patient was                          re-assessed after the procedure.                          After obtaining informed consent, the endoscope was                          passed under direct vision. Throughout the procedure,                          the patient's blood pressure, pulse, and oxygen                          saturations were monitored continuously. The Endoscope                          was introduced through the mouth, and advanced to the                          second part of duodenum. The upper GI endoscopy was                           accomplished without difficulty. The patient tolerated                          the procedure well.                                                                                     Findings:        A pediatric gastroscope was then advanced through the left nare to the        proximal jejunum. Normal esophagus. Large hiatal hernia with retained        food. Otherwise normal stomach. Normal duodenum. A straight Glidewire        was then advanced through the channel into the proximal jejunum. The        pediatric gastroscope was exchanged over the wire. A 12 Fr Cor patrick        nasojejunal tube was then lubricated and advanced over the wire        fluroscopically into the proximal jejunum. The tube was then secured to        the nose at 90 cm with a bridal.                                                                                     Impression:            - Normal esophagus.                          - Large hiatal hernia with retained food.                          - Normal examined duodenum.                          - 12 Fr NJ tube placed as described above and                          placement confirmed fluoroscopically                          - No specimens collected.   Recommendation:        - NJ tube may be used immediately                                                                                       Olu Jeffries MD

## 2022-12-27 NOTE — PLAN OF CARE
Resumed cares from [id number].    Major Shift Events: Rass -3, withdraws in all 4 extremities. Sedation unchanged. VSS, SBP parameters met. Edema unchanged. CMV settings, see flowsheets. NG with tube feeds at 15, increase to 30 @ 2100. No BM this shift. Noble in place with good output.    Plan: Continue current plan of care. Notify teams of any changes.

## 2022-12-27 NOTE — PROGRESS NOTES
ICU End of Shift Summary. See flowsheets for vital signs and detailed assessment.    Changes this shift: Pt sedated RASS -3 while on Propofol at 20 and Fentanyl  At 50.. Pt SB 50-55 with no ectopy, SBP maintained between 100-120 without pressors. Remained on CMV settings 45%, RR 20, Peep 16, Tv 500. Pt ABG returned improved with P/F ratio of >200 at midnight. FiO2 subsequently weaned down. No BM, connor in place with 100mL/q2hr. No oral access so oral meds not given. Will attempt guided OG placement when GI performs endoscopy tomorrow.     Plan: Endoscopy per GI, oral access, monitor potential pressure injury in mouth/tongue related to biting tube

## 2022-12-28 LAB
ALLEN'S TEST: YES
ANION GAP SERPL CALCULATED.3IONS-SCNC: 9 MMOL/L (ref 7–15)
BASE EXCESS BLDA CALC-SCNC: 1.1 MMOL/L (ref -9–1.8)
BUN SERPL-MCNC: 20 MG/DL (ref 8–23)
CALCIUM SERPL-MCNC: 8.5 MG/DL (ref 8.8–10.2)
CHLORIDE SERPL-SCNC: 104 MMOL/L (ref 98–107)
CREAT SERPL-MCNC: 1.32 MG/DL (ref 0.67–1.17)
DEPRECATED HCO3 PLAS-SCNC: 22 MMOL/L (ref 22–29)
ERYTHROCYTE [DISTWIDTH] IN BLOOD BY AUTOMATED COUNT: 16.7 % (ref 10–15)
GFR SERPL CREATININE-BSD FRML MDRD: 61 ML/MIN/1.73M2
GLUCOSE BLDC GLUCOMTR-MCNC: 162 MG/DL (ref 70–99)
GLUCOSE BLDC GLUCOMTR-MCNC: 164 MG/DL (ref 70–99)
GLUCOSE BLDC GLUCOMTR-MCNC: 171 MG/DL (ref 70–99)
GLUCOSE BLDC GLUCOMTR-MCNC: 183 MG/DL (ref 70–99)
GLUCOSE BLDC GLUCOMTR-MCNC: 186 MG/DL (ref 70–99)
GLUCOSE BLDC GLUCOMTR-MCNC: 188 MG/DL (ref 70–99)
GLUCOSE BLDC GLUCOMTR-MCNC: 210 MG/DL (ref 70–99)
GLUCOSE SERPL-MCNC: 162 MG/DL (ref 70–99)
HCO3 BLD-SCNC: 26 MMOL/L (ref 21–28)
HCT VFR BLD AUTO: 35 % (ref 40–53)
HGB BLD-MCNC: 11.9 G/DL (ref 13.3–17.7)
MAGNESIUM SERPL-MCNC: 2 MG/DL (ref 1.7–2.3)
MCH RBC QN AUTO: 30.1 PG (ref 26.5–33)
MCHC RBC AUTO-ENTMCNC: 34 G/DL (ref 31.5–36.5)
MCV RBC AUTO: 89 FL (ref 78–100)
O2/TOTAL GAS SETTING VFR VENT: 40 %
PCO2 BLD: 42 MM HG (ref 35–45)
PH BLD: 7.4 [PH] (ref 7.35–7.45)
PHOSPHATE SERPL-MCNC: 2.7 MG/DL (ref 2.5–4.5)
PLATELET # BLD AUTO: 222 10E3/UL (ref 150–450)
PO2 BLD: 66 MM HG (ref 80–105)
POTASSIUM SERPL-SCNC: 3.8 MMOL/L (ref 3.4–5.3)
RBC # BLD AUTO: 3.95 10E6/UL (ref 4.4–5.9)
SODIUM SERPL-SCNC: 135 MMOL/L (ref 136–145)
WBC # BLD AUTO: 8.3 10E3/UL (ref 4–11)

## 2022-12-28 PROCEDURE — 80048 BASIC METABOLIC PNL TOTAL CA: CPT | Performed by: STUDENT IN AN ORGANIZED HEALTH CARE EDUCATION/TRAINING PROGRAM

## 2022-12-28 PROCEDURE — 250N000012 HC RX MED GY IP 250 OP 636 PS 637: Performed by: STUDENT IN AN ORGANIZED HEALTH CARE EDUCATION/TRAINING PROGRAM

## 2022-12-28 PROCEDURE — 250N000009 HC RX 250: Performed by: STUDENT IN AN ORGANIZED HEALTH CARE EDUCATION/TRAINING PROGRAM

## 2022-12-28 PROCEDURE — 250N000011 HC RX IP 250 OP 636

## 2022-12-28 PROCEDURE — C9113 INJ PANTOPRAZOLE SODIUM, VIA: HCPCS | Performed by: STUDENT IN AN ORGANIZED HEALTH CARE EDUCATION/TRAINING PROGRAM

## 2022-12-28 PROCEDURE — 85014 HEMATOCRIT: CPT | Performed by: STUDENT IN AN ORGANIZED HEALTH CARE EDUCATION/TRAINING PROGRAM

## 2022-12-28 PROCEDURE — 99291 CRITICAL CARE FIRST HOUR: CPT | Mod: GC | Performed by: INTERNAL MEDICINE

## 2022-12-28 PROCEDURE — 250N000013 HC RX MED GY IP 250 OP 250 PS 637

## 2022-12-28 PROCEDURE — 84100 ASSAY OF PHOSPHORUS: CPT | Performed by: STUDENT IN AN ORGANIZED HEALTH CARE EDUCATION/TRAINING PROGRAM

## 2022-12-28 PROCEDURE — 250N000011 HC RX IP 250 OP 636: Performed by: STUDENT IN AN ORGANIZED HEALTH CARE EDUCATION/TRAINING PROGRAM

## 2022-12-28 PROCEDURE — 94640 AIRWAY INHALATION TREATMENT: CPT | Mod: 76

## 2022-12-28 PROCEDURE — 94003 VENT MGMT INPAT SUBQ DAY: CPT

## 2022-12-28 PROCEDURE — 999N000157 HC STATISTIC RCP TIME EA 10 MIN

## 2022-12-28 PROCEDURE — 250N000013 HC RX MED GY IP 250 OP 250 PS 637: Performed by: STUDENT IN AN ORGANIZED HEALTH CARE EDUCATION/TRAINING PROGRAM

## 2022-12-28 PROCEDURE — 200N000002 HC R&B ICU UMMC

## 2022-12-28 PROCEDURE — 82803 BLOOD GASES ANY COMBINATION: CPT | Performed by: STUDENT IN AN ORGANIZED HEALTH CARE EDUCATION/TRAINING PROGRAM

## 2022-12-28 PROCEDURE — 250N000009 HC RX 250

## 2022-12-28 PROCEDURE — 83735 ASSAY OF MAGNESIUM: CPT | Performed by: STUDENT IN AN ORGANIZED HEALTH CARE EDUCATION/TRAINING PROGRAM

## 2022-12-28 RX ORDER — LABETALOL 200 MG/1
200 TABLET, FILM COATED ORAL EVERY 12 HOURS SCHEDULED
Status: DISCONTINUED | OUTPATIENT
Start: 2022-12-28 | End: 2022-12-28

## 2022-12-28 RX ORDER — LABETALOL HYDROCHLORIDE 5 MG/ML
20 INJECTION, SOLUTION INTRAVENOUS EVERY 4 HOURS PRN
Status: DISCONTINUED | OUTPATIENT
Start: 2022-12-28 | End: 2022-12-28

## 2022-12-28 RX ORDER — GLYCOPYRROLATE 1 MG/1
2 TABLET ORAL EVERY 4 HOURS PRN
Status: DISCONTINUED | OUTPATIENT
Start: 2022-12-28 | End: 2023-01-03

## 2022-12-28 RX ORDER — ENOXAPARIN SODIUM 100 MG/ML
40 INJECTION SUBCUTANEOUS EVERY 12 HOURS
Status: DISCONTINUED | OUTPATIENT
Start: 2022-12-28 | End: 2023-01-05

## 2022-12-28 RX ORDER — SENNOSIDES 8.6 MG
8.6 TABLET ORAL 2 TIMES DAILY
Status: DISCONTINUED | OUTPATIENT
Start: 2022-12-28 | End: 2023-01-15 | Stop reason: HOSPADM

## 2022-12-28 RX ORDER — METOPROLOL TARTRATE 1 MG/ML
2.5-5 INJECTION, SOLUTION INTRAVENOUS
Status: DISCONTINUED | OUTPATIENT
Start: 2022-12-28 | End: 2022-12-28

## 2022-12-28 RX ORDER — DEXMEDETOMIDINE HYDROCHLORIDE 4 UG/ML
.1-1.2 INJECTION, SOLUTION INTRAVENOUS CONTINUOUS
Status: DISCONTINUED | OUTPATIENT
Start: 2022-12-28 | End: 2023-01-01

## 2022-12-28 RX ORDER — FENTANYL CITRATE 50 UG/ML
50-100 INJECTION, SOLUTION INTRAMUSCULAR; INTRAVENOUS
Status: DISCONTINUED | OUTPATIENT
Start: 2022-12-28 | End: 2022-12-28

## 2022-12-28 RX ORDER — LABETALOL HYDROCHLORIDE 5 MG/ML
10 INJECTION, SOLUTION INTRAVENOUS EVERY 4 HOURS PRN
Status: DISCONTINUED | OUTPATIENT
Start: 2022-12-28 | End: 2023-01-03

## 2022-12-28 RX ORDER — HYDRALAZINE HYDROCHLORIDE 20 MG/ML
5-10 INJECTION INTRAMUSCULAR; INTRAVENOUS
Status: DISCONTINUED | OUTPATIENT
Start: 2022-12-28 | End: 2022-12-28

## 2022-12-28 RX ORDER — BUMETANIDE 0.25 MG/ML
1 INJECTION INTRAMUSCULAR; INTRAVENOUS ONCE
Status: COMPLETED | OUTPATIENT
Start: 2022-12-28 | End: 2022-12-28

## 2022-12-28 RX ORDER — BUMETANIDE 0.25 MG/ML
2 INJECTION INTRAMUSCULAR; INTRAVENOUS ONCE
Status: COMPLETED | OUTPATIENT
Start: 2022-12-28 | End: 2022-12-28

## 2022-12-28 RX ADMIN — Medication 100 MCG: at 14:00

## 2022-12-28 RX ADMIN — Medication 10 MG: at 16:00

## 2022-12-28 RX ADMIN — PROPOFOL 20 MCG/KG/MIN: 10 INJECTION, EMULSION INTRAVENOUS at 02:09

## 2022-12-28 RX ADMIN — Medication 0.5 MG: at 19:54

## 2022-12-28 RX ADMIN — BUMETANIDE 1 MG: 0.25 INJECTION INTRAMUSCULAR; INTRAVENOUS at 10:44

## 2022-12-28 RX ADMIN — ACETAMINOPHEN 650 MG: 325 TABLET, FILM COATED ORAL at 04:32

## 2022-12-28 RX ADMIN — PANTOPRAZOLE SODIUM 40 MG: 40 INJECTION, POWDER, FOR SOLUTION INTRAVENOUS at 07:43

## 2022-12-28 RX ADMIN — SENNOSIDES 8.6 MG: 8.6 TABLET, FILM COATED ORAL at 19:54

## 2022-12-28 RX ADMIN — DEXMEDETOMIDINE HYDROCHLORIDE 0.7 MCG/KG/HR: 400 INJECTION INTRAVENOUS at 18:38

## 2022-12-28 RX ADMIN — QUETIAPINE FUMARATE 600 MG: 300 TABLET ORAL at 22:28

## 2022-12-28 RX ADMIN — PROPOFOL 15 MCG/KG/MIN: 10 INJECTION, EMULSION INTRAVENOUS at 18:38

## 2022-12-28 RX ADMIN — INSULIN GLARGINE 35 UNITS: 100 INJECTION, SOLUTION SUBCUTANEOUS at 15:14

## 2022-12-28 RX ADMIN — Medication 100 MCG: at 18:00

## 2022-12-28 RX ADMIN — PROPOFOL 10 MCG/KG/MIN: 10 INJECTION, EMULSION INTRAVENOUS at 11:26

## 2022-12-28 RX ADMIN — LEVALBUTEROL TARTRATE 2 PUFF: 45 AEROSOL, METERED ORAL at 11:52

## 2022-12-28 RX ADMIN — GLYCOPYRROLATE 2 MG: 1 TABLET ORAL at 15:14

## 2022-12-28 RX ADMIN — METOPROLOL TARTRATE 5 MG: 5 INJECTION INTRAVENOUS at 06:48

## 2022-12-28 RX ADMIN — Medication 0.5 MG: at 07:43

## 2022-12-28 RX ADMIN — Medication 100 MCG: at 22:00

## 2022-12-28 RX ADMIN — DEXMEDETOMIDINE HYDROCHLORIDE 0.2 MCG/KG/HR: 400 INJECTION INTRAVENOUS at 10:45

## 2022-12-28 RX ADMIN — LEVALBUTEROL TARTRATE 2 PUFF: 45 AEROSOL, METERED ORAL at 21:21

## 2022-12-28 RX ADMIN — DEXMEDETOMIDINE HYDROCHLORIDE 0.7 MCG/KG/HR: 400 INJECTION INTRAVENOUS at 19:42

## 2022-12-28 RX ADMIN — GABAPENTIN 300 MG: 300 CAPSULE ORAL at 15:14

## 2022-12-28 RX ADMIN — METOPROLOL TARTRATE 5 MG: 5 INJECTION INTRAVENOUS at 02:28

## 2022-12-28 RX ADMIN — BUMETANIDE 2 MG: 0.25 INJECTION INTRAMUSCULAR; INTRAVENOUS at 15:14

## 2022-12-28 RX ADMIN — DEXMEDETOMIDINE HYDROCHLORIDE 0.7 MCG/KG/HR: 400 INJECTION INTRAVENOUS at 23:55

## 2022-12-28 RX ADMIN — GABAPENTIN 300 MG: 300 CAPSULE ORAL at 07:43

## 2022-12-28 RX ADMIN — Medication 50 MCG/HR: at 04:39

## 2022-12-28 RX ADMIN — GABAPENTIN 300 MG: 300 CAPSULE ORAL at 19:54

## 2022-12-28 RX ADMIN — Medication 15 ML: at 07:42

## 2022-12-28 RX ADMIN — Medication 5 ML: at 18:01

## 2022-12-28 RX ADMIN — ENOXAPARIN SODIUM 40 MG: 40 INJECTION SUBCUTANEOUS at 18:01

## 2022-12-28 RX ADMIN — LEVALBUTEROL TARTRATE 2 PUFF: 45 AEROSOL, METERED ORAL at 07:53

## 2022-12-28 RX ADMIN — ENOXAPARIN SODIUM 40 MG: 40 INJECTION SUBCUTANEOUS at 06:48

## 2022-12-28 RX ADMIN — POLYETHYLENE GLYCOL 3350 17 G: 17 POWDER, FOR SOLUTION ORAL at 07:42

## 2022-12-28 RX ADMIN — LEVALBUTEROL TARTRATE 2 PUFF: 45 AEROSOL, METERED ORAL at 03:27

## 2022-12-28 RX ADMIN — Medication 100 MCG: at 11:21

## 2022-12-28 RX ADMIN — PROPOFOL 25 MCG/KG/MIN: 10 INJECTION, EMULSION INTRAVENOUS at 06:48

## 2022-12-28 RX ADMIN — SENNOSIDES 8.6 MG: 8.6 TABLET, FILM COATED ORAL at 07:43

## 2022-12-28 RX ADMIN — LEVALBUTEROL TARTRATE 2 PUFF: 45 AEROSOL, METERED ORAL at 15:51

## 2022-12-28 RX ADMIN — ARIPIPRAZOLE 10 MG: 1 SOLUTION ORAL at 22:28

## 2022-12-28 RX ADMIN — METOPROLOL TARTRATE 5 MG: 5 INJECTION INTRAVENOUS at 00:29

## 2022-12-28 RX ADMIN — METOPROLOL TARTRATE 5 MG: 5 INJECTION INTRAVENOUS at 04:32

## 2022-12-28 RX ADMIN — HYDRALAZINE HYDROCHLORIDE 10 MG: 20 INJECTION INTRAMUSCULAR; INTRAVENOUS at 00:27

## 2022-12-28 RX ADMIN — Medication 50 MCG: at 16:00

## 2022-12-28 ASSESSMENT — ACTIVITIES OF DAILY LIVING (ADL)
ADLS_ACUITY_SCORE: 28

## 2022-12-28 NOTE — PLAN OF CARE
Neuro: -3 to -1 RASS. throughout shift.Sedated w/ prop and fent. PERRL +3. Withdrawals in lowers.   CV: SR w/o ectopy (HR 60s-70s): Unable to meet HR goal of <60 w/ metoprolol x5-MICU aware. Met SBP goal of <120 w/ hydralazine x2. Angioedema. T max 99.8-acetaminophen given/MICU notified.  Pulm: CMV 40/500/20/12. Clear/diminished LS. Thick, creamy moderate amount of oral, ETT, nasal secretions.   GI/: NJ running TF@45 (goal 60 to advance at 1300) w/ standard flushes. No BM-miralax given. Noble producing adequate UOP.   Gtts: Prop@25. Fent@50.   Labs: No interventions for labs.  Restraints: Bilateral wrist restraints.   Plan: Continue to monitor hemodynamic, cardiovascular, and respiratory status.     For complete assessments and vital signs, please refer to flowsheets.     Goal Outcome Evaluation:      Plan of Care Reviewed With: patient    Overall Patient Progress: no changeOverall Patient Progress: no change    Shauna MILLAN RN

## 2022-12-28 NOTE — PLAN OF CARE
ICU End of Shift Summary. See flowsheets for vital signs and detailed assessment.    Changes this shift: RASS 0/-1. Follows commands, opens eyes to voice. SBP<120, no prns given. Vent settings unchanged. No BM Miralax and senna given. Bumex given x2 with good response. TF increased to 60 which is goal. Precedex gtt started    Plan:  presssure support tomorrow?    Goal Outcome Evaluation:      Plan of Care Reviewed With: patient    Overall Patient Progress: no changeOverall Patient Progress: no change    Outcome Evaluation: Weaning sedation, CMV settings, SBP<120

## 2022-12-28 NOTE — PROGRESS NOTES
MEDICAL ICU PROGRESS NOTE  12/28/2022      Date of Service (when I saw the patient): 12/28/2022    ASSESSMENT: Antony Aguila Jr. is a 62 year old male admitted on 12/21/2022. He has history chronic type B aortic dissection s/p exploration and median sternotomy (6/18/19), type 2 DM (poorly controlled), and HTN who is admitted for acute on chronic thoracic aortic aneurysm with new aortic root dilation. Hospitalization complicated by hypokalemia who was admitted on 12/21/2022 for acute hypoxic respiratory failure in setting of agitation and need for sedation.      CHANGES and MAJOR THINGS TODAY:   - Start precedex, wean propofol   - Per cardiology, no HR goal, does not require PRN unless SBP>130   - Bumex 1mg IV, redose in PM  - Increase glargine 25>35        PLAN:    Neuro:  # Pain and sedation  - propofol gtt and bolus, wean as able  - Precedex gtt  - fentanyl gtt and bolus  - RASS goal 0 to -1    # Agitation     # Encephalopathy, likely metabolic  - patient developing confusion, possibly 2/2 hypercapnia and noncompliance with cpap  - patient agitated on the floor requiring precedex, ativan, zyprexa and not compliant with cares  - needed further sedation with ativan to the point that intubation was required   - Will wean sedation after feeding tube placement  - Psychiatry consulted, appreciate recs  - Aripiprazole 10 mg HS  - Guanfacine 0.5 mg BID (alpha-2 agonist like Precedex)   - Haldol 2-5 mg IV q 4 hours for severe agitation (and follow QTc)    # Schizoaffective disorder  # PTSD  Meds per chart include Zyprexa, Abilify, and Seroquel. Per pharmacy med rec, appears to only have filled Seroquel recently.   - continue PTA quetiapine 600 mg at bedtime  - PharmD med rec  - Psych recs as above      # Tobacco use  Smoked cigarettes for 45 years. Currently 1 pack per week.  - Encourage cessation      Pulmonary:  # Acute hypoxic and hypercarbic respiratory failure  # Significant airway edema  # Concern for sleep  apnea  # Tracheostomy placed 7/5/19, decannulated 8/2019  Per hospitalization notes here in Sept 2022, noted to have hx of dyspnea on exertion in setting of being current smoker, tx with albuterol inhaler, recommended outpatient PFTs be obtained. Pulmonary progress note from 7/2019 notes current smoker with unknown COPD history, no PFTs available. Had tracheostomy placed 7/5/19, decannulated 8/2019. CTA chest with compressive atelectasis in L lung adjacent to thoracic aorta and hiatal hernia. Patient became agitated and non-compliant with attempts for patient to use BIPAP or HFNC and needed heavy sedation to treat agitation such that intubation was required to protect airway. Patient is historically a difficult intubation and was very difficult this time. Patient has a possible hx of angioedema reaction to lisinopril, was started on losartan yesterday, could account for the airway edema and extubation will be expected to be delayed due to this. Likely needs higher PEEP due to his body habitus.   - patient emergently intubated  - s/p decadron 8mg IV x1    Vent Mode: CMV/AC  (Continuous Mandatory Ventilation/ Assist Control)  FiO2 (%): 40 %  Resp Rate (Set): 20 breaths/min  Tidal Volume (Set, mL): 500 mL  PEEP (cm H2O): 12 cmH2O  Resp: 22    # H/o possible ACE inhibitor related angioedema  - Noted during July 2019 hospitalization at same time having AHRF requiring tracheostomy (later decannulted in August 2019).  - Notably patient started on losartan 12/25/22  - Losartan added to allergy list   - S/p decadron 8mg IV x1      Cardiovascular:  # Shock, NOS-resolved   - patient developed hypotension after intubation and being sedated on propofol   - s/p 1L NS  - Off norepinephrine   - TTE 12/26 EF 60-65%, normal RV      # Chronic type B aortic dissection (proximal descending aorta to the abdominal aorta)  # Proximal ascending aorta saccular aneurysm   # Distal ascending aorta saccular aneurysm   # Mild  nonobstructive CAD  Pt has history of stable type B aortic dissection as well as short segment of dissection vs pseudoaneurysms at the aortic root and distal ascending thoracic aorta. Previously underwent surgical exploration in 6/2019 with concern for type 1 aortic dissection but found to have chronic type B dissection. On admission, CXR with widened mediastinum slightly increased from prior. CT chest with aneurysmal dilatation ascending and descending thoracic aorta with type B aortic dissection distal to L subclavian artery extending to L common iliac artery with severe narrowing of true lumen and possible pseudoaneurysm at aortic root, new compared to prior 2019 images.   - TRINH 12/23 showed saccular aneurysm of ascending aorta measuring 5.8 cm associated with a dissection flap. Thrombus is seen in the false lumen. Aortic root is dilated at 4.6 cm.  - CTA 12/22 two saccular aneurysms, one each in the proximal ascending aorta and distal ascending aorta. The maximal transaortic diameters of 46.9 x 39.2 mm and 62.7 x 54.4 mm, respectively. Mild, non-obstructive coronary artery disease without any high-grade stenoses.  - Cardiology and CVTS consulted and signed off  - Plan for outpatient follow up with CVTS  - No HR goal per cardiology, SBP preferably maintained <130  - Labetalol 10mg IV q4h PRN   - Hold labetalol 400 mg BID 12/25  - Hold DVT ppx 12/23; restarted 12/24 for thrombus seen on TRINH, continue per CVTS recs  - Doppler pulses     # HTN  Based on outpatient note 12/14/2022 pt was previously hydordiuril 50 mg, amlodipine 10 mg, bumex TID and canaglaflozin. 12/25 Hypertensive overnight with highest SBP of 170s. Given hx of Type B sonam dissection and now new aortic aneurysm goals SBP < 120 and HR <60 per CVTS.   - hold Labetalol  BID (titrated from intially PO dose of 100 mg)  - holding PTA hydrochlorothiazide  - Was on Nicardipine gtt given persistent elevated SBPs  - prn labetalol 10mg for SBP  >135     GI/Nutrition:  # Large hiatal hernia  # Nutrition  - Unable to place feeding tube under fluoro due to large hiatal hernia   - GI consulted for endoscopic feeding tube placement   - Start tube feeds once feeding tube placed      Renal/Fluids/Electrolytes:  # LAKISHA on CKD 2, improving  Outpatient labs 12/16 with Cr 1.4 and eGFR 55. Now back at baseline Cr ~1.1-1.2, eGFR ~65-75. UA unremarkable.  - decrease PTA gabapentin 600 mg TID -> 300 mg TID  - holding PTA metformin for now     # Mild hypokalemia, improving  # Mild hypomagnesemia  - Repletion protocols      Endocrine:  # Type 2 DM with hyperglycemia  Most recent A1c 12.5 (9/16/22). Home regimen: metformin 1000 qPM and Lantus 50 unit(s) qAM + aspart 17 unit(s) TIDAC + aspart 9 unit(s) w/ snacks  - Increase lantus to 35u  - HDSSI   - holding PTA metformin as above     # Hypocalcemia  - 2g calcium gluconate     ID:  No acute issues.      Hematology:    # Anemia, normocytic  - Trend CBC     Musculoskeletal:  # BLE edema  Dimer elevated with acute on chronic leg swelling L < R per patient history. DVT US in ED negative for DVT b/l. Has had LE swelling for last 2 years, notes it is worse lately. Previously on diuretics, not currently per chart.  - lymphedema consult     Skin:  # Venous stasis ulcer LLE  # Venous insufficiency  # Peripheral vascular disease  - WOC consult placed     General Cares/Prophylaxis:    DVT Prophylaxis: lovenox   GI Prophylaxis: PPI  Restraints: b/l wrist soft  Family Communication: Sister updated at bedside   Code Status: Full Code     Lines/tubes/drains:  - ETT  - Noble  - NG  - PIV x3   - PICC     Disposition:  - Medical ICU      Patient seen and findings/plan discussed with medical ICU staff, Dr. Goldstein.     Ruthann Moser MD  PGY-1 Internal Medicine      Clinically Significant Risk Factors         # Hyponatremia: Lowest Na = 134 mmol/L in last 2 days, will monitor as appropriate      # Hypoalbuminemia: Lowest albumin = 2.9 g/dL at  12/26/2022  6:03 AM, will monitor as appropriate          # DMII: A1C = 8.0 % (Ref range: <5.7 %) within past 3 months   # Severe Obesity: Estimated body mass index is 49.3 kg/m  as calculated from the following:    Height as of this encounter: 1.829 m (6').    Weight as of this encounter: 164.9 kg (363 lb 8.6 oz).                   ====================================  INTERVAL HISTORY:   No acute events overnight. Off pressors since 12/26 afternoon.     OBJECTIVE:   1. VITAL SIGNS:   Temp:  [98.8  F (37.1  C)-99.8  F (37.7  C)] 98.8  F (37.1  C)  Pulse:  [61-78] 63  Resp:  [17-23] 22  BP: (105-126)/(53-70) 120/70  FiO2 (%):  [40 %] 40 %  SpO2:  [91 %-99 %] 96 %  Vent Mode: CMV/AC  (Continuous Mandatory Ventilation/ Assist Control)  FiO2 (%): 40 %  Resp Rate (Set): 20 breaths/min  Tidal Volume (Set, mL): 500 mL  PEEP (cm H2O): 12 cmH2O  Resp: 22    2. INTAKE/ OUTPUT:   I/O last 3 completed shifts:  In: 1483.9 [I.V.:743.9; NG/GT:320]  Out: 1970 [Urine:1970]    3. PHYSICAL EXAMINATION:  General: Laying in bed   HEENT: No scleral icterus, MMM  Neuro: Sedated, opens eyes to commands   Pulm/Resp: Coarse breath sounds bilaterally , mechanically ventilated  CV: RRR, no murmurs  Abdomen: Soft, non-distended, non-tender  : connor catheter in place  Incisions/Skin: b/l extremities covered with wraps, lower extremity edema     4. LABS:   Arterial Blood Gases   Recent Labs   Lab 12/28/22  0946 12/27/22  0002 12/26/22  1642 12/26/22  0950   PH 7.40 7.39 7.39 7.39   PCO2 42 39 39 40   PO2 66* 133* 62* 75*   HCO3 26 24 24 24     Complete Blood Count   Recent Labs   Lab 12/28/22  0416 12/27/22  0356 12/26/22  0603 12/25/22  0736   WBC 8.3 9.2 8.7 7.1   HGB 11.9* 12.3* 11.3* 13.5    214 210 258     Basic Metabolic Panel  Recent Labs   Lab 12/28/22  1114 12/28/22  0807 12/28/22  0423 12/28/22  0416 12/27/22  0403 12/27/22  0356 12/26/22  0820 12/26/22  0603 12/25/22  0911 12/25/22  0736   NA  --   --   --  135*  --  134*  --   137  --  141   POTASSIUM  --   --   --  3.8  --  4.3  --  4.0  --  4.1   CHLORIDE  --   --   --  104  --  103  --  105  --  104   CO2  --   --   --  22  --  20*  --  22  --  26   BUN  --   --   --  20.0  --  14.4  --  11.2  --  8.4   CR  --   --   --  1.32*  --  1.27*  --  1.41*  --  1.20*   * 210* 188* 162*   < > 160*   < > 163*   < > 156*    < > = values in this interval not displayed.     Liver Function Tests  Recent Labs   Lab 12/26/22  0603 12/22/22  0549 12/21/22  1654   AST 26  --  30   ALT 18  --  29   ALKPHOS 58  --  66   BILITOTAL 0.4  --  0.5   ALBUMIN 2.9*  --  3.3*   INR 1.13 1.08  --      Coagulation Profile  Recent Labs   Lab 12/26/22 0603 12/22/22  0549   INR 1.13 1.08       5. RADIOLOGY:   No results found for this or any previous visit (from the past 24 hour(s)).

## 2022-12-29 ENCOUNTER — APPOINTMENT (OUTPATIENT)
Dept: GENERAL RADIOLOGY | Facility: CLINIC | Age: 62
End: 2022-12-29
Payer: COMMERCIAL

## 2022-12-29 LAB
ALLEN'S TEST: ABNORMAL
ANION GAP SERPL CALCULATED.3IONS-SCNC: 10 MMOL/L (ref 7–15)
ANION GAP SERPL CALCULATED.3IONS-SCNC: 9 MMOL/L (ref 7–15)
BASE EXCESS BLDA CALC-SCNC: 3.6 MMOL/L (ref -9–1.8)
BASE EXCESS BLDV CALC-SCNC: 4.8 MMOL/L (ref -7.7–1.9)
BUN SERPL-MCNC: 23.4 MG/DL (ref 8–23)
BUN SERPL-MCNC: 24.8 MG/DL (ref 8–23)
CALCIUM SERPL-MCNC: 8.3 MG/DL (ref 8.8–10.2)
CALCIUM SERPL-MCNC: 8.7 MG/DL (ref 8.8–10.2)
CHLORIDE SERPL-SCNC: 107 MMOL/L (ref 98–107)
CHLORIDE SERPL-SCNC: 107 MMOL/L (ref 98–107)
CREAT SERPL-MCNC: 1.22 MG/DL (ref 0.67–1.17)
CREAT SERPL-MCNC: 1.27 MG/DL (ref 0.67–1.17)
DEPRECATED HCO3 PLAS-SCNC: 24 MMOL/L (ref 22–29)
DEPRECATED HCO3 PLAS-SCNC: 26 MMOL/L (ref 22–29)
ERYTHROCYTE [DISTWIDTH] IN BLOOD BY AUTOMATED COUNT: 16.5 % (ref 10–15)
GFR SERPL CREATININE-BSD FRML MDRD: 63 ML/MIN/1.73M2
GFR SERPL CREATININE-BSD FRML MDRD: 67 ML/MIN/1.73M2
GLUCOSE BLDC GLUCOMTR-MCNC: 182 MG/DL (ref 70–99)
GLUCOSE BLDC GLUCOMTR-MCNC: 197 MG/DL (ref 70–99)
GLUCOSE BLDC GLUCOMTR-MCNC: 197 MG/DL (ref 70–99)
GLUCOSE BLDC GLUCOMTR-MCNC: 206 MG/DL (ref 70–99)
GLUCOSE BLDC GLUCOMTR-MCNC: 215 MG/DL (ref 70–99)
GLUCOSE BLDC GLUCOMTR-MCNC: 227 MG/DL (ref 70–99)
GLUCOSE BLDC GLUCOMTR-MCNC: 227 MG/DL (ref 70–99)
GLUCOSE SERPL-MCNC: 193 MG/DL (ref 70–99)
GLUCOSE SERPL-MCNC: 213 MG/DL (ref 70–99)
HCO3 BLD-SCNC: 29 MMOL/L (ref 21–28)
HCO3 BLDV-SCNC: 31 MMOL/L (ref 21–28)
HCT VFR BLD AUTO: 36.6 % (ref 40–53)
HGB BLD-MCNC: 12.1 G/DL (ref 13.3–17.7)
LACTATE SERPL-SCNC: 1.2 MMOL/L (ref 0.7–2)
MAGNESIUM SERPL-MCNC: 1.9 MG/DL (ref 1.7–2.3)
MAGNESIUM SERPL-MCNC: 2.1 MG/DL (ref 1.7–2.3)
MCH RBC QN AUTO: 29.5 PG (ref 26.5–33)
MCHC RBC AUTO-ENTMCNC: 33.1 G/DL (ref 31.5–36.5)
MCV RBC AUTO: 89 FL (ref 78–100)
MRSA DNA SPEC QL NAA+PROBE: NEGATIVE
O2/TOTAL GAS SETTING VFR VENT: 40 %
O2/TOTAL GAS SETTING VFR VENT: 50 %
PCO2 BLD: 47 MM HG (ref 35–45)
PCO2 BLDV: 49 MM HG (ref 40–50)
PH BLD: 7.4 [PH] (ref 7.35–7.45)
PH BLDV: 7.41 [PH] (ref 7.32–7.43)
PHOSPHATE SERPL-MCNC: 2.5 MG/DL (ref 2.5–4.5)
PLATELET # BLD AUTO: 211 10E3/UL (ref 150–450)
PO2 BLD: 33 MM HG (ref 80–105)
PO2 BLDV: 36 MM HG (ref 25–47)
POTASSIUM SERPL-SCNC: 3.5 MMOL/L (ref 3.4–5.3)
POTASSIUM SERPL-SCNC: 3.7 MMOL/L (ref 3.4–5.3)
PROCALCITONIN SERPL IA-MCNC: 0.11 NG/ML
RBC # BLD AUTO: 4.1 10E6/UL (ref 4.4–5.9)
SA TARGET DNA: POSITIVE
SARS-COV-2 RNA RESP QL NAA+PROBE: NEGATIVE
SODIUM SERPL-SCNC: 140 MMOL/L (ref 136–145)
SODIUM SERPL-SCNC: 143 MMOL/L (ref 136–145)
TRIGL SERPL-MCNC: 612 MG/DL
WBC # BLD AUTO: 8.2 10E3/UL (ref 4–11)

## 2022-12-29 PROCEDURE — 250N000011 HC RX IP 250 OP 636: Performed by: NURSE PRACTITIONER

## 2022-12-29 PROCEDURE — 87641 MR-STAPH DNA AMP PROBE: CPT | Performed by: NURSE PRACTITIONER

## 2022-12-29 PROCEDURE — 250N000013 HC RX MED GY IP 250 OP 250 PS 637

## 2022-12-29 PROCEDURE — 999N000157 HC STATISTIC RCP TIME EA 10 MIN

## 2022-12-29 PROCEDURE — 87205 SMEAR GRAM STAIN: CPT | Performed by: NURSE PRACTITIONER

## 2022-12-29 PROCEDURE — 250N000009 HC RX 250

## 2022-12-29 PROCEDURE — 85027 COMPLETE CBC AUTOMATED: CPT | Performed by: STUDENT IN AN ORGANIZED HEALTH CARE EDUCATION/TRAINING PROGRAM

## 2022-12-29 PROCEDURE — 94003 VENT MGMT INPAT SUBQ DAY: CPT

## 2022-12-29 PROCEDURE — 84145 PROCALCITONIN (PCT): CPT | Performed by: STUDENT IN AN ORGANIZED HEALTH CARE EDUCATION/TRAINING PROGRAM

## 2022-12-29 PROCEDURE — 71045 X-RAY EXAM CHEST 1 VIEW: CPT | Mod: 26 | Performed by: STUDENT IN AN ORGANIZED HEALTH CARE EDUCATION/TRAINING PROGRAM

## 2022-12-29 PROCEDURE — 87040 BLOOD CULTURE FOR BACTERIA: CPT | Performed by: STUDENT IN AN ORGANIZED HEALTH CARE EDUCATION/TRAINING PROGRAM

## 2022-12-29 PROCEDURE — 200N000002 HC R&B ICU UMMC

## 2022-12-29 PROCEDURE — 94640 AIRWAY INHALATION TREATMENT: CPT | Mod: 76

## 2022-12-29 PROCEDURE — 83735 ASSAY OF MAGNESIUM: CPT | Performed by: STUDENT IN AN ORGANIZED HEALTH CARE EDUCATION/TRAINING PROGRAM

## 2022-12-29 PROCEDURE — 83735 ASSAY OF MAGNESIUM: CPT

## 2022-12-29 PROCEDURE — 250N000013 HC RX MED GY IP 250 OP 250 PS 637: Performed by: STUDENT IN AN ORGANIZED HEALTH CARE EDUCATION/TRAINING PROGRAM

## 2022-12-29 PROCEDURE — 250N000011 HC RX IP 250 OP 636

## 2022-12-29 PROCEDURE — U0005 INFEC AGEN DETEC AMPLI PROBE: HCPCS

## 2022-12-29 PROCEDURE — 87077 CULTURE AEROBIC IDENTIFY: CPT | Performed by: NURSE PRACTITIONER

## 2022-12-29 PROCEDURE — 82803 BLOOD GASES ANY COMBINATION: CPT

## 2022-12-29 PROCEDURE — 94640 AIRWAY INHALATION TREATMENT: CPT

## 2022-12-29 PROCEDURE — 83605 ASSAY OF LACTIC ACID: CPT

## 2022-12-29 PROCEDURE — 250N000009 HC RX 250: Performed by: NURSE PRACTITIONER

## 2022-12-29 PROCEDURE — 80048 BASIC METABOLIC PNL TOTAL CA: CPT | Performed by: STUDENT IN AN ORGANIZED HEALTH CARE EDUCATION/TRAINING PROGRAM

## 2022-12-29 PROCEDURE — 84478 ASSAY OF TRIGLYCERIDES: CPT | Performed by: INTERNAL MEDICINE

## 2022-12-29 PROCEDURE — 84100 ASSAY OF PHOSPHORUS: CPT | Performed by: STUDENT IN AN ORGANIZED HEALTH CARE EDUCATION/TRAINING PROGRAM

## 2022-12-29 PROCEDURE — 250N000011 HC RX IP 250 OP 636: Performed by: STUDENT IN AN ORGANIZED HEALTH CARE EDUCATION/TRAINING PROGRAM

## 2022-12-29 PROCEDURE — G0463 HOSPITAL OUTPT CLINIC VISIT: HCPCS

## 2022-12-29 PROCEDURE — 36415 COLL VENOUS BLD VENIPUNCTURE: CPT | Performed by: STUDENT IN AN ORGANIZED HEALTH CARE EDUCATION/TRAINING PROGRAM

## 2022-12-29 PROCEDURE — 71045 X-RAY EXAM CHEST 1 VIEW: CPT

## 2022-12-29 PROCEDURE — 99291 CRITICAL CARE FIRST HOUR: CPT | Mod: GC | Performed by: INTERNAL MEDICINE

## 2022-12-29 PROCEDURE — 80048 BASIC METABOLIC PNL TOTAL CA: CPT

## 2022-12-29 RX ORDER — BUMETANIDE 0.25 MG/ML
2 INJECTION INTRAMUSCULAR; INTRAVENOUS ONCE
Status: COMPLETED | OUTPATIENT
Start: 2022-12-29 | End: 2022-12-29

## 2022-12-29 RX ORDER — HYDRALAZINE HYDROCHLORIDE 20 MG/ML
10 INJECTION INTRAMUSCULAR; INTRAVENOUS EVERY 4 HOURS PRN
Status: DISCONTINUED | OUTPATIENT
Start: 2022-12-29 | End: 2023-01-03

## 2022-12-29 RX ORDER — LEVALBUTEROL INHALATION SOLUTION 1.25 MG/3ML
1.25 SOLUTION RESPIRATORY (INHALATION) EVERY 6 HOURS PRN
Status: DISCONTINUED | OUTPATIENT
Start: 2022-12-29 | End: 2022-12-29

## 2022-12-29 RX ORDER — PIPERACILLIN SODIUM, TAZOBACTAM SODIUM 4; .5 G/20ML; G/20ML
4.5 INJECTION, POWDER, LYOPHILIZED, FOR SOLUTION INTRAVENOUS EVERY 6 HOURS
Status: DISCONTINUED | OUTPATIENT
Start: 2022-12-29 | End: 2023-01-01

## 2022-12-29 RX ORDER — LEVALBUTEROL TARTRATE 45 UG/1
1-2 AEROSOL, METERED ORAL EVERY 4 HOURS PRN
Status: DISCONTINUED | OUTPATIENT
Start: 2022-12-29 | End: 2022-12-29

## 2022-12-29 RX ORDER — FLUORIDE TOOTHPASTE
10 TOOTHPASTE DENTAL 4 TIMES DAILY
Status: DISCONTINUED | OUTPATIENT
Start: 2022-12-29 | End: 2023-01-03

## 2022-12-29 RX ORDER — ACETYLCYSTEINE 200 MG/ML
2 SOLUTION ORAL; RESPIRATORY (INHALATION) EVERY 4 HOURS
Status: DISCONTINUED | OUTPATIENT
Start: 2022-12-29 | End: 2023-01-03

## 2022-12-29 RX ORDER — LEVALBUTEROL INHALATION SOLUTION 1.25 MG/3ML
1.25 SOLUTION RESPIRATORY (INHALATION) EVERY 4 HOURS
Status: DISCONTINUED | OUTPATIENT
Start: 2022-12-29 | End: 2023-01-03

## 2022-12-29 RX ADMIN — PIPERACILLIN SODIUM AND TAZOBACTAM SODIUM 4.5 G: 4; .5 INJECTION, POWDER, LYOPHILIZED, FOR SOLUTION INTRAVENOUS at 09:55

## 2022-12-29 RX ADMIN — Medication 40 MG: at 08:05

## 2022-12-29 RX ADMIN — ACETYLCYSTEINE 2 ML: 200 SOLUTION ORAL; RESPIRATORY (INHALATION) at 23:12

## 2022-12-29 RX ADMIN — LEVALBUTEROL HYDROCHLORIDE 1.25 MG: 1.25 SOLUTION RESPIRATORY (INHALATION) at 20:24

## 2022-12-29 RX ADMIN — Medication 100 MCG: at 22:00

## 2022-12-29 RX ADMIN — LEVALBUTEROL HYDROCHLORIDE 1.25 MG: 1.25 SOLUTION RESPIRATORY (INHALATION) at 23:11

## 2022-12-29 RX ADMIN — Medication 100 MCG: at 12:01

## 2022-12-29 RX ADMIN — DEXMEDETOMIDINE HYDROCHLORIDE 0.7 MCG/KG/HR: 400 INJECTION INTRAVENOUS at 10:09

## 2022-12-29 RX ADMIN — LEVALBUTEROL HYDROCHLORIDE 1.25 MG: 1.25 SOLUTION RESPIRATORY (INHALATION) at 16:24

## 2022-12-29 RX ADMIN — HYDRALAZINE HYDROCHLORIDE 10 MG: 20 INJECTION INTRAMUSCULAR; INTRAVENOUS at 12:31

## 2022-12-29 RX ADMIN — SENNOSIDES 8.6 MG: 8.6 TABLET, FILM COATED ORAL at 08:05

## 2022-12-29 RX ADMIN — PIPERACILLIN SODIUM AND TAZOBACTAM SODIUM 4.5 G: 4; .5 INJECTION, POWDER, LYOPHILIZED, FOR SOLUTION INTRAVENOUS at 16:23

## 2022-12-29 RX ADMIN — DEXMEDETOMIDINE HYDROCHLORIDE 0.8 MCG/KG/HR: 400 INJECTION INTRAVENOUS at 16:44

## 2022-12-29 RX ADMIN — LEVALBUTEROL TARTRATE 2 PUFF: 45 AEROSOL, METERED ORAL at 00:59

## 2022-12-29 RX ADMIN — GLYCOPYRROLATE 2 MG: 1 TABLET ORAL at 08:05

## 2022-12-29 RX ADMIN — PROPOFOL 10 MCG/KG/MIN: 10 INJECTION, EMULSION INTRAVENOUS at 00:31

## 2022-12-29 RX ADMIN — Medication 100 MCG: at 10:01

## 2022-12-29 RX ADMIN — Medication 10 ML: at 16:23

## 2022-12-29 RX ADMIN — DEXMEDETOMIDINE HYDROCHLORIDE 0.7 MCG/KG/HR: 400 INJECTION INTRAVENOUS at 06:00

## 2022-12-29 RX ADMIN — Medication 10 ML: at 12:18

## 2022-12-29 RX ADMIN — ACETYLCYSTEINE 2 ML: 200 SOLUTION ORAL; RESPIRATORY (INHALATION) at 11:42

## 2022-12-29 RX ADMIN — LEVALBUTEROL TARTRATE 2 PUFF: 45 AEROSOL, METERED ORAL at 04:44

## 2022-12-29 RX ADMIN — Medication 100 MCG: at 15:30

## 2022-12-29 RX ADMIN — ACETYLCYSTEINE 2 ML: 200 SOLUTION ORAL; RESPIRATORY (INHALATION) at 20:22

## 2022-12-29 RX ADMIN — POLYETHYLENE GLYCOL 3350 17 G: 17 POWDER, FOR SOLUTION ORAL at 08:05

## 2022-12-29 RX ADMIN — QUETIAPINE FUMARATE 600 MG: 300 TABLET ORAL at 22:15

## 2022-12-29 RX ADMIN — PROPOFOL 10 MCG/KG/MIN: 10 INJECTION, EMULSION INTRAVENOUS at 06:00

## 2022-12-29 RX ADMIN — Medication 0.5 MG: at 19:46

## 2022-12-29 RX ADMIN — Medication 50 MCG/HR: at 13:26

## 2022-12-29 RX ADMIN — ARIPIPRAZOLE 10 MG: 1 SOLUTION ORAL at 22:15

## 2022-12-29 RX ADMIN — GABAPENTIN 300 MG: 300 CAPSULE ORAL at 13:19

## 2022-12-29 RX ADMIN — Medication 0.5 MG: at 08:05

## 2022-12-29 RX ADMIN — PIPERACILLIN SODIUM AND TAZOBACTAM SODIUM 4.5 G: 4; .5 INJECTION, POWDER, LYOPHILIZED, FOR SOLUTION INTRAVENOUS at 22:15

## 2022-12-29 RX ADMIN — Medication 10 ML: at 19:44

## 2022-12-29 RX ADMIN — SENNOSIDES 8.6 MG: 8.6 TABLET, FILM COATED ORAL at 19:45

## 2022-12-29 RX ADMIN — ENOXAPARIN SODIUM 40 MG: 40 INJECTION SUBCUTANEOUS at 06:01

## 2022-12-29 RX ADMIN — Medication 10 ML: at 08:06

## 2022-12-29 RX ADMIN — GABAPENTIN 300 MG: 300 CAPSULE ORAL at 19:45

## 2022-12-29 RX ADMIN — Medication 5 ML: at 19:45

## 2022-12-29 RX ADMIN — DEXMEDETOMIDINE HYDROCHLORIDE 0.8 MCG/KG/HR: 400 INJECTION INTRAVENOUS at 13:26

## 2022-12-29 RX ADMIN — ENOXAPARIN SODIUM 40 MG: 40 INJECTION SUBCUTANEOUS at 19:44

## 2022-12-29 RX ADMIN — LEVALBUTEROL HYDROCHLORIDE 1.25 MG: 1.25 SOLUTION RESPIRATORY (INHALATION) at 11:42

## 2022-12-29 RX ADMIN — BUMETANIDE 2 MG: 0.25 INJECTION INTRAMUSCULAR; INTRAVENOUS at 16:22

## 2022-12-29 RX ADMIN — GABAPENTIN 300 MG: 300 CAPSULE ORAL at 08:05

## 2022-12-29 RX ADMIN — Medication 100 MCG: at 04:22

## 2022-12-29 RX ADMIN — MIDAZOLAM 2 MG: 1 INJECTION INTRAMUSCULAR; INTRAVENOUS at 15:16

## 2022-12-29 RX ADMIN — ACETYLCYSTEINE 2 ML: 200 SOLUTION ORAL; RESPIRATORY (INHALATION) at 16:25

## 2022-12-29 RX ADMIN — Medication 100 MCG: at 08:01

## 2022-12-29 RX ADMIN — BUMETANIDE 2 MG: 0.25 INJECTION INTRAMUSCULAR; INTRAVENOUS at 09:55

## 2022-12-29 RX ADMIN — Medication 15 ML: at 08:05

## 2022-12-29 RX ADMIN — LEVALBUTEROL TARTRATE 2 PUFF: 45 AEROSOL, METERED ORAL at 08:10

## 2022-12-29 RX ADMIN — DEXMEDETOMIDINE HYDROCHLORIDE 0.8 MCG/KG/HR: 400 INJECTION INTRAVENOUS at 23:07

## 2022-12-29 RX ADMIN — DEXMEDETOMIDINE HYDROCHLORIDE 0.8 MCG/KG/HR: 400 INJECTION INTRAVENOUS at 18:59

## 2022-12-29 ASSESSMENT — ACTIVITIES OF DAILY LIVING (ADL)
ADLS_ACUITY_SCORE: 28
ADLS_ACUITY_SCORE: 30
ADLS_ACUITY_SCORE: 28
ADLS_ACUITY_SCORE: 28
ADLS_ACUITY_SCORE: 30
ADLS_ACUITY_SCORE: 30
ADLS_ACUITY_SCORE: 28
ADLS_ACUITY_SCORE: 30
ADLS_ACUITY_SCORE: 28
ADLS_ACUITY_SCORE: 28
ADLS_ACUITY_SCORE: 30
ADLS_ACUITY_SCORE: 28

## 2022-12-29 NOTE — PLAN OF CARE
Goal Outcome Evaluation:      Plan of Care Reviewed With: patient    Overall Patient Progress: no changeOverall Patient Progress: no change    Outcome Evaluation: VSS and within parameters. No vent changes    ICU End of Shift Summary. See flowsheets for vital signs and detailed assessment.    Changes this shift: Electrolytes stable. Blood sugars gradually increased into 200s overnight. Rass of -1 with brief jump to -3 after evening Seroquel and Abilify. Propofol weaned to 10. Sinus rhythm throughout night with systolic less than 135. One episode of coughing, agitation, and desaturation into low 80s while lying flat for a turn. Patient coughed up copious, think, brown secretions, after which his oxygen saturation returned to normal. ETT in-line secretions are similar in appearance to the brown drainage from the patient's nares. One smear/small bowel movement. Good urine output throughout shift.     Plan: Wean sedation as tolerated. Assess readiness for PST

## 2022-12-29 NOTE — PROGRESS NOTES
"[facility] Nurse Inpatient Assessment     Consulted for: LLE wounds     Patient History (according to provider note(s):      62 year old male admitted on 12/21/2022. He has history chronic type B aortic dissection s/p exploration and median sternotomy (6/18/19), type 2 DM (poorly controlled), and HTN who is admitted for acute on chronic thoracic aortic aneurysm with new aortic root dilation. Hospitalization complicated by hypokalemia      Areas Assessed:      Wound location: LLE      12/22 anterior wounds, posterior calf wound        12/29 LLE anterior and posterior    Wound due to: Venous Ulcer  Wound history/plan of care: Dimer elevated with acute on chronic leg swelling L < R per patient history.  DVT US in ED negative for DVT b/l.  Has had LE swelling for last 2 years, notes it is worse lately.  Previously on diuretics,     Leg appropriately warm, cap refill 3 seconds.  No edema in Left foot, nonpitting edema from ankle to knee.    Chronic venous staining in gator area.  Skin woody with large amt of edema wrinkling.      Prior to admission he was wearing shorts majority of time to avoid getting pants wet from wound drainage as he has not been using dressings or compression.     Anterior wounds:  Started as blisters several months ago.   2 wound both with new epithelium.  Was able to remove large amt of loose fibrin.   No drainage or skin weeping   Posterior wound:  Started several weeks ago \"my pants got so tight they were always rubbing\"    Pink/red moist dermal base surrounded by peeling epithelium.  1.8 cm x 2.7 cm x 0.2 cm.  Currently with minimal serosanguinous drainage.      Palpation of the wound bed: normal      Periwound skin: Intact new unpigmented epithelium          Temperature: normal   Odor: none  Pain: mild,   Pain interventions prior to dressing change: N/A  Treatment goal: Heal  and Protection  STATUS: improving  Supplies ordered: at bedside   "     Treatment Plan:     Left lower extremity wounds:  Daily  Cleanse skin and wounds with wound cleanser or sterile NS.  Pat dry   Apply Aquaphor lotion or vaseline to intact skin on LE.   Cover wounds with xeroform gauze.   Cover xeroform gauze with ABD pads or 4x4s, enough to contain drainage for 24 hrs.   Secure with kerlix roll gauze.   Secure kerlix with per lymphedema therapy orders or if no orders, with coban or ACE wrap    Orders: Written    RECOMMEND PRIMARY TEAM ORDER: Lymphedema consult  Education provided: plan of care and wound progress  Discussed plan of care with: Patient and Nurse  WOC nurse follow-up plan: weekly  Notify WOC if wound(s) deteriorate.  Nursing to notify the Provider(s) and re-consult the WO Nurse if new skin concern.    DATA:     Current support surface: Standard  Standard gel/foam mattress (IsoFlex, Atmos air, etc)  BMI: Body mass index is 48.35 kg/m .   Active diet order: None     Output: I/O last 3 completed shifts:  In: 3139.96 [I.V.:1099.96; NG/GT:705]  Out: 3610 [Urine:3610]     Labs:   Recent Labs   Lab 12/29/22  0408 12/27/22  0356 12/26/22  0603 12/25/22  0736   ALBUMIN  --   --  2.9*  --    HGB 12.1*   < > 11.3* 13.5   INR  --   --  1.13  --    WBC 8.2   < > 8.7 7.1   A1C  --   --   --  8.0*    < > = values in this interval not displayed.     Pressure injury risk assessment:   Sensory Perception: 1-->completely limited  Moisture: 3-->occasionally moist  Activity: 1-->bedfast  Mobility: 1-->completely immobile  Nutrition: 2-->probably inadequate  Friction and Shear: 1-->problem  Abhinav Score: 9    [facility]  Phone: [phone]  Pager: [phone]

## 2022-12-29 NOTE — PROGRESS NOTES
MEDICAL ICU PROGRESS NOTE  12/29/2022      Date of Service (when I saw the patient): 12/29/2022    ASSESSMENT: Antony Aguila Jr. is a 62 year old male admitted on 12/21/2022. He has history chronic type B aortic dissection s/p exploration and median sternotomy (6/18/19), type 2 DM (poorly controlled), and HTN who is admitted for acute on chronic thoracic aortic aneurysm with new aortic root dilation. Hospitalization complicated by hypokalemia who was admitted on 12/21/2022 for acute hypoxic respiratory failure in setting of agitation and need for sedation.      CHANGES and MAJOR THINGS TODAY:   - Sputum cx   - Pulm toilet: metaneb, mucomyst   - Bumex 2 BID   - Increase lantus to 45  - Zosyn for copious creamy secretions      PLAN:    Neuro:  # Pain and sedation  - propofol gtt and bolus, wean as able  - Precedex gtt  - fentanyl gtt and bolus  - RASS goal 0 to -1    # Agitation     # Encephalopathy, likely metabolic  - patient developing confusion, possibly 2/2 hypercapnia and noncompliance with cpap  - patient agitated on the floor requiring precedex, ativan, zyprexa and not compliant with cares  - needed further sedation with ativan to the point that intubation was required   - Will wean sedation after feeding tube placement  - Psychiatry consulted, appreciate recs  - Aripiprazole 10 mg HS  - Guanfacine 0.5 mg BID (alpha-2 agonist like Precedex)   - Haldol 2-5 mg IV q 4 hours for severe agitation (and follow QTc)    # Schizoaffective disorder  # PTSD  Meds per chart include Zyprexa, Abilify, and Seroquel. Per pharmacy med rec, appears to only have filled Seroquel recently.   - continue PTA quetiapine 600 mg at bedtime  - PharmD med rec  - Psych recs as above      # Tobacco use  Smoked cigarettes for 45 years. Currently 1 pack per week.  - Encourage cessation      Pulmonary:  # Acute hypoxic and hypercarbic respiratory failure  # Significant airway edema  # Concern for sleep apnea  # Tracheostomy placed 7/5/19,  decannulated 8/2019  Per hospitalization notes here in Sept 2022, noted to have hx of dyspnea on exertion in setting of being current smoker, tx with albuterol inhaler, recommended outpatient PFTs be obtained. Pulmonary progress note from 7/2019 notes current smoker with unknown COPD history, no PFTs available. Had tracheostomy placed 7/5/19, decannulated 8/2019. CTA chest with compressive atelectasis in L lung adjacent to thoracic aorta and hiatal hernia. Patient became agitated and non-compliant with attempts for patient to use BIPAP or HFNC and needed heavy sedation to treat agitation such that intubation was required to protect airway. Patient is historically a difficult intubation and was very difficult this time. Patient has a possible hx of angioedema reaction to lisinopril, was started on losartan yesterday, could account for the airway edema and extubation will be expected to be delayed due to this. Likely needs higher PEEP due to his body habitus.   - patient emergently intubated  - s/p decadron 8mg IV x1  - Bumex IV 2 BID     Vent Mode: CMV/AC  (Continuous Mandatory Ventilation/ Assist Control)  FiO2 (%): 40 %  Resp Rate (Set): 20 breaths/min  Tidal Volume (Set, mL): 500 mL  PEEP (cm H2O): 12 cmH2O  Resp: 21    # H/o possible ACE inhibitor related angioedema  - Noted during July 2019 hospitalization at same time having AHRF requiring tracheostomy (later decannulted in August 2019).  - Notably patient started on losartan 12/25/22  - Losartan added to allergy list   - S/p decadron 8mg IV x1     # Increased pulmonary secretions  # Concern for hospital associated pneumonia  Has developed copious creamy secretions overnight, has low grade temperature of 100.0F, no leukocytosis.   - Sputum cx   - PCT, only 0.11  - Pulm toilet: metaneb, mucomyst   - Bumex 2 BID   - Zosyn  - CXR     Cardiovascular:  # Shock, NOS-resolved   - patient developed hypotension after intubation and being sedated on propofol   - s/p 1L  NS  - Off norepinephrine   - TTE 12/26 EF 60-65%, normal RV      # Chronic type B aortic dissection (proximal descending aorta to the abdominal aorta)  # Proximal ascending aorta saccular aneurysm   # Distal ascending aorta saccular aneurysm   # Mild nonobstructive CAD  Pt has history of stable type B aortic dissection as well as short segment of dissection vs pseudoaneurysms at the aortic root and distal ascending thoracic aorta. Previously underwent surgical exploration in 6/2019 with concern for type 1 aortic dissection but found to have chronic type B dissection. On admission, CXR with widened mediastinum slightly increased from prior. CT chest with aneurysmal dilatation ascending and descending thoracic aorta with type B aortic dissection distal to L subclavian artery extending to L common iliac artery with severe narrowing of true lumen and possible pseudoaneurysm at aortic root, new compared to prior 2019 images.   - TRINH 12/23 showed saccular aneurysm of ascending aorta measuring 5.8 cm associated with a dissection flap. Thrombus is seen in the false lumen. Aortic root is dilated at 4.6 cm.  - CTA 12/22 two saccular aneurysms, one each in the proximal ascending aorta and distal ascending aorta. The maximal transaortic diameters of 46.9 x 39.2 mm and 62.7 x 54.4 mm, respectively. Mild, non-obstructive coronary artery disease without any high-grade stenoses.  - Cardiology and CVTS consulted and signed off  - Plan for outpatient follow up with CVTS  - No HR goal per cardiology, SBP preferably maintained <130  - Labetalol 10mg IV q4h PRN   - Hold labetalol 400 mg BID 12/25  - Hold DVT ppx 12/23; restarted 12/24 for thrombus seen on TRINH, continue per CVTS recs  - Doppler pulses     # HTN  Based on outpatient note 12/14/2022 pt was previously hydordiuril 50 mg, amlodipine 10 mg, bumex TID and canaglaflozin. 12/25 Hypertensive overnight with highest SBP of 170s. Given hx of Type B sonam dissection and now new  aortic aneurysm goals SBP < 120 and HR <60 per CVTS.   - hold Labetalol  BID (titrated from intially PO dose of 100 mg)  - holding PTA hydrochlorothiazide  - Was on Nicardipine gtt given persistent elevated SBPs  - prn labetalol 10mg for SBP >130     GI/Nutrition:  # Large hiatal hernia  # Nutrition  - Unable to place feeding tube under fluoro due to large hiatal hernia   - GI was consulted for placement after IR was not able to advance  - continue tube feeds     Renal/Fluids/Electrolytes:  # LAKISHA on CKD 2, improving  Outpatient labs 12/16 with Cr 1.4 and eGFR 55. Now back at baseline Cr ~1.1-1.2, eGFR ~65-75. UA unremarkable.  - decrease PTA gabapentin 600 mg TID -> 300 mg TID  - holding PTA metformin for now     # Mild hypokalemia, resolved  # Mild hypomagnesemia  - Repletion protocols     # Hypocalcemia, resolved  - CMP in AM     Endocrine:  # Type 2 DM with hyperglycemia  Most recent A1c 12.5 (9/16/22). Home regimen: metformin 1000 qPM and Lantus 50 unit(s) qAM + aspart 17 unit(s) TIDAC + aspart 9 unit(s) w/ snacks  - Increase lantus to 45U  - HDSSI   - holding PTA metformin as above     ID:  # Increased secretions  # Concern for hospital associated pneumonia  Has developed copious creamy secretions overnight, has low grade temperature of 100.0F, no leukocytosis.   - Sputum cx   - PCT, only 0.11  - Pulm toilet: metaneb, mucomyst   - Bumex 2 BID   - Zosyn  - CXR     Hematology:    # Anemia, normocytic  - Trend CBC     Musculoskeletal:  # BLE edema  Dimer elevated with acute on chronic leg swelling L < R per patient history. DVT US in ED negative for DVT b/l. Has had LE swelling for last 2 years, notes it is worse lately. Previously on diuretics, not currently per chart.  - lymphedema consult  - diuresis as above     Skin:  # Venous stasis ulcer LLE  # Venous insufficiency  # Peripheral vascular disease  - WOC consult placed     General Cares/Prophylaxis:    DVT Prophylaxis: lovenox   GI Prophylaxis:  PPI  Restraints: b/l wrist soft  Family Communication: Sister updated at bedside   Code Status: Full Code     Lines/tubes/drains:  - ETT  - Connor  - NG  - PIV x3   - PICC     Disposition:  - Medical ICU      Patient seen and findings/plan discussed with medical ICU staff, Dr. Goldstein.     Ruthann Moser MD  PGY-1 Internal Medicine      Clinically Significant Risk Factors         # Hyponatremia: Lowest Na = 135 mmol/L in last 2 days, will monitor as appropriate      # Hypoalbuminemia: Lowest albumin = 2.9 g/dL at 12/26/2022  6:03 AM, will monitor as appropriate          # DMII: A1C = 8.0 % (Ref range: <5.7 %) within past 3 months   # Severe Obesity: Estimated body mass index is 48.35 kg/m  as calculated from the following:    Height as of this encounter: 1.829 m (6').    Weight as of this encounter: 161.7 kg (356 lb 7.7 oz).                   ====================================  INTERVAL HISTORY:   No acute events overnight. Off pressors since 12/26 afternoon.     OBJECTIVE:   1. VITAL SIGNS:   Temp:  [98.2  F (36.8  C)-100  F (37.8  C)] 98.2  F (36.8  C)  Pulse:  [54-61] 56  Resp:  [13-29] 21  BP: ()/(52-69) 115/65  FiO2 (%):  [40 %-50 %] 40 %  SpO2:  [92 %-97 %] 93 %  Vent Mode: CMV/AC  (Continuous Mandatory Ventilation/ Assist Control)  FiO2 (%): 40 %  Resp Rate (Set): 20 breaths/min  Tidal Volume (Set, mL): 500 mL  PEEP (cm H2O): 5 cmH2O  Resp: 21    2. INTAKE/ OUTPUT:   I/O last 3 completed shifts:  In: 3139.96 [I.V.:1099.96; NG/GT:705]  Out: 3610 [Urine:3610]    3. PHYSICAL EXAMINATION:  General: Laying in bed  HEENT: No scleral icterus, MMM, tongue swollen and protruding, lips swollen   Neuro: Sedated, opens eyes to commands, follows some commands   Pulm/Resp: Coarse breath sounds bilaterally , mechanically ventilated  CV: RRR, no murmurs  Abdomen: Soft, non-distended, non-tender  : connor catheter in place  Incisions/Skin: b/l extremities covered with wraps, lower extremity edema with wrinkled skin      4. LABS:   Arterial Blood Gases   Recent Labs   Lab 12/29/22  0408 12/28/22  0946 12/27/22  0002 12/26/22  1642   PH 7.40 7.40 7.39 7.39   PCO2 47* 42 39 39   PO2 33* 66* 133* 62*   HCO3 29* 26 24 24     Complete Blood Count   Recent Labs   Lab 12/29/22  0408 12/28/22  0416 12/27/22  0356 12/26/22  0603   WBC 8.2 8.3 9.2 8.7   HGB 12.1* 11.9* 12.3* 11.3*    222 214 210     Basic Metabolic Panel  Recent Labs   Lab 12/29/22  0819 12/29/22  0408 12/29/22 0406 12/29/22  0038 12/28/22  0423 12/28/22 0416 12/27/22  0403 12/27/22  0356 12/26/22  0820 12/26/22  0603   NA  --  140  --   --   --  135*  --  134*  --  137   POTASSIUM  --  3.7  --   --   --  3.8  --  4.3  --  4.0   CHLORIDE  --  107  --   --   --  104  --  103  --  105   CO2  --  24  --   --   --  22  --  20*  --  22   BUN  --  23.4*  --   --   --  20.0  --  14.4  --  11.2   CR  --  1.22*  --   --   --  1.32*  --  1.27*  --  1.41*   * 193* 206* 197*   < > 162*   < > 160*   < > 163*    < > = values in this interval not displayed.     Liver Function Tests  Recent Labs   Lab 12/26/22  0603   AST 26   ALT 18   ALKPHOS 58   BILITOTAL 0.4   ALBUMIN 2.9*   INR 1.13     Coagulation Profile  Recent Labs   Lab 12/26/22  0603   INR 1.13       5. RADIOLOGY:   No results found for this or any previous visit (from the past 24 hour(s)).

## 2022-12-30 LAB
ANION GAP SERPL CALCULATED.3IONS-SCNC: 12 MMOL/L (ref 7–15)
BASE EXCESS BLDV CALC-SCNC: 5.6 MMOL/L (ref -7.7–1.9)
BUN SERPL-MCNC: 23.7 MG/DL (ref 8–23)
CALCIUM SERPL-MCNC: 8.7 MG/DL (ref 8.8–10.2)
CHLORIDE SERPL-SCNC: 109 MMOL/L (ref 98–107)
CREAT SERPL-MCNC: 1.25 MG/DL (ref 0.67–1.17)
DEPRECATED HCO3 PLAS-SCNC: 26 MMOL/L (ref 22–29)
ERYTHROCYTE [DISTWIDTH] IN BLOOD BY AUTOMATED COUNT: 16.6 % (ref 10–15)
GFR SERPL CREATININE-BSD FRML MDRD: 65 ML/MIN/1.73M2
GLUCOSE BLDC GLUCOMTR-MCNC: 197 MG/DL (ref 70–99)
GLUCOSE BLDC GLUCOMTR-MCNC: 206 MG/DL (ref 70–99)
GLUCOSE BLDC GLUCOMTR-MCNC: 206 MG/DL (ref 70–99)
GLUCOSE BLDC GLUCOMTR-MCNC: 239 MG/DL (ref 70–99)
GLUCOSE BLDC GLUCOMTR-MCNC: 275 MG/DL (ref 70–99)
GLUCOSE BLDC GLUCOMTR-MCNC: 279 MG/DL (ref 70–99)
GLUCOSE SERPL-MCNC: 206 MG/DL (ref 70–99)
HCO3 BLDV-SCNC: 31 MMOL/L (ref 21–28)
HCT VFR BLD AUTO: 34.6 % (ref 40–53)
HGB BLD-MCNC: 11.3 G/DL (ref 13.3–17.7)
MAGNESIUM SERPL-MCNC: 2 MG/DL (ref 1.7–2.3)
MCH RBC QN AUTO: 29.2 PG (ref 26.5–33)
MCHC RBC AUTO-ENTMCNC: 32.7 G/DL (ref 31.5–36.5)
MCV RBC AUTO: 89 FL (ref 78–100)
O2/TOTAL GAS SETTING VFR VENT: 50 %
PCO2 BLDV: 49 MM HG (ref 40–50)
PH BLDV: 7.41 [PH] (ref 7.32–7.43)
PHOSPHATE SERPL-MCNC: 2.8 MG/DL (ref 2.5–4.5)
PLATELET # BLD AUTO: 234 10E3/UL (ref 150–450)
PO2 BLDV: 34 MM HG (ref 25–47)
POTASSIUM SERPL-SCNC: 3.7 MMOL/L (ref 3.4–5.3)
PROCALCITONIN SERPL IA-MCNC: 0.14 NG/ML
RBC # BLD AUTO: 3.87 10E6/UL (ref 4.4–5.9)
SODIUM SERPL-SCNC: 147 MMOL/L (ref 136–145)
WBC # BLD AUTO: 10.4 10E3/UL (ref 4–11)

## 2022-12-30 PROCEDURE — 250N000013 HC RX MED GY IP 250 OP 250 PS 637: Performed by: STUDENT IN AN ORGANIZED HEALTH CARE EDUCATION/TRAINING PROGRAM

## 2022-12-30 PROCEDURE — 94799 UNLISTED PULMONARY SVC/PX: CPT

## 2022-12-30 PROCEDURE — 999N000157 HC STATISTIC RCP TIME EA 10 MIN

## 2022-12-30 PROCEDURE — 250N000009 HC RX 250: Performed by: NURSE PRACTITIONER

## 2022-12-30 PROCEDURE — 82803 BLOOD GASES ANY COMBINATION: CPT | Performed by: STUDENT IN AN ORGANIZED HEALTH CARE EDUCATION/TRAINING PROGRAM

## 2022-12-30 PROCEDURE — 80048 BASIC METABOLIC PNL TOTAL CA: CPT | Performed by: STUDENT IN AN ORGANIZED HEALTH CARE EDUCATION/TRAINING PROGRAM

## 2022-12-30 PROCEDURE — 84100 ASSAY OF PHOSPHORUS: CPT | Performed by: INTERNAL MEDICINE

## 2022-12-30 PROCEDURE — 85027 COMPLETE CBC AUTOMATED: CPT | Performed by: STUDENT IN AN ORGANIZED HEALTH CARE EDUCATION/TRAINING PROGRAM

## 2022-12-30 PROCEDURE — 83735 ASSAY OF MAGNESIUM: CPT | Performed by: INTERNAL MEDICINE

## 2022-12-30 PROCEDURE — 99291 CRITICAL CARE FIRST HOUR: CPT | Mod: GC | Performed by: INTERNAL MEDICINE

## 2022-12-30 PROCEDURE — 200N000002 HC R&B ICU UMMC

## 2022-12-30 PROCEDURE — 250N000011 HC RX IP 250 OP 636

## 2022-12-30 PROCEDURE — 250N000011 HC RX IP 250 OP 636: Performed by: STUDENT IN AN ORGANIZED HEALTH CARE EDUCATION/TRAINING PROGRAM

## 2022-12-30 PROCEDURE — 84145 PROCALCITONIN (PCT): CPT | Performed by: STUDENT IN AN ORGANIZED HEALTH CARE EDUCATION/TRAINING PROGRAM

## 2022-12-30 PROCEDURE — 250N000011 HC RX IP 250 OP 636: Performed by: NURSE PRACTITIONER

## 2022-12-30 PROCEDURE — 94003 VENT MGMT INPAT SUBQ DAY: CPT

## 2022-12-30 PROCEDURE — 250N000009 HC RX 250

## 2022-12-30 PROCEDURE — 94640 AIRWAY INHALATION TREATMENT: CPT | Mod: 76

## 2022-12-30 PROCEDURE — 250N000013 HC RX MED GY IP 250 OP 250 PS 637

## 2022-12-30 RX ORDER — BUMETANIDE 0.25 MG/ML
2 INJECTION INTRAMUSCULAR; INTRAVENOUS EVERY 6 HOURS
Status: COMPLETED | OUTPATIENT
Start: 2022-12-30 | End: 2022-12-30

## 2022-12-30 RX ORDER — DEXAMETHASONE SODIUM PHOSPHATE 4 MG/ML
4 INJECTION, SOLUTION INTRA-ARTICULAR; INTRALESIONAL; INTRAMUSCULAR; INTRAVENOUS; SOFT TISSUE EVERY 8 HOURS
Status: COMPLETED | OUTPATIENT
Start: 2022-12-30 | End: 2022-12-31

## 2022-12-30 RX ADMIN — Medication 40 MG: at 07:48

## 2022-12-30 RX ADMIN — Medication 10 ML: at 07:49

## 2022-12-30 RX ADMIN — ARIPIPRAZOLE 10 MG: 1 SOLUTION ORAL at 22:10

## 2022-12-30 RX ADMIN — Medication 50 MCG: at 07:42

## 2022-12-30 RX ADMIN — PIPERACILLIN SODIUM AND TAZOBACTAM SODIUM 4.5 G: 4; .5 INJECTION, POWDER, LYOPHILIZED, FOR SOLUTION INTRAVENOUS at 16:20

## 2022-12-30 RX ADMIN — QUETIAPINE FUMARATE 600 MG: 300 TABLET ORAL at 22:10

## 2022-12-30 RX ADMIN — LEVALBUTEROL HYDROCHLORIDE 1.25 MG: 1.25 SOLUTION RESPIRATORY (INHALATION) at 20:34

## 2022-12-30 RX ADMIN — LEVALBUTEROL HYDROCHLORIDE 1.25 MG: 1.25 SOLUTION RESPIRATORY (INHALATION) at 04:45

## 2022-12-30 RX ADMIN — GABAPENTIN 300 MG: 300 CAPSULE ORAL at 07:48

## 2022-12-30 RX ADMIN — ACETYLCYSTEINE 2 ML: 200 SOLUTION ORAL; RESPIRATORY (INHALATION) at 08:09

## 2022-12-30 RX ADMIN — ACETYLCYSTEINE 2 ML: 200 SOLUTION ORAL; RESPIRATORY (INHALATION) at 15:37

## 2022-12-30 RX ADMIN — Medication 0.5 MG: at 19:58

## 2022-12-30 RX ADMIN — PIPERACILLIN SODIUM AND TAZOBACTAM SODIUM 4.5 G: 4; .5 INJECTION, POWDER, LYOPHILIZED, FOR SOLUTION INTRAVENOUS at 04:38

## 2022-12-30 RX ADMIN — Medication 10 ML: at 16:20

## 2022-12-30 RX ADMIN — Medication 0.5 MG: at 07:49

## 2022-12-30 RX ADMIN — Medication 15 ML: at 07:48

## 2022-12-30 RX ADMIN — Medication 100 MCG: at 06:00

## 2022-12-30 RX ADMIN — SENNOSIDES 8.6 MG: 8.6 TABLET, FILM COATED ORAL at 19:56

## 2022-12-30 RX ADMIN — BUMETANIDE 2 MG: 0.25 INJECTION INTRAMUSCULAR; INTRAVENOUS at 16:20

## 2022-12-30 RX ADMIN — HYDRALAZINE HYDROCHLORIDE 10 MG: 20 INJECTION INTRAMUSCULAR; INTRAVENOUS at 13:49

## 2022-12-30 RX ADMIN — Medication 100 MCG: at 20:30

## 2022-12-30 RX ADMIN — LEVALBUTEROL HYDROCHLORIDE 1.25 MG: 1.25 SOLUTION RESPIRATORY (INHALATION) at 12:08

## 2022-12-30 RX ADMIN — LEVALBUTEROL HYDROCHLORIDE 1.25 MG: 1.25 SOLUTION RESPIRATORY (INHALATION) at 15:36

## 2022-12-30 RX ADMIN — ENOXAPARIN SODIUM 40 MG: 40 INJECTION SUBCUTANEOUS at 07:48

## 2022-12-30 RX ADMIN — ACETAMINOPHEN 650 MG: 325 TABLET, FILM COATED ORAL at 07:54

## 2022-12-30 RX ADMIN — Medication 100 MCG: at 04:00

## 2022-12-30 RX ADMIN — PIPERACILLIN SODIUM AND TAZOBACTAM SODIUM 4.5 G: 4; .5 INJECTION, POWDER, LYOPHILIZED, FOR SOLUTION INTRAVENOUS at 22:15

## 2022-12-30 RX ADMIN — DEXMEDETOMIDINE HYDROCHLORIDE 0.8 MCG/KG/HR: 400 INJECTION INTRAVENOUS at 13:39

## 2022-12-30 RX ADMIN — Medication 10 ML: at 19:58

## 2022-12-30 RX ADMIN — ACETYLCYSTEINE 2 ML: 200 SOLUTION ORAL; RESPIRATORY (INHALATION) at 04:45

## 2022-12-30 RX ADMIN — HYDRALAZINE HYDROCHLORIDE 10 MG: 20 INJECTION INTRAMUSCULAR; INTRAVENOUS at 20:18

## 2022-12-30 RX ADMIN — DEXMEDETOMIDINE HYDROCHLORIDE 0.8 MCG/KG/HR: 400 INJECTION INTRAVENOUS at 16:46

## 2022-12-30 RX ADMIN — BUMETANIDE 2 MG: 0.25 INJECTION INTRAMUSCULAR; INTRAVENOUS at 09:48

## 2022-12-30 RX ADMIN — Medication 100 MCG: at 02:00

## 2022-12-30 RX ADMIN — DEXMEDETOMIDINE HYDROCHLORIDE 1 MCG/KG/HR: 400 INJECTION INTRAVENOUS at 22:40

## 2022-12-30 RX ADMIN — Medication 10 ML: at 12:17

## 2022-12-30 RX ADMIN — DEXAMETHASONE SODIUM PHOSPHATE 4 MG: 4 INJECTION, SOLUTION INTRA-ARTICULAR; INTRALESIONAL; INTRAMUSCULAR; INTRAVENOUS; SOFT TISSUE at 18:43

## 2022-12-30 RX ADMIN — ACETYLCYSTEINE 2 ML: 200 SOLUTION ORAL; RESPIRATORY (INHALATION) at 12:08

## 2022-12-30 RX ADMIN — PIPERACILLIN SODIUM AND TAZOBACTAM SODIUM 4.5 G: 4; .5 INJECTION, POWDER, LYOPHILIZED, FOR SOLUTION INTRAVENOUS at 09:49

## 2022-12-30 RX ADMIN — ACETAMINOPHEN 650 MG: 325 TABLET, FILM COATED ORAL at 16:32

## 2022-12-30 RX ADMIN — DEXMEDETOMIDINE HYDROCHLORIDE 0.8 MCG/KG/HR: 400 INJECTION INTRAVENOUS at 02:25

## 2022-12-30 RX ADMIN — ACETYLCYSTEINE 2 ML: 200 SOLUTION ORAL; RESPIRATORY (INHALATION) at 20:35

## 2022-12-30 RX ADMIN — LEVALBUTEROL HYDROCHLORIDE 1.25 MG: 1.25 SOLUTION RESPIRATORY (INHALATION) at 08:11

## 2022-12-30 RX ADMIN — LABETALOL HYDROCHLORIDE 10 MG: 5 INJECTION, SOLUTION INTRAVENOUS at 12:17

## 2022-12-30 RX ADMIN — ENOXAPARIN SODIUM 40 MG: 40 INJECTION SUBCUTANEOUS at 19:55

## 2022-12-30 RX ADMIN — Medication 100 MCG: at 00:00

## 2022-12-30 RX ADMIN — DEXMEDETOMIDINE HYDROCHLORIDE 0.8 MCG/KG/HR: 400 INJECTION INTRAVENOUS at 19:55

## 2022-12-30 RX ADMIN — GABAPENTIN 300 MG: 300 CAPSULE ORAL at 13:39

## 2022-12-30 RX ADMIN — DEXAMETHASONE SODIUM PHOSPHATE 4 MG: 4 INJECTION, SOLUTION INTRA-ARTICULAR; INTRALESIONAL; INTRAMUSCULAR; INTRAVENOUS; SOFT TISSUE at 09:49

## 2022-12-30 RX ADMIN — Medication 100 MCG/HR: at 17:01

## 2022-12-30 RX ADMIN — DEXMEDETOMIDINE HYDROCHLORIDE 0.8 MCG/KG/HR: 400 INJECTION INTRAVENOUS at 05:47

## 2022-12-30 RX ADMIN — GABAPENTIN 300 MG: 300 CAPSULE ORAL at 19:56

## 2022-12-30 RX ADMIN — LABETALOL HYDROCHLORIDE 10 MG: 5 INJECTION, SOLUTION INTRAVENOUS at 22:07

## 2022-12-30 ASSESSMENT — ACTIVITIES OF DAILY LIVING (ADL)
ADLS_ACUITY_SCORE: 30

## 2022-12-30 NOTE — PROGRESS NOTES
ICU Daily Rounding Checklist     Checklist Response Notes   Can sedation be reduced?  Yes    Can analgesia be reduced? Yes    Is delirium being assessed, addressed and prevented? Yes    Spontaneous awakening trial and/or Spontaneous breathing trial candidate?  No    Total fluid balance goal reviewed?  Yes Target Goals:        500 [12h]             500 [24h]   Is the patient at goals for lung protective ventilation? Yes    Head of bed elevation (30 degrees)? Yes    Skin breakdown assessment (prevention) completed? Yes    Is enteral nutrition at goal? Yes    Is blood glucose at goal? No    Deep venous thrombosis prophylaxis? Yes      Gastric ulcer prophylaxis?  Yes If coagulopathy (INR-1.5 PTT2x normal. Ph<50k), mechanical ventilation 48hr, history of GI bleed/ulcer within past year. TBL, SCI, or burn, or if >= 2 minor risk factors (sepsis, ICU stay 1 week, occult GI bleed > 6 days. glucocorticoid therapy, NSAID use, antiplatelet use)   Can Antibiotics be narrowed or discontinued? No    Early mobility candidate and physical therapy consulted? Yes    Is connor catheter needed? Yes    Is central venous/arterial catheter needed? Yes    Has the family been updated? Yes    Are the patient's goals of care and code status current? Yes

## 2022-12-30 NOTE — PLAN OF CARE
Goal Outcome Evaluation:      Plan of Care Reviewed With: patient    Overall Patient Progress: no changeOverall Patient Progress: no change    Outcome Evaluation: Frequent desaturation due to inline secretions.    ICU End of Shift Summary. See flowsheets for vital signs and detailed assessment.    Changes this shift: Patient comfortable and generally stable on current precedex and fentanyl settings. Remains in sinus bradycardia with no need for PRN BP medications. Frequent episodes of desaturation and coughing requiring aggressive suctioning and bolus' of fentanyl. One large, watery bowel movement. No changes to ventilator settings. Fentanyl at Precedex remain at 50 and 0.8, respectively. Good urine output throughout evening. Sodium noted to be slightly elevated at 147  on morning labs.     Plan: Assess volume status with previous Bumex administration and elevated sodium. Assess whether restraints are still appropriate. Assess connor order given change in RASS goal.

## 2022-12-30 NOTE — PROGRESS NOTES
MEDICAL ICU PROGRESS NOTE  12/30/2022      Date of Service (when I saw the patient): 12/30/2022    ASSESSMENT: Antony Aguila Jr. is a 62 year old male admitted on 12/21/2022. He has history chronic type B aortic dissection s/p exploration and median sternotomy (6/18/19), type 2 DM (poorly controlled), and HTN who is admitted for acute on chronic thoracic aortic aneurysm with new aortic root dilation. Hospitalization complicated by hypokalemia who was admitted on 12/21/2022 for acute hypoxic respiratory failure in setting of agitation and need for sedation.      CHANGES and MAJOR THINGS TODAY:   - bumex 2mg BID  - angioedema and secretions preclude extubation today  - Decadron 4mg q8h for 3 doses   - Goal to try to extubate tomorrow   - Increase lantus to 50U      PLAN:    Neuro:  # Pain and sedation  - propofol gtt and bolus, wean as able  - Precedex gtt  - fentanyl gtt and bolus  - RASS goal 0 to -1    # Agitation     # Encephalopathy, likely metabolic  - patient developing confusion, possibly 2/2 hypercapnia and noncompliance with cpap  - patient agitated on the floor requiring precedex, ativan, zyprexa and not compliant with cares  - needed further sedation with ativan to the point that intubation was required   - Will wean sedation after feeding tube placement  - Psychiatry consulted, appreciate recs  - Aripiprazole 10 mg HS  - Guanfacine 0.5 mg BID (alpha-2 agonist like Precedex)   - Haldol 2-5 mg IV q 4 hours for severe agitation (and follow QTc)    # Schizoaffective disorder  # PTSD  Meds per chart include Zyprexa, Abilify, and Seroquel. Per pharmacy med rec, appears to only have filled Seroquel recently.   - continue PTA quetiapine 600 mg at bedtime  - PharmD med rec  - Psych recs as above      # Tobacco use  Smoked cigarettes for 45 years. Currently 1 pack per week.  - Encourage cessation      Pulmonary:  # Acute hypoxic and hypercarbic respiratory failure  # Significant airway edema  # Concern for sleep  apnea  # Tracheostomy placed 7/5/19, decannulated 8/2019  Per hospitalization notes here in Sept 2022, noted to have hx of dyspnea on exertion in setting of being current smoker, tx with albuterol inhaler, recommended outpatient PFTs be obtained. Pulmonary progress note from 7/2019 notes current smoker with unknown COPD history, no PFTs available. Had tracheostomy placed 7/5/19, decannulated 8/2019. CTA chest with compressive atelectasis in L lung adjacent to thoracic aorta and hiatal hernia. Patient became agitated and non-compliant with attempts for patient to use BIPAP or HFNC and needed heavy sedation to treat agitation such that intubation was required to protect airway. Patient is historically a difficult intubation and was very difficult this time. Patient has a possible hx of angioedema reaction to lisinopril, was started on losartan yesterday, could account for the airway edema and extubation will be expected to be delayed due to this. Likely needs higher PEEP due to his body habitus.   - patient emergently intubated  - s/p decadron 8mg IV x1  - Decadron 4mg IV q8h x3 doses starting 12/30  - Bumex IV 2 BID     Vent Mode: CMV/AC  (Continuous Mandatory Ventilation/ Assist Control)  FiO2 (%): 50 %  Resp Rate (Set): 20 breaths/min  Tidal Volume (Set, mL): 500 mL  PEEP (cm H2O): 12 cmH2O  Resp: 21    # H/o possible ACE inhibitor related angioedema  - Noted during July 2019 hospitalization at same time having AHRF requiring tracheostomy (later decannulted in August 2019).  - Notably patient started on losartan 12/25/22  - Losartan added to allergy list   - S/p decadron 8mg IV x1   - Decadron 4mg IV q8h x3 doses starting 12/30    # Increased pulmonary secretions  # Concern for hospital associated pneumonia  Has developed copious creamy secretions overnight, has low grade temperature of 100.0F, no leukocytosis. CXR showing increased opacities.  - Sputum cx   - PCT, only 0.11  - Pulm toilet: metaneb, mucomyst   -  Bumex 2 BID   - Zosyn 12/29-*, plan for 5 day course     Cardiovascular:  # Shock, NOS-resolved   - patient developed hypotension after intubation and being sedated on propofol   - s/p 1L NS  - Off norepinephrine   - TTE 12/26 EF 60-65%, normal RV      # Chronic type B aortic dissection (proximal descending aorta to the abdominal aorta)  # Proximal ascending aorta saccular aneurysm   # Distal ascending aorta saccular aneurysm   # Mild nonobstructive CAD  Pt has history of stable type B aortic dissection as well as short segment of dissection vs pseudoaneurysms at the aortic root and distal ascending thoracic aorta. Previously underwent surgical exploration in 6/2019 with concern for type 1 aortic dissection but found to have chronic type B dissection. On admission, CXR with widened mediastinum slightly increased from prior. CT chest with aneurysmal dilatation ascending and descending thoracic aorta with type B aortic dissection distal to L subclavian artery extending to L common iliac artery with severe narrowing of true lumen and possible pseudoaneurysm at aortic root, new compared to prior 2019 images.   - TRINH 12/23 showed saccular aneurysm of ascending aorta measuring 5.8 cm associated with a dissection flap. Thrombus is seen in the false lumen. Aortic root is dilated at 4.6 cm.  - CTA 12/22 two saccular aneurysms, one each in the proximal ascending aorta and distal ascending aorta. The maximal transaortic diameters of 46.9 x 39.2 mm and 62.7 x 54.4 mm, respectively. Mild, non-obstructive coronary artery disease without any high-grade stenoses.  - Cardiology and CVTS consulted and signed off  - Plan for outpatient follow up with CVTS  - No HR goal per cardiology, SBP preferably maintained <130  - PRN labetalol 10mg for SBP >135, PRN hydralazine for SBP>135 if HR<55  - Hold labetalol 400 mg BID 12/25  - Hold DVT ppx 12/23; restarted 12/24 for thrombus seen on TRINH, continue per CVTS recs  - Doppler pulses     #  HTN  Based on outpatient note 12/14/2022 pt was previously hydordiuril 50 mg, amlodipine 10 mg, bumex TID and canaglaflozin. 12/25 Hypertensive overnight with highest SBP of 170s. Given hx of Type B sonam dissection and now new aortic aneurysm goals   - hold Labetalol  BID (titrated from intially PO dose of 100 mg)  - holding PTA hydrochlorothiazide  - Was on Nicardipine gtt given persistent elevated SBPs  - prn labetalol 10mg for SBP >135, PRN hydralazine for SBP>135 if HR<55     GI/Nutrition:  # Large hiatal hernia  # Nutrition  - Unable to place feeding tube under fluoro due to large hiatal hernia   - GI assisted with feeding tube placement   - continue tube feeds     Renal/Fluids/Electrolytes:  # LAKISHA on CKD 2, improving  Outpatient labs 12/16 with Cr 1.4 and eGFR 55. Now back at baseline Cr ~1.1-1.2, eGFR ~65-75. UA unremarkable.  - decrease PTA gabapentin 600 mg TID -> 300 mg TID  - holding PTA metformin for now     # Mild hypokalemia, resolved  # Mild hypomagnesemia  - Repletion protocols     # Hypocalcemia, resolved  - Monitor BMP     Endocrine:  # Type 2 DM with hyperglycemia  Most recent A1c 12.5 (9/16/22). Home regimen: metformin 1000 qPM and Lantus 50 unit(s) qAM + aspart 17 unit(s) TIDAC + aspart 9 unit(s) w/ snacks  - Increase lantus to 50U  - HDSSI   - holding PTA metformin as above     ID:  # Increased secretions  # Concern for hospital associated pneumonia  Has developed copious creamy secretions overnight, has low grade temperature of 100.0F, no leukocytosis. CXR showing increased opacities.   - Sputum cx: mixed arslan   - PCT, only 0.11  - Pulm toilet: metaneb, mucomyst   - Bumex 2 BID   - Zosyn 12/29-*, plan for 5 day course      Hematology:    # Anemia, normocytic  - Trend CBC     Musculoskeletal:  # BLE edema  Dimer elevated with acute on chronic leg swelling L < R per patient history. DVT US in ED negative for DVT b/l. Has had LE swelling for last 2 years, notes it is worse lately.  Previously on diuretics, not currently per chart.  - lymphedema consult  - diuresis as above     Skin:  # Venous stasis ulcer LLE  # Venous insufficiency  # Peripheral vascular disease  - WOC consult placed     General Cares/Prophylaxis:    DVT Prophylaxis: lovenox   GI Prophylaxis: PPI  Restraints: b/l wrist soft  Family Communication: Sister updated at bedside   Code Status: Full Code     Lines/tubes/drains:  - ETT  - Noble  - NG  - PIV x3   - PICC     Disposition:  - Medical ICU      Patient seen and findings/plan discussed with medical ICU staff, Dr. Goldstein.     Ruthann Moser MD  PGY-1 Internal Medicine      Clinically Significant Risk Factors         # Hypernatremia: Highest Na = 147 mmol/L in last 2 days, will monitor as appropriate      # Hypoalbuminemia: Lowest albumin = 2.9 g/dL at 12/26/2022  6:03 AM, will monitor as appropriate          # DMII: A1C = 8.0 % (Ref range: <5.7 %) within past 3 months   # Severe Obesity: Estimated body mass index is 47.66 kg/m  as calculated from the following:    Height as of this encounter: 1.829 m (6').    Weight as of this encounter: 159.4 kg (351 lb 6.6 oz).                   ====================================  INTERVAL HISTORY:   Head/neck twitching/jerking for ~10 minutes yesterday, received 2mg versed. Tmax 100.7. Still having coughing episodes causing desats requiring aggressive suctioning.     OBJECTIVE:   1. VITAL SIGNS:   Temp:  [99.4  F (37.4  C)-101  F (38.3  C)] 101  F (38.3  C)  Pulse:  [53-71] 71  Resp:  [16-25] 21  BP: (106-138)/(58-75) 123/71  FiO2 (%):  [50 %] 50 %  SpO2:  [93 %-99 %] 97 %  Vent Mode: CMV/AC  (Continuous Mandatory Ventilation/ Assist Control)  FiO2 (%): 50 %  Resp Rate (Set): 20 breaths/min  Tidal Volume (Set, mL): 500 mL  PEEP (cm H2O): 12 cmH2O  Resp: 21    2. INTAKE/ OUTPUT:   I/O last 3 completed shifts:  In: 3472.91 [I.V.:1447.91; NG/GT:585]  Out: 3990 [Urine:3990]    3. PHYSICAL EXAMINATION:  General: Laying in bed, appears  comfortable    HEENT: No scleral icterus, MMM, tongue swollen and protruding, lips swollen   Neuro: Sedated, following commands    Pulm/Resp: Less coarse breath sounds bilaterally , mechanically ventilated  CV: RRR, no murmurs  Abdomen: Soft, non-distended, non-tender  : connor catheter in place  Incisions/Skin: b/l extremities covered with wraps, lower extremity edema with wrinkled skin     4. LABS:   Arterial Blood Gases   Recent Labs   Lab 12/29/22  0408 12/28/22  0946 12/27/22  0002 12/26/22  1642   PH 7.40 7.40 7.39 7.39   PCO2 47* 42 39 39   PO2 33* 66* 133* 62*   HCO3 29* 26 24 24     Complete Blood Count   Recent Labs   Lab 12/30/22  0433 12/29/22  0408 12/28/22  0416 12/27/22  0356   WBC 10.4 8.2 8.3 9.2   HGB 11.3* 12.1* 11.9* 12.3*    211 222 214     Basic Metabolic Panel  Recent Labs   Lab 12/30/22  0747 12/30/22  0433 12/30/22  0432 12/30/22  0054 12/29/22  1631 12/29/22  1508 12/29/22  0819 12/29/22  0408 12/28/22  0423 12/28/22  0416   NA  --  147*  --   --   --  143  --  140  --  135*   POTASSIUM  --  3.7  --   --   --  3.5  --  3.7  --  3.8   CHLORIDE  --  109*  --   --   --  107  --  107  --  104   CO2  --  26  --   --   --  26  --  24  --  22   BUN  --  23.7*  --   --   --  24.8*  --  23.4*  --  20.0   CR  --  1.25*  --   --   --  1.27*  --  1.22*  --  1.32*   * 206* 197* 206*   < > 213*   < > 193*   < > 162*    < > = values in this interval not displayed.     Liver Function Tests  Recent Labs   Lab 12/26/22  0603   AST 26   ALT 18   ALKPHOS 58   BILITOTAL 0.4   ALBUMIN 2.9*   INR 1.13     Coagulation Profile  Recent Labs   Lab 12/26/22  0603   INR 1.13       5. RADIOLOGY:   Recent Results (from the past 24 hour(s))   XR Chest Port 1 View    Narrative    XR CHEST PORT 1 VIEW  12/29/2022 2:58 PM     HISTORY:  pt with increased creamy secretions concern for PNA,  bacterial vs aspiration       COMPARISON: Chest radiograph 12/26/2022    FINDINGS:   Frontal view of the chest.  Endotracheal tube tip projects about 6 cm  above the mackenzie. Right arm PICC tip projects over the  SVC, tip  obscured by overlying wires and tubes.. Partially visualized feeding  tube courses below the diaphragm and beyond the field-of-view. Median  sternotomy wires.    Trachea is midline. Similar appearing cardiomediastinal silhouette.  Low lung volumes with increased bibasilar predominant mixed  interstitial and airspace opacities. Right costophrenic angle is not  well seen. Small left pleural effusion. No pneumothorax.      Impression    IMPRESSION:    1. Increased bilateral perihilar and bibasilar opacities, which may  represent pulmonary edema/atelectasis and/or infection.  2. Small left pleural effusion..    I have personally reviewed the examination and initial interpretation  and I agree with the findings.    AYO FARMER MD         SYSTEM ID:  [id number]

## 2022-12-30 NOTE — PLAN OF CARE
Goal Outcome Evaluation:      Plan of Care Reviewed With: patient, family    Overall Patient Progress: no changeOverall Patient Progress: no change    Outcome Evaluation: RASS -1/+2, increased sedation for comfort. febrile, tylenol given. labetalol and hydralazine given for SBP>135. lantus increased, BS remains in 200s. Bumex given x2, started decadron.    ICU End of Shift Summary. See flowsheets for vital signs and detailed assessment.    Changes this shift: Follows commands and tracking. Fentanyl bumps given for pain and agitation with suctioning. Labetalol and Hydralazine given for SBP >135. Tylenol given for fever x2. Tmax 101. PEEP decreased to 10. Continues to have large amounts of thick brown secretions in line and orally. Lantus increased to 50 units daily, continues to have BS in 200s. No BM. Noble in place, 2mg Bumex given x2 with good UO. Started IV Decadron 4mg. Continued restraints.    Plan: Possible extubation next couple of days as long as secretions are controlled and able to PST.

## 2022-12-30 NOTE — PLAN OF CARE
ICU End of Shift Summary. See flowsheets for vital signs and detailed assessment.    Changes this shift: RASS 0/-1, following commands, nodding to yes/no questions. VSS, prn hydralazine given x1 for SBP of 145. Sinus harsh most of shift, tmax 100.7. Large amount of tan, thick secretions from ETT, sputum sent. No BM, prn miralax and senna given. 4 mg bumex given with good response. Wound care on LLE done. PI on tongue d/t to swelling and pressure from ETT, tube repositioned q2 hr. Around 1500 pt had an episode of rhythmic ticking movements of his head, MD called to bedside, multiple labs drawn and 2mg of versed given d/t concern for seizure. Movements lasted 5-10 minutes and resolved before versed was given, per team- continue to monitor for similar episodes and notify. Propofol off, remains on fentanyl and precedex    Plan: monitor for changes and notify team. Follow poc      Goal Outcome Evaluation:      Plan of Care Reviewed With: patient    Overall Patient Progress: no changeOverall Patient Progress: no change    Outcome Evaluation: Intubated, low grade fevers

## 2022-12-30 NOTE — PROGRESS NOTES
Vent Mode: CMV/AC  (Continuous Mandatory Ventilation/ Assist Control)  FiO2 (%): 50 %  Resp Rate (Set): 20 breaths/min  Tidal Volume (Set, mL): 500 mL  PEEP (cm H2O): 10 cmH2O  Resp: 21    Pt remains on FVS.   Volara/Neb TX's done per MD order.   LS CRS, Sx for large thick yellow/brown.   No weans per MD.     RT will follow.

## 2022-12-31 ENCOUNTER — APPOINTMENT (OUTPATIENT)
Dept: GENERAL RADIOLOGY | Facility: CLINIC | Age: 62
End: 2022-12-31
Payer: COMMERCIAL

## 2022-12-31 LAB
ANION GAP SERPL CALCULATED.3IONS-SCNC: 11 MMOL/L (ref 7–15)
ANION GAP SERPL CALCULATED.3IONS-SCNC: 12 MMOL/L (ref 7–15)
BACTERIA SPT CULT: ABNORMAL
BUN SERPL-MCNC: 28.6 MG/DL (ref 8–23)
BUN SERPL-MCNC: 29.1 MG/DL (ref 8–23)
CALCIUM SERPL-MCNC: 8.3 MG/DL (ref 8.8–10.2)
CALCIUM SERPL-MCNC: 9.3 MG/DL (ref 8.8–10.2)
CHLORIDE SERPL-SCNC: 109 MMOL/L (ref 98–107)
CHLORIDE SERPL-SCNC: 112 MMOL/L (ref 98–107)
CREAT SERPL-MCNC: 1.17 MG/DL (ref 0.67–1.17)
CREAT SERPL-MCNC: 1.21 MG/DL (ref 0.67–1.17)
DEPRECATED HCO3 PLAS-SCNC: 26 MMOL/L (ref 22–29)
DEPRECATED HCO3 PLAS-SCNC: 26 MMOL/L (ref 22–29)
ERYTHROCYTE [DISTWIDTH] IN BLOOD BY AUTOMATED COUNT: 16.5 % (ref 10–15)
GFR SERPL CREATININE-BSD FRML MDRD: 67 ML/MIN/1.73M2
GFR SERPL CREATININE-BSD FRML MDRD: 70 ML/MIN/1.73M2
GLUCOSE BLDC GLUCOMTR-MCNC: 181 MG/DL (ref 70–99)
GLUCOSE BLDC GLUCOMTR-MCNC: 233 MG/DL (ref 70–99)
GLUCOSE BLDC GLUCOMTR-MCNC: 270 MG/DL (ref 70–99)
GLUCOSE BLDC GLUCOMTR-MCNC: 288 MG/DL (ref 70–99)
GLUCOSE BLDC GLUCOMTR-MCNC: 308 MG/DL (ref 70–99)
GLUCOSE BLDC GLUCOMTR-MCNC: 317 MG/DL (ref 70–99)
GLUCOSE SERPL-MCNC: 283 MG/DL (ref 70–99)
GLUCOSE SERPL-MCNC: 323 MG/DL (ref 70–99)
GRAM STAIN RESULT: ABNORMAL
GRAM STAIN RESULT: ABNORMAL
HCT VFR BLD AUTO: 38.6 % (ref 40–53)
HGB BLD-MCNC: 12.4 G/DL (ref 13.3–17.7)
MAGNESIUM SERPL-MCNC: 2.2 MG/DL (ref 1.7–2.3)
MCH RBC QN AUTO: 28.6 PG (ref 26.5–33)
MCHC RBC AUTO-ENTMCNC: 32.1 G/DL (ref 31.5–36.5)
MCV RBC AUTO: 89 FL (ref 78–100)
PHOSPHATE SERPL-MCNC: 2.2 MG/DL (ref 2.5–4.5)
PLATELET # BLD AUTO: 214 10E3/UL (ref 150–450)
POTASSIUM SERPL-SCNC: 3.7 MMOL/L (ref 3.4–5.3)
POTASSIUM SERPL-SCNC: 4.1 MMOL/L (ref 3.4–5.3)
RBC # BLD AUTO: 4.34 10E6/UL (ref 4.4–5.9)
SODIUM SERPL-SCNC: 147 MMOL/L (ref 136–145)
SODIUM SERPL-SCNC: 149 MMOL/L (ref 136–145)
WBC # BLD AUTO: 10.4 10E3/UL (ref 4–11)

## 2022-12-31 PROCEDURE — 999N000157 HC STATISTIC RCP TIME EA 10 MIN

## 2022-12-31 PROCEDURE — 250N000011 HC RX IP 250 OP 636: Performed by: STUDENT IN AN ORGANIZED HEALTH CARE EDUCATION/TRAINING PROGRAM

## 2022-12-31 PROCEDURE — 250N000013 HC RX MED GY IP 250 OP 250 PS 637: Performed by: INTERNAL MEDICINE

## 2022-12-31 PROCEDURE — 250N000013 HC RX MED GY IP 250 OP 250 PS 637: Performed by: STUDENT IN AN ORGANIZED HEALTH CARE EDUCATION/TRAINING PROGRAM

## 2022-12-31 PROCEDURE — 74018 RADEX ABDOMEN 1 VIEW: CPT | Mod: 26 | Performed by: RADIOLOGY

## 2022-12-31 PROCEDURE — 250N000009 HC RX 250: Performed by: NURSE PRACTITIONER

## 2022-12-31 PROCEDURE — 250N000009 HC RX 250

## 2022-12-31 PROCEDURE — 94640 AIRWAY INHALATION TREATMENT: CPT

## 2022-12-31 PROCEDURE — 200N000002 HC R&B ICU UMMC

## 2022-12-31 PROCEDURE — 272N000064 HC CIRCUIT HUMIDITY W/CPAP BIPAP

## 2022-12-31 PROCEDURE — 250N000013 HC RX MED GY IP 250 OP 250 PS 637

## 2022-12-31 PROCEDURE — 999N000215 HC STATISTIC HFNC ADULT NON-CPAP

## 2022-12-31 PROCEDURE — 94799 UNLISTED PULMONARY SVC/PX: CPT

## 2022-12-31 PROCEDURE — 250N000011 HC RX IP 250 OP 636: Performed by: NURSE PRACTITIONER

## 2022-12-31 PROCEDURE — 94003 VENT MGMT INPAT SUBQ DAY: CPT

## 2022-12-31 PROCEDURE — 84100 ASSAY OF PHOSPHORUS: CPT | Performed by: INTERNAL MEDICINE

## 2022-12-31 PROCEDURE — 85027 COMPLETE CBC AUTOMATED: CPT | Performed by: STUDENT IN AN ORGANIZED HEALTH CARE EDUCATION/TRAINING PROGRAM

## 2022-12-31 PROCEDURE — 94640 AIRWAY INHALATION TREATMENT: CPT | Mod: 76

## 2022-12-31 PROCEDURE — 250N000011 HC RX IP 250 OP 636

## 2022-12-31 PROCEDURE — 83735 ASSAY OF MAGNESIUM: CPT | Performed by: INTERNAL MEDICINE

## 2022-12-31 PROCEDURE — 74018 RADEX ABDOMEN 1 VIEW: CPT

## 2022-12-31 PROCEDURE — 80048 BASIC METABOLIC PNL TOTAL CA: CPT | Performed by: STUDENT IN AN ORGANIZED HEALTH CARE EDUCATION/TRAINING PROGRAM

## 2022-12-31 PROCEDURE — 99291 CRITICAL CARE FIRST HOUR: CPT | Mod: GC | Performed by: INTERNAL MEDICINE

## 2022-12-31 PROCEDURE — 94660 CPAP INITIATION&MGMT: CPT

## 2022-12-31 RX ORDER — HYDRALAZINE HYDROCHLORIDE 20 MG/ML
5 INJECTION INTRAMUSCULAR; INTRAVENOUS ONCE
Status: COMPLETED | OUTPATIENT
Start: 2022-12-31 | End: 2022-12-31

## 2022-12-31 RX ORDER — BUMETANIDE 0.25 MG/ML
2 INJECTION INTRAMUSCULAR; INTRAVENOUS EVERY 6 HOURS
Status: COMPLETED | OUTPATIENT
Start: 2022-12-31 | End: 2022-12-31

## 2022-12-31 RX ORDER — ONDANSETRON 2 MG/ML
4 INJECTION INTRAMUSCULAR; INTRAVENOUS EVERY 6 HOURS PRN
Status: DISCONTINUED | OUTPATIENT
Start: 2022-12-31 | End: 2023-01-03

## 2022-12-31 RX ADMIN — ACETYLCYSTEINE 2 ML: 200 SOLUTION ORAL; RESPIRATORY (INHALATION) at 00:28

## 2022-12-31 RX ADMIN — HYDRALAZINE HYDROCHLORIDE 10 MG: 20 INJECTION INTRAMUSCULAR; INTRAVENOUS at 09:08

## 2022-12-31 RX ADMIN — Medication 10 ML: at 09:07

## 2022-12-31 RX ADMIN — BUMETANIDE 2 MG: 0.25 INJECTION INTRAMUSCULAR; INTRAVENOUS at 16:56

## 2022-12-31 RX ADMIN — Medication 10 ML: at 11:24

## 2022-12-31 RX ADMIN — PIPERACILLIN SODIUM AND TAZOBACTAM SODIUM 4.5 G: 4; .5 INJECTION, POWDER, LYOPHILIZED, FOR SOLUTION INTRAVENOUS at 04:16

## 2022-12-31 RX ADMIN — ACETYLCYSTEINE 2 ML: 200 SOLUTION ORAL; RESPIRATORY (INHALATION) at 21:31

## 2022-12-31 RX ADMIN — DEXMEDETOMIDINE HYDROCHLORIDE 1 MCG/KG/HR: 400 INJECTION INTRAVENOUS at 04:16

## 2022-12-31 RX ADMIN — HYDRALAZINE HYDROCHLORIDE 5 MG: 20 INJECTION INTRAMUSCULAR; INTRAVENOUS at 04:31

## 2022-12-31 RX ADMIN — ARIPIPRAZOLE 10 MG: 1 SOLUTION ORAL at 21:42

## 2022-12-31 RX ADMIN — Medication 10 ML: at 16:56

## 2022-12-31 RX ADMIN — HYDRALAZINE HYDROCHLORIDE 10 MG: 20 INJECTION INTRAMUSCULAR; INTRAVENOUS at 02:04

## 2022-12-31 RX ADMIN — INSULIN GLARGINE 65 UNITS: 100 INJECTION, SOLUTION SUBCUTANEOUS at 11:32

## 2022-12-31 RX ADMIN — LEVALBUTEROL HYDROCHLORIDE 1.25 MG: 1.25 SOLUTION RESPIRATORY (INHALATION) at 21:31

## 2022-12-31 RX ADMIN — LEVALBUTEROL HYDROCHLORIDE 1.25 MG: 1.25 SOLUTION RESPIRATORY (INHALATION) at 12:19

## 2022-12-31 RX ADMIN — ACETYLCYSTEINE 2 ML: 200 SOLUTION ORAL; RESPIRATORY (INHALATION) at 16:05

## 2022-12-31 RX ADMIN — LEVALBUTEROL HYDROCHLORIDE 1.25 MG: 1.25 SOLUTION RESPIRATORY (INHALATION) at 08:16

## 2022-12-31 RX ADMIN — DEXAMETHASONE SODIUM PHOSPHATE 4 MG: 4 INJECTION, SOLUTION INTRA-ARTICULAR; INTRALESIONAL; INTRAMUSCULAR; INTRAVENOUS; SOFT TISSUE at 01:23

## 2022-12-31 RX ADMIN — PIPERACILLIN SODIUM AND TAZOBACTAM SODIUM 4.5 G: 4; .5 INJECTION, POWDER, LYOPHILIZED, FOR SOLUTION INTRAVENOUS at 21:42

## 2022-12-31 RX ADMIN — Medication 1 PACKET: at 08:59

## 2022-12-31 RX ADMIN — LEVALBUTEROL HYDROCHLORIDE 1.25 MG: 1.25 SOLUTION RESPIRATORY (INHALATION) at 04:56

## 2022-12-31 RX ADMIN — DEXMEDETOMIDINE HYDROCHLORIDE 1 MCG/KG/HR: 400 INJECTION INTRAVENOUS at 01:23

## 2022-12-31 RX ADMIN — SIMETHICONE 80 MG: 80 TABLET, CHEWABLE ORAL at 11:25

## 2022-12-31 RX ADMIN — ACETYLCYSTEINE 2 ML: 200 SOLUTION ORAL; RESPIRATORY (INHALATION) at 08:15

## 2022-12-31 RX ADMIN — DEXMEDETOMIDINE HYDROCHLORIDE 1 MCG/KG/HR: 400 INJECTION INTRAVENOUS at 12:34

## 2022-12-31 RX ADMIN — ACETYLCYSTEINE 2 ML: 200 SOLUTION ORAL; RESPIRATORY (INHALATION) at 04:56

## 2022-12-31 RX ADMIN — SENNOSIDES 8.6 MG: 8.6 TABLET, FILM COATED ORAL at 09:00

## 2022-12-31 RX ADMIN — GABAPENTIN 300 MG: 300 CAPSULE ORAL at 14:13

## 2022-12-31 RX ADMIN — SENNOSIDES 8.6 MG: 8.6 TABLET, FILM COATED ORAL at 19:42

## 2022-12-31 RX ADMIN — GABAPENTIN 300 MG: 300 CAPSULE ORAL at 09:00

## 2022-12-31 RX ADMIN — Medication 15 ML: at 09:00

## 2022-12-31 RX ADMIN — ENOXAPARIN SODIUM 40 MG: 40 INJECTION SUBCUTANEOUS at 19:42

## 2022-12-31 RX ADMIN — PIPERACILLIN SODIUM AND TAZOBACTAM SODIUM 4.5 G: 4; .5 INJECTION, POWDER, LYOPHILIZED, FOR SOLUTION INTRAVENOUS at 16:57

## 2022-12-31 RX ADMIN — DEXMEDETOMIDINE HYDROCHLORIDE 1 MCG/KG/HR: 400 INJECTION INTRAVENOUS at 08:59

## 2022-12-31 RX ADMIN — Medication 0.5 MG: at 09:09

## 2022-12-31 RX ADMIN — Medication 1 PACKET: at 16:56

## 2022-12-31 RX ADMIN — Medication 0.5 MG: at 19:44

## 2022-12-31 RX ADMIN — LEVALBUTEROL HYDROCHLORIDE 1.25 MG: 1.25 SOLUTION RESPIRATORY (INHALATION) at 16:05

## 2022-12-31 RX ADMIN — QUETIAPINE FUMARATE 600 MG: 300 TABLET ORAL at 21:42

## 2022-12-31 RX ADMIN — Medication 40 MG: at 09:00

## 2022-12-31 RX ADMIN — Medication 10 ML: at 19:45

## 2022-12-31 RX ADMIN — Medication 1 PACKET: at 11:25

## 2022-12-31 RX ADMIN — ENOXAPARIN SODIUM 40 MG: 40 INJECTION SUBCUTANEOUS at 09:00

## 2022-12-31 RX ADMIN — BUMETANIDE 2 MG: 0.25 INJECTION INTRAMUSCULAR; INTRAVENOUS at 11:24

## 2022-12-31 RX ADMIN — ONDANSETRON HYDROCHLORIDE 4 MG: 2 INJECTION, SOLUTION INTRAMUSCULAR; INTRAVENOUS at 16:56

## 2022-12-31 RX ADMIN — Medication 10 ML: at 19:43

## 2022-12-31 RX ADMIN — LEVALBUTEROL HYDROCHLORIDE 1.25 MG: 1.25 SOLUTION RESPIRATORY (INHALATION) at 00:27

## 2022-12-31 RX ADMIN — GABAPENTIN 300 MG: 300 CAPSULE ORAL at 19:42

## 2022-12-31 RX ADMIN — ACETAMINOPHEN 650 MG: 325 TABLET, FILM COATED ORAL at 15:11

## 2022-12-31 RX ADMIN — DEXMEDETOMIDINE HYDROCHLORIDE 1 MCG/KG/HR: 400 INJECTION INTRAVENOUS at 06:53

## 2022-12-31 ASSESSMENT — ACTIVITIES OF DAILY LIVING (ADL)
ADLS_ACUITY_SCORE: 30
ADLS_ACUITY_SCORE: 34
ADLS_ACUITY_SCORE: 28
ADLS_ACUITY_SCORE: 30
ADLS_ACUITY_SCORE: 34
ADLS_ACUITY_SCORE: 30
ADLS_ACUITY_SCORE: 34
ADLS_ACUITY_SCORE: 28
ADLS_ACUITY_SCORE: 30
ADLS_ACUITY_SCORE: 30

## 2022-12-31 NOTE — PROGRESS NOTES
Pt extubated post 45min PS/CPAP 7/7 30 wean.  Cuff leak +.   Volara/Nebs given per MD order.   Pt placed immediately on HFNC 40l/m 40%. Pt agreed to wear CPAP @ NOC.     RT will follow.

## 2022-12-31 NOTE — PLAN OF CARE
ICU End of Shift Summary. See flowsheets for vital signs and detailed assessment.     Changes this shift: Pt following commands, tracking, able to make most needs known. Precedex increased to 1 for increased restlessness/anxiety, fentanyl remains at 100. PRN antihypertensives given for SBP >135. Bradycardic to low 50s. Pt hyperglycemic, BGs 270s-300. MD notified, one-time dose of regular insulin in addition to S/S, no insulin gtt for now. New PI on tongue. Tongue remains very edematous. ETT repositioned from left to right every 2 hrs, avoiding PI on center of tongue.      Plan: PST when able. Continue plan of care.      Goal Outcome Evaluation:     Plan of Care Reviewed With: patient     Overall Patient Progress: no change

## 2022-12-31 NOTE — PROGRESS NOTES
MEDICAL ICU PROGRESS NOTE  12/31/2022      Date of Service (when I saw the patient): 12/31/2022    ASSESSMENT: Antony Aguila Jr. is a 62 year old male admitted on 12/21/2022. He has history chronic type B aortic dissection s/p exploration and median sternotomy (6/18/19), type 2 DM (poorly controlled), and HTN who is admitted for acute on chronic thoracic aortic aneurysm with new aortic root dilation. Hospitalization complicated by hypokalemia who was admitted on 12/21/2022 for acute hypoxic respiratory failure in setting of agitation and need for sedation.      CHANGES and MAJOR THINGS TODAY:   - bumex 2mg BID   - Growing S .aureus  - Continue Zosyn  - Increase lantus to 65U  - PST with possible extubation with anesthesia today      PLAN:    Neuro:  # Pain and sedation  - Precedex gtt  - fentanyl gtt and bolus  - RASS goal 0 to -1    # Agitation     # Encephalopathy, likely metabolic  - patient developing confusion, possibly 2/2 hypercapnia and noncompliance with cpap  - patient agitated on the floor requiring precedex, ativan, zyprexa and not compliant with cares  - needed further sedation with ativan to the point that intubation was required   - Will wean sedation after feeding tube placement  - Psychiatry consulted, appreciate recs  - Aripiprazole 10 mg HS  - Guanfacine 0.5 mg BID (alpha-2 agonist like Precedex)   - Haldol 2-5 mg IV q 4 hours for severe agitation (and follow QTc)    # Schizoaffective disorder  # PTSD  Meds per chart include Zyprexa, Abilify, and Seroquel. Per pharmacy med rec, appears to only have filled Seroquel recently.   - continue PTA quetiapine 600 mg at bedtime  - PharmD med rec  - Psych recs as above      # Tobacco use  Smoked cigarettes for 45 years. Currently 1 pack per week.  - Encourage cessation      Pulmonary:  # Acute hypoxic and hypercarbic respiratory failure  # Significant airway edema  # Concern for sleep apnea  # Tracheostomy placed 7/5/19, decannulated 8/2019  Per  hospitalization notes here in Sept 2022, noted to have hx of dyspnea on exertion in setting of being current smoker, tx with albuterol inhaler, recommended outpatient PFTs be obtained. Pulmonary progress note from 7/2019 notes current smoker with unknown COPD history, no PFTs available. Had tracheostomy placed 7/5/19, decannulated 8/2019. CTA chest with compressive atelectasis in L lung adjacent to thoracic aorta and hiatal hernia. Patient became agitated and non-compliant with attempts for patient to use BIPAP or HFNC and needed heavy sedation to treat agitation such that intubation was required to protect airway. Patient is historically a difficult intubation and was very difficult this time. Patient has a possible hx of angioedema reaction to lisinopril, was started on losartan yesterday, could account for the airway edema and extubation will be expected to be delayed due to this. Likely needs higher PEEP due to his body habitus.   - patient emergently intubated  - s/p decadron 8mg IV x1  - Decadron 4mg IV q8h x3 doses starting 12/30  - Bumex IV 2 BID   - Wean PEEP to 7, PST today if tolerating well    Vent Mode: (S) CPAP/PS  (Continuous positive airway pressure with Pressure Support)  FiO2 (%): 40 %  Resp Rate (Set): 20 breaths/min  Tidal Volume (Set, mL): 500 mL  PEEP (cm H2O): 7 cmH2O  Pressure Support (cm H2O): 7 cmH2O  Resp: 13    # H/o possible ACE inhibitor related angioedema  - Noted during July 2019 hospitalization at same time having AHRF requiring tracheostomy (later decannulted in August 2019).  - Notably patient started on losartan 12/25/22  - Losartan added to allergy list   - S/p decadron 8mg IV x1   - Decadron 4mg IV q8h x3 doses starting 12/30    # Increased pulmonary secretions  # Concern for hospital associated pneumonia  Has developed copious creamy secretions overnight, has low grade temperature of 100.0F, no leukocytosis. CXR showing increased opacities.  - Sputum cx   - PCT, only 0.11  -  Pulm toilet: metaneb, mucomyst   - Bumex 2 BID   - Zosyn 12/29-*, plan for 7 day course     Cardiovascular:  # Shock, NOS-resolved   - patient developed hypotension after intubation and being sedated on propofol   - s/p 1L NS  - Off norepinephrine   - TTE 12/26 EF 60-65%, normal RV      # Chronic type B aortic dissection (proximal descending aorta to the abdominal aorta)  # Proximal ascending aorta saccular aneurysm   # Distal ascending aorta saccular aneurysm   # Mild nonobstructive CAD  Pt has history of stable type B aortic dissection as well as short segment of dissection vs pseudoaneurysms at the aortic root and distal ascending thoracic aorta. Previously underwent surgical exploration in 6/2019 with concern for type 1 aortic dissection but found to have chronic type B dissection. On admission, CXR with widened mediastinum slightly increased from prior. CT chest with aneurysmal dilatation ascending and descending thoracic aorta with type B aortic dissection distal to L subclavian artery extending to L common iliac artery with severe narrowing of true lumen and possible pseudoaneurysm at aortic root, new compared to prior 2019 images.   - TRINH 12/23 showed saccular aneurysm of ascending aorta measuring 5.8 cm associated with a dissection flap. Thrombus is seen in the false lumen. Aortic root is dilated at 4.6 cm.  - CTA 12/22 two saccular aneurysms, one each in the proximal ascending aorta and distal ascending aorta. The maximal transaortic diameters of 46.9 x 39.2 mm and 62.7 x 54.4 mm, respectively. Mild, non-obstructive coronary artery disease without any high-grade stenoses.  - Cardiology and CVTS consulted and signed off  - Plan for outpatient follow up with CVTS  - No HR goal per cardiology, SBP preferably maintained <130  - PRN labetalol 10mg for SBP >135, PRN hydralazine for SBP>135 if HR<55  - Hold labetalol 400 mg BID 12/25  - Hold DVT ppx 12/23; restarted 12/24 for thrombus seen on TRINH, continue per  CVTS recs  - Doppler pulses     # HTN  Based on outpatient note 12/14/2022 pt was previously hydordiuril 50 mg, amlodipine 10 mg, bumex TID and canaglaflozin. 12/25 Hypertensive overnight with highest SBP of 170s. Given hx of Type B sonam dissection and now new aortic aneurysm goals   - hold Labetalol  BID (titrated from intially PO dose of 100 mg)  - holding PTA hydrochlorothiazide  - Was on Nicardipine gtt given persistent elevated SBPs  - prn labetalol 10mg for SBP >135, PRN hydralazine for SBP>135 if HR<55     GI/Nutrition:  # Large hiatal hernia  # Nutrition  - Unable to place feeding tube under fluoro due to large hiatal hernia   - GI assisted with feeding tube placement   - continue tube feeds     Renal/Fluids/Electrolytes:  # LAKISHA on CKD 2-resolved   Outpatient labs 12/16 with Cr 1.4 and eGFR 55. Now back at baseline Cr ~1.1-1.2, eGFR ~65-75. UA unremarkable.  - decrease PTA gabapentin 600 mg TID -> 300 mg TID  - holding PTA metformin for now     # Mild hypokalemia, resolved  # Mild hypomagnesemia- resolved   - Repletion protocols     # Hypocalcemia, resolved  - Monitor BMP     Endocrine:  # Type 2 DM with hyperglycemia  Most recent A1c 12.5 (9/16/22). Home regimen: metformin 1000 qPM and Lantus 50 unit(s) qAM + aspart 17 unit(s) TIDAC + aspart 9 unit(s) w/ snacks  - Increase lantus to 65U  - HDSSI   - holding PTA metformin as above     ID:  # Increased secretions  # Concern for hospital associated pneumonia  Has developed copious creamy secretions overnight, has low grade temperature of 100.0F, no leukocytosis. CXR showing increased opacities.   - Sputum cx: Staph aureus    - PCT, only 0.11  - Pulm toilet: metaneb, mucomyst   - Bumex 2 BID   - Zosyn 12/29-*, plan for 7 day course      Hematology:    # Anemia, normocytic  - Trend CBC     Musculoskeletal:  # BLE edema  Dimer elevated with acute on chronic leg swelling L < R per patient history. DVT US in ED negative for DVT b/l. Has had LE swelling  for last 2 years, notes it is worse lately. Previously on diuretics, not currently per chart.  - lymphedema consult  - diuresis as above     Skin:  # Venous stasis ulcer LLE  # Venous insufficiency  # Peripheral vascular disease  - WOC consult placed     General Cares/Prophylaxis:    DVT Prophylaxis: lovenox   GI Prophylaxis: PPI  Restraints: b/l wrist soft  Family Communication: Sister updated at bedside   Code Status: Full Code     Lines/tubes/drains:  - ETT  - Noble  - NG  - PIV x3   - PICC     Disposition:  - Medical ICU      Patient seen and findings/plan discussed with medical ICU staff, Dr. Goldstein.     Ruthann Moser MD  PGY-1 Internal Medicine      Clinically Significant Risk Factors         # Hypernatremia: Highest Na = 149 mmol/L in last 2 days, will monitor as appropriate      # Hypoalbuminemia: Lowest albumin = 2.9 g/dL at 12/26/2022  6:03 AM, will monitor as appropriate          # DMII: A1C = 8.0 % (Ref range: <5.7 %) within past 3 months   # Severe Obesity: Estimated body mass index is 47.21 kg/m  as calculated from the following:    Height as of this encounter: 1.829 m (6').    Weight as of this encounter: 157.9 kg (348 lb 1.7 oz).                   ====================================  INTERVAL HISTORY:   Head/neck twitching/jerking for ~10 minutes yesterday, received 2mg versed. Tmax 100.7. Still having coughing episodes causing desats requiring aggressive suctioning.     OBJECTIVE:   1. VITAL SIGNS:   Temp:  [97.7  F (36.5  C)-100.9  F (38.3  C)] 98.8  F (37.1  C)  Pulse:  [50-73] 67  Resp:  [13-25] 13  BP: (106-147)/(59-89) 126/70  FiO2 (%):  [40 %-50 %] 40 %  SpO2:  [83 %-98 %] 97 %  Vent Mode: (S) CPAP/PS  (Continuous positive airway pressure with Pressure Support)  FiO2 (%): 40 %  Resp Rate (Set): 20 breaths/min  Tidal Volume (Set, mL): 500 mL  PEEP (cm H2O): 7 cmH2O  Pressure Support (cm H2O): 7 cmH2O  Resp: 13    2. INTAKE/ OUTPUT:   I/O last 3 completed shifts:  In: 3569.62 [I.V.:1509.62;  NG/GT:620]  Out: 4605 [Urine:4605]    3. PHYSICAL EXAMINATION:  General: Laying in bed, appears comfortable    HEENT: No scleral icterus, MMM, tongue swollen and protruding, lips swollen   Neuro: Sedated, following commands    Pulm/Resp: Less coarse breath sounds bilaterally , mechanically ventilated  CV: RRR, no murmurs  Abdomen: Soft, non-distended, non-tender  : connor catheter in place  Incisions/Skin: b/l extremities covered with wraps, lower extremity edema with wrinkled skin     4. LABS:   Arterial Blood Gases   Recent Labs   Lab 12/29/22  0408 12/28/22  0946 12/27/22  0002 12/26/22  1642   PH 7.40 7.40 7.39 7.39   PCO2 47* 42 39 39   PO2 33* 66* 133* 62*   HCO3 29* 26 24 24     Complete Blood Count   Recent Labs   Lab 12/31/22  0405 12/30/22  0433 12/29/22  0408 12/28/22  0416   WBC 10.4 10.4 8.2 8.3   HGB 12.4* 11.3* 12.1* 11.9*    234 211 222     Basic Metabolic Panel  Recent Labs   Lab 12/31/22  1306 12/31/22  0857 12/31/22  0405 12/31/22  0038 12/30/22  2353 12/30/22  0747 12/30/22  0433 12/29/22  1631 12/29/22  1508   NA  --   --  147*  --  149*  --  147*  --  143   POTASSIUM  --   --  4.1  --  3.7  --  3.7  --  3.5   CHLORIDE  --   --  109*  --  112*  --  109*  --  107   CO2  --   --  26  --  26  --  26  --  26   BUN  --   --  29.1*  --  28.6*  --  23.7*  --  24.8*   CR  --   --  1.17  --  1.21*  --  1.25*  --  1.27*   * 317* 308*  323*   < > 283*   < > 206*   < > 213*    < > = values in this interval not displayed.     Liver Function Tests  Recent Labs   Lab 12/26/22  0603   AST 26   ALT 18   ALKPHOS 58   BILITOTAL 0.4   ALBUMIN 2.9*   INR 1.13     Coagulation Profile  Recent Labs   Lab 12/26/22  0603   INR 1.13       5. RADIOLOGY:   No results found for this or any previous visit (from the past 24 hour(s)).

## 2023-01-01 LAB
ANION GAP SERPL CALCULATED.3IONS-SCNC: 11 MMOL/L (ref 7–15)
BUN SERPL-MCNC: 30.4 MG/DL (ref 8–23)
CALCIUM SERPL-MCNC: 8.1 MG/DL (ref 8.8–10.2)
CHLORIDE SERPL-SCNC: 113 MMOL/L (ref 98–107)
CREAT SERPL-MCNC: 1.23 MG/DL (ref 0.67–1.17)
DEPRECATED HCO3 PLAS-SCNC: 28 MMOL/L (ref 22–29)
ERYTHROCYTE [DISTWIDTH] IN BLOOD BY AUTOMATED COUNT: 16.7 % (ref 10–15)
GFR SERPL CREATININE-BSD FRML MDRD: 66 ML/MIN/1.73M2
GLUCOSE BLDC GLUCOMTR-MCNC: 147 MG/DL (ref 70–99)
GLUCOSE BLDC GLUCOMTR-MCNC: 159 MG/DL (ref 70–99)
GLUCOSE BLDC GLUCOMTR-MCNC: 191 MG/DL (ref 70–99)
GLUCOSE BLDC GLUCOMTR-MCNC: 207 MG/DL (ref 70–99)
GLUCOSE BLDC GLUCOMTR-MCNC: 210 MG/DL (ref 70–99)
GLUCOSE BLDC GLUCOMTR-MCNC: 212 MG/DL (ref 70–99)
GLUCOSE BLDC GLUCOMTR-MCNC: 255 MG/DL (ref 70–99)
GLUCOSE SERPL-MCNC: 127 MG/DL (ref 70–99)
HCT VFR BLD AUTO: 34.5 % (ref 40–53)
HGB BLD-MCNC: 11 G/DL (ref 13.3–17.7)
MAGNESIUM SERPL-MCNC: 1.8 MG/DL (ref 1.7–2.3)
MCH RBC QN AUTO: 28.8 PG (ref 26.5–33)
MCHC RBC AUTO-ENTMCNC: 31.9 G/DL (ref 31.5–36.5)
MCV RBC AUTO: 90 FL (ref 78–100)
PHOSPHATE SERPL-MCNC: 2.5 MG/DL (ref 2.5–4.5)
PLATELET # BLD AUTO: 222 10E3/UL (ref 150–450)
POTASSIUM SERPL-SCNC: 3.1 MMOL/L (ref 3.4–5.3)
POTASSIUM SERPL-SCNC: 3.6 MMOL/L (ref 3.4–5.3)
RBC # BLD AUTO: 3.82 10E6/UL (ref 4.4–5.9)
SODIUM SERPL-SCNC: 152 MMOL/L (ref 136–145)
WBC # BLD AUTO: 10.1 10E3/UL (ref 4–11)

## 2023-01-01 PROCEDURE — 250N000013 HC RX MED GY IP 250 OP 250 PS 637: Performed by: STUDENT IN AN ORGANIZED HEALTH CARE EDUCATION/TRAINING PROGRAM

## 2023-01-01 PROCEDURE — 94640 AIRWAY INHALATION TREATMENT: CPT | Mod: 76

## 2023-01-01 PROCEDURE — 94660 CPAP INITIATION&MGMT: CPT

## 2023-01-01 PROCEDURE — 83735 ASSAY OF MAGNESIUM: CPT | Performed by: INTERNAL MEDICINE

## 2023-01-01 PROCEDURE — 84132 ASSAY OF SERUM POTASSIUM: CPT | Performed by: INTERNAL MEDICINE

## 2023-01-01 PROCEDURE — 80048 BASIC METABOLIC PNL TOTAL CA: CPT | Performed by: STUDENT IN AN ORGANIZED HEALTH CARE EDUCATION/TRAINING PROGRAM

## 2023-01-01 PROCEDURE — 250N000011 HC RX IP 250 OP 636

## 2023-01-01 PROCEDURE — 250N000013 HC RX MED GY IP 250 OP 250 PS 637

## 2023-01-01 PROCEDURE — 250N000009 HC RX 250: Performed by: NURSE PRACTITIONER

## 2023-01-01 PROCEDURE — 99233 SBSQ HOSP IP/OBS HIGH 50: CPT | Performed by: STUDENT IN AN ORGANIZED HEALTH CARE EDUCATION/TRAINING PROGRAM

## 2023-01-01 PROCEDURE — 272N000272 HC CONTINUOUS NEBULIZER MICRO PUMP

## 2023-01-01 PROCEDURE — 99233 SBSQ HOSP IP/OBS HIGH 50: CPT | Mod: GC | Performed by: INTERNAL MEDICINE

## 2023-01-01 PROCEDURE — 999N000157 HC STATISTIC RCP TIME EA 10 MIN

## 2023-01-01 PROCEDURE — 999N000215 HC STATISTIC HFNC ADULT NON-CPAP

## 2023-01-01 PROCEDURE — 200N000002 HC R&B ICU UMMC

## 2023-01-01 PROCEDURE — 250N000013 HC RX MED GY IP 250 OP 250 PS 637: Performed by: INTERNAL MEDICINE

## 2023-01-01 PROCEDURE — 250N000009 HC RX 250

## 2023-01-01 PROCEDURE — 85027 COMPLETE CBC AUTOMATED: CPT | Performed by: STUDENT IN AN ORGANIZED HEALTH CARE EDUCATION/TRAINING PROGRAM

## 2023-01-01 PROCEDURE — 84100 ASSAY OF PHOSPHORUS: CPT | Performed by: INTERNAL MEDICINE

## 2023-01-01 PROCEDURE — 94640 AIRWAY INHALATION TREATMENT: CPT

## 2023-01-01 PROCEDURE — 250N000011 HC RX IP 250 OP 636: Performed by: NURSE PRACTITIONER

## 2023-01-01 RX ORDER — POTASSIUM CHLORIDE 750 MG/1
40 TABLET, EXTENDED RELEASE ORAL ONCE
Status: DISCONTINUED | OUTPATIENT
Start: 2023-01-01 | End: 2023-01-02

## 2023-01-01 RX ORDER — POTASSIUM CHLORIDE 1.5 G/1.58G
40 POWDER, FOR SOLUTION ORAL ONCE
Status: COMPLETED | OUTPATIENT
Start: 2023-01-01 | End: 2023-01-01

## 2023-01-01 RX ORDER — BUMETANIDE 0.25 MG/ML
2 INJECTION INTRAMUSCULAR; INTRAVENOUS 2 TIMES DAILY
Status: COMPLETED | OUTPATIENT
Start: 2023-01-01 | End: 2023-01-01

## 2023-01-01 RX ORDER — NAFCILLIN SODIUM 2 G/8ML
2 INJECTION, POWDER, FOR SOLUTION INTRAMUSCULAR; INTRAVENOUS EVERY 4 HOURS
Status: COMPLETED | OUTPATIENT
Start: 2023-01-01 | End: 2023-01-04

## 2023-01-01 RX ORDER — AMLODIPINE BESYLATE 10 MG/1
10 TABLET ORAL DAILY
Status: DISCONTINUED | OUTPATIENT
Start: 2023-01-01 | End: 2023-01-05

## 2023-01-01 RX ADMIN — QUETIAPINE FUMARATE 600 MG: 300 TABLET ORAL at 22:44

## 2023-01-01 RX ADMIN — Medication 40 MG: at 06:34

## 2023-01-01 RX ADMIN — INSULIN GLARGINE 65 UNITS: 100 INJECTION, SOLUTION SUBCUTANEOUS at 13:47

## 2023-01-01 RX ADMIN — SENNOSIDES 8.6 MG: 8.6 TABLET, FILM COATED ORAL at 08:27

## 2023-01-01 RX ADMIN — HYDRALAZINE HYDROCHLORIDE 10 MG: 20 INJECTION INTRAMUSCULAR; INTRAVENOUS at 23:45

## 2023-01-01 RX ADMIN — PIPERACILLIN SODIUM AND TAZOBACTAM SODIUM 4.5 G: 4; .5 INJECTION, POWDER, LYOPHILIZED, FOR SOLUTION INTRAVENOUS at 09:56

## 2023-01-01 RX ADMIN — LEVALBUTEROL HYDROCHLORIDE 1.25 MG: 1.25 SOLUTION RESPIRATORY (INHALATION) at 01:08

## 2023-01-01 RX ADMIN — PIPERACILLIN SODIUM AND TAZOBACTAM SODIUM 4.5 G: 4; .5 INJECTION, POWDER, LYOPHILIZED, FOR SOLUTION INTRAVENOUS at 03:36

## 2023-01-01 RX ADMIN — ENOXAPARIN SODIUM 40 MG: 40 INJECTION SUBCUTANEOUS at 19:52

## 2023-01-01 RX ADMIN — HYDRALAZINE HYDROCHLORIDE 10 MG: 20 INJECTION INTRAMUSCULAR; INTRAVENOUS at 09:54

## 2023-01-01 RX ADMIN — AMLODIPINE BESYLATE 10 MG: 10 TABLET ORAL at 14:16

## 2023-01-01 RX ADMIN — ENOXAPARIN SODIUM 40 MG: 40 INJECTION SUBCUTANEOUS at 08:26

## 2023-01-01 RX ADMIN — ACETYLCYSTEINE 2 ML: 200 SOLUTION ORAL; RESPIRATORY (INHALATION) at 04:35

## 2023-01-01 RX ADMIN — LABETALOL HYDROCHLORIDE 10 MG: 5 INJECTION, SOLUTION INTRAVENOUS at 19:55

## 2023-01-01 RX ADMIN — POTASSIUM CHLORIDE 40 MEQ: 1.5 POWDER, FOR SOLUTION ORAL at 06:34

## 2023-01-01 RX ADMIN — BUMETANIDE 2 MG: 0.25 INJECTION INTRAMUSCULAR; INTRAVENOUS at 13:35

## 2023-01-01 RX ADMIN — ACETYLCYSTEINE 2 ML: 200 SOLUTION ORAL; RESPIRATORY (INHALATION) at 01:08

## 2023-01-01 RX ADMIN — ACETAMINOPHEN 650 MG: 325 TABLET, FILM COATED ORAL at 08:25

## 2023-01-01 RX ADMIN — BUMETANIDE 2 MG: 0.25 INJECTION INTRAMUSCULAR; INTRAVENOUS at 19:54

## 2023-01-01 RX ADMIN — Medication 0.5 MG: at 19:52

## 2023-01-01 RX ADMIN — NAFCILLIN 2 G: 2 POWDER, FOR SOLUTION INTRAMUSCULAR; INTRAVENOUS at 14:14

## 2023-01-01 RX ADMIN — NAFCILLIN 2 G: 2 POWDER, FOR SOLUTION INTRAMUSCULAR; INTRAVENOUS at 22:56

## 2023-01-01 RX ADMIN — LEVALBUTEROL HYDROCHLORIDE 1.25 MG: 1.25 SOLUTION RESPIRATORY (INHALATION) at 20:46

## 2023-01-01 RX ADMIN — ACETYLCYSTEINE 2 ML: 200 SOLUTION ORAL; RESPIRATORY (INHALATION) at 20:46

## 2023-01-01 RX ADMIN — GABAPENTIN 300 MG: 300 CAPSULE ORAL at 19:52

## 2023-01-01 RX ADMIN — LABETALOL HYDROCHLORIDE 10 MG: 5 INJECTION, SOLUTION INTRAVENOUS at 06:43

## 2023-01-01 RX ADMIN — Medication 0.5 MG: at 08:26

## 2023-01-01 RX ADMIN — LEVALBUTEROL HYDROCHLORIDE 1.25 MG: 1.25 SOLUTION RESPIRATORY (INHALATION) at 04:35

## 2023-01-01 RX ADMIN — Medication 15 ML: at 08:25

## 2023-01-01 RX ADMIN — GABAPENTIN 300 MG: 300 CAPSULE ORAL at 08:25

## 2023-01-01 RX ADMIN — SIMETHICONE 80 MG: 80 TABLET, CHEWABLE ORAL at 14:16

## 2023-01-01 RX ADMIN — Medication 10 ML: at 20:05

## 2023-01-01 RX ADMIN — ARIPIPRAZOLE 10 MG: 1 SOLUTION ORAL at 22:47

## 2023-01-01 RX ADMIN — ACETYLCYSTEINE 2 ML: 200 SOLUTION ORAL; RESPIRATORY (INHALATION) at 09:15

## 2023-01-01 RX ADMIN — NAFCILLIN 2 G: 2 POWDER, FOR SOLUTION INTRAMUSCULAR; INTRAVENOUS at 18:28

## 2023-01-01 RX ADMIN — LEVALBUTEROL HYDROCHLORIDE 1.25 MG: 1.25 SOLUTION RESPIRATORY (INHALATION) at 09:15

## 2023-01-01 RX ADMIN — SIMETHICONE 80 MG: 80 TABLET, CHEWABLE ORAL at 03:56

## 2023-01-01 RX ADMIN — LABETALOL HYDROCHLORIDE 10 MG: 5 INJECTION, SOLUTION INTRAVENOUS at 13:36

## 2023-01-01 RX ADMIN — GABAPENTIN 300 MG: 300 CAPSULE ORAL at 13:34

## 2023-01-01 RX ADMIN — HYDRALAZINE HYDROCHLORIDE 10 MG: 20 INJECTION INTRAMUSCULAR; INTRAVENOUS at 16:12

## 2023-01-01 RX ADMIN — ONDANSETRON HYDROCHLORIDE 4 MG: 2 INJECTION, SOLUTION INTRAMUSCULAR; INTRAVENOUS at 14:16

## 2023-01-01 ASSESSMENT — ACTIVITIES OF DAILY LIVING (ADL)
ADLS_ACUITY_SCORE: 28
ADLS_ACUITY_SCORE: 26
ADLS_ACUITY_SCORE: 28
ADLS_ACUITY_SCORE: 26
ADLS_ACUITY_SCORE: 28

## 2023-01-01 NOTE — PROGRESS NOTES
MEDICAL ICU PROGRESS NOTE  01/01/2023      Date of Service (when I saw the patient): 01/01/2023    ASSESSMENT: Antony Aguila Jr. is a 62 year old male admitted on 12/21/2022. He has history chronic type B aortic dissection s/p exploration and median sternotomy (6/18/19), type 2 DM (poorly controlled), and HTN who is admitted for acute on chronic thoracic aortic aneurysm with new aortic root dilation. Hospitalization complicated by hypokalemia who was admitted on 12/21/2022 for acute hypoxic respiratory failure in setting of agitation and need for sedation.      CHANGES and MAJOR THINGS TODAY:   - weaning HFNC  - bumex 2mg BID   - Zosyn > nafcillin  - transfer to floor    PLAN:    Neuro:  # Pain and sedation  - RASS goal 0 to -1    # Agitation     # Encephalopathy, likely metabolic  - patient developing confusion, possibly 2/2 hypercapnia and noncompliance with cpap  - patient agitated on the floor requiring precedex, ativan, zyprexa and not compliant with cares  - needed further sedation with ativan to the point that intubation was required   - Will wean sedation after feeding tube placement  - Psychiatry consulted, appreciate recs  - Aripiprazole 10 mg HS  - Guanfacine 0.5 mg BID (alpha-2 agonist like Precedex)   - Haldol 2-5 mg IV q 4 hours for severe agitation (and follow QTc)    # Schizoaffective disorder  # PTSD  Meds per chart include Zyprexa, Abilify, and Seroquel. Per pharmacy med rec, appears to only have filled Seroquel recently.   - continue PTA quetiapine 600 mg at bedtime  - PharmD med rec  - Psych recs as above      # Tobacco use  Smoked cigarettes for 45 years. Currently 1 pack per week.  - Encourage cessation      Pulmonary:  # Acute hypoxic and hypercarbic respiratory failure  # Significant airway edema  # Concern for sleep apnea  # Tracheostomy placed 7/5/19, decannulated 8/2019  Per hospitalization notes here in Sept 2022, noted to have hx of dyspnea on exertion in setting of being current  smoker, tx with albuterol inhaler, recommended outpatient PFTs be obtained. Pulmonary progress note from 7/2019 notes current smoker with unknown COPD history, no PFTs available. Had tracheostomy placed 7/5/19, decannulated 8/2019. CTA chest with compressive atelectasis in L lung adjacent to thoracic aorta and hiatal hernia. Patient became agitated and non-compliant with attempts for patient to use BIPAP or HFNC and needed heavy sedation to treat agitation such that intubation was required to protect airway. Patient is historically a difficult intubation and was very difficult this time. Patient has a possible hx of angioedema reaction to lisinopril, was started on losartan yesterday, could account for the airway edema and extubation will be expected to be delayed due to this. Likely needs higher PEEP due to his body habitus.   - patient emergently intubated  - Decadron 4mg IV q8h x3 doses starting 12/30  - s/p extubation on 12/31/22  - continue Bumex IV 2 BID     # H/o possible ACE inhibitor related angioedema  - Noted during July 2019 hospitalization at same time having AHRF requiring tracheostomy (later decannulted in August 2019).  - Notably patient started on losartan 12/25/22  - Losartan added to allergy list   - S/p decadron 8mg IV x1   - Decadron 4mg IV q8h x3 doses starting 12/30    # Increased pulmonary secretions  # Concern for hospital associated pneumonia  Has developed copious creamy secretions overnight, has low grade temperature of 100.0F, no leukocytosis. CXR showing increased opacities.  - Sputum cx   - PCT, only 0.11  - Pulm toilet: metaneb, mucomyst   - Bumex 2 BID   - Zosyn 12/29-*, plan for 7 day course     Cardiovascular:  # Shock, NOS-resolved   - patient developed hypotension after intubation and being sedated on propofol   - s/p 1L NS  - Off norepinephrine   - TTE 12/26 EF 60-65%, normal RV      # Chronic type B aortic dissection (proximal descending aorta to the abdominal aorta)  #  Proximal ascending aorta saccular aneurysm   # Distal ascending aorta saccular aneurysm   # Mild nonobstructive CAD  Pt has history of stable type B aortic dissection as well as short segment of dissection vs pseudoaneurysms at the aortic root and distal ascending thoracic aorta. Previously underwent surgical exploration in 6/2019 with concern for type 1 aortic dissection but found to have chronic type B dissection. On admission, CXR with widened mediastinum slightly increased from prior. CT chest with aneurysmal dilatation ascending and descending thoracic aorta with type B aortic dissection distal to L subclavian artery extending to L common iliac artery with severe narrowing of true lumen and possible pseudoaneurysm at aortic root, new compared to prior 2019 images.   - TRINH 12/23 showed saccular aneurysm of ascending aorta measuring 5.8 cm associated with a dissection flap. Thrombus is seen in the false lumen. Aortic root is dilated at 4.6 cm.  - CTA 12/22 two saccular aneurysms, one each in the proximal ascending aorta and distal ascending aorta. The maximal transaortic diameters of 46.9 x 39.2 mm and 62.7 x 54.4 mm, respectively. Mild, non-obstructive coronary artery disease without any high-grade stenoses.  - Cardiology and CVTS consulted and signed off  - Plan for outpatient follow up with CVTS  - No HR goal per cardiology, SBP preferably maintained <130  - PRN labetalol 10mg for SBP >135, PRN hydralazine for SBP>135 if HR<55  - Hold labetalol 400 mg BID 12/25  - Hold DVT ppx 12/23; restarted 12/24 for thrombus seen on TRINH, continue per CVTS recs  - Doppler pulses     # HTN  Based on outpatient note 12/14/2022 pt was previously hydordiuril 50 mg, amlodipine 10 mg, bumex TID and canaglaflozin. 12/25 Hypertensive overnight with highest SBP of 170s. Given hx of Type B sonam dissection and now new aortic aneurysm goals   - hold Labetalol  BID (titrated from intially PO dose of 100 mg)  - holding PTA  hydrochlorothiazide  - Was on Nicardipine gtt given persistent elevated SBPs  - prn labetalol 10mg for SBP >135, PRN hydralazine for SBP>135 if HR<55     GI/Nutrition:  # Large hiatal hernia  # Nutrition  - Unable to place feeding tube under fluoro due to large hiatal hernia   - GI assisted with feeding tube placement   - continue tube feeds     Renal/Fluids/Electrolytes:  # LAKISHA on CKD 2-resolved   Outpatient labs 12/16 with Cr 1.4 and eGFR 55. Now back at baseline Cr ~1.1-1.2, eGFR ~65-75. UA unremarkable.  - decrease PTA gabapentin 600 mg TID -> 300 mg TID  - holding PTA metformin for now     # Mild hypokalemia, resolved  # Mild hypomagnesemia- resolved   - Repletion protocols     # Hypocalcemia, resolved  - Monitor BMP     Endocrine:  # Type 2 DM with hyperglycemia  Most recent A1c 12.5 (9/16/22). Home regimen: metformin 1000 qPM and Lantus 50 unit(s) qAM + aspart 17 unit(s) TIDAC + aspart 9 unit(s) w/ snacks  - Lantus 65U  - HDSSI   - holding PTA metformin as above     ID:  # Increased secretions  # Concern for hospital associated pneumonia  Has developed copious creamy secretions overnight, has low grade temperature of 100.0F, no leukocytosis. CXR showing increased opacities.   - Sputum cx: Staph aureus    - PCT, only 0.11  - Pulm toilet: metaneb, mucomyst   - diuresis as above  - Zosyn 12/29-1/1/23  - Start nafcillin today     Hematology:    # Anemia, normocytic  - Trend CBC     Musculoskeletal:  # BLE edema  Dimer elevated with acute on chronic leg swelling L < R per patient history. DVT US in ED negative for DVT b/l. Has had LE swelling for last 2 years, notes it is worse lately. Previously on diuretics, not currently per chart.  - lymphedema consult  - diuresis as above     Skin:  # Venous stasis ulcer LLE  # Venous insufficiency  # Peripheral vascular disease  - WOC consult placed     General Cares/Prophylaxis:    DVT Prophylaxis: lovenox   GI Prophylaxis: PPI  Restraints: none  Family Communication:  Sister updated at bedside   Code Status: Full Code     Lines/tubes/drains:  - Noble  - NG  - PIV x3   - PICC     Disposition:  - transfer to floor      Patient seen and findings/plan discussed with medical ICU staff, Dr. Hooker.     Breann Khan,   [facility]  PGY3, Internal Medicine  See Kathleen for pager     Clinically Significant Risk Factors        # Hypokalemia: Lowest K = 3.1 mmol/L in last 2 days, will replace as needed  # Hypernatremia: Highest Na = 152 mmol/L in last 2 days, will monitor as appropriate      # Hypoalbuminemia: Lowest albumin = 2.9 g/dL at 12/26/2022  6:03 AM, will monitor as appropriate          # DMII: A1C = 8.0 % (Ref range: <5.7 %) within past 3 months   # Severe Obesity: Estimated body mass index is 46.58 kg/m  as calculated from the following:    Height as of this encounter: 1.829 m (6').    Weight as of this encounter: 155.8 kg (343 lb 7.6 oz).                   ====================================  INTERVAL HISTORY:   No acute events overnight. Patient extubated yesterday, tolerating HFNC well. Transfer to floor today. Patient denies pain, SOB, chest pain. Having regular bowel movements.     OBJECTIVE:   1. VITAL SIGNS:   Temp:  [97.7  F (36.5  C)-99.4  F (37.4  C)] 99  F (37.2  C)  Pulse:  [] 80  Resp:  [12-28] 19  BP: (108-146)/() 132/109  FiO2 (%):  [30 %-50 %] 40 %  SpO2:  [83 %-100 %] 94 %  Vent Mode: (S) CPAP/PS  (Continuous positive airway pressure with Pressure Support)  FiO2 (%): (S) 40 %  Resp Rate (Set): 20 breaths/min  Tidal Volume (Set, mL): 500 mL  PEEP (cm H2O): 7 cmH2O  Pressure Support (cm H2O): 7 cmH2O  Resp: 19    2. INTAKE/ OUTPUT:   I/O last 3 completed shifts:  In: 4179.9 [P.O.:1200; I.V.:749.9; NG/GT:790]  Out: 5675 [Urine:5175; Stool:500]    3. PHYSICAL EXAMINATION:  General: Laying in bed, appears comfortable    HEENT: No scleral icterus, MMM  Neuro: NAD, following commands, no focal deficits    Pulm/Resp: Less coarse breath  sounds bilaterally, no wheezing  CV: RRR, no murmurs  Abdomen: Soft, non-distended, non-tender  : connor catheter in place  Incisions/Skin: b/l extremities covered with wraps, lower extremity edema with wrinkled skin     4. LABS:   Arterial Blood Gases   Recent Labs   Lab 12/29/22  0408 12/28/22  0946 12/27/22  0002 12/26/22  1642   PH 7.40 7.40 7.39 7.39   PCO2 47* 42 39 39   PO2 33* 66* 133* 62*   HCO3 29* 26 24 24     Complete Blood Count   Recent Labs   Lab 01/01/23  0345 12/31/22  0405 12/30/22  0433 12/29/22  0408   WBC 10.1 10.4 10.4 8.2   HGB 11.0* 12.4* 11.3* 12.1*    214 234 211     Basic Metabolic Panel  Recent Labs   Lab 01/01/23  0345 01/01/23  0003 12/31/22  1936 12/31/22  0857 12/31/22  0405 12/31/22  0038 12/30/22  2353 12/30/22  0747 12/30/22  0433   *  --   --   --  147*  --  149*  --  147*   POTASSIUM 3.1*  --   --   --  4.1  --  3.7  --  3.7   CHLORIDE 113*  --   --   --  109*  --  112*  --  109*   CO2 28  --   --   --  26  --  26  --  26   BUN 30.4*  --   --   --  29.1*  --  28.6*  --  23.7*   CR 1.23*  --   --   --  1.17  --  1.21*  --  1.25*   *  127* 159* 181*   < > 308*  323*   < > 283*   < > 206*    < > = values in this interval not displayed.     Liver Function Tests  Recent Labs   Lab 12/26/22  0603   AST 26   ALT 18   ALKPHOS 58   BILITOTAL 0.4   ALBUMIN 2.9*   INR 1.13     Coagulation Profile  Recent Labs   Lab 12/26/22  0603   INR 1.13       5. RADIOLOGY:   Recent Results (from the past 24 hour(s))   XR Abdomen Port 1 View    Narrative    EXAMINATION:  XR ABDOMEN PORT 1 VIEW 12/31/2022 1:53 PM     COMPARISON: Chest radiograph 12/29/2022.    HISTORY: small bore feeding tube placement post extubation.    FINDINGS: Portable frontal view of the abdomen. Enteric tube tip  projects over the mid abdomen, to the right midline. Partially  visualized median sternotomy wires. Prominent gaseous distention of  bowel in the upper abdomen with evaluation limited due to  suboptimal  positioning/field-of-view. The visualized lung bases demonstrate small  pleural effusions with overlying bibasilar opacities. Large hernia.      Impression    IMPRESSION:   1. Enteric tube tip projects over the mid abdomen, likely within the  proximal duodenum.  2. Prominent gaseous distention of the bowel, possibly adynamic ileus.    I have personally reviewed the examination and initial interpretation  and I agree with the findings.    DENISSE DAMON MD         SYSTEM ID:  [id number]

## 2023-01-01 NOTE — PLAN OF CARE
Goal Outcome Evaluation:    ICU End of Shift Summary. See flowsheets for vital signs and detailed assessment.    Changes this shift: Neuro intact. Hypertensive throughout shift, utilizing PRN Labetalol and Hydralazine to keep SBP <135 but unable to achieve goal. MD notified, PO Amlodipine started. Weaned O2 to 2L, continue to wean as tolerates. Able to cough/clear secretions. Tongue swelling improving. Advanced diet to 2g Na restriction. Intermittent nausea w/ emesis x1, PRN Zofran given. PRN Simethicone given for gas. Did have large BM. Using sliding scale insulin for blood glucose management; continues to remain elevated but improving. Bumex x1 with good response, large urine output this shift. Real THOMAS'd, DTV. K+ replaced this AM per protocol, recheck WNL; recheck all lytes in AM per protocol.     Plan: Wean O2 as able. Consider DC'ing tube feeds with increase in diet. Consider changing blood glucose to AC/HS. Transfer to C when bed available.      Problem: Hypertension Comorbidity  Goal: Blood Pressure in Desired Range  Intervention: Maintain Blood Pressure Management  Recent Flowsheet Documentation  Taken 1/1/2023 1600 by Julia Najera, RN  Medication Review/Management:    medications reviewed    high-risk medications identified  Taken 1/1/2023 1200 by Julia Najera, RN  Medication Review/Management:    medications reviewed    high-risk medications identified  Taken 1/1/2023 0800 by Julia Najera, RN  Medication Review/Management:    medications reviewed    high-risk medications identified

## 2023-01-01 NOTE — PLAN OF CARE
Goal Outcome Evaluation:  Plan of Care Reviewed With: patient  Overall Patient Progress: improving  Outcome Evaluation: Pt tolerating HFNC 30LPM/30% FiO2 post-extubation overnight, compliant with CPAP for 3 hr. BG trending down to 140s.    -----------------------------  Major Shift Events:  AOx4. PERRLA. VAIL. Denies pain. Tmax = 99.4F. VSS. SR with PVCs, HR 80-90s. K+ replaced per protocol. SBP goal <135, PRN labetalol given x1 for /79.  LS coarse-dim, pt tolerating HFNC 30 LPM/30% FiO2 and compliant with CPAP for 3 hr overnight, able to clear secretions. NJ tube @ 84, TF @ 60cc/hr goal with FWF 30cc Q4. C/o gas discomfort, PRN simethicone given. Rectal pouch in place, total 100 ml stool/shift. BGs checks trending down, last BG = 147. Noble in place, adequate UOP. R-PICC. PIV x2.     Plan: Wean O2 needs as able. Possible transfer orders today. Continue to monitor and treat per plan of care.     For vital signs and complete assessments, please see documentation flowsheets.

## 2023-01-01 NOTE — PLAN OF CARE
Goal Outcome Evaluation:    ICU End of Shift Summary. See flowsheets for vital signs and detailed assessment.    Changes this shift: Sedation off, extubated at 1250. Neuro intact; restraints DC'd. C/O mild headache this afternoon, used Tylenol x1 with relief. PRN hydralazine given x1 this AM for SBP >135. Extubated to HFNC, continue to wean as able. Plan for CPAP overnight. Tongue swelling improving; advanced diet to clear liquids, tolerating well. Large loose BM x1. Bumex given x2 with good output. Blood sugars remain elevated but improving throughout day. Phos replaced per protocol, recheck all lytes in AM per protocol.    Plan:  Continue to monitor and implement POC, notify provider of changes.        Problem: Diabetes Comorbidity  Goal: Blood Glucose Level Within Targeted Range  Outcome: Progressing     Problem: Hypertension Comorbidity  Goal: Blood Pressure in Desired Range  Outcome: Progressing

## 2023-01-01 NOTE — PROGRESS NOTES
[facility]    Medicine Accept Note - Hospitalist Service, GOLD TEAM 2    Date of Admission:  12/21/2022    Assessment & Plan   Antony Aguila Jr. is a 62 year old male admitted on 12/21/2022. He has history chronic type B aortic dissection s/p exploration and median sternotomy (6/18/19), type 2 DM (poorly controlled), and HTN who is admitted for acute on chronic thoracic aortic aneurysm with new aortic root dilation. Hospitalization complicated by hypokalemia who was admitted on 12/21/2022 for acute hypoxic respiratory failure in setting of agitation and need for sedation. Extubated 12/31, currently treating hypertension and MSSA ventilator-associated pneumonia weaning oxygen.     Changes Today:  - Added amlodipine. Cannot get ACE/ARB due to concern for angioedema. Also continue bumex BID for diuresis.  - Free water deficit of 8L, started FWF 200cc q4 hours to give additional 1L today. Also acknowledging need for diuresis (net negative 4L, decreasing weight but with small pleural effusions on CXR)    Active Medical Problems  # Chronic type B aortic dissection (proximal descending aorta to the abdominal aorta)  # Proximal ascending aorta saccular aneurysm   # Distal ascending aorta saccular aneurysm   # Mild nonobstructive CAD  Pt has history of stable type B aortic dissection as well as short segment of dissection vs pseudoaneurysms at the aortic root and distal ascending thoracic aorta. Previously underwent surgical exploration in 6/2019 with concern for type 1 aortic dissection but found to have chronic type B dissection. On admission, CXR with widened mediastinum slightly increased from prior. CT chest with aneurysmal dilatation ascending and descending thoracic aorta with type B aortic dissection distal to L subclavian artery extending to L common iliac artery with severe narrowing of true lumen and possible pseudoaneurysm at aortic root, new compared to prior 2019  images.   - TRINH 12/23 showed saccular aneurysm of ascending aorta measuring 5.8 cm associated with a dissection flap. Thrombus is seen in the false lumen. Aortic root is dilated at 4.6 cm.  - CTA 12/22 two saccular aneurysms, one each in the proximal ascending aorta and distal ascending aorta. The maximal transaortic diameters of 46.9 x 39.2 mm and 62.7 x 54.4 mm, respectively. Mild, non-obstructive coronary artery disease without any high-grade stenoses.  - Cardiology and CVTS consulted and signed off  - Plan for outpatient follow up with CVTS  - No HR goal per cardiology, SBP preferably maintained <130  - Started amlodipine 1/1 (on earlier in hospitalization), could also consider carvedilol or scheduled labetalol for further BP control. Cannot get ace/arb  - PRN labetalol 10mg for SBP >135, PRN hydralazine for SBP>135 if HR<55  - Hold DVT ppx 12/23; restarted 12/24 for thrombus seen on TRINH, continue per CVTS recs  - Doppler pulses     # Acute hypoxic and hypercarbic respiratory failure  # Significant airway edema  # Concern for sleep apnea  # Tracheostomy placed 7/5/19, decannulated 8/2019  Per hospitalization notes here in Sept 2022, noted to have hx of dyspnea on exertion in setting of being current smoker, tx with albuterol inhaler, recommended outpatient PFTs be obtained. Pulmonary progress note from 7/2019 notes current smoker with unknown COPD history, no PFTs available. Had tracheostomy placed 7/5/19, decannulated 8/2019. CTA chest with compressive atelectasis in L lung adjacent to thoracic aorta and hiatal hernia. Patient became agitated and non-compliant with attempts for patient to use BIPAP or HFNC and needed heavy sedation to treat agitation such that intubation was required to protect airway. Patient is historically a difficult intubation and was very difficult this time. Patient has a possible hx of angioedema reaction to lisinopril, was started on losartan yesterday, could account for the airway  edema and extubation will be expected to be delayed due to this. Likely needs higher PEEP due to his body habitus.   - patient emergently intubated  - s/p decadron 8mg IV x1, completed 4mg IV x3 1/1  - Bumex IV 2 BID   - Extubated 12/31, HFNC 25L 40%, wean as tolerated    # H/o possible ACE inhibitor related angioedema  - Noted during July 2019 hospitalization at same time having AHRF requiring tracheostomy (later decannulted in August 2019).  - Notably patient started on losartan 12/25/2  - Losartan added to allergy list   - Decadron course as abovef    # Increased pulmonary secretions  # Concern for hospital associated pneumonia  Has developed copious creamy secretions overnight, has low grade temperature of 100.0F, no leukocytosis. CXR showing increased opacities.  - PCT, only 0.11  - Pulm toilet: metaneb, mucomyst   - Bumex 2 BID   - Growing MSSA 12/29, Zosyn 12/29-1/1 then transitioned to nafcillin to complete 5-day course    # Agitation     # Encephalopathy, likely metabolic  - patient developing confusion, possibly 2/2 hypercapnia and noncompliance with cpap  - patient agitated on the floor requiring precedex, ativan, zyprexa and not compliant with cares  - needed further sedation with ativan to the point that intubation was required   - Psychiatry consulted, appreciate recs  - Aripiprazole 10 mg HS  - Guanfacine 0.5 mg BID (alpha-2 agonist like Precedex)   - Haldol 2-5 mg IV q 4 hours for severe agitation (and follow QTc)    # Schizoaffective disorder  # PTSD  Meds per chart include Zyprexa, Abilify, and Seroquel. Per pharmacy med rec, appears to only have filled Seroquel recently.   - continue PTA quetiapine 600 mg at bedtime  - PharmD med rec  - Psych recs as above     # HTN  Based on outpatient note 12/14/2022 pt was previously hydordiuril 50 mg, amlodipine 10 mg, bumex TID and canaglaflozin. 12/25 Hypertensive overnight with highest SBP of 170s. Given hx of Type B sonam dissection and now new aortic  aneurysm goals. On nicardipine gtt in the ICU.   - hold Labetalol  BID (titrated from intially PO dose of 100 mg)  - ACE/ARB contraindicated due to angioedema  - Start amlodipine 10mg 1/1. Can consider re-adding labetalol or carvedilol.  - prn labetalol 10mg for SBP >135, PRN hydralazine for SBP>135 if HR<55    # Large hiatal hernia  # Nutrition  # Hypernatremia  - Unable to place feeding tube under fluoro due to large hiatal hernia   - GI assisted with feeding tube placement   - continue tube feeds, i  - start clear liquids  - 1/1- calculated free water deficit at 8L, will increase FWF to 200ml q4 (would give additional 1L free water) in addition to oral fluids. Will likely need more to correct hypernatremia but also diuresing.      # Type 2 DM with hyperglycemia  Most recent A1c 12.5 (9/16/22). Home regimen: metformin 1000 qPM and Lantus 50 unit(s) qAM + aspart 17 unit(s) TIDAC + aspart 9 unit(s) w/ snacks  - Increase lantus to 65U  - HDSSI   - holding PTA metformin as above  - Will need to reassess as weaning tube feed/starting oral feeds    # BLE edema  Dimer elevated with acute on chronic leg swelling L < R per patient history. DVT US in ED negative for DVT b/l. Has had LE swelling for last 2 years, notes it is worse lately. Previously on diuretics, not currently per chart.  - lymphedema consult  - diuresis as above    Resolved or Chronic Problems:  # Shock, NOS-resolved   - patient developed hypotension after intubation and being sedated on propofol   - s/p 1L NS  - Off norepinephrine   - TTE 12/26 EF 60-65%, normal RV     # Anemia, normocytic  - Trend CBC    # LAKISHA on CKD 2-resolved   Outpatient labs 12/16 with Cr 1.4 and eGFR 55. Now back at baseline Cr ~1.1-1.2, eGFR ~65-75. UA unremarkable.  - decrease PTA gabapentin 600 mg TID -> 300 mg TID  - holding PTA metformin for now    # Mild hypokalemia, resolved  # Mild hypomagnesemia- resolved   - Repletion protocols      # Hypocalcemia, resolved  - Monitor  BMP    # Venous stasis ulcer LLE  # Venous insufficiency  # Peripheral vascular disease  - WOC consult placed    # Tobacco use  Smoked cigarettes for 45 years. Currently 1 pack per week.  - Encourage cessation      Diet: Adult Formula Drip Feeding: Continuous Vital AF 1.2; Other - Specify in Comment; Goal Rate: 60ml/hr (goal). Pending Radiology placement of NGT/NDT, start @ 15ml/hr and adv by 15 ml q 8 hrs until goal.; mL/hr; Do not advance tube feeding rate unles...  Advance Diet as Tolerated: Clear Liquid Diet    DVT Prophylaxis: Enoxaparin (Lovenox) SQ  Noble Catheter: PRESENT, indication: Strict 1-2 Hour I&O  Lines: PRESENT      PICC 12/26/22 Triple Lumen Right Cephalic ACCESS. PICC okay to use.-Site Assessment: WDL      Cardiac Monitoring: ACTIVE order. Indication: ICU  Code Status: Full Code      Clinically Significant Risk Factors        # Hypokalemia: Lowest K = 3.1 mmol/L in last 2 days, will replace as needed  # Hypernatremia: Highest Na = 152 mmol/L in last 2 days, will monitor as appropriate      # Hypoalbuminemia: Lowest albumin = 2.9 g/dL at 12/26/2022  6:03 AM, will monitor as appropriate          # DMII: A1C = 8.0 % (Ref range: <5.7 %) within past 3 months   # Severe Obesity: Estimated body mass index is 46.58 kg/m  as calculated from the following:    Height as of this encounter: 1.829 m (6').    Weight as of this encounter: 155.8 kg (343 lb 7.6 oz).          Disposition Plan           Bette Morgan MD  Hospitalist Service, [facility] TEAM [address] [place] [address]  Securely message with [employer] (more info)  Text page via [facility] Paging/Directory   See signed in provider for up to date coverage information  ______________________________________________________________________    Interval History   - Today, he feels overall well and a bit emotional. Feels like he has some cough and phlegm. No difficulty breathing or chest pain. Bedside nurse offered sips of water with  pills and no issue. VS are notable for hypertension this afternoon up to 161/93 but over the last 24 hours mainly -130. Extubated yesterday afternoon and now on HFNC 25LPM 40%.    A 4 point review of systems evaluated including questioning patient about cardiovascular symptoms, gastrointestinal symptoms, respiratory symptoms, constitutional symptoms, and hematology / bleeding symptoms and all were negative except as noted in HPI above.    Physical Exam   Vital Signs: Temp: 100  F (37.8  C) Temp src: Oral BP: 130/85 Pulse: 94   Resp: (!) 33 SpO2: 93 % O2 Device: Nasal cannula Oxygen Delivery: 25 LPM  Weight: 343 lbs 7.63 oz    General Appearance: Alert, oriented, sitting up in the chair  HEENT: NG in place, NC in place  Respiratory: coarse bilaterally with crackles, no wheezing  Cardiovascular: RRR, 1+ lower extremity edema. Unable to assess JVD.   GI: soft, nontender, nondistended  Skin: warm and dry, venous stasis on bilateral shins     Data     I have personally reviewed the following data over the past 24 hrs:    10.1  \   11.0 (L)   / 222     152 (H) 113 (H) 30.4 (H) /  210 (H)   3.1 (L) 28 1.23 (H) \

## 2023-01-02 ENCOUNTER — APPOINTMENT (OUTPATIENT)
Dept: PHYSICAL THERAPY | Facility: CLINIC | Age: 63
End: 2023-01-02
Payer: COMMERCIAL

## 2023-01-02 LAB
ANION GAP SERPL CALCULATED.3IONS-SCNC: 11 MMOL/L (ref 7–15)
BUN SERPL-MCNC: 29.1 MG/DL (ref 8–23)
CALCIUM SERPL-MCNC: 8.9 MG/DL (ref 8.8–10.2)
CHLORIDE SERPL-SCNC: 105 MMOL/L (ref 98–107)
CREAT SERPL-MCNC: 1.13 MG/DL (ref 0.67–1.17)
DEPRECATED HCO3 PLAS-SCNC: 31 MMOL/L (ref 22–29)
ERYTHROCYTE [DISTWIDTH] IN BLOOD BY AUTOMATED COUNT: 16.5 % (ref 10–15)
GFR SERPL CREATININE-BSD FRML MDRD: 73 ML/MIN/1.73M2
GLUCOSE BLDC GLUCOMTR-MCNC: 110 MG/DL (ref 70–99)
GLUCOSE BLDC GLUCOMTR-MCNC: 164 MG/DL (ref 70–99)
GLUCOSE BLDC GLUCOMTR-MCNC: 169 MG/DL (ref 70–99)
GLUCOSE BLDC GLUCOMTR-MCNC: 178 MG/DL (ref 70–99)
GLUCOSE BLDC GLUCOMTR-MCNC: 202 MG/DL (ref 70–99)
GLUCOSE BLDC GLUCOMTR-MCNC: 226 MG/DL (ref 70–99)
GLUCOSE SERPL-MCNC: 170 MG/DL (ref 70–99)
HCT VFR BLD AUTO: 39.7 % (ref 40–53)
HGB BLD-MCNC: 12.6 G/DL (ref 13.3–17.7)
MAGNESIUM SERPL-MCNC: 2.1 MG/DL (ref 1.7–2.3)
MCH RBC QN AUTO: 28.4 PG (ref 26.5–33)
MCHC RBC AUTO-ENTMCNC: 31.7 G/DL (ref 31.5–36.5)
MCV RBC AUTO: 90 FL (ref 78–100)
PHOSPHATE SERPL-MCNC: 2.8 MG/DL (ref 2.5–4.5)
PLATELET # BLD AUTO: 250 10E3/UL (ref 150–450)
POTASSIUM SERPL-SCNC: 3.1 MMOL/L (ref 3.4–5.3)
POTASSIUM SERPL-SCNC: 3.3 MMOL/L (ref 3.4–5.3)
POTASSIUM SERPL-SCNC: 3.4 MMOL/L (ref 3.4–5.3)
RBC # BLD AUTO: 4.43 10E6/UL (ref 4.4–5.9)
SODIUM SERPL-SCNC: 147 MMOL/L (ref 136–145)
WBC # BLD AUTO: 8.9 10E3/UL (ref 4–11)

## 2023-01-02 PROCEDURE — 85027 COMPLETE CBC AUTOMATED: CPT | Performed by: STUDENT IN AN ORGANIZED HEALTH CARE EDUCATION/TRAINING PROGRAM

## 2023-01-02 PROCEDURE — 84100 ASSAY OF PHOSPHORUS: CPT | Performed by: INTERNAL MEDICINE

## 2023-01-02 PROCEDURE — 99233 SBSQ HOSP IP/OBS HIGH 50: CPT | Performed by: STUDENT IN AN ORGANIZED HEALTH CARE EDUCATION/TRAINING PROGRAM

## 2023-01-02 PROCEDURE — 250N000013 HC RX MED GY IP 250 OP 250 PS 637

## 2023-01-02 PROCEDURE — 250N000012 HC RX MED GY IP 250 OP 636 PS 637: Performed by: STUDENT IN AN ORGANIZED HEALTH CARE EDUCATION/TRAINING PROGRAM

## 2023-01-02 PROCEDURE — 250N000011 HC RX IP 250 OP 636

## 2023-01-02 PROCEDURE — 97110 THERAPEUTIC EXERCISES: CPT | Mod: GP

## 2023-01-02 PROCEDURE — 250N000013 HC RX MED GY IP 250 OP 250 PS 637: Performed by: STUDENT IN AN ORGANIZED HEALTH CARE EDUCATION/TRAINING PROGRAM

## 2023-01-02 PROCEDURE — 84132 ASSAY OF SERUM POTASSIUM: CPT | Performed by: STUDENT IN AN ORGANIZED HEALTH CARE EDUCATION/TRAINING PROGRAM

## 2023-01-02 PROCEDURE — 250N000011 HC RX IP 250 OP 636: Performed by: STUDENT IN AN ORGANIZED HEALTH CARE EDUCATION/TRAINING PROGRAM

## 2023-01-02 PROCEDURE — 200N000002 HC R&B ICU UMMC

## 2023-01-02 PROCEDURE — 94640 AIRWAY INHALATION TREATMENT: CPT | Mod: 76

## 2023-01-02 PROCEDURE — 97530 THERAPEUTIC ACTIVITIES: CPT | Mod: GP

## 2023-01-02 PROCEDURE — 250N000009 HC RX 250

## 2023-01-02 PROCEDURE — 83735 ASSAY OF MAGNESIUM: CPT | Performed by: INTERNAL MEDICINE

## 2023-01-02 PROCEDURE — 250N000009 HC RX 250: Performed by: NURSE PRACTITIONER

## 2023-01-02 PROCEDURE — G0463 HOSPITAL OUTPT CLINIC VISIT: HCPCS

## 2023-01-02 PROCEDURE — 80048 BASIC METABOLIC PNL TOTAL CA: CPT | Performed by: STUDENT IN AN ORGANIZED HEALTH CARE EDUCATION/TRAINING PROGRAM

## 2023-01-02 PROCEDURE — 94660 CPAP INITIATION&MGMT: CPT

## 2023-01-02 PROCEDURE — 999N000157 HC STATISTIC RCP TIME EA 10 MIN

## 2023-01-02 PROCEDURE — 97161 PT EVAL LOW COMPLEX 20 MIN: CPT | Mod: GP

## 2023-01-02 RX ORDER — CARVEDILOL 12.5 MG/1
12.5 TABLET ORAL 2 TIMES DAILY WITH MEALS
Status: DISCONTINUED | OUTPATIENT
Start: 2023-01-02 | End: 2023-01-03

## 2023-01-02 RX ORDER — POTASSIUM CHLORIDE 20MEQ/15ML
40 LIQUID (ML) ORAL ONCE
Status: COMPLETED | OUTPATIENT
Start: 2023-01-02 | End: 2023-01-02

## 2023-01-02 RX ORDER — ARIPIPRAZOLE 10 MG/1
10 TABLET ORAL AT BEDTIME
Status: DISCONTINUED | OUTPATIENT
Start: 2023-01-02 | End: 2023-01-05

## 2023-01-02 RX ORDER — HYDRALAZINE HYDROCHLORIDE 25 MG/1
25 TABLET, FILM COATED ORAL EVERY 8 HOURS SCHEDULED
Status: DISCONTINUED | OUTPATIENT
Start: 2023-01-02 | End: 2023-01-03

## 2023-01-02 RX ORDER — POTASSIUM CHLORIDE 7.45 MG/ML
10 INJECTION INTRAVENOUS
Status: DISCONTINUED | OUTPATIENT
Start: 2023-01-02 | End: 2023-01-02 | Stop reason: ALTCHOICE

## 2023-01-02 RX ORDER — POTASSIUM CHLORIDE 29.8 MG/ML
20 INJECTION INTRAVENOUS
Status: COMPLETED | OUTPATIENT
Start: 2023-01-02 | End: 2023-01-02

## 2023-01-02 RX ORDER — POTASSIUM CHLORIDE 750 MG/1
40 TABLET, EXTENDED RELEASE ORAL ONCE
Status: COMPLETED | OUTPATIENT
Start: 2023-01-02 | End: 2023-01-02

## 2023-01-02 RX ADMIN — HYDRALAZINE HYDROCHLORIDE 10 MG: 20 INJECTION INTRAMUSCULAR; INTRAVENOUS at 06:18

## 2023-01-02 RX ADMIN — HYDRALAZINE HYDROCHLORIDE 10 MG: 20 INJECTION INTRAMUSCULAR; INTRAVENOUS at 18:39

## 2023-01-02 RX ADMIN — POTASSIUM CHLORIDE 40 MEQ: 750 TABLET, EXTENDED RELEASE ORAL at 13:18

## 2023-01-02 RX ADMIN — NAFCILLIN 2 G: 2 POWDER, FOR SOLUTION INTRAMUSCULAR; INTRAVENOUS at 02:01

## 2023-01-02 RX ADMIN — POTASSIUM CHLORIDE 20 MEQ: 29.8 INJECTION, SOLUTION INTRAVENOUS at 20:42

## 2023-01-02 RX ADMIN — QUETIAPINE FUMARATE 600 MG: 300 TABLET ORAL at 22:07

## 2023-01-02 RX ADMIN — POTASSIUM CHLORIDE 20 MEQ: 29.8 INJECTION, SOLUTION INTRAVENOUS at 19:48

## 2023-01-02 RX ADMIN — Medication 1 LOZENGE: at 06:21

## 2023-01-02 RX ADMIN — LEVALBUTEROL HYDROCHLORIDE 1.25 MG: 1.25 SOLUTION RESPIRATORY (INHALATION) at 08:18

## 2023-01-02 RX ADMIN — Medication 10 ML: at 19:46

## 2023-01-02 RX ADMIN — GABAPENTIN 300 MG: 300 CAPSULE ORAL at 13:19

## 2023-01-02 RX ADMIN — ARIPIPRAZOLE 10 MG: 10 TABLET ORAL at 22:07

## 2023-01-02 RX ADMIN — NAFCILLIN 2 G: 2 POWDER, FOR SOLUTION INTRAMUSCULAR; INTRAVENOUS at 14:26

## 2023-01-02 RX ADMIN — NAFCILLIN 2 G: 2 POWDER, FOR SOLUTION INTRAMUSCULAR; INTRAVENOUS at 22:09

## 2023-01-02 RX ADMIN — CARVEDILOL 12.5 MG: 12.5 TABLET, FILM COATED ORAL at 18:39

## 2023-01-02 RX ADMIN — LABETALOL HYDROCHLORIDE 10 MG: 5 INJECTION, SOLUTION INTRAVENOUS at 08:10

## 2023-01-02 RX ADMIN — AMLODIPINE BESYLATE 10 MG: 10 TABLET ORAL at 07:47

## 2023-01-02 RX ADMIN — LABETALOL HYDROCHLORIDE 10 MG: 5 INJECTION, SOLUTION INTRAVENOUS at 19:46

## 2023-01-02 RX ADMIN — SENNOSIDES 8.6 MG: 8.6 TABLET, FILM COATED ORAL at 07:48

## 2023-01-02 RX ADMIN — ACETYLCYSTEINE 2 ML: 200 SOLUTION ORAL; RESPIRATORY (INHALATION) at 12:16

## 2023-01-02 RX ADMIN — GABAPENTIN 300 MG: 300 CAPSULE ORAL at 19:46

## 2023-01-02 RX ADMIN — Medication 0.5 MG: at 19:46

## 2023-01-02 RX ADMIN — HYDRALAZINE HYDROCHLORIDE 25 MG: 25 TABLET, FILM COATED ORAL at 22:07

## 2023-01-02 RX ADMIN — ENOXAPARIN SODIUM 40 MG: 40 INJECTION SUBCUTANEOUS at 07:48

## 2023-01-02 RX ADMIN — ACETYLCYSTEINE 2 ML: 200 SOLUTION ORAL; RESPIRATORY (INHALATION) at 08:19

## 2023-01-02 RX ADMIN — LABETALOL HYDROCHLORIDE 10 MG: 5 INJECTION, SOLUTION INTRAVENOUS at 03:06

## 2023-01-02 RX ADMIN — LABETALOL HYDROCHLORIDE 10 MG: 5 INJECTION, SOLUTION INTRAVENOUS at 13:19

## 2023-01-02 RX ADMIN — NAFCILLIN 2 G: 2 POWDER, FOR SOLUTION INTRAMUSCULAR; INTRAVENOUS at 18:39

## 2023-01-02 RX ADMIN — ENOXAPARIN SODIUM 40 MG: 40 INJECTION SUBCUTANEOUS at 19:46

## 2023-01-02 RX ADMIN — LEVALBUTEROL HYDROCHLORIDE 1.25 MG: 1.25 SOLUTION RESPIRATORY (INHALATION) at 12:17

## 2023-01-02 RX ADMIN — Medication 15 ML: at 07:48

## 2023-01-02 RX ADMIN — HYDRALAZINE HYDROCHLORIDE 25 MG: 25 TABLET, FILM COATED ORAL at 13:18

## 2023-01-02 RX ADMIN — Medication 40 MG: at 07:48

## 2023-01-02 RX ADMIN — NAFCILLIN 2 G: 2 POWDER, FOR SOLUTION INTRAMUSCULAR; INTRAVENOUS at 10:32

## 2023-01-02 RX ADMIN — NAFCILLIN 2 G: 2 POWDER, FOR SOLUTION INTRAMUSCULAR; INTRAVENOUS at 05:32

## 2023-01-02 RX ADMIN — BENZOCAINE 1 ML: 220 SPRAY, METERED PERIODONTAL at 02:00

## 2023-01-02 RX ADMIN — Medication 0.5 MG: at 07:48

## 2023-01-02 RX ADMIN — GABAPENTIN 300 MG: 300 CAPSULE ORAL at 07:47

## 2023-01-02 RX ADMIN — HYDRALAZINE HYDROCHLORIDE 10 MG: 20 INJECTION INTRAMUSCULAR; INTRAVENOUS at 10:32

## 2023-01-02 RX ADMIN — POTASSIUM CHLORIDE 40 MEQ: 40 SOLUTION ORAL at 05:32

## 2023-01-02 RX ADMIN — INSULIN GLARGINE 65 UNITS: 100 INJECTION, SOLUTION SUBCUTANEOUS at 13:20

## 2023-01-02 ASSESSMENT — ACTIVITIES OF DAILY LIVING (ADL)
ADLS_ACUITY_SCORE: 26

## 2023-01-02 NOTE — PROGRESS NOTES
CLINICAL NUTRITION SERVICES - REASSESSMENT NOTE     Nutrition Prescription    RECOMMENDATIONS FOR MDs/PROVIDERS TO ORDER:  --If PO intake decreases, recommend ordering calorie counts to help quantify intake.     Malnutrition Status:    Patient does not meet two of the established criteria necessary for diagnosing malnutrition    Recommendations already ordered by Registered Dietitian (RD):  None at this time.     Future/Additional Recommendations:  --PO intake, weight trends.      EVALUATION OF THE PROGRESS TOWARD GOALS   Diet: 2 g Sodium (advanced 1/1); 100% x 2 meals documented so far  Nutrition Support:   12/26 - ___ : Vital AF 1.2 @ 60 ml/hr + 3 Prosource TF20 packets to provide 1440 ml volume, 1968 kcals (24 kcal/kg IBW), 168 g pro (2.1 g/kg IBW), 159 g CHO, 7 g soluble Fiber, and 1168 ml free water.        --Via NJT placed by GI on 12/27       --TF orders discontinued by provider this morning 1/2. Per RN, plan to remove FT this morning.   Intake: Propofol has been off since 12/29 and provided an additional ~270 kcals/day. With TF + Propofol, pt met ~100% of low-end estimated nutrition needs over the past 6 days.      NEW FINDINGS   Weight: variable, trending down today (previously stable) with driest weight 145.2 kg (on 1/2) -> ?outlier. Will monitor, no changes to dosing weight at this time.   Labs: Na 147 (H), K+ 3.1 (L), BUN 29.1 (H), Cr 1.13 (WNL), Mg++ 2.1 (WNL), Phos 2.8 (WL), -255  Meds: High sliding scale insulin, Lantus 65 units daily, Metformin (on hold), Certavite, Prosource TF TID, Sennosides BID  GI: +stool output 12/30-1/1; abdomen distended, soft, passing flatus.   --AXR 12/31: 1. Enteric tube tip projects over the mid abdomen, likely within the proximal duodenum.  2. Prominent gaseous distention of the bowel, possibly adynamic ileus.  Resp: extubated 12/31  Skin: WOCN following for LE venous ulcers (improving per last WOCN assessment)    Dosing Weight: 81 kg (IBW due to fluid  status)    ASSESSED NUTRITION NEEDS  Estimated Energy Needs: 0850-6905 kcals/day (25 - 30 kcal/kg IBW)  Justification: Increased needs, Obese (ASPEN guidelines), and fluid status  Estimated Protein Needs: 121+ grams protein/day (1.5+ grams of pro/kg IBW)  Justification: Increased needs and Obesity guidelines    MALNUTRITION  % Intake: Decreased intake does not meet criteria  % Weight Loss: difficult to assess 2/2 fluid status  Subcutaneous Fat Loss: None observed - difficult to fully assess 2/2 body habitus  Muscle Loss: None observed - difficult to fully assess 2/2 body habitus  Fluid Accumulation/Edema: Moderate 3+ edema  Malnutrition Diagnosis: Patient does not meet two of the established criteria necessary for diagnosing malnutrition    Previous Goals   Total avg nutritional intake to meet a minimum of 22 kcal/kg and 2 g PRO/kg daily (per dosing wt 81 kg IBW).  Evaluation: Met    Previous Nutrition Diagnosis  Inadequate oral intake related to mechanical ventilation and lack of enteral access as evidenced by NPO status x <1 day.   Evaluation: Improving    CURRENT NUTRITION DIAGNOSIS  Predicted inadequate nutrient intake (energy and protein) related to recent extubation as evidenced by diet just advanced and feeding tube removed, pt at risk of meeting <100% of nutrition needs.    INTERVENTIONS  Implementation  Collaboration with other providers - RN    Goals  Patient to consume % of nutritionally adequate meal trays TID, or the equivalent with supplements/snacks.    Monitoring/Evaluation  Progress toward goals will be monitored and evaluated per protocol.    Julia Wright, MS, RD, LD, [facility]  [facility] pager: [phone]  ASCOM: [id number]

## 2023-01-02 NOTE — PROGRESS NOTES
[facility]    Medicine Progress Note - Hospitalist Service, GOLD TEAM 2    Date of Admission:  12/21/2022    Assessment & Plan   Antony Aguila Jr. is a 62 year old male admitted on 12/21/2022. He has history chronic type B aortic dissection s/p exploration and median sternotomy (6/18/19), type 2 DM (poorly controlled), and HTN who is admitted for acute on chronic thoracic aortic aneurysm with new aortic root dilation. Hospitalization complicated by hypokalemia who was admitted on 12/21/2022 for acute hypoxic respiratory failure in setting of agitation and need for sedation. Extubated 12/31, currently treating hypertension and MSSA ventilator-associated pneumonia weaning oxygen.     Changes Today:  - Continue adding/titrating oral meds for hypertension. Added carvedilol 12.5mg BID today in addition to scheduled hydralazine 25mg TID.  - Continue to push oral free water for hypernatremia    Active Medical Problems  # Chronic type B aortic dissection (proximal descending aorta to the abdominal aorta)  # Proximal ascending aorta saccular aneurysm   # Distal ascending aorta saccular aneurysm   # Mild nonobstructive CAD  Pt has history of stable type B aortic dissection as well as short segment of dissection vs pseudoaneurysms at the aortic root and distal ascending thoracic aorta. Previously underwent surgical exploration in 6/2019 with concern for type 1 aortic dissection but found to have chronic type B dissection. On admission, CXR with widened mediastinum slightly increased from prior. CT chest with aneurysmal dilatation ascending and descending thoracic aorta with type B aortic dissection distal to L subclavian artery extending to L common iliac artery with severe narrowing of true lumen and possible pseudoaneurysm at aortic root, new compared to prior 2019 images.   - TRINH 12/23 showed saccular aneurysm of ascending aorta measuring 5.8 cm associated with a dissection  flap. Thrombus is seen in the false lumen. Aortic root is dilated at 4.6 cm.  - CTA 12/22 two saccular aneurysms, one each in the proximal ascending aorta and distal ascending aorta. The maximal transaortic diameters of 46.9 x 39.2 mm and 62.7 x 54.4 mm, respectively. Mild, non-obstructive coronary artery disease without any high-grade stenoses.  - Cardiology and CVTS consulted and signed off  - Plan for outpatient follow up with CVTS  - No HR goal per cardiology, SBP preferably maintained <130  - Started amlodipine 1/1 (on earlier in hospitalization), carvedilol 12.5mg BID on 1/2, and oral hydralazine 25mg TID on 1/2  - PRN labetalol 10mg for SBP >135, PRN hydralazine for SBP>135 if HR<55  - Hold DVT ppx 12/23; restarted 12/24 for thrombus seen on TRINH, continue per CVTS recs  - Doppler pulses     # Acute hypoxic and hypercarbic respiratory failure  # Significant airway edema  # Concern for sleep apnea  # Tracheostomy placed 7/5/19, decannulated 8/2019  Per hospitalization notes here in Sept 2022, noted to have hx of dyspnea on exertion in setting of being current smoker, tx with albuterol inhaler, recommended outpatient PFTs be obtained. Pulmonary progress note from 7/2019 notes current smoker with unknown COPD history, no PFTs available. Had tracheostomy placed 7/5/19, decannulated 8/2019. CTA chest with compressive atelectasis in L lung adjacent to thoracic aorta and hiatal hernia. Patient became agitated and non-compliant with attempts for patient to use BIPAP or HFNC and needed heavy sedation to treat agitation such that intubation was required to protect airway. Patient is historically a difficult intubation and was very difficult this time. Patient has a possible hx of angioedema reaction to lisinopril, was started on losartan yesterday, could account for the airway edema and extubation will be expected to be delayed due to this. Likely needs higher PEEP due to his body habitus.   - patient emergently  intubated  - s/p decadron 8mg IV x1, completed 4mg IV x3 1/1  - Bumex IV 2 BID   - Extubated 12/31, HFNC 25L 40%, wean as tolerated    # H/o possible ACE inhibitor related angioedema  - Noted during July 2019 hospitalization at same time having AHRF requiring tracheostomy (later decannulted in August 2019).  - Notably patient started on losartan 12/25/2  - Losartan added to allergy list   - Decadron course as abovef    # Increased pulmonary secretions  # Concern for hospital associated pneumonia  Has developed copious creamy secretions overnight, has low grade temperature of 100.0F, no leukocytosis. CXR showing increased opacities.  - PCT, only 0.11  - Pulm toilet: metaneb, mucomyst   - Bumex 2 BID   - Growing MSSA 12/29, Zosyn 12/29-1/1 then transitioned to nafcillin to complete 5-day course    # Agitation     # Encephalopathy, likely metabolic  - patient developing confusion, possibly 2/2 hypercapnia and noncompliance with cpap  - patient agitated on the floor requiring precedex, ativan, zyprexa and not compliant with cares  - needed further sedation with ativan to the point that intubation was required   - Psychiatry consulted, appreciate recs  - Aripiprazole 10 mg HS  - Guanfacine 0.5 mg BID (alpha-2 agonist like Precedex)   - Haldol 2-5 mg IV q 4 hours for severe agitation (and follow QTc)    # Schizoaffective disorder  # PTSD  Meds per chart include Zyprexa, Abilify, and Seroquel. Per pharmacy med rec, appears to only have filled Seroquel recently.   - continue PTA quetiapine 600 mg at bedtime  - PharmD med rec  - Psych recs as above     # HTN  Based on outpatient note 12/14/2022 pt was previously hydordiuril 50 mg, amlodipine 10 mg, bumex TID and canaglaflozin. 12/25 Hypertensive overnight with highest SBP of 170s. Given hx of Type B sonam dissection and now new aortic aneurysm goals. On nicardipine gtt in the ICU.   - hold Labetalol  BID (titrated from intially PO dose of 100 mg)  - ACE/ARB  contraindicated due to angioedema  - As above: amlodipine, carvedilol, hydralazine  - prn labetalol 10mg for SBP >135, PRN hydralazine for SBP>135 if HR<55    # Large hiatal hernia  # Nutrition  # Hypernatremia  - Unable to place feeding tube under fluoro due to large hiatal hernia   - GI assisted with feeding tube placement   - continue tube feeds, i  - start clear liquids  - 1/1- calculated free water deficit at 8L, will increase FWF to 200ml q4 (would give additional 1L free water) in addition to oral fluids. Will likely need more to correct hypernatremia but also diuresing.      # Type 2 DM with hyperglycemia  Most recent A1c 12.5 (9/16/22). Home regimen: metformin 1000 qPM and Lantus 50 unit(s) qAM + aspart 17 unit(s) TIDAC + aspart 9 unit(s) w/ snacks  - Increase lantus to 65U  - HDSSI   - holding PTA metformin as above  - Will need to reassess as weaning tube feed/starting oral feeds    # BLE edema  Dimer elevated with acute on chronic leg swelling L < R per patient history. DVT US in ED negative for DVT b/l. Has had LE swelling for last 2 years, notes it is worse lately. Previously on diuretics, not currently per chart.  - lymphedema consult  - diuresis as above    Resolved or Chronic Problems:  # Shock, NOS-resolved   - patient developed hypotension after intubation and being sedated on propofol   - s/p 1L NS  - Off norepinephrine   - TTE 12/26 EF 60-65%, normal RV     # Anemia, normocytic  - Trend CBC    # LAKISHA on CKD 2-resolved   Outpatient labs 12/16 with Cr 1.4 and eGFR 55. Now back at baseline Cr ~1.1-1.2, eGFR ~65-75. UA unremarkable.  - decrease PTA gabapentin 600 mg TID -> 300 mg TID  - holding PTA metformin for now    # Mild hypokalemia, resolved  # Mild hypomagnesemia- resolved   - Repletion protocols      # Hypocalcemia, resolved  - Monitor BMP    # Venous stasis ulcer LLE  # Venous insufficiency  # Peripheral vascular disease  - WOC consult placed    # Tobacco use  Smoked cigarettes for 45 years.  Currently 1 pack per week.  - Encourage cessation      Diet: 2 Gram Sodium Diet    DVT Prophylaxis: Enoxaparin (Lovenox) SQ  Noble Catheter: Not present  Lines: PRESENT      PICC 12/26/22 Triple Lumen Right Cephalic ACCESS. PICC okay to use.-Site Assessment: WDL      Cardiac Monitoring: ACTIVE order. Indication: Acute decompensated heart failure (48 hours)  Code Status: Full Code      Clinically Significant Risk Factors        # Hypokalemia: Lowest K = 3.1 mmol/L in last 2 days, will replace as needed  # Hypernatremia: Highest Na = 152 mmol/L in last 2 days, will monitor as appropriate      # Hypoalbuminemia: Lowest albumin = 2.9 g/dL at 12/26/2022  6:03 AM, will monitor as appropriate          # DMII: A1C = 8.0 % (Ref range: <5.7 %) within past 3 months   # Severe Obesity: Estimated body mass index is 43.4 kg/m  as calculated from the following:    Height as of this encounter: 1.829 m (6').    Weight as of this encounter: 145.2 kg (320 lb).          Disposition Plan           Bette Morgan MD  Hospitalist Service, [facility] TEAM [address] [place] [address]  Securely message with VeriTweet (more info)  Text page via [facility] Paging/Directory   See signed in provider for up to date coverage information  ______________________________________________________________________    Interval History   - Weaned from HFNC to 2L NC. NG removed with adequate oral intake and no nausea or vomiting. Having bowel movements and urinating appropriately.  - Denies headache, vision change, chest pain, shortness of breath.  - Required hydralazine x4 and labetalol x5 in the last 24 hours. Net negative 1L.    A 4 point review of systems evaluated including questioning patient about cardiovascular symptoms, gastrointestinal symptoms, respiratory symptoms, constitutional symptoms, and hematology / bleeding symptoms and all were negative except as noted in HPI above.    Physical Exam   Vital Signs: Temp: 98.6  F (37   C) Temp src: Oral BP: 136/88 Pulse: 106   Resp: (!) 33 SpO2: 95 % O2 Device: Nasal cannula Oxygen Delivery: 2 LPM  Weight: 320 lbs 0 oz    General Appearance: Alert, oriented, sitting up in the chair  HEENT:  NC in place  Respiratory: coarse bilaterally with crackles, no wheezing  Cardiovascular: RRR, 1+ lower extremity edema. Unable to assess JVD.   GI: soft, nontender, nondistended  Skin: warm and dry, venous stasis on bilateral shins     Data     I have personally reviewed the following data over the past 24 hrs:    8.9  \   12.6 (L)   / 250     147 (H) 105 29.1 (H) /  202 (H)   3.3 (L) 31 (H) 1.13 \

## 2023-01-02 NOTE — PROGRESS NOTES
[facility]  WO Nurse Inpatient Assessment     Consulted for: LLE wounds   1/2: NEW:  Consulted for tongue wound   Patient History (according to provider note(s):      62 year old male admitted on 12/21/2022. He has history chronic type B aortic dissection s/p exploration and median sternotomy (6/18/19), type 2 DM (poorly controlled), and HTN who is admitted for acute on chronic thoracic aortic aneurysm with new aortic root dilation. Hospitalization complicated by hypokalemia      Areas Assessed:      Pressure Injury Location: Right tongue       Last photo: 1/2/23  Wound type: Pressure Injury     Pressure Injury Stage: Mucosal, hospital acquired      This is a Medical Device Related Pressure Injury (MDRPI) due to ETT  Wound history/plan of care:   Wound noted 12/30 when intubated with edematous tongue, extubated 12/31     Wound base: 100 % eroded mucosa ,      Palpation of the wound bed: normal      Drainage: none     Description of drainage: none     Measurements (length x width x depth, in cm) 0.5  x 0.5  x  0.2 cm   Periwound skin: Intact, slightly edematous       Color: normal and consistent with surrounding tissue      Temperature: normal   Odor: none  Pain: denies ,   Pain intervention prior to dressing change: N/A  Treatment goal: Heal   STATUS: initial assessment, off loaded   Supplies ordered: at bedside      Wound location: LLE (below from 12/29, not assessed 1/2)      12/22 anterior wounds, posterior calf wound        12/29 LLE anterior and posterior    Wound due to: Venous Ulcer  Wound history/plan of care: Dimer elevated with acute on chronic leg swelling L < R per patient history.  DVT US in ED negative for DVT b/l.  Has had LE swelling for last 2 years, notes it is worse lately.  Previously on diuretics,     Leg appropriately warm, cap refill 3 seconds.  No edema in Left foot, nonpitting edema from ankle to knee.    Chronic venous staining in gator area.   "Skin woody with large amt of edema wrinkling.      Prior to admission he was wearing shorts majority of time to avoid getting pants wet from wound drainage as he has not been using dressings or compression.     Anterior wounds:  Started as blisters several months ago.   2 wound both with new epithelium.  Was able to remove large amt of loose fibrin.   No drainage or skin weeping   Posterior wound:  Started several weeks ago \"my pants got so tight they were always rubbing\"    Pink/red moist dermal base surrounded by peeling epithelium.  1.8 cm x 2.7 cm x 0.2 cm.  Currently with minimal serosanguinous drainage.      Palpation of the wound bed: normal      Periwound skin: Intact new unpigmented epithelium          Temperature: normal   Odor: none  Pain: mild,   Pain interventions prior to dressing change: N/A  Treatment goal: Heal  and Protection  STATUS: improving  Supplies ordered: at bedside       Treatment Plan:     Tongue wound:  Oral cares per unit routine     Left lower extremity wounds:  Daily  Cleanse skin and wounds with wound cleanser or sterile NS.  Pat dry   Apply Aquaphor lotion or vaseline to intact skin on LE.   Cover wounds with xeroform gauze.   Cover xeroform gauze with ABD pads or 4x4s, enough to contain drainage for 24 hrs.   Secure with kerlix roll gauze.   Secure kerlix with per lymphedema therapy orders or if no orders, with coban or ACE wrap    Orders: Written    RECOMMEND PRIMARY TEAM ORDER: Lymphedema consult  Education provided: plan of care and wound progress  Discussed plan of care with: Patient and Nurse  WOC nurse follow-up plan: weekly  Notify WOC if wound(s) deteriorate.  Nursing to notify the Provider(s) and re-consult the WOC Nurse if new skin concern.    DATA:     Current support surface: Standard  Standard gel/foam mattress (IsoFlex, Atmos air, etc)  BMI: Body mass index is 43.4 kg/m .   Active diet order: Orders Placed This Encounter      2 Gram Sodium Diet     Output: I/O last 3 " completed shifts:  In: 4955 [P.O.:1890; I.V.:745; NG/GT:940]  Out: 4650 [Urine:4650]     Labs:   Recent Labs   Lab 01/02/23  0415   HGB 12.6*   WBC 8.9     Pressure injury risk assessment:   Sensory Perception: 3-->slightly limited  Moisture: 4-->rarely moist  Activity: 2-->chairfast  Mobility: 3-->slightly limited  Nutrition: 3-->adequate  Friction and Shear: 2-->potential problem  Abhinav Score: 17    [facility]  Phone: [phone]  Pager: [phone]

## 2023-01-02 NOTE — PLAN OF CARE
ICU End of Shift Summary. See flowsheets for vital signs and detailed assessment.    Changes this shift: A/Ox4, follows commands. HTN throughout shift, utilizing PRNs to keep SBP <135, but unable consistently meet goal. 2L NC. TF at goal rate Denies pain. Passing gas but no BM. Voids spontaneously. K replaced.     Plan: Wean 02, consider discontinuing TF's. Transfer to IMC when bed available.

## 2023-01-03 ENCOUNTER — APPOINTMENT (OUTPATIENT)
Dept: PHYSICAL THERAPY | Facility: CLINIC | Age: 63
End: 2023-01-03
Payer: COMMERCIAL

## 2023-01-03 ENCOUNTER — APPOINTMENT (OUTPATIENT)
Dept: OCCUPATIONAL THERAPY | Facility: CLINIC | Age: 63
End: 2023-01-03
Payer: COMMERCIAL

## 2023-01-03 LAB
ANION GAP SERPL CALCULATED.3IONS-SCNC: 11 MMOL/L (ref 7–15)
BACTERIA BLD CULT: NO GROWTH
BACTERIA BLD CULT: NO GROWTH
BUN SERPL-MCNC: 22.4 MG/DL (ref 8–23)
CALCIUM SERPL-MCNC: 8.6 MG/DL (ref 8.8–10.2)
CHLORIDE SERPL-SCNC: 109 MMOL/L (ref 98–107)
CREAT SERPL-MCNC: 1.03 MG/DL (ref 0.67–1.17)
DEPRECATED HCO3 PLAS-SCNC: 26 MMOL/L (ref 22–29)
ERYTHROCYTE [DISTWIDTH] IN BLOOD BY AUTOMATED COUNT: 16.3 % (ref 10–15)
GFR SERPL CREATININE-BSD FRML MDRD: 82 ML/MIN/1.73M2
GLUCOSE BLDC GLUCOMTR-MCNC: 109 MG/DL (ref 70–99)
GLUCOSE BLDC GLUCOMTR-MCNC: 118 MG/DL (ref 70–99)
GLUCOSE BLDC GLUCOMTR-MCNC: 145 MG/DL (ref 70–99)
GLUCOSE BLDC GLUCOMTR-MCNC: 164 MG/DL (ref 70–99)
GLUCOSE BLDC GLUCOMTR-MCNC: 213 MG/DL (ref 70–99)
GLUCOSE BLDC GLUCOMTR-MCNC: 216 MG/DL (ref 70–99)
GLUCOSE BLDC GLUCOMTR-MCNC: 63 MG/DL (ref 70–99)
GLUCOSE BLDC GLUCOMTR-MCNC: 84 MG/DL (ref 70–99)
GLUCOSE BLDC GLUCOMTR-MCNC: 94 MG/DL (ref 70–99)
GLUCOSE SERPL-MCNC: 66 MG/DL (ref 70–99)
HCT VFR BLD AUTO: 37.2 % (ref 40–53)
HGB BLD-MCNC: 11.7 G/DL (ref 13.3–17.7)
MAGNESIUM SERPL-MCNC: 2.1 MG/DL (ref 1.7–2.3)
MCH RBC QN AUTO: 28.7 PG (ref 26.5–33)
MCHC RBC AUTO-ENTMCNC: 31.5 G/DL (ref 31.5–36.5)
MCV RBC AUTO: 91 FL (ref 78–100)
PHOSPHATE SERPL-MCNC: 2.3 MG/DL (ref 2.5–4.5)
PLATELET # BLD AUTO: 244 10E3/UL (ref 150–450)
POTASSIUM SERPL-SCNC: 3.4 MMOL/L (ref 3.4–5.3)
POTASSIUM SERPL-SCNC: 3.4 MMOL/L (ref 3.4–5.3)
RBC # BLD AUTO: 4.08 10E6/UL (ref 4.4–5.9)
SODIUM SERPL-SCNC: 146 MMOL/L (ref 136–145)
WBC # BLD AUTO: 9.4 10E3/UL (ref 4–11)

## 2023-01-03 PROCEDURE — 250N000013 HC RX MED GY IP 250 OP 250 PS 637

## 2023-01-03 PROCEDURE — 99233 SBSQ HOSP IP/OBS HIGH 50: CPT | Performed by: HOSPITALIST

## 2023-01-03 PROCEDURE — 999N000157 HC STATISTIC RCP TIME EA 10 MIN

## 2023-01-03 PROCEDURE — 250N000011 HC RX IP 250 OP 636

## 2023-01-03 PROCEDURE — 97140 MANUAL THERAPY 1/> REGIONS: CPT | Mod: GO | Performed by: OCCUPATIONAL THERAPIST

## 2023-01-03 PROCEDURE — 94640 AIRWAY INHALATION TREATMENT: CPT

## 2023-01-03 PROCEDURE — 250N000009 HC RX 250

## 2023-01-03 PROCEDURE — 200N000002 HC R&B ICU UMMC

## 2023-01-03 PROCEDURE — 250N000009 HC RX 250: Performed by: HOSPITALIST

## 2023-01-03 PROCEDURE — 250N000011 HC RX IP 250 OP 636: Performed by: HOSPITALIST

## 2023-01-03 PROCEDURE — 84100 ASSAY OF PHOSPHORUS: CPT | Performed by: STUDENT IN AN ORGANIZED HEALTH CARE EDUCATION/TRAINING PROGRAM

## 2023-01-03 PROCEDURE — 258N000003 HC RX IP 258 OP 636: Performed by: HOSPITALIST

## 2023-01-03 PROCEDURE — 83735 ASSAY OF MAGNESIUM: CPT | Performed by: STUDENT IN AN ORGANIZED HEALTH CARE EDUCATION/TRAINING PROGRAM

## 2023-01-03 PROCEDURE — 94660 CPAP INITIATION&MGMT: CPT

## 2023-01-03 PROCEDURE — 250N000013 HC RX MED GY IP 250 OP 250 PS 637: Performed by: STUDENT IN AN ORGANIZED HEALTH CARE EDUCATION/TRAINING PROGRAM

## 2023-01-03 PROCEDURE — 97530 THERAPEUTIC ACTIVITIES: CPT | Mod: GP

## 2023-01-03 PROCEDURE — 250N000013 HC RX MED GY IP 250 OP 250 PS 637: Performed by: HOSPITALIST

## 2023-01-03 PROCEDURE — 80048 BASIC METABOLIC PNL TOTAL CA: CPT | Performed by: STUDENT IN AN ORGANIZED HEALTH CARE EDUCATION/TRAINING PROGRAM

## 2023-01-03 PROCEDURE — 250N000009 HC RX 250: Performed by: NURSE PRACTITIONER

## 2023-01-03 PROCEDURE — 85027 COMPLETE CBC AUTOMATED: CPT | Performed by: STUDENT IN AN ORGANIZED HEALTH CARE EDUCATION/TRAINING PROGRAM

## 2023-01-03 PROCEDURE — 94799 UNLISTED PULMONARY SVC/PX: CPT

## 2023-01-03 RX ORDER — CARVEDILOL 12.5 MG/1
12.5 TABLET ORAL 2 TIMES DAILY WITH MEALS
Status: DISCONTINUED | OUTPATIENT
Start: 2023-01-03 | End: 2023-01-03

## 2023-01-03 RX ORDER — ONDANSETRON 4 MG/1
4 TABLET, FILM COATED ORAL EVERY 6 HOURS PRN
Status: DISCONTINUED | OUTPATIENT
Start: 2023-01-03 | End: 2023-01-05

## 2023-01-03 RX ORDER — POTASSIUM CHLORIDE 750 MG/1
40 TABLET, EXTENDED RELEASE ORAL ONCE
Status: COMPLETED | OUTPATIENT
Start: 2023-01-03 | End: 2023-01-03

## 2023-01-03 RX ORDER — LEVALBUTEROL INHALATION SOLUTION 1.25 MG/3ML
1.25 SOLUTION RESPIRATORY (INHALATION) EVERY 4 HOURS PRN
Status: DISCONTINUED | OUTPATIENT
Start: 2023-01-03 | End: 2023-01-05

## 2023-01-03 RX ORDER — CARVEDILOL 12.5 MG/1
25 TABLET ORAL 2 TIMES DAILY WITH MEALS
Status: DISCONTINUED | OUTPATIENT
Start: 2023-01-03 | End: 2023-01-05

## 2023-01-03 RX ORDER — LABETALOL HYDROCHLORIDE 5 MG/ML
10 INJECTION, SOLUTION INTRAVENOUS EVERY 4 HOURS PRN
Status: DISCONTINUED | OUTPATIENT
Start: 2023-01-03 | End: 2023-01-05

## 2023-01-03 RX ORDER — CARVEDILOL 12.5 MG/1
25 TABLET ORAL 2 TIMES DAILY WITH MEALS
Status: DISCONTINUED | OUTPATIENT
Start: 2023-01-03 | End: 2023-01-03

## 2023-01-03 RX ADMIN — NAFCILLIN 2 G: 2 POWDER, FOR SOLUTION INTRAMUSCULAR; INTRAVENOUS at 05:44

## 2023-01-03 RX ADMIN — GABAPENTIN 300 MG: 300 CAPSULE ORAL at 19:27

## 2023-01-03 RX ADMIN — NAFCILLIN 2 G: 2 POWDER, FOR SOLUTION INTRAMUSCULAR; INTRAVENOUS at 18:28

## 2023-01-03 RX ADMIN — ARIPIPRAZOLE 10 MG: 10 TABLET ORAL at 22:26

## 2023-01-03 RX ADMIN — ACETYLCYSTEINE 2 ML: 200 SOLUTION ORAL; RESPIRATORY (INHALATION) at 08:11

## 2023-01-03 RX ADMIN — AMLODIPINE BESYLATE 10 MG: 10 TABLET ORAL at 07:40

## 2023-01-03 RX ADMIN — NAFCILLIN 2 G: 2 POWDER, FOR SOLUTION INTRAMUSCULAR; INTRAVENOUS at 22:26

## 2023-01-03 RX ADMIN — HYDRALAZINE HYDROCHLORIDE 25 MG: 25 TABLET, FILM COATED ORAL at 05:08

## 2023-01-03 RX ADMIN — LABETALOL HYDROCHLORIDE 10 MG: 5 INJECTION, SOLUTION INTRAVENOUS at 22:32

## 2023-01-03 RX ADMIN — NAFCILLIN 2 G: 2 POWDER, FOR SOLUTION INTRAMUSCULAR; INTRAVENOUS at 10:45

## 2023-01-03 RX ADMIN — CARVEDILOL 25 MG: 12.5 TABLET, FILM COATED ORAL at 17:45

## 2023-01-03 RX ADMIN — NAFCILLIN 2 G: 2 POWDER, FOR SOLUTION INTRAMUSCULAR; INTRAVENOUS at 02:37

## 2023-01-03 RX ADMIN — CARVEDILOL 25 MG: 12.5 TABLET, FILM COATED ORAL at 07:57

## 2023-01-03 RX ADMIN — QUETIAPINE FUMARATE 600 MG: 300 TABLET ORAL at 22:26

## 2023-01-03 RX ADMIN — POTASSIUM CHLORIDE 40 MEQ: 750 TABLET, EXTENDED RELEASE ORAL at 05:08

## 2023-01-03 RX ADMIN — HYDRALAZINE HYDROCHLORIDE 10 MG: 20 INJECTION INTRAMUSCULAR; INTRAVENOUS at 02:37

## 2023-01-03 RX ADMIN — GABAPENTIN 300 MG: 300 CAPSULE ORAL at 14:38

## 2023-01-03 RX ADMIN — ENOXAPARIN SODIUM 40 MG: 40 INJECTION SUBCUTANEOUS at 07:39

## 2023-01-03 RX ADMIN — Medication 0.5 MG: at 07:41

## 2023-01-03 RX ADMIN — NAFCILLIN 2 G: 2 POWDER, FOR SOLUTION INTRAMUSCULAR; INTRAVENOUS at 14:38

## 2023-01-03 RX ADMIN — Medication 0.5 MG: at 19:27

## 2023-01-03 RX ADMIN — LEVALBUTEROL HYDROCHLORIDE 1.25 MG: 1.25 SOLUTION RESPIRATORY (INHALATION) at 08:11

## 2023-01-03 RX ADMIN — POTASSIUM PHOSPHATE, MONOBASIC POTASSIUM PHOSPHATE, DIBASIC 15 MMOL: 224; 236 INJECTION, SOLUTION, CONCENTRATE INTRAVENOUS at 06:56

## 2023-01-03 RX ADMIN — GABAPENTIN 300 MG: 300 CAPSULE ORAL at 07:40

## 2023-01-03 RX ADMIN — ENOXAPARIN SODIUM 40 MG: 40 INJECTION SUBCUTANEOUS at 19:27

## 2023-01-03 RX ADMIN — LABETALOL HYDROCHLORIDE 10 MG: 5 INJECTION, SOLUTION INTRAVENOUS at 03:37

## 2023-01-03 NOTE — PLAN OF CARE
Major Shift Events:  VSS, BPs tenious and occasionally HTN but addressed with scheduled medications, A/Ox4, tele discontinued, voiding via urinal, regular diet, skin intact, BGs stable     Plan: transfer to [facility] bed when bed available    For vital signs and complete assessments, please see documentation flowsheets.

## 2023-01-03 NOTE — PLAN OF CARE
ICU End of Shift Summary. See flowsheets for vital signs and detailed assessment.    Changes this shift: 2L NC, NJ removed, large BM, required PRNs for hypertension multiple times today. In the chair most of the day.     Plan: Transfer to IMC.     Goal Outcome Evaluation:      Plan of Care Reviewed With: patient    Overall Patient Progress: improvingOverall Patient Progress: improving    Outcome Evaluation: NC 2L, IMC orders

## 2023-01-03 NOTE — PLAN OF CARE
ICU End of Shift Summary. See flowsheets for vital signs and detailed assessment.     Changes this shift: A/Ox4, follows commands. HTN throughout shift, utilizing PRNs to keep SBP <135, but unable consistently meet goal. 2L NC.  Denies pain. Up to commode x1 with large BM with urine mix. Voids spontaneously. K and phos replaced.  BG at 0400 63, OJ and crackers given, last .     Plan: Wean 02. Transfer to IMC when bed available.

## 2023-01-03 NOTE — PROGRESS NOTES
[facility]    Medicine Progress Note - Hospitalist Service, GOLD TEAM 2    Date of Admission:  12/21/2022    Assessment & Plan   Antony Aguila Jr. is a 62 year old male admitted on 12/21/2022. He has history chronic type B aortic dissection s/p exploration and median sternotomy (6/18/19), type 2 DM (poorly controlled), and HTN who is admitted for acute on chronic thoracic aortic aneurysm with new aortic root dilation. Hospitalization complicated by hypokalemia who was admitted on 12/21/2022 for acute hypoxic respiratory failure in setting of agitation and need for sedation. Extubated 12/31, currently treating hypertension and MSSA ventilator-associated pneumonia weaning oxygen.     Changes Today:  - BG 60 this AM, dropped glargine to 45U , cont to hold home metformin for simplicity w/ insulin control  - holding diuretics while electolytes stabilize  - Continue adding/titrating oral meds for hypertension.  carvedilol 12.5mg --> 25 BID today with good BP response after AM dose SYSTOLIC BLOOD PRESSURE ~ 100.. For now holding scheduled PO hydralazine to not drop too fast. Continue with amlodipine 10mg.   *PRN labetalol available for HTN w/ parameters in order  *overal BP goal for aortic issues per last CV note: BP < 120, HR< 60  - likely transition nafcillin to PO 1/4 for MSSA pneumonia if remains stable    Active Medical Problems  # Chronic type B aortic dissection (proximal descending aorta to the abdominal aorta)  # Proximal ascending aorta saccular aneurysm   # Distal ascending aorta saccular aneurysm   # Mild nonobstructive CAD  Pt has history of stable type B aortic dissection as well as short segment of dissection vs pseudoaneurysms at the aortic root and distal ascending thoracic aorta. Previously underwent surgical exploration in 6/2019 with concern for type 1 aortic dissection but found to have chronic type B dissection. On admission, CXR with widened mediastinum  slightly increased from prior. CT chest with aneurysmal dilatation ascending and descending thoracic aorta with type B aortic dissection distal to L subclavian artery extending to L common iliac artery with severe narrowing of true lumen and possible pseudoaneurysm at aortic root, new compared to prior 2019 images.   - TRINH 12/23 showed saccular aneurysm of ascending aorta measuring 5.8 cm associated with a dissection flap. Thrombus is seen in the false lumen. Aortic root is dilated at 4.6 cm.  - CTA 12/22 two saccular aneurysms, one each in the proximal ascending aorta and distal ascending aorta. The maximal transaortic diameters of 46.9 x 39.2 mm and 62.7 x 54.4 mm, respectively. Mild, non-obstructive coronary artery disease without any high-grade stenoses.  - Cardiology and CVTS consulted and signed off  - Plan for outpatient follow up with CVTS  - Hold DVT ppx 12/23; restarted 12/24 for thrombus seen on TRINH, continue per CVTS recs  BP REGIMEN TITRATION (does not appear to be on BP meds at home)  *amlodipine  *coreg  *PRN labetaolol IV    # Acute hypoxic and hypercarbic respiratory failure  # Significant airway edema  # Concern for sleep apnea  # Tracheostomy placed 7/5/19, decannulated 8/2019  Per hospitalization notes here in Sept 2022, noted to have hx of dyspnea on exertion in setting of being current smoker, tx with albuterol inhaler, recommended outpatient PFTs be obtained. Pulmonary progress note from 7/2019 notes current smoker with unknown COPD history, no PFTs available. Had tracheostomy placed 7/5/19, decannulated 8/2019. CTA chest with compressive atelectasis in L lung adjacent to thoracic aorta and hiatal hernia. Patient became agitated and non-compliant with attempts for patient to use BIPAP or HFNC and needed heavy sedation to treat agitation such that intubation was required to protect airway. Patient is historically a difficult intubation and was very difficult this time. Patient has a possible hx  of angioedema reaction to lisinopril, was started on losartan yesterday, could account for the airway edema and extubation will be expected to be delayed due to this. Likely needs higher PEEP due to his body habitus.   - patient emergently intubated  - s/p decadron 8mg IV x1, completed 4mg IV x3 1/1  - Bumex IV 2 BID last given 1/1  - Extubated 12/31    # H/o possible ACE inhibitor related angioedema  - Noted during July 2019 hospitalization at same time having AHRF requiring tracheostomy (later decannulted in August 2019).  - Notably patient started on losartan 12/25/2  - Losartan added to allergy list   - Decadron course as abovef    # Increased pulmonary secretions  # Concern for hospital associated pneumonia  - Growing MSSA 12/29, Zosyn 12/29-1/1 then transitioned to nafcillin to complete 5-day course    # Schizoaffective disorder  # PTSD  # Agitation     # Encephalopathy, likely metabolic, resolved  - Psychiatry consulted, appreciate recs  - continue with home seroqeul qHS  - Aripiprazole 10 mg HS  - Guanfacine 0.5 mg BID (alpha-2 agonist like Precedex)    not needed recently    # HTN  Based on outpatient note 12/14/2022 pt was previously hydordiuril 50 mg, amlodipine 10 mg, bumex TID and canaglaflozin. 12/25 Hypertensive overnight with highest SBP of 170s. Given hx of Type B sonam dissection and now new aortic aneurysm goals. On nicardipine gtt in the ICU.   - hold Labetalol  BID (titrated from intially PO dose of 100 mg)  - ACE/ARB contraindicated due to angioedema  - As above: amlodipine, carvedilol    # Large hiatal hernia  # Nutrition  # Hypernatremia  - resume PO     # Type 2 DM with hyperglycemia  Most recent A1c 12.5 (9/16/22). Home regimen: metformin 1000 qPM and Lantus 50 unit(s) qAM + aspart 17 unit(s) TIDAC + aspart 9 unit(s) w/ snacks  - titrate insulin  - HDSSI   - holding PTA metformin     # BLE edema  Dimer elevated with acute on chronic leg swelling L < R per patient history. DVT US in  ED negative for DVT b/l. Has had LE swelling for last 2 years, notes it is worse lately. Previously on diuretics, not currently per chart.  - lymphedema consult    Resolved or Chronic Problems:  # Shock, NOS-resolved   - patient developed hypotension after intubation and being sedated on propofol   - TTE 12/26 EF 60-65%, normal RV     # Anemia, normocytic  - Trend CBC    # LAKISHA on CKD 2-resolved   Outpatient labs 12/16 with Cr 1.4 and eGFR 55. Now back at baseline Cr ~1.1-1.2, eGFR ~65-75. UA unremarkable.  - decreased PTA gabapentin 600 mg TID -> 300 mg TID  - holding PTA metformin for now    # Mild hypokalemia, resolved  # Mild hypomagnesemia- resolved   - Repletion protocols      # Hypocalcemia, resolved  - Monitor BMP    # Venous stasis ulcer LLE  # Venous insufficiency  # Peripheral vascular disease  - WOC consult placed    # Tobacco use  Smoked cigarettes for 45 years. Currently 1 pack per week.  - Encourage cessation      Diet: Regular Diet Adult    DVT Prophylaxis: Enoxaparin (Lovenox) SQ  Noble Catheter: Not present  Lines: PRESENT      PICC 12/26/22 Triple Lumen Right Cephalic ACCESS. PICC okay to use.-Site Assessment: WDL      Cardiac Monitoring: None  Code Status: Full Code      Clinically Significant Risk Factors        # Hypokalemia: Lowest K = 3.1 mmol/L in last 2 days, will replace as needed  # Hypernatremia: Highest Na = 147 mmol/L in last 2 days, will monitor as appropriate      # Hypoalbuminemia: Lowest albumin = 2.9 g/dL at 12/26/2022  6:03 AM, will monitor as appropriate          # DMII: A1C = 8.0 % (Ref range: <5.7 %) within past 3 months   # Severe Obesity: Estimated body mass index is 44.08 kg/m  as calculated from the following:    Height as of this encounter: 1.829 m (6').    Weight as of this encounter: 147.4 kg (325 lb).          Disposition Plan      Expected Discharge Date: 01/05/2023      Destination: home with help/services;nursing home  Discharge Comments: Completing IV nafcillin  through 1/6, could be done at home though still working on oral BP control        Bhupendra Payan MD  Hospitalist Service, [facility] TEAM 2  [facility]  Securely message with [employer] (more info)  Text page via [employer] Paging/Directory   See signed in provider for up to date coverage information  ______________________________________________________________________    Interval History   - feels much improved today with lozenge and cough better    4 point ROS otherwise negative.     Physical Exam   Vital Signs: Temp: 98  F (36.7  C) Temp src: Oral BP: 112/66 Pulse: 75   Resp: 26 SpO2: 98 % O2 Device: Nasal cannula Oxygen Delivery: 3 LPM  Weight: 325 lbs 0 oz    Physical Exam   Constitutional: obese NAD  HEENT: EOMI  Neck: Symmetric  Cardiovascular: regular without murmurs or gallops  Pulmonary/Chest: CTAB prolonged expiratory phase. No respiratory distress.  GI: Soft, non-tender , non-distended    Musculoskeletal:  Wrinkled 1-2+ bilateral edema, oozing blisters under gauze on L  Skin: Skin is warm and dry  Neurological: Alert and oriented   Psychiatric:  euthymic      Data     I have personally reviewed the following data over the past 24 hrs:    9.4  \   11.7 (L)   / 244     146 (H) 109 (H) 22.4 /  164 (H)   3.4; 3.4 26 1.03 \

## 2023-01-04 ENCOUNTER — APPOINTMENT (OUTPATIENT)
Dept: GENERAL RADIOLOGY | Facility: CLINIC | Age: 63
End: 2023-01-04
Attending: HOSPITALIST
Payer: COMMERCIAL

## 2023-01-04 LAB
ALBUMIN SERPL BCG-MCNC: 3.4 G/DL (ref 3.5–5.2)
ALP SERPL-CCNC: 68 U/L (ref 40–129)
ALT SERPL W P-5'-P-CCNC: 82 U/L (ref 10–50)
ANION GAP SERPL CALCULATED.3IONS-SCNC: 11 MMOL/L (ref 7–15)
ANION GAP SERPL CALCULATED.3IONS-SCNC: 11 MMOL/L (ref 7–15)
AST SERPL W P-5'-P-CCNC: 60 U/L (ref 10–50)
BASE EXCESS BLDV CALC-SCNC: 6.8 MMOL/L (ref -7.7–1.9)
BILIRUB SERPL-MCNC: 0.3 MG/DL
BUN SERPL-MCNC: 19.6 MG/DL (ref 8–23)
BUN SERPL-MCNC: 20.1 MG/DL (ref 8–23)
CALCIUM SERPL-MCNC: 8.5 MG/DL (ref 8.8–10.2)
CALCIUM SERPL-MCNC: 9 MG/DL (ref 8.8–10.2)
CHLORIDE SERPL-SCNC: 104 MMOL/L (ref 98–107)
CHLORIDE SERPL-SCNC: 106 MMOL/L (ref 98–107)
CREAT SERPL-MCNC: 1.25 MG/DL (ref 0.67–1.17)
CREAT SERPL-MCNC: 1.26 MG/DL (ref 0.67–1.17)
DEPRECATED HCO3 PLAS-SCNC: 27 MMOL/L (ref 22–29)
DEPRECATED HCO3 PLAS-SCNC: 27 MMOL/L (ref 22–29)
ERYTHROCYTE [DISTWIDTH] IN BLOOD BY AUTOMATED COUNT: 16.1 % (ref 10–15)
GFR SERPL CREATININE-BSD FRML MDRD: 64 ML/MIN/1.73M2
GFR SERPL CREATININE-BSD FRML MDRD: 65 ML/MIN/1.73M2
GLUCOSE BLDC GLUCOMTR-MCNC: 109 MG/DL (ref 70–99)
GLUCOSE BLDC GLUCOMTR-MCNC: 116 MG/DL (ref 70–99)
GLUCOSE BLDC GLUCOMTR-MCNC: 127 MG/DL (ref 70–99)
GLUCOSE BLDC GLUCOMTR-MCNC: 140 MG/DL (ref 70–99)
GLUCOSE BLDC GLUCOMTR-MCNC: 177 MG/DL (ref 70–99)
GLUCOSE BLDC GLUCOMTR-MCNC: 201 MG/DL (ref 70–99)
GLUCOSE SERPL-MCNC: 123 MG/DL (ref 70–99)
GLUCOSE SERPL-MCNC: 174 MG/DL (ref 70–99)
HCO3 BLDV-SCNC: 33 MMOL/L (ref 21–28)
HCT VFR BLD AUTO: 38.9 % (ref 40–53)
HGB BLD-MCNC: 12.3 G/DL (ref 13.3–17.7)
LACTATE SERPL-SCNC: 1.1 MMOL/L (ref 0.7–2)
MAGNESIUM SERPL-MCNC: 2.2 MG/DL (ref 1.7–2.3)
MCH RBC QN AUTO: 28.2 PG (ref 26.5–33)
MCHC RBC AUTO-ENTMCNC: 31.6 G/DL (ref 31.5–36.5)
MCV RBC AUTO: 89 FL (ref 78–100)
NT-PROBNP SERPL-MCNC: 1236 PG/ML (ref 0–900)
O2/TOTAL GAS SETTING VFR VENT: 21 %
PCO2 BLDV: 52 MM HG (ref 40–50)
PH BLDV: 7.41 [PH] (ref 7.32–7.43)
PHOSPHATE SERPL-MCNC: 3.3 MG/DL (ref 2.5–4.5)
PLATELET # BLD AUTO: 247 10E3/UL (ref 150–450)
PO2 BLDV: 37 MM HG (ref 25–47)
POTASSIUM SERPL-SCNC: 3.5 MMOL/L (ref 3.4–5.3)
POTASSIUM SERPL-SCNC: 3.6 MMOL/L (ref 3.4–5.3)
PROT SERPL-MCNC: 6.6 G/DL (ref 6.4–8.3)
RBC # BLD AUTO: 4.36 10E6/UL (ref 4.4–5.9)
SODIUM SERPL-SCNC: 142 MMOL/L (ref 136–145)
SODIUM SERPL-SCNC: 144 MMOL/L (ref 136–145)
TROPONIN T SERPL HS-MCNC: 19 NG/L
WBC # BLD AUTO: 10.9 10E3/UL (ref 4–11)

## 2023-01-04 PROCEDURE — 71045 X-RAY EXAM CHEST 1 VIEW: CPT | Mod: 26 | Performed by: RADIOLOGY

## 2023-01-04 PROCEDURE — 250N000013 HC RX MED GY IP 250 OP 250 PS 637: Performed by: HOSPITALIST

## 2023-01-04 PROCEDURE — 250N000013 HC RX MED GY IP 250 OP 250 PS 637

## 2023-01-04 PROCEDURE — 83735 ASSAY OF MAGNESIUM: CPT | Performed by: STUDENT IN AN ORGANIZED HEALTH CARE EDUCATION/TRAINING PROGRAM

## 2023-01-04 PROCEDURE — 84100 ASSAY OF PHOSPHORUS: CPT | Performed by: HOSPITALIST

## 2023-01-04 PROCEDURE — 93005 ELECTROCARDIOGRAM TRACING: CPT

## 2023-01-04 PROCEDURE — 250N000011 HC RX IP 250 OP 636: Performed by: HOSPITALIST

## 2023-01-04 PROCEDURE — 250N000011 HC RX IP 250 OP 636

## 2023-01-04 PROCEDURE — 83880 ASSAY OF NATRIURETIC PEPTIDE: CPT | Performed by: HOSPITALIST

## 2023-01-04 PROCEDURE — 999N000157 HC STATISTIC RCP TIME EA 10 MIN

## 2023-01-04 PROCEDURE — 120N000003 HC R&B IMCU UMMC

## 2023-01-04 PROCEDURE — 83605 ASSAY OF LACTIC ACID: CPT | Performed by: HOSPITALIST

## 2023-01-04 PROCEDURE — 84484 ASSAY OF TROPONIN QUANT: CPT | Performed by: HOSPITALIST

## 2023-01-04 PROCEDURE — 71045 X-RAY EXAM CHEST 1 VIEW: CPT

## 2023-01-04 PROCEDURE — 250N000009 HC RX 250

## 2023-01-04 PROCEDURE — 250N000013 HC RX MED GY IP 250 OP 250 PS 637: Performed by: STUDENT IN AN ORGANIZED HEALTH CARE EDUCATION/TRAINING PROGRAM

## 2023-01-04 PROCEDURE — 82803 BLOOD GASES ANY COMBINATION: CPT | Performed by: HOSPITALIST

## 2023-01-04 PROCEDURE — 80053 COMPREHEN METABOLIC PANEL: CPT | Performed by: STUDENT IN AN ORGANIZED HEALTH CARE EDUCATION/TRAINING PROGRAM

## 2023-01-04 PROCEDURE — 99233 SBSQ HOSP IP/OBS HIGH 50: CPT | Performed by: HOSPITALIST

## 2023-01-04 PROCEDURE — 93010 ELECTROCARDIOGRAM REPORT: CPT | Performed by: INTERNAL MEDICINE

## 2023-01-04 PROCEDURE — 85027 COMPLETE CBC AUTOMATED: CPT | Performed by: STUDENT IN AN ORGANIZED HEALTH CARE EDUCATION/TRAINING PROGRAM

## 2023-01-04 RX ORDER — HYDRALAZINE HYDROCHLORIDE 25 MG/1
25 TABLET, FILM COATED ORAL EVERY 8 HOURS SCHEDULED
Status: DISCONTINUED | OUTPATIENT
Start: 2023-01-04 | End: 2023-01-04

## 2023-01-04 RX ORDER — BUMETANIDE 0.25 MG/ML
1 INJECTION INTRAMUSCULAR; INTRAVENOUS ONCE
Status: DISCONTINUED | OUTPATIENT
Start: 2023-01-04 | End: 2023-01-04

## 2023-01-04 RX ORDER — HYDRALAZINE HYDROCHLORIDE 50 MG/1
50 TABLET, FILM COATED ORAL EVERY 8 HOURS SCHEDULED
Status: DISCONTINUED | OUTPATIENT
Start: 2023-01-04 | End: 2023-01-05

## 2023-01-04 RX ORDER — BUMETANIDE 1 MG/1
1 TABLET ORAL 3 TIMES DAILY
Status: DISCONTINUED | OUTPATIENT
Start: 2023-01-04 | End: 2023-01-05

## 2023-01-04 RX ORDER — BUMETANIDE 0.25 MG/ML
2 INJECTION INTRAMUSCULAR; INTRAVENOUS ONCE
Status: COMPLETED | OUTPATIENT
Start: 2023-01-04 | End: 2023-01-04

## 2023-01-04 RX ADMIN — HYDRALAZINE HYDROCHLORIDE 25 MG: 25 TABLET, FILM COATED ORAL at 08:41

## 2023-01-04 RX ADMIN — GABAPENTIN 300 MG: 300 CAPSULE ORAL at 19:28

## 2023-01-04 RX ADMIN — ENOXAPARIN SODIUM 40 MG: 40 INJECTION SUBCUTANEOUS at 08:37

## 2023-01-04 RX ADMIN — BUMETANIDE 1 MG: 1 TABLET ORAL at 19:28

## 2023-01-04 RX ADMIN — HYDRALAZINE HYDROCHLORIDE 50 MG: 50 TABLET, FILM COATED ORAL at 21:40

## 2023-01-04 RX ADMIN — GABAPENTIN 300 MG: 300 CAPSULE ORAL at 14:19

## 2023-01-04 RX ADMIN — BUMETANIDE 1 MG: 1 TABLET ORAL at 14:19

## 2023-01-04 RX ADMIN — BUMETANIDE 1 MG: 1 TABLET ORAL at 08:41

## 2023-01-04 RX ADMIN — CARVEDILOL 25 MG: 12.5 TABLET, FILM COATED ORAL at 19:28

## 2023-01-04 RX ADMIN — AMLODIPINE BESYLATE 10 MG: 10 TABLET ORAL at 08:36

## 2023-01-04 RX ADMIN — BUMETANIDE 2 MG: 0.25 INJECTION INTRAMUSCULAR; INTRAVENOUS at 14:19

## 2023-01-04 RX ADMIN — CARVEDILOL 25 MG: 12.5 TABLET, FILM COATED ORAL at 08:36

## 2023-01-04 RX ADMIN — SIMETHICONE 80 MG: 80 TABLET, CHEWABLE ORAL at 14:52

## 2023-01-04 RX ADMIN — Medication 0.5 MG: at 19:30

## 2023-01-04 RX ADMIN — NAFCILLIN 2 G: 2 POWDER, FOR SOLUTION INTRAMUSCULAR; INTRAVENOUS at 10:00

## 2023-01-04 RX ADMIN — NAFCILLIN 2 G: 2 POWDER, FOR SOLUTION INTRAMUSCULAR; INTRAVENOUS at 02:27

## 2023-01-04 RX ADMIN — ARIPIPRAZOLE 10 MG: 10 TABLET ORAL at 21:38

## 2023-01-04 RX ADMIN — Medication 0.5 MG: at 08:36

## 2023-01-04 RX ADMIN — GABAPENTIN 300 MG: 300 CAPSULE ORAL at 08:36

## 2023-01-04 RX ADMIN — ENOXAPARIN SODIUM 40 MG: 40 INJECTION SUBCUTANEOUS at 19:27

## 2023-01-04 RX ADMIN — LABETALOL HYDROCHLORIDE 10 MG: 5 INJECTION, SOLUTION INTRAVENOUS at 21:47

## 2023-01-04 RX ADMIN — HYDRALAZINE HYDROCHLORIDE 50 MG: 50 TABLET, FILM COATED ORAL at 14:19

## 2023-01-04 RX ADMIN — NAFCILLIN 2 G: 2 POWDER, FOR SOLUTION INTRAMUSCULAR; INTRAVENOUS at 05:42

## 2023-01-04 RX ADMIN — QUETIAPINE FUMARATE 600 MG: 300 TABLET ORAL at 21:38

## 2023-01-04 ASSESSMENT — ACTIVITIES OF DAILY LIVING (ADL)
ADLS_ACUITY_SCORE: 28

## 2023-01-04 NOTE — PROGRESS NOTES
[facility]    Medicine Progress Note - Hospitalist Service, GOLD TEAM 2    Date of Admission:  12/21/2022    Assessment & Plan   Antony Aguila Jr. is a 62 year old male admitted on 12/21/2022. He has history chronic type B aortic dissection s/p exploration and median sternotomy (6/18/19), type 2 DM (poorly controlled), and HTN who is admitted for acute on chronic thoracic aortic aneurysm with new aortic root dilation. Hospitalization complicated by hypokalemia who was admitted on 12/21/2022 for acute hypoxic respiratory failure in setting of agitation and need for sedation. Extubated 12/31, currently treating hypertension and MSSA ventilator-associated pneumonia weaning oxygen.     Addendum  January 4, 2023  2:08 PM     BNP quite high, troponin and lactate not elevated, consistent w/ acute volume overload (persistent hazy edema on CXR) likely from sodium load from pizza last night    2mg IV bumex for midday dose now, resume 1mg PO TID tonight    Salt restict 2g diet        Changes Today:  - Completed nacfillin for MSSA pneumonia     #Acute fatigue on waking 1/4 with chest pain and poor appetite  - possible volume overload vs cardiovascular etiology ; BP has been chronically higher than goal (systolic blood pressure 140's vs goal < 120) though not acutely changed, no HR variability  *EKG without acute ischemic changes, pain less consistent with acute progression of chronic descending dissection which would expect in back and knife like  *Troponin/CMP/lactate/BNP pending as of 1:03 PM  *continue Bumex home dose 1mg PO TID given equivocal voluem on exam, consider PRN extra doses pending workup  *overal BP goal for aortic issues per last CV note: BP < 120, HR< 60    # Chronic type B aortic dissection (proximal descending aorta to the abdominal aorta)  # Proximal ascending aorta saccular aneurysm   # Distal ascending aorta saccular aneurysm   # Mild nonobstructive CAD  Pt  has history of stable type B aortic dissection as well as short segment of dissection vs pseudoaneurysms at the aortic root and distal ascending thoracic aorta. Previously underwent surgical exploration in 6/2019 with concern for type 1 aortic dissection but found to have chronic type B dissection. On admission, CXR with widened mediastinum slightly increased from prior. CT chest with aneurysmal dilatation ascending and descending thoracic aorta with type B aortic dissection distal to L subclavian artery extending to L common iliac artery with severe narrowing of true lumen and possible pseudoaneurysm at aortic root, new compared to prior 2019 images.   - TRINH 12/23 showed saccular aneurysm of ascending aorta measuring 5.8 cm associated with a dissection flap. Thrombus is seen in the false lumen. Aortic root is dilated at 4.6 cm.  - CTA 12/22 two saccular aneurysms, one each in the proximal ascending aorta and distal ascending aorta. The maximal transaortic diameters of 46.9 x 39.2 mm and 62.7 x 54.4 mm, respectively. Mild, non-obstructive coronary artery disease without any high-grade stenoses.  - Cardiology and CVTS consulted and signed off  - Plan for outpatient follow up with CVTS  - Hold DVT ppx 12/23; restarted 12/24 for thrombus seen on TRINH, continue per CVTS recs  BP REGIMEN TITRATION (does not appear to be on BP meds at home)   *amlodipine 10 daily  *coreg --> 25 BID  *hydral 25 -> 50 TID  *PRN labetaolol IV    # Acute hypoxic and hypercarbic respiratory failure  # Significant airway edema  # Concern for sleep apnea  # Tracheostomy placed 7/5/19, decannulated 8/2019  Per hospitalization notes here in Sept 2022, noted to have hx of dyspnea on exertion in setting of being current smoker, tx with albuterol inhaler, recommended outpatient PFTs be obtained. Pulmonary progress note from 7/2019 notes current smoker with unknown COPD history, no PFTs available. Had tracheostomy placed 7/5/19, decannulated 8/2019.  CTA chest with compressive atelectasis in L lung adjacent to thoracic aorta and hiatal hernia. Patient became agitated and non-compliant with attempts for patient to use BIPAP or HFNC and needed heavy sedation to treat agitation such that intubation was required to protect airway. Patient is historically a difficult intubation and was very difficult this time. Patient has a possible hx of angioedema reaction to lisinopril, was started on losartan yesterday, could account for the airway edema and extubation will be expected to be delayed due to this. Likely needs higher PEEP due to his body habitus.   - patient emergently intubated  - s/p decadron 8mg IV x1, completed 4mg IV x3 1/1  - Bumex IV 2 BID last given 1/1, switched to home 1mg PO TID 1/4  - Extubated 12/31    # H/o possible ACE inhibitor related angioedema  - Noted during July 2019 hospitalization at same time having AHRF requiring tracheostomy (later decannulted in August 2019).  - Notably patient started on losartan 12/25/2  - Losartan added to allergy list   - Decadron course as abovef    # Increased pulmonary secretions  # Concern for hospital associated pneumonia  - Growing MSSA 12/29, Zosyn 12/29-1/1 then transitioned to nafcillin to complete 5-day course    # Schizoaffective disorder  # PTSD  # Agitation     # Encephalopathy, likely metabolic, resolved  - Psychiatry consulted, appreciate recs  - continue with home seroqeul qHS  - Aripiprazole 10 mg HS  - Guanfacine 0.5 mg BID (alpha-2 agonist like Precedex)    not needed recently    # HTN  Based on outpatient note 12/14/2022 pt was previously hydordiuril 50 mg, amlodipine 10 mg, bumex TID and canaglaflozin. 12/25 Hypertensive overnight with highest SBP of 170s. Given hx of Type B sonam dissection and now new aortic aneurysm goals. On nicardipine gtt in the ICU.   - hold Labetalol  BID (titrated from intially PO dose of 100 mg)  - ACE/ARB contraindicated due to angioedema  - As above    #  Large hiatal hernia  # Nutrition  # Hypernatremia  - resume PO     # Type 2 DM with hyperglycemia  Most recent A1c 12.5 (9/16/22). Home regimen: metformin 1000 qPM and Lantus 50 unit(s) qAM + aspart 17 unit(s) TIDAC + aspart 9 unit(s) w/ snacks  - titrate insulin  - HDSSI   - holding PTA metformin     # BLE edema  Dimer elevated with acute on chronic leg swelling L < R per patient history. DVT US in ED negative for DVT b/l. Has had LE swelling for last 2 years, notes it is worse lately. Previously on diuretics, not currently per chart.  - lymphedema consult    Resolved or Chronic Problems:  # Shock, NOS-resolved   - patient developed hypotension after intubation and being sedated on propofol   - TTE 12/26 EF 60-65%, normal RV     # Anemia, normocytic  - Trend CBC    # LAKISHA on CKD 2-resolved   Outpatient labs 12/16 with Cr 1.4 and eGFR 55. Now back at baseline Cr ~1.1-1.2, eGFR ~65-75. UA unremarkable.  - decreased PTA gabapentin 600 mg TID -> 300 mg TID  - holding PTA metformin for now    # Mild hypokalemia, resolved  # Mild hypomagnesemia- resolved   - Repletion protocols      # Hypocalcemia, resolved  - Monitor BMP    # Venous stasis ulcer LLE  # Venous insufficiency  # Peripheral vascular disease  - WOC consult placed    # Tobacco use  Smoked cigarettes for 45 years. Currently 1 pack per week.  - Encourage cessation      Diet: Regular Diet Adult    DVT Prophylaxis: Enoxaparin (Lovenox) SQ  Noble Catheter: Not present  Lines: PRESENT      PICC 12/26/22 Triple Lumen Right Cephalic ACCESS. PICC okay to use.-Site Assessment: WDL      Cardiac Monitoring: None  Code Status: Full Code      Clinically Significant Risk Factors         # Hypernatremia: Highest Na = 146 mmol/L in last 2 days, will monitor as appropriate      # Hypoalbuminemia: Lowest albumin = 2.9 g/dL at 12/26/2022  6:03 AM, will monitor as appropriate          # DMII: A1C = 8.0 % (Ref range: <5.7 %) within past 3 months   # Severe Obesity: Estimated body  mass index is 47.5 kg/m  as calculated from the following:    Height as of this encounter: 1.829 m (6').    Weight as of this encounter: 158.8 kg (350 lb 3.2 oz).          Disposition Plan     Expected Discharge Date: 01/05/2023      Destination: home with help/services;nursing home  Discharge Comments: Completing IV nafcillin through 1/6, could be done at home though still working on oral BP control        Bhupendra Payan MD  Hospitalist Service, [facility] TEAM 2  [facility]  Securely message with [employer] (more info)  Text page via [facility] Paging/Directory   See signed in provider for up to date coverage information  ______________________________________________________________________    Interval History   - woke with sensation of extreme fatigue this morning. Not eating much breakfast. Ate entire carry out pizza for dinner last night. chest discomfort intermittent since waking unclear details but described as heavy. Denies feeling fulness in mouth/throat but fullness in chest. Doesn't feels like his leg swelling has suddnely gotten worse.     4 point ROS otherwise negative.     Physical Exam   Vital Signs: Temp: 98.4  F (36.9  C) Temp src: Oral BP: (!) 147/95 Pulse: 82   Resp: 20 SpO2: 95 %      Weight: 350 lbs 3.2 oz    Physical Exam   Constitutional: obese appears uncomfortable  HEENT: EOMI  Neck: Symmetric  Cardiovascular: regular without murmurs or gallops; equovical JVD given habitus  Pulmonary/Chest: CTAB prolonged expiratory phase. No respiratory distress.  GI: Soft, non-tender , non-distended    Musculoskeletal:  2+ bilateral edema increased, oozing blisters under gauze on L  Skin: Skin is warm and dry  Neurological: Alert and oriented   Psychiatric:  euthymic      Data     I have personally reviewed the following data over the past 24 hrs:    10.9  \   12.3 (L)   / 247     144 106 20.1 /  201 (H)   3.5 27 1.26 (H) \

## 2023-01-04 NOTE — PLAN OF CARE
ICU End of Shift Summary. See flowsheets for vital signs and detailed assessment.     Changes this shift: A/Ox4, follows commands. HTN, PRN labetalol given x2. CRESPO, but on RA.  Denies pain. Voids spontaneously with urinal. BG stable.      Plan: Transfer to floor when bed available.

## 2023-01-05 ENCOUNTER — APPOINTMENT (OUTPATIENT)
Dept: CT IMAGING | Facility: CLINIC | Age: 63
End: 2023-01-05
Attending: INTERNAL MEDICINE
Payer: COMMERCIAL

## 2023-01-05 ENCOUNTER — APPOINTMENT (OUTPATIENT)
Dept: GENERAL RADIOLOGY | Facility: CLINIC | Age: 63
End: 2023-01-05
Payer: COMMERCIAL

## 2023-01-05 ENCOUNTER — ANESTHESIA EVENT (OUTPATIENT)
Dept: INTENSIVE CARE | Facility: CLINIC | Age: 63
End: 2023-01-05
Payer: COMMERCIAL

## 2023-01-05 ENCOUNTER — ANESTHESIA (OUTPATIENT)
Dept: INTENSIVE CARE | Facility: CLINIC | Age: 63
End: 2023-01-05
Payer: COMMERCIAL

## 2023-01-05 LAB
ALLEN'S TEST: NO
ANION GAP SERPL CALCULATED.3IONS-SCNC: 10 MMOL/L (ref 7–15)
ANION GAP SERPL CALCULATED.3IONS-SCNC: 13 MMOL/L (ref 7–15)
ATRIAL RATE - MUSE: 85 BPM
BASE EXCESS BLDA CALC-SCNC: 5.3 MMOL/L (ref -9–1.8)
BUN SERPL-MCNC: 14.8 MG/DL (ref 8–23)
BUN SERPL-MCNC: 15 MG/DL (ref 8–23)
CALCIUM SERPL-MCNC: 8.6 MG/DL (ref 8.8–10.2)
CALCIUM SERPL-MCNC: 9.2 MG/DL (ref 8.8–10.2)
CHLORIDE SERPL-SCNC: 103 MMOL/L (ref 98–107)
CHLORIDE SERPL-SCNC: 104 MMOL/L (ref 98–107)
CREAT SERPL-MCNC: 1.19 MG/DL (ref 0.67–1.17)
CREAT SERPL-MCNC: 1.23 MG/DL (ref 0.67–1.17)
DEPRECATED HCO3 PLAS-SCNC: 26 MMOL/L (ref 22–29)
DEPRECATED HCO3 PLAS-SCNC: 27 MMOL/L (ref 22–29)
DIASTOLIC BLOOD PRESSURE - MUSE: NORMAL MMHG
ERYTHROCYTE [DISTWIDTH] IN BLOOD BY AUTOMATED COUNT: 16.2 % (ref 10–15)
GFR SERPL CREATININE-BSD FRML MDRD: 66 ML/MIN/1.73M2
GFR SERPL CREATININE-BSD FRML MDRD: 69 ML/MIN/1.73M2
GLUCOSE BLDC GLUCOMTR-MCNC: 110 MG/DL (ref 70–99)
GLUCOSE BLDC GLUCOMTR-MCNC: 113 MG/DL (ref 70–99)
GLUCOSE BLDC GLUCOMTR-MCNC: 139 MG/DL (ref 70–99)
GLUCOSE BLDC GLUCOMTR-MCNC: 142 MG/DL (ref 70–99)
GLUCOSE BLDC GLUCOMTR-MCNC: 143 MG/DL (ref 70–99)
GLUCOSE SERPL-MCNC: 105 MG/DL (ref 70–99)
GLUCOSE SERPL-MCNC: 144 MG/DL (ref 70–99)
HCO3 BLD-SCNC: 32 MMOL/L (ref 21–28)
HCT VFR BLD AUTO: 35.7 % (ref 40–53)
HGB BLD-MCNC: 11.5 G/DL (ref 13.3–17.7)
HOLD SPECIMEN: NORMAL
INTERPRETATION ECG - MUSE: NORMAL
MAGNESIUM SERPL-MCNC: 2.2 MG/DL (ref 1.7–2.3)
MAGNESIUM SERPL-MCNC: 2.2 MG/DL (ref 1.7–2.3)
MCH RBC QN AUTO: 29.1 PG (ref 26.5–33)
MCHC RBC AUTO-ENTMCNC: 32.2 G/DL (ref 31.5–36.5)
MCV RBC AUTO: 90 FL (ref 78–100)
O2/TOTAL GAS SETTING VFR VENT: 80 %
OXYHGB MFR BLD: 93 % (ref 92–100)
P AXIS - MUSE: 48 DEGREES
PCO2 BLD: 52 MM HG (ref 35–45)
PH BLD: 7.39 [PH] (ref 7.35–7.45)
PHOSPHATE SERPL-MCNC: 4.3 MG/DL (ref 2.5–4.5)
PLATELET # BLD AUTO: 257 10E3/UL (ref 150–450)
PO2 BLD: 76 MM HG (ref 80–105)
POTASSIUM SERPL-SCNC: 3.7 MMOL/L (ref 3.4–5.3)
POTASSIUM SERPL-SCNC: 3.8 MMOL/L (ref 3.4–5.3)
PR INTERVAL - MUSE: 144 MS
QRS DURATION - MUSE: 78 MS
QT - MUSE: 424 MS
QTC - MUSE: 504 MS
R AXIS - MUSE: 38 DEGREES
RADIOLOGIST FLAGS: ABNORMAL
RBC # BLD AUTO: 3.95 10E6/UL (ref 4.4–5.9)
SODIUM SERPL-SCNC: 140 MMOL/L (ref 136–145)
SODIUM SERPL-SCNC: 143 MMOL/L (ref 136–145)
SYSTOLIC BLOOD PRESSURE - MUSE: NORMAL MMHG
T AXIS - MUSE: 102 DEGREES
VENTRICULAR RATE- MUSE: 85 BPM
WBC # BLD AUTO: 9.2 10E3/UL (ref 4–11)

## 2023-01-05 PROCEDURE — 250N000009 HC RX 250: Performed by: INTERNAL MEDICINE

## 2023-01-05 PROCEDURE — G0463 HOSPITAL OUTPT CLINIC VISIT: HCPCS

## 2023-01-05 PROCEDURE — 999N000065 XR CHEST PORT 1 VIEW

## 2023-01-05 PROCEDURE — 80048 BASIC METABOLIC PNL TOTAL CA: CPT | Performed by: STUDENT IN AN ORGANIZED HEALTH CARE EDUCATION/TRAINING PROGRAM

## 2023-01-05 PROCEDURE — 250N000011 HC RX IP 250 OP 636: Performed by: NURSE PRACTITIONER

## 2023-01-05 PROCEDURE — 250N000011 HC RX IP 250 OP 636: Performed by: INTERNAL MEDICINE

## 2023-01-05 PROCEDURE — 99207 PR NO BILLABLE SERVICE THIS VISIT: CPT | Performed by: INTERNAL MEDICINE

## 2023-01-05 PROCEDURE — 83735 ASSAY OF MAGNESIUM: CPT | Performed by: STUDENT IN AN ORGANIZED HEALTH CARE EDUCATION/TRAINING PROGRAM

## 2023-01-05 PROCEDURE — 250N000009 HC RX 250: Performed by: NURSE ANESTHETIST, CERTIFIED REGISTERED

## 2023-01-05 PROCEDURE — 82805 BLOOD GASES W/O2 SATURATION: CPT | Performed by: STUDENT IN AN ORGANIZED HEALTH CARE EDUCATION/TRAINING PROGRAM

## 2023-01-05 PROCEDURE — 200N000002 HC R&B ICU UMMC

## 2023-01-05 PROCEDURE — 250N000011 HC RX IP 250 OP 636: Performed by: STUDENT IN AN ORGANIZED HEALTH CARE EDUCATION/TRAINING PROGRAM

## 2023-01-05 PROCEDURE — 258N000003 HC RX IP 258 OP 636: Performed by: STUDENT IN AN ORGANIZED HEALTH CARE EDUCATION/TRAINING PROGRAM

## 2023-01-05 PROCEDURE — 999N000185 HC STATISTIC TRANSPORT TIME EA 15 MIN

## 2023-01-05 PROCEDURE — 250N000011 HC RX IP 250 OP 636

## 2023-01-05 PROCEDURE — 370N000003 HC ANESTHESIA WARD SERVICE

## 2023-01-05 PROCEDURE — 250N000009 HC RX 250

## 2023-01-05 PROCEDURE — 84100 ASSAY OF PHOSPHORUS: CPT | Performed by: STUDENT IN AN ORGANIZED HEALTH CARE EDUCATION/TRAINING PROGRAM

## 2023-01-05 PROCEDURE — 999N000157 HC STATISTIC RCP TIME EA 10 MIN

## 2023-01-05 PROCEDURE — 250N000013 HC RX MED GY IP 250 OP 250 PS 637

## 2023-01-05 PROCEDURE — 70498 CT ANGIOGRAPHY NECK: CPT | Mod: 26 | Performed by: RADIOLOGY

## 2023-01-05 PROCEDURE — 70496 CT ANGIOGRAPHY HEAD: CPT | Mod: 26 | Performed by: RADIOLOGY

## 2023-01-05 PROCEDURE — 94002 VENT MGMT INPAT INIT DAY: CPT

## 2023-01-05 PROCEDURE — 71045 X-RAY EXAM CHEST 1 VIEW: CPT | Mod: 26 | Performed by: RADIOLOGY

## 2023-01-05 PROCEDURE — 70450 CT HEAD/BRAIN W/O DYE: CPT

## 2023-01-05 PROCEDURE — 250N000013 HC RX MED GY IP 250 OP 250 PS 637: Performed by: STUDENT IN AN ORGANIZED HEALTH CARE EDUCATION/TRAINING PROGRAM

## 2023-01-05 PROCEDURE — 83520 IMMUNOASSAY QUANT NOS NONAB: CPT

## 2023-01-05 PROCEDURE — 94640 AIRWAY INHALATION TREATMENT: CPT

## 2023-01-05 PROCEDURE — 36600 WITHDRAWAL OF ARTERIAL BLOOD: CPT

## 2023-01-05 PROCEDURE — 85027 COMPLETE CBC AUTOMATED: CPT | Performed by: STUDENT IN AN ORGANIZED HEALTH CARE EDUCATION/TRAINING PROGRAM

## 2023-01-05 PROCEDURE — 99232 SBSQ HOSP IP/OBS MODERATE 35: CPT | Performed by: PSYCHIATRY & NEUROLOGY

## 2023-01-05 PROCEDURE — 94640 AIRWAY INHALATION TREATMENT: CPT | Mod: 76

## 2023-01-05 PROCEDURE — 70498 CT ANGIOGRAPHY NECK: CPT

## 2023-01-05 PROCEDURE — 250N000009 HC RX 250: Performed by: STUDENT IN AN ORGANIZED HEALTH CARE EDUCATION/TRAINING PROGRAM

## 2023-01-05 PROCEDURE — 99291 CRITICAL CARE FIRST HOUR: CPT | Mod: 25 | Performed by: ANESTHESIOLOGY

## 2023-01-05 PROCEDURE — 44500 INTRO GASTROINTESTINAL TUBE: CPT

## 2023-01-05 RX ORDER — ENOXAPARIN SODIUM 100 MG/ML
40 INJECTION SUBCUTANEOUS EVERY 12 HOURS
Status: COMPLETED | OUTPATIENT
Start: 2023-01-05 | End: 2023-01-05

## 2023-01-05 RX ORDER — IOPAMIDOL 755 MG/ML
75 INJECTION, SOLUTION INTRAVASCULAR ONCE
Status: COMPLETED | OUTPATIENT
Start: 2023-01-05 | End: 2023-01-05

## 2023-01-05 RX ORDER — PROCHLORPERAZINE MALEATE 10 MG
10 TABLET ORAL EVERY 6 HOURS PRN
Status: DISCONTINUED | OUTPATIENT
Start: 2023-01-05 | End: 2023-01-15 | Stop reason: HOSPADM

## 2023-01-05 RX ORDER — ONDANSETRON 2 MG/ML
4 INJECTION INTRAMUSCULAR; INTRAVENOUS EVERY 6 HOURS PRN
Status: DISCONTINUED | OUTPATIENT
Start: 2023-01-05 | End: 2023-01-15 | Stop reason: HOSPADM

## 2023-01-05 RX ORDER — DIPHENHYDRAMINE HYDROCHLORIDE 50 MG/ML
50 INJECTION INTRAMUSCULAR; INTRAVENOUS EVERY 6 HOURS
Status: DISCONTINUED | OUTPATIENT
Start: 2023-01-05 | End: 2023-01-11

## 2023-01-05 RX ORDER — HYDRALAZINE HYDROCHLORIDE 50 MG/1
50 TABLET, FILM COATED ORAL EVERY 8 HOURS SCHEDULED
Status: DISCONTINUED | OUTPATIENT
Start: 2023-01-05 | End: 2023-01-05 | Stop reason: CLARIF

## 2023-01-05 RX ORDER — AMOXICILLIN 250 MG
2 CAPSULE ORAL 2 TIMES DAILY PRN
Status: DISCONTINUED | OUTPATIENT
Start: 2023-01-05 | End: 2023-01-15 | Stop reason: HOSPADM

## 2023-01-05 RX ORDER — NOREPINEPHRINE BITARTRATE 0.06 MG/ML
.01-.6 INJECTION, SOLUTION INTRAVENOUS CONTINUOUS
Status: DISCONTINUED | OUTPATIENT
Start: 2023-01-05 | End: 2023-01-07

## 2023-01-05 RX ORDER — BUMETANIDE 0.25 MG/ML
1 INJECTION INTRAMUSCULAR; INTRAVENOUS 3 TIMES DAILY
Status: DISCONTINUED | OUTPATIENT
Start: 2023-01-05 | End: 2023-01-09

## 2023-01-05 RX ORDER — NALOXONE HYDROCHLORIDE 0.4 MG/ML
0.2 INJECTION, SOLUTION INTRAMUSCULAR; INTRAVENOUS; SUBCUTANEOUS
Status: DISCONTINUED | OUTPATIENT
Start: 2023-01-05 | End: 2023-01-15 | Stop reason: HOSPADM

## 2023-01-05 RX ORDER — ALBUTEROL SULFATE 90 UG/1
6 AEROSOL, METERED RESPIRATORY (INHALATION) EVERY 4 HOURS
Status: DISCONTINUED | OUTPATIENT
Start: 2023-01-05 | End: 2023-01-06

## 2023-01-05 RX ORDER — AMOXICILLIN 250 MG
1 CAPSULE ORAL 2 TIMES DAILY PRN
Status: DISCONTINUED | OUTPATIENT
Start: 2023-01-05 | End: 2023-01-15 | Stop reason: HOSPADM

## 2023-01-05 RX ORDER — NALOXONE HYDROCHLORIDE 0.4 MG/ML
0.4 INJECTION, SOLUTION INTRAMUSCULAR; INTRAVENOUS; SUBCUTANEOUS
Status: DISCONTINUED | OUTPATIENT
Start: 2023-01-05 | End: 2023-01-15 | Stop reason: HOSPADM

## 2023-01-05 RX ORDER — ONDANSETRON 4 MG/1
4 TABLET, ORALLY DISINTEGRATING ORAL EVERY 6 HOURS PRN
Status: DISCONTINUED | OUTPATIENT
Start: 2023-01-05 | End: 2023-01-15 | Stop reason: HOSPADM

## 2023-01-05 RX ORDER — NOREPINEPHRINE BITARTRATE 0.06 MG/ML
INJECTION, SOLUTION INTRAVENOUS
Status: COMPLETED
Start: 2023-01-05 | End: 2023-01-05

## 2023-01-05 RX ORDER — LORAZEPAM 2 MG/ML
INJECTION INTRAMUSCULAR
Status: DISCONTINUED
Start: 2023-01-05 | End: 2023-01-05 | Stop reason: WASHOUT

## 2023-01-05 RX ORDER — FENTANYL CITRATE 50 UG/ML
INJECTION, SOLUTION INTRAMUSCULAR; INTRAVENOUS
Status: DISPENSED
Start: 2023-01-05 | End: 2023-01-05

## 2023-01-05 RX ORDER — PROCHLORPERAZINE 25 MG
25 SUPPOSITORY, RECTAL RECTAL EVERY 12 HOURS PRN
Status: DISCONTINUED | OUTPATIENT
Start: 2023-01-05 | End: 2023-01-15 | Stop reason: HOSPADM

## 2023-01-05 RX ORDER — MIDAZOLAM HCL IN 0.9 % NACL/PF 1 MG/ML
1-8 PLASTIC BAG, INJECTION (ML) INTRAVENOUS CONTINUOUS
Status: DISCONTINUED | OUTPATIENT
Start: 2023-01-05 | End: 2023-01-06

## 2023-01-05 RX ORDER — METHYLPREDNISOLONE SODIUM SUCCINATE 125 MG/2ML
125 INJECTION, POWDER, LYOPHILIZED, FOR SOLUTION INTRAMUSCULAR; INTRAVENOUS DAILY
Status: DISCONTINUED | OUTPATIENT
Start: 2023-01-05 | End: 2023-01-11

## 2023-01-05 RX ORDER — PROPOFOL 10 MG/ML
INJECTION, EMULSION INTRAVENOUS
Status: COMPLETED
Start: 2023-01-05 | End: 2023-01-05

## 2023-01-05 RX ORDER — LORAZEPAM 2 MG/ML
2 INJECTION INTRAMUSCULAR ONCE
Status: COMPLETED | OUTPATIENT
Start: 2023-01-05 | End: 2023-01-05

## 2023-01-05 RX ORDER — LABETALOL HYDROCHLORIDE 5 MG/ML
10 INJECTION, SOLUTION INTRAVENOUS EVERY 4 HOURS PRN
Status: DISCONTINUED | OUTPATIENT
Start: 2023-01-05 | End: 2023-01-15 | Stop reason: HOSPADM

## 2023-01-05 RX ORDER — LEVETIRACETAM 10 MG/ML
1000 INJECTION INTRAVASCULAR EVERY 12 HOURS
Status: DISCONTINUED | OUTPATIENT
Start: 2023-01-05 | End: 2023-01-07

## 2023-01-05 RX ORDER — LEVALBUTEROL INHALATION SOLUTION 1.25 MG/3ML
1.25 SOLUTION RESPIRATORY (INHALATION) EVERY 4 HOURS PRN
Status: DISCONTINUED | OUTPATIENT
Start: 2023-01-05 | End: 2023-01-05

## 2023-01-05 RX ORDER — FENTANYL CITRATE 50 UG/ML
INJECTION, SOLUTION INTRAMUSCULAR; INTRAVENOUS
Status: COMPLETED
Start: 2023-01-05 | End: 2023-01-05

## 2023-01-05 RX ORDER — DIPHENHYDRAMINE HYDROCHLORIDE 50 MG/ML
50 INJECTION INTRAMUSCULAR; INTRAVENOUS ONCE
Status: COMPLETED | OUTPATIENT
Start: 2023-01-05 | End: 2023-01-05

## 2023-01-05 RX ORDER — DEXMEDETOMIDINE HYDROCHLORIDE 4 UG/ML
.2-1.2 INJECTION, SOLUTION INTRAVENOUS CONTINUOUS
Status: DISCONTINUED | OUTPATIENT
Start: 2023-01-05 | End: 2023-01-05

## 2023-01-05 RX ORDER — PROPOFOL 10 MG/ML
5-75 INJECTION, EMULSION INTRAVENOUS CONTINUOUS
Status: DISCONTINUED | OUTPATIENT
Start: 2023-01-05 | End: 2023-01-10

## 2023-01-05 RX ADMIN — MIDAZOLAM 2 MG: 1 INJECTION INTRAMUSCULAR; INTRAVENOUS at 10:25

## 2023-01-05 RX ADMIN — DIPHENHYDRAMINE HYDROCHLORIDE 50 MG: 50 INJECTION, SOLUTION INTRAMUSCULAR; INTRAVENOUS at 05:17

## 2023-01-05 RX ADMIN — Medication 0.5 MG: at 09:26

## 2023-01-05 RX ADMIN — ALBUTEROL SULFATE 6 PUFF: 90 AEROSOL, METERED RESPIRATORY (INHALATION) at 20:41

## 2023-01-05 RX ADMIN — BUMETANIDE 1 MG: 0.25 INJECTION INTRAMUSCULAR; INTRAVENOUS at 21:00

## 2023-01-05 RX ADMIN — PROPOFOL 10 MCG/KG/MIN: 10 INJECTION, EMULSION INTRAVENOUS at 09:14

## 2023-01-05 RX ADMIN — Medication 100 MCG: at 09:28

## 2023-01-05 RX ADMIN — FAMOTIDINE 20 MG: 10 INJECTION, SOLUTION INTRAVENOUS at 21:39

## 2023-01-05 RX ADMIN — PROPOFOL 30 MCG/KG/MIN: 10 INJECTION, EMULSION INTRAVENOUS at 16:19

## 2023-01-05 RX ADMIN — PROPOFOL 30 MCG/KG/MIN: 10 INJECTION, EMULSION INTRAVENOUS at 13:06

## 2023-01-05 RX ADMIN — Medication 30 MG: at 04:19

## 2023-01-05 RX ADMIN — DIPHENHYDRAMINE HYDROCHLORIDE 50 MG: 50 INJECTION, SOLUTION INTRAMUSCULAR; INTRAVENOUS at 22:43

## 2023-01-05 RX ADMIN — ALTEPLASE 2 MG: 2.2 INJECTION, POWDER, LYOPHILIZED, FOR SOLUTION INTRAVENOUS at 22:41

## 2023-01-05 RX ADMIN — Medication 100 MCG/HR: at 10:45

## 2023-01-05 RX ADMIN — PROPOFOL 15 MCG/KG/MIN: 10 INJECTION, EMULSION INTRAVENOUS at 04:37

## 2023-01-05 RX ADMIN — ENOXAPARIN SODIUM 40 MG: 40 INJECTION SUBCUTANEOUS at 20:58

## 2023-01-05 RX ADMIN — METHYLPREDNISOLONE SODIUM SUCCINATE 125 MG: 125 INJECTION, POWDER, FOR SOLUTION INTRAMUSCULAR; INTRAVENOUS at 09:25

## 2023-01-05 RX ADMIN — Medication 0.03 MCG/KG/MIN: at 04:54

## 2023-01-05 RX ADMIN — ALBUTEROL SULFATE 6 PUFF: 90 AEROSOL, METERED RESPIRATORY (INHALATION) at 12:20

## 2023-01-05 RX ADMIN — ACETAMINOPHEN 650 MG: 325 TABLET, FILM COATED ORAL at 09:26

## 2023-01-05 RX ADMIN — PROPOFOL 30 MCG/KG/MIN: 10 INJECTION, EMULSION INTRAVENOUS at 22:00

## 2023-01-05 RX ADMIN — FENTANYL CITRATE 100 MCG: 50 INJECTION, SOLUTION INTRAMUSCULAR; INTRAVENOUS at 04:19

## 2023-01-05 RX ADMIN — BUMETANIDE 1 MG: 0.25 INJECTION INTRAMUSCULAR; INTRAVENOUS at 14:12

## 2023-01-05 RX ADMIN — LORAZEPAM 2 MG: 2 INJECTION INTRAMUSCULAR; INTRAVENOUS at 01:57

## 2023-01-05 RX ADMIN — DEXMEDETOMIDINE HYDROCHLORIDE 0.7 MCG/KG/HR: 400 INJECTION INTRAVENOUS at 02:34

## 2023-01-05 RX ADMIN — DIPHENHYDRAMINE HYDROCHLORIDE 50 MG: 50 INJECTION, SOLUTION INTRAMUSCULAR; INTRAVENOUS at 17:57

## 2023-01-05 RX ADMIN — LEVETIRACETAM 3000 MG: 100 INJECTION, SOLUTION INTRAVENOUS at 23:56

## 2023-01-05 RX ADMIN — DIPHENHYDRAMINE HYDROCHLORIDE 50 MG: 50 INJECTION, SOLUTION INTRAMUSCULAR; INTRAVENOUS at 11:10

## 2023-01-05 RX ADMIN — MIDAZOLAM HYDROCHLORIDE 5 MG: 1 INJECTION, SOLUTION INTRAMUSCULAR; INTRAVENOUS at 04:20

## 2023-01-05 RX ADMIN — FAMOTIDINE 20 MG: 10 INJECTION, SOLUTION INTRAVENOUS at 17:44

## 2023-01-05 RX ADMIN — SENNOSIDES 8.6 MG: 8.6 TABLET, FILM COATED ORAL at 09:26

## 2023-01-05 RX ADMIN — MIDAZOLAM HYDROCHLORIDE 8 MG/HR: 1 INJECTION, SOLUTION INTRAVENOUS at 16:34

## 2023-01-05 RX ADMIN — PROPOFOL 30 MCG/KG/MIN: 10 INJECTION, EMULSION INTRAVENOUS at 19:33

## 2023-01-05 RX ADMIN — IOPAMIDOL 75 ML: 755 INJECTION, SOLUTION INTRAVENOUS at 17:00

## 2023-01-05 RX ADMIN — BUMETANIDE 1 MG: 0.25 INJECTION INTRAMUSCULAR; INTRAVENOUS at 09:25

## 2023-01-05 RX ADMIN — ALBUTEROL SULFATE 6 PUFF: 90 AEROSOL, METERED RESPIRATORY (INHALATION) at 23:09

## 2023-01-05 RX ADMIN — DIPHENHYDRAMINE HYDROCHLORIDE 50 MG: 50 INJECTION, SOLUTION INTRAMUSCULAR; INTRAVENOUS at 01:57

## 2023-01-05 RX ADMIN — ROCURONIUM BROMIDE 100 MG: 50 INJECTION, SOLUTION INTRAVENOUS at 04:22

## 2023-01-05 RX ADMIN — MIDAZOLAM HYDROCHLORIDE 1 MG/HR: 1 INJECTION, SOLUTION INTRAVENOUS at 05:19

## 2023-01-05 ASSESSMENT — ACTIVITIES OF DAILY LIVING (ADL)
ADLS_ACUITY_SCORE: 28
ADLS_ACUITY_SCORE: 34
ADLS_ACUITY_SCORE: 34
ADLS_ACUITY_SCORE: 28
ADLS_ACUITY_SCORE: 28
ADLS_ACUITY_SCORE: 30
ADLS_ACUITY_SCORE: 34

## 2023-01-05 NOTE — PROVIDER NOTIFICATION
Called Dr. Tarango again about the patients agitation, and wanted to get up and leave again.  See orders from MD.  Medications administered to sedation, and then we have to call a code 21 and he is now in 4 point restraints.  Precedex drip started and transfer to ICU.

## 2023-01-05 NOTE — PROGRESS NOTES
Admitted/transferred from: 6B  BBReason for admission/transfer: Agitation requiring sedation and restraint. Increased O2 requirement.   Patient status upon admission/transfer: Pt is agitated and combative in five-point restraints. Pt appears to have severe edema to face and tongue. Hypertensive. 12 L Oxymask. Active 72 hour hold.   Interventions: Infusing precedex at time of transfer.  Plan: Secure airway.   2 RN skin assessment: completed by MELINDA Brewer. MELINDA Núñez.  Result of skin assessment and interventions/actions: See flowsheets.  Height, weight, drug calc weight: not done d/t bed malfunction and pt instability.  Patient belongings (see Flowsheet - Adult Profile for details): With pt.  MDRO education (if applicable): N/A

## 2023-01-05 NOTE — PROGRESS NOTES
"Dr. Tarango came to beside. Patient educated with repeated teaching, points of falls, not wearing oxygen due to O2 sats being in the 70-80s on room air, refuses medications to lower blood pressure to a systolic goal of less than 140,  and he also refused to allow medications in his PICC line to get rid of clots present. He is able to repeat back the risks of not taking medication for his high blood pressure, he also repeats back the risks of not wearing oxygen. He is also refusing to wear his O2 probe, and blood pressure cuff because he \"doesn't believe the machines and he is over medicated.\"  Neuro check he is A&O x 4.  MD is at beside and witnesses what is going on, and is aware of the patient decling care and this time. MD performed bedside assessment of patient. MD deemed pt incompetent based on meds given and pts history of mental health diseases. 72 hour hold placed. Code 21 called prior to MD informing pt of hold for safety reasons.   "

## 2023-01-05 NOTE — PROGRESS NOTES
ICU End of Shift Summary. See flowsheets for vital signs and detailed assessment.    Changes this shift: Pt transferred to unit from  in five-point restraints. Pt is agitated and combative and infusing precedex gtt at 1.2. Severe edema to the head, face, and tongue is present. 12L oxymask at time of transfer. Pt intubated for airway occlusion d/t edema (see anesthesia note). Pt became bradycardic (30's) and hypotensive (MAP 50's) during intubation. HR normalized. Levo gtt initiated. Maintaining RASS goal of -4 to -5 with propofol and versed gtts. Five-point restraints discontinued. BL soft wrist restraints and BL mitts in place for patient safety. CMV settings 65%, 18, 500, 8. Noble inserted. See results review for labs and CXR.    Plan: Titrate pressors and sedation to maintain goals. Maintain patent airway. Continue plan of care.

## 2023-01-05 NOTE — PLAN OF CARE
"ICU End of Shift Summary. See flowsheets for vital signs and detailed assessment.    Changes this shift: Intermittently agitated requiring pain and sedation. See restraint note below Fentanyl, propofol, and versed with plan to wean versed to RASS of -2. Pt remain is mitt restraints. Pt went for head/neck CT W/contrast. OG placed and found to be in hiatal hernia. Good urine output. Both sisters/family updated.     Plan:  Confirm proper OG placement. Wean off versed with a RASS goal of -2. Continue plan of care. Psych, pharmacy, and MICU investigating.     Problem: Plan of Care - These are the overarching goals to be used throughout the patient stay.    Goal: Patient-Specific Goal (Individualized)  Description: You can add care plan individualizations to a care plan. Examples of Individualization might be:  \"Parent requests to be called daily at 9am for status\", \"I have a hard time hearing out of my right ear\", or \"Do not touch me to wake me up as it startles me\".  Recent Flowsheet Documentation  Taken 1/5/2023 1717 by Sandoval Vargas, RN  Individualized Care Needs: patient airway will stablize enough to wean to extubate  Anxieties, Fears or Concerns: Patient has prior anxiety and fear from prior intubations will manage with pain and sedation meds and frequent reorientation   Goal Outcome Evaluation:       Right wrist, Left wrist, and soft restraints restraints continued 1/5/2023    Clinical Justification: Pulling lines, pulling tubes, and pulling equipment, Recovering from anesthesia  Less Restrictive Alternative: Repositioning, Re-evaluate equipment, Pain management, Alarm, Reorientation  Attending Physician Notified: Yes, Attending Physician's Name: Dr. Tarango   New orders placed Yes  Length of Order: 1 Day      Sandoval Vargas, RN                  "

## 2023-01-05 NOTE — PROGRESS NOTES
BRIEF GI CHART REVIEW NOTE:    01/05/23    Note: patient not seen today, this note is the product of chart review    This is a 61 y/o male with medical history significant for chronic type B aortic dissection, type 2 diabetes, hypertension, and morbid obesity (BMI is 48), schizoaffective disorder who was admitted on 12/22 due to lab abnormalities with c/f progression of dissection and transferred to MICU 12/26 due to confusion/agitation and hypoxic respiratory failure requiring code/emergent intubation (?angioedema given swollen tongue/airway after given ARB with known hx of ACEi allergy). He was then transferred to the floor but had recurrent episodes of agitation requiring aggressive sedation and compromised airway secondary to upper airway edema requiring intubation.      GI consulted for endoscopic enteral access placement, as attempts have been unsuccessful due to large hiatal hernia.     CXR Port on 1/5/2023  IMPRESSION:   1.Gastric tube appears to loop in the lower chest, suspected within  known large hiatal hernia. Additional support devices remain in  similar position.  2. Stable cardiomegaly and widening of the cardiomediastinal  silhouette with small bilateral pleural effusions.  3. Bibasilar and retrocardiac pulmonary opacities, mildly increased in  the left lung base, likely edema/atelectasis.    Change in Recommendations:  - Plan for endoscopic NG tube placement tomorrow (due to gastric tube stuck in large hiatal hernia)  - NPO at midnight  - Hold anticoagulation after PM dose 1/5/22    Patient discussed with on call GI staff Dr. Manny Koehler, PA-C  GI Service  [facility]  Text Page

## 2023-01-05 NOTE — PROVIDER NOTIFICATION
Change in status, uncooperative, restless, argumentative, Hypertension, low O2 sats.  Refusing Oxygen, further medications for blood pressure, and TPA to make 2 ports of his PICC line to open them up.  Team in route to bedside

## 2023-01-05 NOTE — PROVIDER NOTIFICATION
Dr. Tarango called again because patient is dressed and wants to leave AMA.  MD coming to bedside to assess and to talk with patient.  MD will be witting order to not bother patient tonight per the patients request. Code 21 was called based on the patients history but a compromise between the MD and patient was dolores to come up with a plan.  Please refer to MD notes for further care and plans for the patient.

## 2023-01-05 NOTE — PROVIDER NOTIFICATION
01/05/23 0450   Tech Time   $Tech Time (10 minute increments) 2   Ventilator Data   Vent Owner [facility]   Vent Brand Drager   Vent ID [id number]   Ventilation Method Invasive   Ventilator Start - Date 01/05/23   $Vent Initial  Initial day   Ventilator   Ventilator Adult   Ventilator Settings    Vent Mode CMV/AC  (Continuous Mandatory Ventilation/ Assist Control)   Resp Rate (Set) 18 breaths/min   Tidal Volume (Set, mL) 500 mL   FiO2 80 %   PEEP (cm H2O) 8 cmH2O   Inspiratory Time (sec) 0.9 sec   Vent Humidifier - Heater/HME Heater   Heater Temperature 37   Ventilator HME Changed No   Ventilator - Patient    Patient Resp Rate  26 breaths/min   Expiratory Vt (ml) 470   Minute Volume (ml) 8.3 L/min   Peak Inspiratory Pressure (cm H2O) 34 cmH2O   Mean Airway Pressure (cm H2O) 15 cmH2O   ETT Cuffed Single 6.5 mm   Placement Date/Time: 01/05/23 (c) 0429   Mask Ventilation: Not attempted  Ease of Intubation: Difficult (abnormal)  Laryngoscopy Technique: Video laryngoscopy  Cuffed: Cuffed  ETT Type: Single  Single Lumen Tube Size: 6.5 mm  VL Blade Type: D Blade  G...   Secured at (cm) 25 cm   Measured from Lips   Secured by Commercial tube dukes   Cuff Assessment Minimal leak technique   Safety Measures Manual resuscitator at bedside

## 2023-01-05 NOTE — PROGRESS NOTES
Brief MICU Progress Note    Changes today:  - RASS -1 to -2  - Angioedema labs: C4, tryptase  - WOC consult for tongue injury  - Decrease lantus to 25U  - Sedation: propofol gtt + bolus, fentanyl gtt + bolus, versed bolus     Ruthann Moser MD  PGY-1 Internal Medicine

## 2023-01-05 NOTE — H&P
MEDICAL ICU H&P  01/05/2023    Date of Hospital Admission: 12/21/2022  Date of ICU Admission: 01/05/23  Reason for Critical Care Admission: Agitation, Respiratory Failure  Date of Service (when I saw the patient): 01/05/2023    ASSESSMENT:  Antony Aguila Jr. is a 62 year old male admitted on 12/21/2022. He has history chronic type B aortic dissection s/p exploration and median sternotomy (6/18/19), type 2 DM (poorly controlled), and HTN who is admitted for acute on chronic thoracic aortic aneurysm with new aortic root dilation. Hospitalization complicated by recurrent episodes of agitation requiring aggressive sedation and compromised airway secondary to upper airway edema requiring intubation.     Neuro:  # Agitation     # Encephalopathy, likely metabolic  Patient has had several episodes of agitation requiring aggressive sedation and intubation, most recently on 1/5/2023. On the floor, had multiple code 21s called, ultimately given benadryl and ativan (hospitalist reported haldol was given additionally but this is not charted). He was ultimately started on Precedex, put in 4-point restraints. On transfer to the ICU, ultimately had respiratory compromise on transfer to the ICU and was more deeply sedated as a result.  - Psychiatry previously involved - appreciate input  - Holding aripiprazole in setting of upper airway edema  - Guanfacine 0.5 mg BID (alpha-2 agonist like Precedex)   - Sedation as below    # Pain and sedation  - Propofol gtt  - Versed gtt  - RASS goal -4 to -5       # Schizoaffective disorder  # PTSD  Meds per chart include Zyprexa, Abilify, and Seroquel. Per pharmacy med rec, appears to only have filled Seroquel recently.   - Holding PTA quetiapine 600 mg at bedtime  - Psych recs as above      # Tobacco use  Smoked cigarettes for 45 years. Currently 1 pack per week.  - Encourage cessation      Pulmonary:  # Acute hypoxic and hypercarbic respiratory failure  # Significant airway edema  # Concern  for sleep apnea  # Tracheostomy placed 7/5/19, decannulated 8/2019  Per hospitalization notes here in Sept 2022, noted to have hx of dyspnea on exertion in setting of being current smoker, tx with albuterol inhaler, recommended outpatient PFTs be obtained. Pulmonary progress note from 7/2019 notes current smoker with unknown COPD history, no PFTs available. Had tracheostomy placed 7/5/19, decannulated 8/2019. CTA chest with compressive atelectasis in L lung adjacent to thoracic aorta and hiatal hernia. Patient has now had multiple instances where he became agitated and non-compliant with attempts for patient to use BIPAP or HFNC and needed heavy sedation to treat agitation such that intubation was required to protect airway. Patient is historically a difficult intubation and was very difficult this time. Patient has a possible hx of angioedema reaction to lisinopril or losartan, but the most recent episode occurred without these medications on board   - Patient emergently intubated, very difficult airway  - Methylprednisolone 125mg QDay  - Discussed current medications with pharmacy - stopped aripiprazole, gabapentin, and amlodipine due to their (rare) association with edema - more often peripheral edema however.  - Could also consider allergy testing if concern for some other non-medication allergic cause?  - Continue Bumex IV 1mg TID     Cardiovascular:  # Chronic type B aortic dissection (proximal descending aorta to the abdominal aorta)  # Proximal ascending aorta saccular aneurysm   # Distal ascending aorta saccular aneurysm   # Mild nonobstructive CAD  Pt has history of stable type B aortic dissection as well as short segment of dissection vs pseudoaneurysms at the aortic root and distal ascending thoracic aorta. Previously underwent surgical exploration in 6/2019 with concern for type 1 aortic dissection but found to have chronic type B dissection. On admission, CXR with widened mediastinum slightly increased  from prior. CT chest with aneurysmal dilatation ascending and descending thoracic aorta with type B aortic dissection distal to L subclavian artery extending to L common iliac artery with severe narrowing of true lumen and possible pseudoaneurysm at aortic root, new compared to prior 2019 images.   - TRINH 12/23 showed saccular aneurysm of ascending aorta measuring 5.8 cm associated with a dissection flap. Thrombus is seen in the false lumen. Aortic root is dilated at 4.6 cm.  - CTA 12/22 two saccular aneurysms, one each in the proximal ascending aorta and distal ascending aorta. The maximal transaortic diameters of 46.9 x 39.2 mm and 62.7 x 54.4 mm, respectively. Mild, non-obstructive coronary artery disease without any high-grade stenoses.  - Cardiology and CVTS consulted and signed off  - Plan for outpatient follow up with CVTS  - No HR goal per cardiology, SBP preferably maintained <130  - PRN labetalol 10mg for SBP >135  - Hold labetalol 400 mg BID 12/25  - Hold DVT ppx 12/23; restarted 12/24 for thrombus seen on TRINH, continue per CVTS recs     # HTN  Based on outpatient note 12/14/2022 pt was previously hydordiuril 50 mg, amlodipine 10 mg, bumex TID and canaglaflozin. 12/25 Hypertensive overnight with highest SBP of 170s. Given hx of Type B sonam dissection and now new aortic aneurysm goals - hold Labetalol  BID (titrated from intially PO dose of 100 mg)  - holding PTA hydrochlorothiazide  - Was on Nicardipine gtt given persistent elevated SBPs  - Holding prn labetalol 10mg for SBP >135, PRN hydralazine for SBP>135 if HR<55     GI/Nutrition:  # Large hiatal hernia  # Nutrition  Previously unable to place feeding tube under fluoro due to large hiatal hernia, required GI assistance with feeding tube placement   - FT placement per day team.     Renal/Fluids/Electrolytes:  # LAKISHA on CKD 2-resolved   Outpatient labs 12/16 with Cr 1.4 and eGFR 55. Now back at baseline Cr ~1.1-1.2, eGFR ~65-75. UA  unremarkable.  - holding PTA metformin for now     # Mild hypokalemia, resolved  # Mild hypomagnesemia- resolved   - Repletion protocols      # Hypocalcemia, resolved  - Monitor BMP     Endocrine:  # Type 2 DM with hyperglycemia  Most recent A1c 12.5 (9/16/22). Home regimen: metformin 1000 qPM and Lantus 50 unit(s) qAM + aspart 17 unit(s) TIDAC + aspart 9 unit(s) w/ snacks  - Lantus 65U  - HDSSI   - holding PTA metformin as above     ID:  # Increased pulmonary secretions  # Concern for hospital associated pneumonia  Has developed copious creamy secretions, has low grade temperature of 100.0F, no leukocytosis. CXR showing increased opacities. Previously on Zosyn, transitioned to nafcillin 1/01/23  - Continue nafcillin  - Pulm toilet: metaneb, mucomyst       # Increased secretions  # Concern for hospital associated pneumonia  Has developed copious creamy secretions overnight, has low grade temperature of 100.0F, no leukocytosis. CXR showing increased opacities.   - Sputum cx: Staph aureus    - Pulm toilet: metaneb, mucomyst   - diuresis as above  - Zosyn 12/29-1/1/23  - Start nafcillin today     Hematology:    # Anemia, normocytic  - Trend CBC     Musculoskeletal:  # BLE edema  Dimer elevated with acute on chronic leg swelling L < R per patient history. DVT US in ED negative for DVT b/l. Has had LE swelling for last 2 years, notes it is worse lately. Previously on diuretics, not currently per chart.  - lymphedema consult  - diuresis as above     Skin:  # Venous stasis ulcer LLE  # Venous insufficiency  # Peripheral vascular disease  - WOC consult placed     General Cares/Prophylaxis:    DVT Prophylaxis: lovenox   GI Prophylaxis: PPI  Restraints: none  Family Communication: Sister updated at bedside   Code Status: Full Code     Lines/tubes/drains:  - Noble  - ET Tube  - PIV x3   - PICC     Disposition:  - Medical ICU    Patient seen and findings/plan discussed with medical ICU staff, Dr. Hameed.    Ector Caceres,  MD    -----------------    HISTORY PRESENTING ILLNESS:   Antony Aguila Jr. is a 62 year old male admitted on 12/21/2022. He has history chronic type B aortic dissection s/p exploration and median sternotomy (6/18/19), type 2 DM (poorly controlled), and HTN who is admitted for acute on chronic thoracic aortic aneurysm with new aortic root dilation. Hospitalization complicated by recurrent episodes of agitation requiring aggressive sedation and compromised airway secondary to upper airway edema requiring intubation.    Was recently admitted to the ICU, transferred, then readmitted a few days later. Refer to admission H&P and above problem list for his initial presentation.    Throughout the evening of 1/4/23, Antony was quite agitated, refusing cares, trying to climb out of bed, argumentative. He ultimately had 2 code 21's called due to this agitation. He was given benadryl and ativan which was insufficient at calming him down. He was eventually placed in 4-point restraints and started on a Precedex drip which nursing staff on his unit was uncomfortable with managing and so he was transferred to the ICU.    On arrival to the ICU, he was restless, noted to have significant oropharyngeal swelling, gurgling airway noises. While he was maintaining his O2 sats, we were very concerned with his airway status and elected to intubate. He was a very difficult intubation, requiring aggressive sedation and paralysis along with his severe airway swelling.     REVIEW OF SYSTEMS: Unable to assess d/t sedation/intubation    PAST MEDICAL HISTORY:   Past Medical History:   Diagnosis Date     Chronic dissection of thoracic aorta     S/p exploration and median sternotomy by Dr. Marcelo on 6/18/19     Diabetes (H)      Hypertension      SURGICAL HISTORY:  Past Surgical History:   Procedure Laterality Date     ESOPHAGOGASTRODUODENOSCOPY, WITH NASOGASTRIC TUBE INSERTION N/A 12/27/2022    Procedure: ESOPHAGOGASTRODUODENOSCOPY, WITH  NASOJEJUNAL TUBE INSERTION;  Surgeon: Olu Jeffries MD;  Location: [facility] GI     MEDIAN STERNOTOMY  06/18/2019    Chronic dissection of thoracic aorta s/p exploration and median sternotomy by Dr. Marcelo     PICC TRIPLE LUMEN PLACEMENT Right 12/26/2022    Cephalic Vein 51 cm, 2 cm out     TRACHEOSTOMY  07/07/2019    decannulated 8/8/19     TRANSESOPHAGEAL ECHOCARDIOGRAM INTRAOPERATIVE N/A 12/23/2022    Procedure: Echocardiogram, Transesophageal, with anesthesia;  Surgeon: GENERIC ANESTHESIA PROVIDER;  Location: [facility] OR     SOCIAL HISTORY:  Social History     Socioeconomic History     Marital status:      Spouse name: None     Number of children: None     Years of education: None     Highest education level: None   Tobacco Use     Smoking status: Every Day     Years: 45.00     Types: Cigarettes     Smokeless tobacco: Never     Tobacco comments:     Smokes 1 pack per week currently (12/2022).   Vaping Use     Vaping Use: Never used   Substance and Sexual Activity     Alcohol use: Not Currently     Drug use: Yes     Types: Marijuana     FAMILY HISTORY:   History reviewed. No pertinent family history.  ALLERGIES:   Allergies   Allergen Reactions     Lisinopril Angioedema     Hospitalization in 7/2019 notes possible ACE-inhibitor related angioedema.      Losartan Angioedema     Angioedema on 12/26/22, received losartan 12/25/22.     Lidocaine      Pt states this is not an allergy and doesn't recall this to be an allergy     Pollen Extract      MEDICATIONS:  No current facility-administered medications on file prior to encounter.  acetaminophen (TYLENOL) 500 MG tablet, Take 500-1,000 mg by mouth every 8 hours as needed for mild pain  albuterol (PROAIR HFA/PROVENTIL HFA/VENTOLIN HFA) 108 (90 Base) MCG/ACT inhaler, Inhale 2 puffs into the lungs every 4 hours as needed for shortness of breath / dyspnea or wheezing  bumetanide (BUMEX) 1 MG tablet, Take 1 mg by mouth 3 times daily Frequency unknown (filled as 90 tab for 30  days)  canagliflozin (INVOKANA) 100 MG tablet, Take 100 mg by mouth every morning (before breakfast)  hydrochlorothiazide (HYDRODIURIL) 50 MG tablet, Take 50 mg by mouth daily  insulin aspart (NOVOLOG PEN) 100 UNIT/ML pen, Inject 17 Units Subcutaneous 3 times daily (with meals) and 9 units with snacks  insulin glargine (LANTUS PEN) 100 UNIT/ML pen, Inject 70 Units Subcutaneous every morning  metFORMIN (GLUCOPHAGE XR) 500 MG 24 hr tablet, Take 2 tablets (1,000 mg) by mouth daily (with dinner)  potassium chloride ER (K-TAB) 20 MEQ CR tablet, Take 40 mEq by mouth daily Pt reports taking KCL 40 mEq daily  QUEtiapine (SEROQUEL) 200 MG tablet, Take 400 mg by mouth At Bedtime Takes 400 mg po at bedtime        PHYSICAL EXAMINATION:  Temp:  [97.4  F (36.3  C)-99.2  F (37.3  C)] 97.4  F (36.3  C)  Pulse:  [] 60  Resp:  [18-43] 18  BP: ()/() 116/69  FiO2 (%):  [80 %] 80 %  SpO2:  [80 %-100 %] 99 %  General: Sedated, intubated, laying in bed   HEENT: No scleral icterus, MMM  Neuro: Sedated, does not withdraw to pain currently.    Pulm/Resp: Coarse breath sounds bilaterally , mechanically ventilated  CV: RRR, no murmurs  Abdomen: Soft, non-distended, non-tender  : connor catheter in place  Incisions/Skin: b/l extremities covered with wraps, lower extremity edema     LABS: Reviewed.   Arterial Blood Gases   Recent Labs   Lab 01/05/23  0536   PH 7.39   PCO2 52*   PO2 76*   HCO3 32*     Complete Blood Count   Recent Labs   Lab 01/05/23  0536 01/04/23  0408 01/03/23  0245 01/02/23  0415   WBC 9.2 10.9 9.4 8.9   HGB 11.5* 12.3* 11.7* 12.6*    247 244 250     Basic Metabolic Panel  Recent Labs   Lab 01/05/23  0536 01/05/23  0355 01/04/23  2159 01/04/23  1934 01/04/23  1606 01/04/23  1257 01/04/23  0845 01/04/23  0408 01/03/23  0442 01/03/23  0245     --   --   --   --  142  --  144  --  146*   POTASSIUM 3.8  --   --   --   --  3.6  --  3.5  --  3.4  3.4   CHLORIDE 103  --   --   --   --  104  --  106   --  109*   CO2 27  --   --   --   --  27  --  27  --  26   BUN 15.0  --   --   --   --  19.6  --  20.1  --  22.4   CR 1.19*  --   --   --   --  1.25*  --  1.26*  --  1.03   * 142* 109* 177*   < > 174*   < > 123*   < > 66*    < > = values in this interval not displayed.     Liver Function Tests  Recent Labs   Lab 01/04/23  1257   AST 60*   ALT 82*   ALKPHOS 68   BILITOTAL 0.3   ALBUMIN 3.4*     Coagulation Profile  No lab results found in last 7 days.    IMAGING:  Recent Results (from the past 24 hour(s))   XR Chest Port 1 View    Narrative    Exam: XR CHEST PORT 1 VIEW, 1/4/2023 1:10 PM    Indication: Worseni f sob    Comparison: Chest x-ray 12/29/2022. Chest CT 12/22/2022.    Findings: AP portable upright radiograph. Sternotomy wires are intact  right-sided PICC with the tip in the right atrium. An endotracheal  tube and enteric tube is no longer visualized. Trachea is rightward  deviated. Persistent widened mediastinum with enlarged cardiac  silhouette. Bilateral costophrenic angle blunting, left > right. No  significant pneumothorax. Persistent bilateral perihilar and bibasilar  opacities with improved aeration of the right lower lung field.      Impression    Impression:   1. Interval removal of the endotracheal tube and enteric tube.  2. Persistent bilateral perihilar and basilar opacities, with improved  aeration of the right lower lung field, pulmonary edema versus  atelectasis.  3. Bilateral pleural effusions, left > right  4. Stable widened mediastinum and prominent cardiac silhouette,  consistent with known aortic dissection.    I have personally reviewed the examination and initial interpretation  and I agree with the findings.    SAÚL ARRINGTON MD         SYSTEM ID:  [id number]   XR Chest Port 1 View    Narrative    Exam: XR CHEST PORT 1 VIEW, 1/5/2023 5:08 AM    Indication: s/p intubation    Comparison: Chest x-ray 1/4/2023. Chest CT 12/22/2022.    Findings: AP portable supine radiograph.  Sternotomy wires are intact  right-sided PICC with the tip in the right atrium. Endotracheal tube  tip in the mid thoracic trachea. Trachea is rightward deviated,  stable. Stable widened mediastinum with enlarged cardiac silhouette.  Mild blunting of the costophrenic angles. Silhouetting of the medial  left hemidiaphragm. No significant pneumothorax. Persistent bilateral  perihilar and bibasilar opacities, not significantly changed compared  to previous.      Impression    Impression:   1. Endotracheal tube tip in the mid thoracic trachea.  2. Persistent bilateral perihilar and basilar opacities, not  significantly changed compared to previous, pulmonary edema versus  atelectasis.  3. Small left and trace right pleural effusions  4. Stable widened mediastinum and prominent cardiac silhouette,  consistent with known aortic dissection.

## 2023-01-05 NOTE — ANESTHESIA PROCEDURE NOTES
Airway         Procedure Start/Stop Times: 1/5/2023 4:22 AM  Staff -        CRNA: Radha Ulrich APRN CRNA       Performed By: CRNA  Consent for Airway        Urgency: emergent  Indications and Patient Condition       Indications for airway management: airway protection         Mask difficulty assessment: 0 - not attempted    Final Airway Details       Final airway type: endotracheal airway       Successful airway: ETT - single  Endotracheal Airway Details        ETT size (mm): 6.5       Cuffed: yes       Successful intubation technique: video laryngoscopy       VL Blade Size: MAC D Blade       Grade View of Cords: 3       Adjucts: stylet       Position: Right       Measured from: gums/teeth       Secured at (cm): 24       Bite block used: None    Post intubation assessment        Placement verified by: capnometry, equal breath sounds and chest rise        Number of attempts at approach: 2       Number of other approaches attempted: 0       Secured with: commercial tube dukes       Ease of procedure: difficult       Dentition: Intact    Medication(s) Administered   Medication Administration Time: 1/5/2023 4:22 AM    Additional Comments       20 mg of ketamine, 5mg versed, and 100mcg of fentanyl given by ICU team.  Patient delirious, agitated, and in 4 point restraints upon arrival.  Patient noted to have a hx of difficult airway and  a lot of redundant tissue.  Appears to have angioedema as well with tongue protruding from the mouth.  Patient began to obstruct and desaturate.  Intubated after 2 attempts.

## 2023-01-05 NOTE — CONSULTS
Care Management Follow Up    Length of Stay (days): 15    Expected Discharge Date: 01/05/2023     Concerns to be Addressed: all concerns addressed in this encounter, care coordination/care conferences, discharge planning     Patient plan of care discussed at interdisciplinary rounds: Yes    Anticipated Discharge Disposition:  TCU vs Home w/ HC     Private pay costs discussed: insurance costs out of pocket expenses    Additional Information:  AIDAN acknowledges consult. Pt unfortunately had episode of airway swelling and agitation overnight and is now intubated and sedated. AIDAN will continue to follow for discharge planning.     AIDAN called and spoke with pt's CADI STACY Burt (ph: [phone]) giving her an update on pt's hospital course this admission and also getting list of services pt receives. Carmel reported that pt has skilled nursing once a week through [facility] (ph: [phone]), ILS/IHS services 10 hrs/week, and homemaking services 5 hrs/week. Carmel asked that she be updated on discharge plans when pt is medically stable. She did not have any questions at this time.    AIDAN/RNCC will continue to follow for support and discharge planning    CELI Peñaloza, ROHANSW  4A/4C   Ph: [phone]  Pager: [phone]

## 2023-01-05 NOTE — PROGRESS NOTES
Transferred to: 6B Bed 35-1 @ 2000.  Status at time of transfer: A&O. VSS.  Belongings: With pt.  Noble removed? (if no, why?): N/A  Chart and medications: Sent with pt.  Family notified:    Statement Selected

## 2023-01-05 NOTE — ANESTHESIA CARE TRANSFER NOTE
Patient: Antony Aguila Jr.    Procedure: * No procedures listed *       Diagnosis: * No pre-op diagnosis entered *  Diagnosis Additional Information: No value filed.    Anesthesia Type:   No value filed.     Note:    Oropharynx: ventilatory support  Level of Consciousness: unresponsive      Independent Airway: airway patency not satisfactory and stable  Dentition: dentition unchanged  Vital Signs Stable: post-procedure vital signs reviewed and stable  Report to RN Given: handoff report given  Patient transferred to: ICU  Comments: Anesthesia Out of OR Intubation     Patient:Antony Aguila Jr. ([id number])  Site: [facility]  The Patient received  20 mg of  ketamine  for Induction and  5 mg of  Versed.  100mg  for Neuromuscular Blockade after intubation.  The patient will be paralyzed for approximately 45min-1hour.  The primary team has ordered  precedex  for sedation during this period of paralysis and has been on it for the past 2 hours and will be continued.        BRANDY Duncan CRNA 1/5/2023 4:47 AM  ICU Handoff: Call for PAUSE to initiate/utilize ICU HANDOFF, Identified Patient, Identified Responsible Provider, Reviewed the Pertinent Medical History, Discussed Surgical Course, Reviewed Intra-OP Anesthesia Management and Issues during Anesthesia, Set Expectations for Post Procedure Period and Allowed Opportunity for Questions and Acknowledgement of Understanding      Vitals:  Vitals Value Taken Time   BP 90/56 01/05/23 0444   Temp     Pulse 58 01/05/23 0446   Resp 0 01/05/23 0446   SpO2 99 % 01/05/23 0446   Vitals shown include unvalidated device data.    Electronically Signed By: BRANDY Duncan CRNA  January 5, 2023  4:47 AM

## 2023-01-05 NOTE — PROGRESS NOTES
1/5/2023 - 3:16 AM    Brief Hospitalist Cross Cover Note    I was contacted by:  MELINDA Lora via secure text / pager and RN via phone.    Issue / Concern:  CHANGE IN VITAL SIGN(S) / UOP / MENTAL STATUS / ETC - severe agitation    Information / Subjective:  Continued agitation - requiring restraints    Material reviewed in chart:    Past Medical History / Problem Lists, current inpatient medications and recent progress notes    Objective  BP (!) 158/85 (BP Location: Left arm)   Pulse 87   Temp 99.2  F (37.3  C) (Oral)   Resp 20   Ht 1.829 m (6')   Wt (!) 158.8 kg (350 lb 3.2 oz)   SpO2 (!) 80%   BMI 47.50 kg/m      Lungs clear bilaterally with good airflow (normal lung exam)  CV RRR without murmur  Abdomen soft, nontender  Morbidly obese, moving all extremities  Yelling      I have personally reviewed the following data over the past 24 hrs:    10.9  \   12.3 (L)   / 247     142 104 19.6 /  109 (H)   3.6 27 1.25 (H) \       ALT: 82 (H) AST: 60 (H) AP: 68 TBILI: 0.3   ALB: 3.4 (L) TOT PROTEIN: 6.6 LIPASE: N/A       Trop: 19 BNP: 1,236 (H)       Procal: N/A CRP: N/A Lactic Acid: 1.1         Assessment:    Severe agitation associated with hypoxia and refusal to keep oxygen supplementation on.  Now demanding to leave hospital though is falling asleep while sitting up.  He took 600mg quetiapine (Seroquel ) earlier this evening.  He is a high risk for decompensating abruptly if increased sedation medications used and would rather have in ICU for that escalation.  Plan:    Transfer to ICU - discussed with Dr. Hameed    5 point restraints applied under my direction    Precedex initiated on 6B --- after lorazepam (Ativan ) and Benadryl ---> after his 600mg quetiapine (Seroquel )    Cesar Tarango MD

## 2023-01-05 NOTE — CONSULTS
"          Psychiatry Consultation; Follow up              Reason for Consult, requesting source:    AMS  Requesting source:     Labs and imaging reviewed, discussed with nursing and the primary team.                Interim history:    From H and P 12/22:  \"\"Antony Aguila Jr. is a 62 year old male who has a history of chronic thoracic aortic dissection ([place] type B) s/p exploration and median sternotomy (6/18/19), type 2 DM, and HTN and presents due to abnormal clinic labs (K 3.1, Cr 2.4) and Has had LE swelling for last 2 years, notes it is worse lately. Reports intermittent dyspnea, but not at time of exam. No chest pain, no sob, no nausea or vomiting. Newly diaphoretic x 30 mins when seen in ED. Has a nurse that helps with meds 1x/week and PCA with cares\"  Soon after that admission he ran some problems with his airway to the extent that he needed to be transferred to the ICU for intubation and sedation.  He has slowly cleared and was extubated and transferred to .  However he again decompensated and required transfer back to the ICU. He is currently sedated with versed and propofol.  It was noted during intubation that he had some angioedema which has improved with Solu-Medrol.  Due to his reports of angioedema with Abilify this was discontinued.     When I came to see him he was nonresponsive due to the sedation.          Current Medications:       albuterol  6 puff Inhalation Q4H     bumetanide  1 mg Intravenous TID     diphenhydrAMINE  50 mg Intravenous Q6H     enoxaparin ANTICOAGULANT  40 mg Subcutaneous Q12H     famotidine  20 mg Intravenous Q12H     fentaNYL (PF)         heparin lock flush  5-20 mL Intracatheter Q24H     insulin aspart  1-12 Units Subcutaneous Q4H     insulin glargine  25 Units Subcutaneous Daily     methylPREDNISolone  125 mg Intravenous Daily     [Held by provider] QUEtiapine  600 mg Oral At Bedtime     sennosides  8.6 mg Oral BID     sodium chloride (PF)  10-40 mL Intracatheter " "Q8H     sodium chloride (PF)  3 mL Intracatheter Q8H              MSE:   Appearance: adequately groomed, he is not responsive due to sedation.     Vital signs:  Temp: 98.4  F (36.9  C) Temp src: Axillary BP: 122/72 Pulse: 51   Resp: 18 SpO2: 100 % O2 Device: Mechanical Ventilator Oxygen Delivery: 12 LPM Height: 182.9 cm (6') Weight: (!) 158.8 kg (350 lb 3.2 oz) (With Street clothes on)  Estimated body mass index is 47.5 kg/m  as calculated from the following:    Height as of this encounter: 1.829 m (6').    Weight as of this encounter: 158.8 kg (350 lb 3.2 oz).    Qtc: 504 ms 1/4  (vs 459 when I saw him 12/26.          DSM-5 Diagnosis:   295.90  (F20.9) Schizophrenia   Delirium         Assessment:   In my opinion the guanfacine that I had recommended is a more likely culprit for the angioedema since it is the most recently introduced. He has been on and off Abilify a number of times this year without problems, and it has the advantage of not prolonging QTc.   Due to the current QTc use of Haldol is not indicated.   Since he doesn't currently have PO access, restarting the Seroquel is not an option           Summary of Recommendations:   As he comes off the parenteral sedation you could use Zyprexa Zydis or IM 5 mg up to TID; this should have minimal effect on QTc.  Then transition back to Seroquel when taking PO (or FT).   I have stopped the guanfacine.  I would consider trying the Abilify again if needed.     .Page me or re-consult psychiatry as needed (psychiatry is signing off).     Jai Aly M.D.   Consult liaison psychiatry   [facility]   Contact information available via [facility] Paging/Directory.  If I am not available, then [facility] intake ([phone]) should know who   Is on call      \"Much or all of the text in this note was generated through the use of Dragon Dictate voice to text software. Errors in spelling or words which appear to be out of contact are " "unintentional, may be present due having escaped editing\"           "

## 2023-01-05 NOTE — PROGRESS NOTES
ICU End of Shift Summary. See flowsheets for vital signs and detailed assessment.    Changes this shift:   Patient complained of head ache after being diuresed today. Blood pressures have been stable and heart rate has been SR 80's. Incontinent of urine multiple times 2/2 anatomy and not being able to get to urinal in time. Patient otherwise pleasant and cooperative.    Plan:  Continue to monitor and assess. Follow treatment plan. Report called to 6B RN and patient is ready for transfer.

## 2023-01-06 ENCOUNTER — APPOINTMENT (OUTPATIENT)
Dept: NEUROLOGY | Facility: CLINIC | Age: 63
End: 2023-01-06
Payer: COMMERCIAL

## 2023-01-06 ENCOUNTER — APPOINTMENT (OUTPATIENT)
Dept: GENERAL RADIOLOGY | Facility: CLINIC | Age: 63
End: 2023-01-06
Attending: INTERNAL MEDICINE
Payer: COMMERCIAL

## 2023-01-06 LAB
AMMONIA PLAS-SCNC: 19 UMOL/L (ref 16–60)
ANION GAP SERPL CALCULATED.3IONS-SCNC: 12 MMOL/L (ref 7–15)
BUN SERPL-MCNC: 15 MG/DL (ref 8–23)
CALCIUM SERPL-MCNC: 8.6 MG/DL (ref 8.8–10.2)
CHLORIDE SERPL-SCNC: 102 MMOL/L (ref 98–107)
CHOLEST SERPL-MCNC: 168 MG/DL
CREAT SERPL-MCNC: 1.24 MG/DL (ref 0.67–1.17)
DEPRECATED HCO3 PLAS-SCNC: 25 MMOL/L (ref 22–29)
ERYTHROCYTE [DISTWIDTH] IN BLOOD BY AUTOMATED COUNT: 16.9 % (ref 10–15)
GFR SERPL CREATININE-BSD FRML MDRD: 65 ML/MIN/1.73M2
GLUCOSE BLDC GLUCOMTR-MCNC: 100 MG/DL (ref 70–99)
GLUCOSE BLDC GLUCOMTR-MCNC: 103 MG/DL (ref 70–99)
GLUCOSE BLDC GLUCOMTR-MCNC: 106 MG/DL (ref 70–99)
GLUCOSE BLDC GLUCOMTR-MCNC: 121 MG/DL (ref 70–99)
GLUCOSE BLDC GLUCOMTR-MCNC: 134 MG/DL (ref 70–99)
GLUCOSE BLDC GLUCOMTR-MCNC: 147 MG/DL (ref 70–99)
GLUCOSE SERPL-MCNC: 100 MG/DL (ref 70–99)
HCT VFR BLD AUTO: 33.3 % (ref 40–53)
HDLC SERPL-MCNC: 23 MG/DL
HGB BLD-MCNC: 11.7 G/DL (ref 13.3–17.7)
LDLC SERPL CALC-MCNC: ABNORMAL MG/DL
LDLC SERPL DIRECT ASSAY-MCNC: 93 MG/DL
MAGNESIUM SERPL-MCNC: 2.1 MG/DL (ref 1.7–2.3)
MCH RBC QN AUTO: 32.1 PG (ref 26.5–33)
MCHC RBC AUTO-ENTMCNC: 35.1 G/DL (ref 31.5–36.5)
MCV RBC AUTO: 91 FL (ref 78–100)
NONHDLC SERPL-MCNC: 145 MG/DL
PHOSPHATE SERPL-MCNC: 3.1 MG/DL (ref 2.5–4.5)
PLATELET # BLD AUTO: 283 10E3/UL (ref 150–450)
POTASSIUM SERPL-SCNC: 3.3 MMOL/L (ref 3.4–5.3)
POTASSIUM SERPL-SCNC: 4 MMOL/L (ref 3.4–5.3)
RBC # BLD AUTO: 3.65 10E6/UL (ref 4.4–5.9)
SODIUM SERPL-SCNC: 139 MMOL/L (ref 136–145)
TRIGL SERPL-MCNC: 852 MG/DL
TRYPTASE SERPL-MCNC: 7.2 UG/L
TSH SERPL DL<=0.005 MIU/L-ACNC: 2.69 UIU/ML (ref 0.3–4.2)
UPPER GI ENDOSCOPY: NORMAL
VIT B12 SERPL-MCNC: 857 PG/ML (ref 232–1245)
WBC # BLD AUTO: 8.7 10E3/UL (ref 4–11)

## 2023-01-06 PROCEDURE — 93010 ELECTROCARDIOGRAM REPORT: CPT | Performed by: INTERNAL MEDICINE

## 2023-01-06 PROCEDURE — 250N000011 HC RX IP 250 OP 636: Performed by: STUDENT IN AN ORGANIZED HEALTH CARE EDUCATION/TRAINING PROGRAM

## 2023-01-06 PROCEDURE — 85027 COMPLETE CBC AUTOMATED: CPT | Performed by: STUDENT IN AN ORGANIZED HEALTH CARE EDUCATION/TRAINING PROGRAM

## 2023-01-06 PROCEDURE — 84132 ASSAY OF SERUM POTASSIUM: CPT | Performed by: INTERNAL MEDICINE

## 2023-01-06 PROCEDURE — 99291 CRITICAL CARE FIRST HOUR: CPT | Mod: GC | Performed by: INTERNAL MEDICINE

## 2023-01-06 PROCEDURE — 250N000009 HC RX 250

## 2023-01-06 PROCEDURE — 250N000011 HC RX IP 250 OP 636

## 2023-01-06 PROCEDURE — 82607 VITAMIN B-12: CPT | Performed by: INTERNAL MEDICINE

## 2023-01-06 PROCEDURE — 95720 EEG PHY/QHP EA INCR W/VEEG: CPT | Performed by: PSYCHIATRY & NEUROLOGY

## 2023-01-06 PROCEDURE — 84443 ASSAY THYROID STIM HORMONE: CPT | Performed by: INTERNAL MEDICINE

## 2023-01-06 PROCEDURE — 82746 ASSAY OF FOLIC ACID SERUM: CPT | Performed by: INTERNAL MEDICINE

## 2023-01-06 PROCEDURE — 99207 EEG VIDEO 12-26 HR UNMONITORED: CPT | Performed by: PSYCHIATRY & NEUROLOGY

## 2023-01-06 PROCEDURE — 94003 VENT MGMT INPAT SUBQ DAY: CPT

## 2023-01-06 PROCEDURE — 95714 VEEG EA 12-26 HR UNMNTR: CPT

## 2023-01-06 PROCEDURE — 94640 AIRWAY INHALATION TREATMENT: CPT | Mod: 76

## 2023-01-06 PROCEDURE — 80048 BASIC METABOLIC PNL TOTAL CA: CPT | Performed by: STUDENT IN AN ORGANIZED HEALTH CARE EDUCATION/TRAINING PROGRAM

## 2023-01-06 PROCEDURE — 250N000009 HC RX 250: Performed by: NURSE PRACTITIONER

## 2023-01-06 PROCEDURE — 83735 ASSAY OF MAGNESIUM: CPT | Performed by: STUDENT IN AN ORGANIZED HEALTH CARE EDUCATION/TRAINING PROGRAM

## 2023-01-06 PROCEDURE — 76000 FLUOROSCOPY <1 HR PHYS/QHP: CPT | Mod: TC

## 2023-01-06 PROCEDURE — 250N000011 HC RX IP 250 OP 636: Performed by: INTERNAL MEDICINE

## 2023-01-06 PROCEDURE — 250N000009 HC RX 250: Performed by: STUDENT IN AN ORGANIZED HEALTH CARE EDUCATION/TRAINING PROGRAM

## 2023-01-06 PROCEDURE — 43235 EGD DIAGNOSTIC BRUSH WASH: CPT | Performed by: INTERNAL MEDICINE

## 2023-01-06 PROCEDURE — 84100 ASSAY OF PHOSPHORUS: CPT | Performed by: STUDENT IN AN ORGANIZED HEALTH CARE EDUCATION/TRAINING PROGRAM

## 2023-01-06 PROCEDURE — 0DJ08ZZ INSPECTION OF UPPER INTESTINAL TRACT, VIA NATURAL OR ARTIFICIAL OPENING ENDOSCOPIC: ICD-10-PCS | Performed by: INTERNAL MEDICINE

## 2023-01-06 PROCEDURE — 999N000157 HC STATISTIC RCP TIME EA 10 MIN

## 2023-01-06 PROCEDURE — 83721 ASSAY OF BLOOD LIPOPROTEIN: CPT

## 2023-01-06 PROCEDURE — 83521 IG LIGHT CHAINS FREE EACH: CPT | Performed by: INTERNAL MEDICINE

## 2023-01-06 PROCEDURE — 93005 ELECTROCARDIOGRAM TRACING: CPT

## 2023-01-06 PROCEDURE — 250N000013 HC RX MED GY IP 250 OP 250 PS 637

## 2023-01-06 PROCEDURE — 200N000002 HC R&B ICU UMMC

## 2023-01-06 PROCEDURE — 250N000011 HC RX IP 250 OP 636: Performed by: NURSE PRACTITIONER

## 2023-01-06 PROCEDURE — 99222 1ST HOSP IP/OBS MODERATE 55: CPT | Mod: 25 | Performed by: PSYCHIATRY & NEUROLOGY

## 2023-01-06 PROCEDURE — 80061 LIPID PANEL: CPT

## 2023-01-06 PROCEDURE — 82140 ASSAY OF AMMONIA: CPT | Performed by: STUDENT IN AN ORGANIZED HEALTH CARE EDUCATION/TRAINING PROGRAM

## 2023-01-06 PROCEDURE — 94640 AIRWAY INHALATION TREATMENT: CPT

## 2023-01-06 RX ORDER — ACETYLCYSTEINE 100 MG/ML
4 SOLUTION ORAL; RESPIRATORY (INHALATION) EVERY 6 HOURS
Status: DISCONTINUED | OUTPATIENT
Start: 2023-01-06 | End: 2023-01-11

## 2023-01-06 RX ORDER — DEXMEDETOMIDINE HYDROCHLORIDE 4 UG/ML
.1-1.2 INJECTION, SOLUTION INTRAVENOUS CONTINUOUS
Status: DISCONTINUED | OUTPATIENT
Start: 2023-01-06 | End: 2023-01-08

## 2023-01-06 RX ORDER — IPRATROPIUM BROMIDE AND ALBUTEROL SULFATE 2.5; .5 MG/3ML; MG/3ML
3 SOLUTION RESPIRATORY (INHALATION)
Status: DISCONTINUED | OUTPATIENT
Start: 2023-01-06 | End: 2023-01-11

## 2023-01-06 RX ORDER — ENOXAPARIN SODIUM 100 MG/ML
40 INJECTION SUBCUTANEOUS EVERY 12 HOURS
Status: DISCONTINUED | OUTPATIENT
Start: 2023-01-06 | End: 2023-01-15 | Stop reason: HOSPADM

## 2023-01-06 RX ORDER — POTASSIUM CHLORIDE 29.8 MG/ML
20 INJECTION INTRAVENOUS
Status: COMPLETED | OUTPATIENT
Start: 2023-01-06 | End: 2023-01-06

## 2023-01-06 RX ORDER — DEXTROSE MONOHYDRATE 100 MG/ML
INJECTION, SOLUTION INTRAVENOUS CONTINUOUS PRN
Status: DISCONTINUED | OUTPATIENT
Start: 2023-01-06 | End: 2023-01-11

## 2023-01-06 RX ORDER — MIDAZOLAM HCL IN 0.9 % NACL/PF 1 MG/ML
1-3 PLASTIC BAG, INJECTION (ML) INTRAVENOUS CONTINUOUS
Status: DISCONTINUED | OUTPATIENT
Start: 2023-01-06 | End: 2023-01-07

## 2023-01-06 RX ORDER — GUAIFENESIN 600 MG/1
15 TABLET, EXTENDED RELEASE ORAL DAILY
Status: DISCONTINUED | OUTPATIENT
Start: 2023-01-06 | End: 2023-01-11

## 2023-01-06 RX ADMIN — POTASSIUM CHLORIDE 20 MEQ: 29.8 INJECTION, SOLUTION INTRAVENOUS at 06:12

## 2023-01-06 RX ADMIN — ENOXAPARIN SODIUM 40 MG: 40 INJECTION SUBCUTANEOUS at 13:13

## 2023-01-06 RX ADMIN — FAMOTIDINE 20 MG: 10 INJECTION, SOLUTION INTRAVENOUS at 09:33

## 2023-01-06 RX ADMIN — IPRATROPIUM BROMIDE AND ALBUTEROL SULFATE 3 ML: 2.5; .5 SOLUTION RESPIRATORY (INHALATION) at 20:10

## 2023-01-06 RX ADMIN — SENNOSIDES 8.6 MG: 8.6 TABLET, FILM COATED ORAL at 20:45

## 2023-01-06 RX ADMIN — DEXMEDETOMIDINE HYDROCHLORIDE 0.4 MCG/KG/HR: 400 INJECTION INTRAVENOUS at 21:50

## 2023-01-06 RX ADMIN — ACETYLCYSTEINE 4 ML: 100 SOLUTION ORAL; RESPIRATORY (INHALATION) at 20:10

## 2023-01-06 RX ADMIN — Medication 0.03 MCG/KG/MIN: at 00:54

## 2023-01-06 RX ADMIN — MULTIVITAMIN 15 ML: LIQUID ORAL at 13:20

## 2023-01-06 RX ADMIN — PROPOFOL 35 MCG/KG/MIN: 10 INJECTION, EMULSION INTRAVENOUS at 03:44

## 2023-01-06 RX ADMIN — DIPHENHYDRAMINE HYDROCHLORIDE 50 MG: 50 INJECTION, SOLUTION INTRAMUSCULAR; INTRAVENOUS at 16:45

## 2023-01-06 RX ADMIN — Medication 50 MCG: at 20:15

## 2023-01-06 RX ADMIN — BUMETANIDE 1 MG: 0.25 INJECTION INTRAMUSCULAR; INTRAVENOUS at 07:38

## 2023-01-06 RX ADMIN — POTASSIUM CHLORIDE 20 MEQ: 29.8 INJECTION, SOLUTION INTRAVENOUS at 07:38

## 2023-01-06 RX ADMIN — PROPOFOL 35 MCG/KG/MIN: 10 INJECTION, EMULSION INTRAVENOUS at 00:52

## 2023-01-06 RX ADMIN — ACETYLCYSTEINE 4 ML: 100 SOLUTION ORAL; RESPIRATORY (INHALATION) at 11:55

## 2023-01-06 RX ADMIN — ALBUTEROL SULFATE 6 PUFF: 90 AEROSOL, METERED RESPIRATORY (INHALATION) at 08:40

## 2023-01-06 RX ADMIN — BUMETANIDE 1 MG: 0.25 INJECTION INTRAMUSCULAR; INTRAVENOUS at 13:19

## 2023-01-06 RX ADMIN — BUMETANIDE 1 MG: 0.25 INJECTION INTRAMUSCULAR; INTRAVENOUS at 20:39

## 2023-01-06 RX ADMIN — METHYLPREDNISOLONE SODIUM SUCCINATE 125 MG: 125 INJECTION, POWDER, FOR SOLUTION INTRAMUSCULAR; INTRAVENOUS at 07:38

## 2023-01-06 RX ADMIN — DIPHENHYDRAMINE HYDROCHLORIDE 50 MG: 50 INJECTION, SOLUTION INTRAMUSCULAR; INTRAVENOUS at 06:23

## 2023-01-06 RX ADMIN — Medication 100 MCG/HR: at 10:58

## 2023-01-06 RX ADMIN — MIDAZOLAM HYDROCHLORIDE 2 MG/HR: 1 INJECTION, SOLUTION INTRAVENOUS at 21:18

## 2023-01-06 RX ADMIN — PROPOFOL 35 MCG/KG/MIN: 10 INJECTION, EMULSION INTRAVENOUS at 09:30

## 2023-01-06 RX ADMIN — ALBUTEROL SULFATE 6 PUFF: 90 AEROSOL, METERED RESPIRATORY (INHALATION) at 05:46

## 2023-01-06 RX ADMIN — DEXMEDETOMIDINE HYDROCHLORIDE 0.2 MCG/KG/HR: 400 INJECTION INTRAVENOUS at 12:59

## 2023-01-06 RX ADMIN — PROPOFOL 35 MCG/KG/MIN: 10 INJECTION, EMULSION INTRAVENOUS at 06:45

## 2023-01-06 RX ADMIN — DIPHENHYDRAMINE HYDROCHLORIDE 50 MG: 50 INJECTION, SOLUTION INTRAMUSCULAR; INTRAVENOUS at 10:45

## 2023-01-06 RX ADMIN — DEXMEDETOMIDINE HYDROCHLORIDE 0.6 MCG/KG/HR: 400 INJECTION INTRAVENOUS at 16:48

## 2023-01-06 RX ADMIN — IPRATROPIUM BROMIDE AND ALBUTEROL SULFATE 3 ML: 2.5; .5 SOLUTION RESPIRATORY (INHALATION) at 11:54

## 2023-01-06 ASSESSMENT — ACTIVITIES OF DAILY LIVING (ADL)
ADLS_ACUITY_SCORE: 28

## 2023-01-06 NOTE — OR NURSING
EGD with NG placement.  Dr Jeffries obtained consent, writer verified as witness with sister Frank.  Fluoro in room.  Pt placed on fluoro table for procedure.  NG advanced with amplatz wire.  Dr Jeffries positioned NG under live fluoro and had live fluoro during scope removal.  NG remained in place. MD secured NG to nose with tape. Pt tolerated procedure. Fluoro time 1.2

## 2023-01-06 NOTE — PLAN OF CARE
ICU End of Shift Summary. See flowsheets for vital signs and detailed assessment.    Changes this shift: Neuro: RASS -4. Moves eyes to vigorous stimulation. Pt had episode of full body twitching and eye lid quivering, team called to bedside. Neurocrit consulted, Keppra was given. Md stated to not titrate sedation off anymore this shift. Versed off, Fent @ 100, Propofol @35. Cardiac: Radames throughout the shift, lowest HR 45. MAPs down to low 60s, levo restarted, running at 0.01 to maintain goal. Afebrile. Resp: no changes to vent, very little secretions. GI/: no bm this shift, blood sugars trending down. Good UOP.     Plan: Place new OG, start TF, EEG, Wean sedation as tolerated. Continue to POC.

## 2023-01-06 NOTE — PROGRESS NOTES
MEDICAL ICU PROGRESS NOTE  01/06/2023      Date of Service (when I saw the patient): 01/06/2023    ASSESSMENT: Antony Aguila Jr. is a 62 year old male admitted on 12/21/2022. He has history chronic type B aortic dissection s/p exploration and median sternotomy (6/18/19), type 2 DM (poorly controlled), and HTN who is admitted for acute on chronic thoracic aortic aneurysm with new aortic root dilation. Hospitalization complicated by recurrent episodes of agitation requiring aggressive sedation and compromised airway secondary to upper airway edema requiring intubation.    CHANGES and MAJOR THINGS TODAY:   - RASS -2 to -3   - add scheduled mucomyst, duonebs QID  - GI to advance feeding tube, start TF when placement confirmed  - Start precedex, wean prop given high triglycerides         PLAN:    Neuro:  # Agitation     # Encephalopathy, likely metabolic  Patient has had several episodes of agitation requiring aggressive sedation and intubation, most recently on 1/5/2023. On the floor, had multiple code 21s called, ultimately given benadryl and ativan (hospitalist reported haldol was given additionally but this is not charted). He was ultimately started on Precedex, put in 4-point restraints. On transfer to the ICU, ultimately had respiratory compromise on transfer to the ICU and was more deeply sedated as a result.  - Psychiatry re-consulted, appreciate recs  - When off sedation Zyprexa Zydis or IM 5mg up to TID  - Transition back to Seroquel when taking PO or FT  - Discontinue guanfacine  - Consider trying Abilify again, had been held given possibility of edema   - Sedation as below    # C/f seizure   - Hx of body twitching in 2019 and was treated with Keppra, MRI and EEG did not show epileptic etiology or structural lesion   - Arm and leg shaking, as well as eye flickering overnight on 1/5  - Neuro crit consulted, appreciate recs   - Loaded with Keppra 3g given concern for seizure, however felt that suspicion was  low therefore discontinued Keppra  - vEEG initiated      # Age-indeterminate right and chronic left BG lacunar infarcts  - Repeat lipid panel, consider starting statin   - Consider start ASA 81mg     # Pain and sedation  - Propofol gtt, wean given elevated triglycerides   - Precedex gtt   - Fentanyl gtt  - RASS goal -2 to -3      # Schizoaffective disorder  # PTSD  Meds per chart include Zyprexa, Abilify, and Seroquel. Per pharmacy med rec, appears to only have filled Seroquel recently.   - Holding PTA quetiapine 600 mg at bedtime  - Psych recs as above      # Tobacco use  Smoked cigarettes for 45 years. Currently 1 pack per week.  - Encourage cessation      Pulmonary:  # Acute hypoxic and hypercarbic respiratory failure  # Significant airway edema  # Concern for sleep apnea  # Tracheostomy placed 7/5/19, decannulated 8/2019  # Angioedema   Per hospitalization notes here in Sept 2022, noted to have hx of dyspnea on exertion in setting of being current smoker, tx with albuterol inhaler, recommended outpatient PFTs be obtained. Pulmonary progress note from 7/2019 notes current smoker with unknown COPD history, no PFTs available. Had tracheostomy placed 7/5/19, decannulated 8/2019. CTA chest with compressive atelectasis in L lung adjacent to thoracic aorta and hiatal hernia. Patient has now had multiple instances where he became agitated and non-compliant with attempts for patient to use BIPAP or HFNC and needed heavy sedation to treat agitation such that intubation was required to protect airway. Patient is historically a difficult intubation and was very difficult this time. Patient has a possible hx of angioedema reaction to lisinopril or losartan, but the most recent episode occurred without these medications on board   - Patient emergently intubated, very difficult airway  - Methylprednisolone 125mg QDay  - Benadryl 50mg q6h   - Famotidine 20mg q12h   - Discussed current medications with pharmacy - stopped  aripiprazole, gabapentin, and amlodipine due to their (rare) association with edema - more often peripheral edema however.  - Could also consider allergy testing if concern for some other non-medication allergic cause  - Continue Bumex IV 1mg TID  - Angioedema workup labs: C4, tryptase     Vent Mode: CMV/AC  (Continuous Mandatory Ventilation/ Assist Control)  FiO2 (%): 50 %  Resp Rate (Set): 18 breaths/min  Tidal Volume (Set, mL): 500 mL  PEEP (cm H2O): 8 cmH2O  Resp: 18       Cardiovascular:  # Chronic type B aortic dissection (proximal descending aorta to the abdominal aorta)  # Proximal ascending aorta saccular aneurysm   # Distal ascending aorta saccular aneurysm   # Mild nonobstructive CAD  Pt has history of stable type B aortic dissection as well as short segment of dissection vs pseudoaneurysms at the aortic root and distal ascending thoracic aorta. Previously underwent surgical exploration in 6/2019 with concern for type 1 aortic dissection but found to have chronic type B dissection. On admission, CXR with widened mediastinum slightly increased from prior. CT chest with aneurysmal dilatation ascending and descending thoracic aorta with type B aortic dissection distal to L subclavian artery extending to L common iliac artery with severe narrowing of true lumen and possible pseudoaneurysm at aortic root, new compared to prior 2019 images.   - TRINH 12/23 showed saccular aneurysm of ascending aorta measuring 5.8 cm associated with a dissection flap. Thrombus is seen in the false lumen. Aortic root is dilated at 4.6 cm.  - CTA 12/22 two saccular aneurysms, one each in the proximal ascending aorta and distal ascending aorta. The maximal transaortic diameters of 46.9 x 39.2 mm and 62.7 x 54.4 mm, respectively. Mild, non-obstructive coronary artery disease without any high-grade stenoses.  - Cardiology and CVTS consulted and signed off  - Plan for outpatient follow up with CVTS  - Hold labetalol 400 mg BID 12/25  -  Hold DVT ppx 12/23; restarted 12/24 for thrombus seen on TRINH, continue per CVTS recs  - No HR goal per cardiology, SBP preferably maintained <130  - PRN labetalol 10mg for SBP >135     # HTN  Based on outpatient note 12/14/2022 pt was previously hydordiuril 50 mg, amlodipine 10 mg, bumex TID and canaglaflozin. 12/25 Hypertensive overnight with highest SBP of 170s. Given hx of Type B sonam dissection and now new aortic aneurysm goals - hold Labetalol  BID (titrated from intially PO dose of 100 mg)  - holding PTA hydrochlorothiazide  - Was on Nicardipine gtt given persistent elevated SBPs  - Holding prn labetalol 10mg for SBP >135, PRN hydralazine for SBP>135 if HR<55     GI/Nutrition:  # Large hiatal hernia  # Nutrition  Previously unable to place feeding tube under fluoro due to large hiatal hernia, required GI assistance with feeding tube placement   - Appreciate GI assistance with advancing feeding tube past hiatal hernia   - TF pending feeding tube advancement      Renal/Fluids/Electrolytes:  # LAKISHA on CKD 2-resolved   Outpatient labs 12/16 with Cr 1.4 and eGFR 55. Now back at baseline Cr ~1.1-1.2, eGFR ~65-75. UA unremarkable.  - holding PTA metformin for now     # Mild hypokalemia, resolved  # Mild hypomagnesemia- resolved   - Repletion protocols      # Hypocalcemia, resolved  - Monitor BMP     Endocrine:  # Type 2 DM with hyperglycemia  Most recent A1c 12.5 (9/16/22). Home regimen: metformin 1000 qPM and Lantus 50 unit(s) qAM + aspart 17 unit(s) TIDAC + aspart 9 unit(s) w/ snacks  - Lantus 25U while NPO   - HDSSI   - holding PTA metformin as above     ID:  # Increased pulmonary secretions  # Concern for hospital associated pneumonia-resolved   Has developed copious creamy secretions, has low grade temperature of 100.0F, no leukocytosis. CXR showing increased opacities. Previously on Zosyn, transitioned to nafcillin 1/01/23  - Nafcillin completed 1/4   - Pulm toilet: metaneb, mucomyst        Hematology:    # Anemia, normocytic  - Trend CBC     Musculoskeletal:  # BLE edema  Dimer elevated with acute on chronic leg swelling L < R per patient history. DVT US in ED negative for DVT b/l. Has had LE swelling for last 2 years, notes it is worse lately. Previously on diuretics, not currently per chart.  - lymphedema consult  - diuresis as above     Skin:  # Venous stasis ulcer LLE  # Venous insufficiency  # Peripheral vascular disease  # Tongue injury   - WOC consult placed     General Cares/Prophylaxis:    DVT Prophylaxis: lovenox   GI Prophylaxis: PPI  Restraints: bilateral mitts   Family Communication: Sister updated by phone   Code Status: Full Code     Lines/tubes/drains:  - [place]  - ET Tube  - PIV x3   - PICC     Disposition:  - Medical ICU    Patient seen and findings/plan discussed with medical ICU staff, Dr. Mccall.     Ruthann Moser MD  PGY-1 Internal Medicine       Clinically Significant Risk Factors        # Hypokalemia: Lowest K = 3.3 mmol/L in last 2 days, will replace as needed       # Hypoalbuminemia: Lowest albumin = 2.9 g/dL at 12/26/2022  6:03 AM, will monitor as appropriate          # DMII: A1C = 8.0 % (Ref range: <5.7 %) within past 3 months   # Severe Obesity: Estimated body mass index is 47.5 kg/m  as calculated from the following:    Height as of this encounter: 1.829 m (6').    Weight as of this encounter: 158.8 kg (350 lb 3.2 oz).                   ====================================  INTERVAL HISTORY:   Episode of arm and leg twitching and eyelid quivering overnight. Neuro crit consulted, loaded with Keppra. MAPs<65, on low dose NE.     OBJECTIVE:   1. VITAL SIGNS:   Temp:  [97.1  F (36.2  C)-98.4  F (36.9  C)] 97.7  F (36.5  C)  Pulse:  [46-73] 52  Resp:  [14-25] 18  BP: ()/(50-90) 111/67  FiO2 (%):  [50 %-65 %] 50 %  SpO2:  [95 %-100 %] 99 %  Vent Mode: CMV/AC  (Continuous Mandatory Ventilation/ Assist Control)  FiO2 (%): 50 %  Resp Rate (Set): 18 breaths/min  Tidal  Volume (Set, mL): 500 mL  PEEP (cm H2O): 8 cmH2O  Resp: 18    2. INTAKE/ OUTPUT:   I/O last 3 completed shifts:  In: 782.09 [I.V.:782.09]  Out: 1720 [Urine:1720]    3. PHYSICAL EXAMINATION:  General: Sedated, intubated, laying in bed   HEENT: No scleral icterus, MMM  Neuro: Sedated, does not withdraw to pain currently.    Pulm/Resp: Coarse breath sounds bilaterally, mechanically ventilated  CV: RRR, no murmurs  Abdomen: Soft, non-distended, non-tender  : connor catheter in place  Incisions/Skin: lower extremity edema, wrinkles in lower legs      4. LABS:   Arterial Blood Gases   Recent Labs   Lab 01/05/23 0536   PH 7.39   PCO2 52*   PO2 76*   HCO3 32*     Complete Blood Count   Recent Labs   Lab 01/06/23  0352 01/05/23  0536 01/04/23  0408 01/03/23  0245   WBC 8.7 9.2 10.9 9.4   HGB 11.7* 11.5* 12.3* 11.7*    257 247 244     Basic Metabolic Panel  Recent Labs   Lab 01/06/23  0352 01/06/23  0002 01/05/23  2232 01/05/23  0934 01/05/23  0536 01/04/23  1606 01/04/23  1257     --  143  --  140  --  142   POTASSIUM 3.3*  --  3.7  --  3.8  --  3.6   CHLORIDE 102  --  104  --  103  --  104   CO2 25  --  26  --  27  --  27   BUN 15.0  --  14.8  --  15.0  --  19.6   CR 1.24*  --  1.23*  --  1.19*  --  1.25*   *  100* 100* 105*   < > 144*   < > 174*    < > = values in this interval not displayed.     Liver Function Tests  Recent Labs   Lab 01/04/23  1257   AST 60*   ALT 82*   ALKPHOS 68   BILITOTAL 0.3   ALBUMIN 3.4*     Coagulation Profile  No lab results found in last 7 days.    5. RADIOLOGY:   Recent Results (from the past 24 hour(s))   XR Chest Port 1 View    Narrative    EXAM: XR CHEST PORT 1 VIEW  1/5/2023 10:12 AM     HISTORY:  ET placement s/p bite block       COMPARISON:  Chest x-ray on 523 and CT chest abdomen and pelvis  12/21/2022    FINDINGS:   AP portable view the chest. Postoperative chest with intact median  sternotomy wires. Endotracheal tube tip projects in the high/mid  thoracic  trachea. Gastric tube appears to loop in the lower chest,  suspected within known large hiatal hernia. Right arm PICC tip  projects at the superior cavoatrial junction. Stable cardiomegaly and  widening of the cardiomediastinal silhouette. No appreciable  pneumothorax. Similar bibasilar and retrocardiac pulmonary opacities.  Suspected small bilateral pleural effusions.      Impression    IMPRESSION:   1.Gastric tube appears to loop in the lower chest, suspected within  known large hiatal hernia. Additional support devices remain in  similar position.  2. Stable cardiomegaly and widening of the cardiomediastinal  silhouette with small bilateral pleural effusions.  3. Bibasilar and retrocardiac pulmonary opacities, mildly increased in  the left lung base, likely edema/atelectasis.    I have personally reviewed the examination and initial interpretation  and I agree with the findings.    ANDIE EDWARDS MD         SYSTEM ID:  [id number]   CT Head w/o Contrast   Result Value    Radiologist flags Age-indeterminant right basal ganglia infarct (Urgent)    Narrative    EXAM: CT HEAD W/O CONTRAST  1/5/2023 5:36 PM     HISTORY:  bleed?       COMPARISON:  None    TECHNIQUE: Using multidetector thin collimation helical acquisition  technique, axial, coronal and sagittal CT images from the skull base  to the vertex were obtained without intravenous contrast.   (topogram) image(s) also obtained and reviewed.    FINDINGS:  There is a small right basal ganglia hypodensity, favored to represent  age-indeterminate lacunar infarct. There is an additional small left  basal ganglia lacunar hypodensity with hypodensity on coronals and  sagittal images being roughly the same attenuation as CSF, favored to  represent chronic lacunar infarct.    No acute intracranial hemorrhage, mass effect, or midline shift. No  acute loss of gray-white matter differentiation in the cerebral  hemispheres. Ventricles are proportionate to the cerebral  sulci. Clear  basal cisterns. Mild patchy periventricular white matter changes  consistent with mild sequela of chronic small vessel ischemic disease.  Mild diffuse cerebral parenchymal volume loss.    The bony calvaria and the bones of the skull base are normal. Moderate  bilateral maxillary sinus mucosal thickening along with scattered  paranasal sinus mucosal debris in the setting of nasal  catheterization. Mastoid air cells are unremarkable. The orbits are  unremarkable.       Impression    IMPRESSION:  1. Age-indeterminate small right basal ganglia lacunar infarct.  2. Slightly more chronic appearing small left basal ganglia lacunar  infarct. MRI would be a better study to evaluate age of these lacunar  infarcts.  3. Mild diffuse cerebral parenchymal volume loss and mild sequela of  chronic small vessel ischemic disease.    [Urgent Result: Age-indeterminant right basal ganglia infarct]    Finding was identified on 1/5/2023 6:02 PM.     Dr. Frazier was contacted by Dr. Gustafson at 1/5/2023 6:22 PM and  verbalized understanding of the urgent finding.     I have personally reviewed the examination and initial interpretation  and I agree with the findings.    ROSMERY AZUL MD         SYSTEM ID:  [id number]   CTA Head Neck with Contrast    Narrative    CTA HEAD NECK W CONTRAST 1/5/2023 5:37 PM    CT angiogram of the neck   CT angiogram of the base of the brain with contrast  Reconstruction by the Radiologist on the 3D workstation    Provided History:  Repeated angioedema events and currently intubated,  assess structures of head/neck  ICD-10:    Comparison:  None.      Technique:  HEAD and NECK CTA: During rapid bolus intravenous injection of  nonionic contrast material, axial images were obtained using thin  collimation multidetector helical technique from the base of the skull  through the [place]. This CT angiogram data was reconstructed  at thin intervals with mild overlap. Images were sent to the  "[employer]a  workstation, and 3D reconstructions were obtained. The axial source  images, multiplanar reformations, 3D reconstructions in both maximum  intensity projection display and volume rendered models were reviewed,  with reconstructions performed by the technologist and the  radiologist.    Contrast: 75 cc of IV Isovue-[id number]    Findings:  Head CT: Please refer to separate noncontrast head CT evaluation today  at 1710.    Head CTA demonstrates no aneurysm or stenosis of the major  intracranial arteries. Patent right posterior communicating artery  with diminutive left posterior communicator artery.    Neck CTA demonstrates no stenosis of the major cervical arteries. The  origins of the great vessels from the aortic arch are patent. The  normal distal right internal carotid artery measures 5 mm. The normal  distal left internal carotid artery measures 5 mm. Mild  atherosclerotic plaque at the left carotid bifurcation with moderate  atherosclerotic plaque without significant luminal narrowing at the  right carotid bifurcation.    No mass is noted within the visualized portions of the cervical soft  tissues or lung apices.       Impression    Impression:    1. Head CTA demonstrates no aneurysm or stenosis of the major  intracranial arteries.   2. Neck CTA demonstrates no stenosis of the major cervical arteries.   3. No acute intracranial hemorrhage identified on separate noncontrast  head CT at 1710 today. Please refer to separate study for additional  findings.    I have personally reviewed the examination and initial interpretation  and I agree with the findings.    ROSMERY AZUL MD         SYSTEM ID:  [id number]

## 2023-01-06 NOTE — PROCEDURES
Bridle Placement:   Reason for bridle placement: securement of 16-18 Fr NGT   Medicine delivered during procedure: lubricating jelly  Procedure: Successful  Location of top of clip on FT: @ ~2 cm from the nare   Condition of nose/skin at time of bridle placement: Unremarkable   Face to Face time with patient: 15 minutes.    Julia Wright, MS, RD, LD, [facility] pager: [phone]  ASCOM: [id number]

## 2023-01-06 NOTE — OP NOTE
Upper GI Endoscopy 01/06/2023 10:14 AM Americo laurent   [address] [place] [address] [phone]     Endoscopy Department   _______________________________________________________________________________   Patient Name: Antony Aguila          Procedure Date: 1/6/2023 10:14 AM   MRN: [id number]                       Account Number: [id number]   YOB: 1960              Admit Type: Inpatient   Age: 62                               Room: [place] [address]   Gender: Male                          Note Status: Finalized   Attending MD: ALBERTO RAINEY MD       Pause for the Cause: pause for cause    completed   Total Sedation Time:                     _______________________________________________________________________________       Procedure:             Upper GI endoscopy   Indications:           Malnutrition, NG tube advancement   Providers:             Jacky Barnes RN, ALBERTO RAINEY MD   Referring MD:             Medicines:             General Anesthesia   Complications:         No immediate complications. Estimated blood loss:                          Minimal.   _______________________________________________________________________________   Procedure:             Pre-Anesthesia Assessment:                          - Prior to the procedure, a History and Physical was                          performed, and patient medications and allergies were                          reviewed. The patient is unable to give consent                          secondary to the patient being legally incompetent to                          consent. The risks and benefits of the procedure and                          the sedation options and risks were discussed with the                          patient's spouse. All questions were answered and                          informed consent was obtained. Patient identification                          and proposed procedure were verified  by the physician                          and the nurse ICU. Mental Status Examination: sedated.                          Airway Examination: orotracheal intubation.                          Respiratory Examination: poor air movement. CV                          Examination: normal. Prophylactic Antibiotics: The                          patient does not require prophylactic antibiotics.                          Prior Anticoagulants: The patient has taken no                          anticoagulant or antiplatelet agents. ASA Grade                          Assessment: III - A patient with severe systemic                          disease. After reviewing the risks and benefits, the                          patient was deemed in satisfactory condition to                          undergo the procedure. The anesthesia plan was to use                          general anesthesia. Immediately prior to                          administration of medications, the patient was                          re-assessed for adequacy to receive sedatives. The                          heart rate, respiratory rate, oxygen saturations,                          blood pressure, adequacy of pulmonary ventilation, and                          response to care were monitored throughout the                          procedure. The physical status of the patient was                          re-assessed after the procedure.                          After obtaining informed consent, the endoscope was                          passed under direct vision. Throughout the procedure,                          the patient's blood pressure, pulse, and oxygen                          saturations were monitored continuously. The Endoscope                          was introduced through the mouth, and advanced to the                          second part of duodenum. The upper GI endoscopy was                          accomplished without difficulty. The patient  tolerated                          the procedure well.                                                                                     Findings:        Fluoroscopy utilized throughout procedure.  film showed coiled NG        tube in hiatal hernia. NG left in place and gastroscope advanced        alonside tube.        The esophagus was normal.        A large hiatal hernia was present with the distal end of the NG tube        coiled within hernia.        The examined duodenum was normal.        The 14 Fr nasogastric tube was pulled back to remove curl in the hiatal        hernia. Under endoscopic guidance, the tube was advanced into the        gastric antrum. Placement was confirmed by fluoroscopy and scope        visualization. Gastroscope was removed while maintaining the NG tube in        position. NG secured to ET tube with the tube at 80 cm at the nare.        Estimated blood loss was minimal.                                                                                     Impression:            - NG tube coiled in a large hiatal hernia                          - Same NG tube was uncoiled and repositioned into the                          gastric antrum under endoscopic and fluoroscopic                          guidance                          - No specimens collected.   Recommendation:        - NG tube may be used immediately

## 2023-01-06 NOTE — PROGRESS NOTES
Brief Overnight Progress Note    Around 10pm, received report that patient was having diffuse twitching c/f seizure. On exam, noted to have twitching of bilateral arms, legs, and eyelids. This was in setting of weaning sedation (though he was on propofol 30 mcg/kg/min and Versed 2mg/hr at that point). [place] etiology was related to seizure vs. Shivering. Warm blankets were applied, Ativan was ordered but ultimately not given as the shivering subsided. Neurocritical care was consulted as below.    Plan:  - Neurocritical Care Consulted  - Keppra loaded with 3g, start 1g BID  - Hold off on weaning additional sedation for now given change in neurological status.

## 2023-01-06 NOTE — PROGRESS NOTES
EEG CLINICAL NEUROPHYSIOLOGY PRELIMINARY REPORT    First 3 hours of video EEG reviewed.  Propofol 35 mcg/kg/min.  Fentanyl 100 mcg/h.    Diffuse bifrontal alpha, continuous.  Superimposed continuous polymorphic delta.  EEG is reactive with more vigorous stimulations associated with routine cares (EKG suctioning, moving for radiology studies, etc.).  No seizures.    Study consistent with moderate to severe diffuse encephalopathy.  Findings may be related to anesthetic drips employed.  Extent of underlying cerebral dysfunction separate from anesthetic effects difficult to ascertain with certainty.  EEG is reactive no seizures recorded.    Full report to follow.    Shailesh Angeles MD  Contact information for physicians covering Epilepsy and EEG is available on [facility].  Click search, enter neurology adult/[facility] in group name box, click on neurology adult/[facility], then click Staff Epilepsy and EEG.

## 2023-01-06 NOTE — PROGRESS NOTES
[facility]  WO Nurse Inpatient Assessment     Consulted for: LLE wounds   1/2: NEW:  Consulted for tongue wound   Patient History (according to provider note(s):      62 year old male admitted on 12/21/2022. He has history chronic type B aortic dissection s/p exploration and median sternotomy (6/18/19), type 2 DM (poorly controlled), and HTN who is admitted for acute on chronic thoracic aortic aneurysm with new aortic root dilation. Hospitalization complicated by hypokalemia      Areas Assessed:      Pressure Injury Location: Right tongue       Last photo: 1/5/23  Wound type: Pressure Injury     Pressure Injury Stage: Mucosal, hospital acquired      This is a Medical Device Related Pressure Injury (MDRPI) due to ETT  Wound history/plan of care:   Wound noted 12/30 when intubated with edematous tongue, extubated 12/31 and now reintubated.  Intubation was traumatic with bloody drainage noted    Wound base: 100 % eroded mucosa ,      Palpation of the wound bed: normal      Drainage: none     Description of drainage: none     Measurements (length x width x depth, in cm) 0.5  x 0.5  x  0.2 cm   Periwound skin: Intact,  edematous which is decreasing per bedside RN      Color: normal and consistent with surrounding tissue      Temperature: normal   Odor: none  Pain: denies ,   Pain intervention prior to dressing change: N/A  Treatment goal: Heal   STATUS: unchanged, despite traumatic intubation  Supplies ordered: at bedside      Wound location: LLE         12/22, 12/29, 1/5/22 posterior calf wound    Wound due to: Venous Ulcer  Wound history/plan of care: Dimer elevated with acute on chronic leg swelling L < R per patient history.  DVT US in ED negative for DVT b/l.  Has had LE swelling for last 2 years, notes it is worse lately.    Leg appropriately warm, cap refill 3 seconds.  No edema in Left foot, nonpitting edema from ankle to knee.    Chronic venous staining in gator  "area.  Skin woody with large amt of edema wrinkling.      Prior to admission he was wearing shorts majority of time to avoid getting pants wet from wound drainage as he has not been using dressings or compression.     Anterior wounds:  Started as blisters several months ago.  All wounds with new epithelium  Posterior wound:  Started several weeks ago \"my pants got so tight they were always rubbing\"    Now completely covered with unpigmented epithelium      Palpation of the wound bed: normal      Periwound skin: Intact           Temperature: normal   Odor: none  Pain: no grimacing or signs of discomfort,   Pain interventions prior to dressing change: N/A  Treatment goal: Heal  and Protection  STATUS: healed  Supplies ordered: at bedside       Treatment Plan:     Tongue wound:  Oral cares per unit routine     Left lower extremity skin cares:  Daily  Cleanse skin per unit protocol.  Pat dry   Apply Aquaphor lotion or vaseline to intact skin on LE.     Orders: Updated    RECOMMEND PRIMARY TEAM ORDER: None, at this time  Education provided: plan of care and wound progress  Discussed plan of care with: Patient and Nurse  WOC nurse follow-up plan: signing off LE consult, following tongue wound twice weekly  Notify WOC if wound(s) deteriorate.  Nursing to notify the Provider(s) and re-consult the WOC Nurse if new skin concern.    DATA:     Current support surface: Standard  Standard gel/foam mattress (IsoFlex, Atmos air, etc)  BMI: Body mass index is 47.5 kg/m .   Active diet order: Orders Placed This Encounter      NPO for Medical/Clinical Reasons Except for: No Exceptions     Output: I/O last 3 completed shifts:  In: 778.09 [P.O.:440; I.V.:338.09]  Out: 2595 [Urine:2595]     Labs:   Recent Labs   Lab 01/05/23  0536 01/04/23  1257   ALBUMIN  --  3.4*   HGB 11.5*  --    WBC 9.2  --      Pressure injury risk assessment:   Sensory Perception: 1-->completely limited  Moisture: 3-->occasionally moist  Activity: " 1-->bedfast  Mobility: 1-->completely immobile  Nutrition: 1-->very poor  Friction and Shear: 1-->problem  Abhinav Score: 8    [facility]  Phone: [phone]  Pager: [phone]

## 2023-01-06 NOTE — PROGRESS NOTES
CLINICAL NUTRITION SERVICES - BRIEF NOTE      Nutrition Prescription     RECOMMENDATIONS FOR MDs/PROVIDERS TO ORDER:  --Additional water flushes as per primary team.      Recommendations already ordered by Registered Dietitian (RD):  TF:   --Enteral access: NGT (per EGD)  --GOAL: Vital AF 1.2 @ 60 ml/hr + 3 Prosource TF20 packets to provide 1440 ml volume, 1968 kcals (24 kcal/kg IBW), 168 g pro (2.1 g/kg IBW), 159 g CHO, 7 g soluble Fiber, and 1168 ml free water.   --Start TF @ 15 ml/hr and advance by 15 ml q 8 hrs until goal rate.  --Do not start or advance TF rate unless K+ >3.0, Mg++ > 1.5,  and Phos > 1.9.  --Minimum 30 ml q 4 hrs water flushes for tube patency.  --If gastric enteral access: HOB > 30 degrees.  --MVI/minerals supplement if not already ordered (Certavite).     Future/Additional Recommendations:  --TF tolerance, lytes via NGT.  --Weight trends.     *Please see full assessment note from 1/2/2023    Consult received for RD to order TF.     New Findings:  Pt was intubated 1/5. Prior to intubation, pt was eating 100% of meals. RN placed feeding tube, however unable to pass large hiatal hernia (last intubation, pt required GI to advance feeding tube endoscopically due to Radiology unable to advance). GI to advance today. Okay to start TF.     Labs: K+ 4 (WNL <- 3), Cr 1.24 (H), Phos 3.1 (WNL),  (H)  Meds: Bumex TID, High sliding scale insulin, Lantus 25 units, Solumedrol, Sennosides BID, Precedex, Fentanyl, Propofol @ 34 ml/hr, Norepi (off)    Dosing Weight: 81 kg (IBW due to fluid status)    ASSESSED NUTRITION NEEDS  Estimated Energy Needs: 1739-2457 kcals/day (22 - 25 kcal/kg IBW)  Justification: Increased needs, Obese (ASPEN guidelines/critical illness)  Estimated Protein Needs: 162-202 grams protein/day (2-2.5 grams of pro/kg IBW)  Justification: Increased needs and Obesity guidelines    Interventions  Collaboration with other providers  Enteral Nutrition - Initiate    RD to follow per  protocol.    Julia Wright MS, RD, LD, [facility] pager: [phone]  ASCOM: [id number]

## 2023-01-06 NOTE — CONSULTS
Neurocritical Care Consultation    Reason for critical care admission: Recurrent episodes of agitation requiring aggressive sedation and compromised airway secondary to upper airway edema requiring intubation,   Admitting Team: MICU  Date of Service:  01/05/2023  Date of Admission:  12/21/2022  Hospital Day: 16    Assessment/Plan  Antony Aguila Jr. is a 62 year old male admitted on 12/21/2022 for acute on chronic thoracic aortic aneurysm with new aortic root dilation, with intubation required due to agitation as well as compromised airway secondary to upper airway edema. NCC consultation for brief episode of witnessed fully body fine twitching and eye lid quivering, while weaning sedition, currently sedated on fentanyl 100 mcg/hr, midazolam 4 (coming down from 8) mg/hr, and increasing propofol 30 to now 35 mcg/kg/hr. The patient has this history of body and facial twitching from 2019, and appears to have been treated with Keppra previously, with prior MRI and EEG workup unrevealing of epileptic etiology or distinct focus to suggest structural lesion.The patient's examination tonight did not show persistent activity aside from twitching of the right face, with down going toes, conjugate gaze, and no roving eye movements, and otherwise he remained hemodynamically stable during prior event visualized on video and on exam. Considering non-specific brief episode of fully body fine low amplitude shaking and involvement of the right face and right eye (as witnessed on video), without the patient being continued on his keppra while here in the hospital since 12/22, suspect most likely this is a manifestation of brief spell of myoclonus especially in the setting of weaning sediation, without known history of seizures, but additionally can not rule out seizures.       Neuro  #Brief Spell of fully body Twitching and eyelid flickering  -Do not resume patients PTA keppra  -Keppra load of 3 g x1 overnight  -Obtain Ammonia  level  -Lactic acid (right after event)  -vEEG to try and capture twitching as sedation is lifted    #Age-indeterminant right and chronic left BG lacunar infarcts  Small vessel disease risk factors: T2DM, HTN, HLD, reported current smoker  -Recent A1c at 8.0% (poorly controlled with 12.% A1c 3 months ago), TTE with EF of 55-60%, with global right ventricular function is mildly reduced,  And saccular aneurysm of ascending aorta measuring 5.8 cm associated with a dissection flap.  -Completed Stroke work up would include: obtaining repeat LDL labs and start statin medication w/ titration of LDL goal 40-70, when appropraite  -And may consider,  If he was taking a baby aspirin in the past to continue this for further stroke prevention, otherwise suggest starting one when appropriate per primary team, and encourage tobacco cessation and tight blood sugar control.        The patient to be discussed with Dr. Tejada , [facility] Fellow  and the [facility] staff is Dr. Azul on morning rounds.     Sadaf Bartholomew DO, MBA  Neurology Resident, PGY-3  ASCOM: [id number]          Physician Attestation   I, Jabari Azul MD, was present with the medical/DENA student who participated in the service and in the documentation of the note.  I have verified the history and personally performed the physical exam and medical decision making.  I agree with the assessment and plan of care as documented in the note.      Key findings: patient sedated in the ICU, no visible    NOTE(S)/MEDICAL RECORDS REVIEWED over the past 24 hours: prior hospitalization and notes from admission  Medical complexity over the past 24 hours:  - Decision regarding ESCALATION OF LEVEL OF CARE  - vEEG to monitor for seizures and determination of etiology of shaking    I have personally reviewed the following data over the past 24 hrs:    8.7  \   11.7 (L)   / 283     139 102 15.0 /  121 (H)   4.0 25 1.24 (H) \         Jabari Azul MD  Date of Service (when I saw the  "patient): 01/06/23          History of Present Illness:  Antony Aguila Jr. is a 62 year old male admitted on 12/21/2022, with pmhx of chronic pain, PTSD chronic type B aortic dissection s/p exploration and median sternotomy (6/18/19), type 2 DM (poorly controlled), schizoaffective disorder on multiple medications (include Zyprexa, Abilify, and olanzapine, Seroquel 600 mg nightly), a non-specific history of body twitches, HLD, and HTN who is admitted for acute on chronic thoracic aortic aneurysm with new aortic root dilation. Hospitalization complicated by recurrent episodes of agitation requiring aggressive sedation and compromised airway secondary to upper airway edema requiring intubation, and thus transferred to MICU service 1/4/2023 overnight with intubation. NCC consultation for a brief, 10 second episode of body twitching and eye lid twitching, while weaning sedition, currently sedated on fentanyl 100 mcg/hr, midazolam 4 (coming down from 8) mg/hr, and increasing propofol 30 to now 35 mcg/kg/hr.       Per chart review the patient was on PTA keppra 500 mg BID, and indication listed as \"body twitching \" and \"facial twitching\" and was supposed to follow up with neurology but I do not see any notes from neurology. The patient did undergo MRI and EEG in 2019 due to facial twitching rhythmically appreciated, called myoclonic, apparently during hospital stay,and was started on keppra for this and it appears he has remained on keppra from his most recent medication list that I can see. MRI and EEG full report below, without access to visualize the images myself. MRI reported indicated no intracranial findings. EEG reported mildly abnormal w/ slightly increased slow wave activity of a nonfocal nature, nonspecific, could be accounted by medication effect, without distinct focus of slowing to suggest expanding structural lesion.     Per further chart review, from this admission in fact, the patient had an episode of " "\"Head/neck twitching/jerking for ~10 minutes and, received 2mg versed\" on 1/1/2023     Additionally the patient has elevated LFTs on lab review as well as elevated cr to 1.23.         Allergies   Allergen Reactions     Lisinopril Angioedema     Hospitalization in 7/2019 notes possible ACE-inhibitor related angioedema.      Losartan Angioedema     Angioedema on 12/26/22, received losartan 12/25/22.     Lidocaine      Pt states this is not an allergy and doesn't recall this to be an allergy     Pollen Extract        PAST MEDICAL HISTORY:   Past Medical History:   Diagnosis Date     Chronic dissection of thoracic aorta     S/p exploration and median sternotomy by Dr. Marcelo on 6/18/19     Diabetes (H)      Hypertension        PAST SURGICAL HISTORY:   Past Surgical History:   Procedure Laterality Date     ESOPHAGOGASTRODUODENOSCOPY, WITH NASOGASTRIC TUBE INSERTION N/A 12/27/2022    Procedure: ESOPHAGOGASTRODUODENOSCOPY, WITH NASOJEJUNAL TUBE INSERTION;  Surgeon: Olu Jeffries MD;  Location: [facility] GI     MEDIAN STERNOTOMY  06/18/2019    Chronic dissection of thoracic aorta s/p exploration and median sternotomy by Dr. Marcelo     PICC TRIPLE LUMEN PLACEMENT Right 12/26/2022    Cephalic Vein 51 cm, 2 cm out     TRACHEOSTOMY  07/07/2019    decannulated 8/8/19     TRANSESOPHAGEAL ECHOCARDIOGRAM INTRAOPERATIVE N/A 12/23/2022    Procedure: Echocardiogram, Transesophageal, with anesthesia;  Surgeon: GENERIC ANESTHESIA PROVIDER;  Location: [facility] OR       FAMILY HISTORY:         SOCIAL HISTORY:   Social History     Tobacco Use     Smoking status: Every Day     Years: 45.00     Types: Cigarettes     Smokeless tobacco: Never     Tobacco comments:     Smokes 1 pack per week currently (12/2022).   Substance Use Topics     Alcohol use: Not Currently       ROS: Review of systems not obtained due to patient factors - critical condition and intubation    Current Medications:    albuterol  6 puff Inhalation Q4H     alteplase  2 mg Intravenous Q2H "     bumetanide  1 mg Intravenous TID     diphenhydrAMINE  50 mg Intravenous Q6H     famotidine  20 mg Intravenous Q12H     heparin lock flush  5-20 mL Intracatheter Q24H     insulin aspart  1-12 Units Subcutaneous Q4H     insulin glargine  25 Units Subcutaneous Daily     levETIRAcetam  3,000 mg Intravenous Once     levETIRAcetam  1,000 mg Intravenous Q12H     methylPREDNISolone  125 mg Intravenous Daily     [Held by provider] QUEtiapine  600 mg Oral At Bedtime     sennosides  8.6 mg Oral BID     sodium chloride (PF)  10-40 mL Intracatheter Q8H     sodium chloride (PF)  3 mL Intracatheter Q8H       PRN Medications:  acetaminophen, benzocaine 20%, sore throat, glucose **OR** dextrose **OR** glucagon, fentaNYL, heparin lock flush, labetalol, propofol **AND** propofol **AND** CK total **AND** Triglycerides **AND** - MEDICATION INSTRUCTIONS - **AND** Notify Physician, midazolam, naloxone **OR** naloxone **OR** naloxone **OR** naloxone, ondansetron **OR** ondansetron, - MEDICATION INSTRUCTIONS -, polyethylene glycol, prochlorperazine **OR** prochlorperazine **OR** prochlorperazine, senna-docusate **OR** senna-docusate, simethicone, sodium chloride, sodium chloride (PF), sodium chloride (PF), sodium chloride (PF), sodium chloride (PF)    Infusions:    fentaNYL 100 mcg/hr (01/05/23 1900)     propofol 35 mcg/kg/min (01/05/23 2234)    And     - MEDICATION INSTRUCTIONS -       midazolam 2 mg/hr (01/05/23 2234)     norepinephrine Stopped (01/05/23 1030)     - MEDICATION INSTRUCTIONS -         Physical Examination:  Vitals: /61   Pulse 51   Temp 98  F (36.7  C) (Axillary)   Resp 18   Ht 1.829 m (6')   Wt (!) 158.8 kg (350 lb 3.2 oz)   SpO2 98%   BMI 47.50 kg/m     Provider at bedside showed me a video of event of fully body shaking just 3 minutes prior to my arrival. The event self-aborted.  The video showed: slight fine quivering movements of the UE and toes (through the sheet) with right eye opening and  flickering, lasting about 10 seconds. He remained hemodynamically stable during this event.     Sedated on fentanyl 100 mcg/hr, midazolam 3 mg/hr, and propofol 35 mcg/kg/hr at examination  General: Adult large obese male patient, lying in bed, critically-ill, intubated  HEENT: Normocephalic, atraumatic, no icterus, oral cavity/oropharynx pink and moist  Pulm:Appears unlabored, expansion symmetric, no retractions or use of accessory muscles, intubated  Abdomen: obese, protuberant  Neuro:   Mental status: does not open eyes to command, eyes flicker (r>l) occasionally, does not track, does not follow commands, intubated, no spont movements noticed.   Cranial nerves: pupils equal at 1 mm difficult to appreciate reactivity, conjugate gaze, no roving eye movements  Motor: normal bulk and tone. No abnormal movements in the extremities observed. Slight faint right cheek near the neck and ear quivering/twitching intermittently. Extremities with withdrawal to noxious distally, on UE noxious stimuli noticed a grimace and eye flickering.   DTR: No clonus, toes down going b/l    Labs:  Recent Labs   Lab 23  0536 23  1257 238 23  0245    142 144 146*   POTASSIUM 3.8 3.6 3.5 3.4  3.4   CHLORIDE 103 104 106 109*   CO2 27 27 27 26   BUN 15.0 19.6 20.1 22.4   CR 1.19* 1.25* 1.26* 1.03   ANGELLA 8.6* 8.5* 9.0 8.6*   BILITOTAL  --  0.3  --   --    ALKPHOS  --  68  --   --    ALT  --  82*  --   --    AST  --  60*  --   --        Recent Labs   Lab 23  0536 23  0408 23  0245 23  0415   WBC 9.2 10.9 9.4 8.9   HGB 11.5* 12.3* 11.7* 12.6*    247 244 250       Recent Labs   Lab 23  0536   PH 7.39   PCO2 52*   PO2 76*   HCO3 32*       All cultures:  No results for input(s): CULTURE in the last 168 hours.    Imagin2023 5:36 PM                                                                  IMPRESSION:  1. Age-indeterminate small right basal ganglia lacunar  infarct.  2. Slightly more chronic appearing small left basal ganglia lacunar  infarct. MRI would be a better study to evaluate age of these lacunar  infarcts.  3. Mild diffuse cerebral parenchymal volume loss and mild sequela of  chronic small vessel ischemic disease.                                                                       Impression:  CTA 1/5/2023  1. Head CTA demonstrates no aneurysm or stenosis of the major  intracranial arteries.   2. Neck CTA demonstrates no stenosis of the major cervical arteries.   3. No acute intracranial hemorrhage identified on separate noncontrast  head CT at 1710 today. Please refer to separate study for additional  Findings.      Chart review images:    EXAM: MRI SCAN OF BRAIN WITHOUT IV GADOLINIUM-BASED CONTRAST MATERIAL ENHANCEMENT 6/28/2019 10:55 PM   CLINICAL: Encephalopathy.   IMPRESSION:     1.  No clearly acute finding involving the brain. Age-appropriate diffuse brain atrophy. Slight changes of presumed chronic small vessel ischemia in cerebral white matter. Vertebrobasilar dolichoectasia with slight deformity at the left anterior aspect of the ginette associated.     2.  Extensive paranasal sinus disease including fluid levels suggesting sinusitis. Extensive bilateral mastoid effusions            EEG awake and asleep portable 6/2019  This is a non-fasting awake record on a 59-year-old male who has a history of hypertension and schizoaffective disorder who presented with acute onset of back pain and feeling nauseous and sweaty.  He is also on hydralazine, hydromorphone, oxycodone, and nicardipine.     The predominant posterior rhythm consists of moderate voltage, somewhat rhythmic 7 cycle-per-second activity which is just slightly slower than would be expected for age.  Anteriorly, muscle artifact and occasional eye movement is seen.  Referential montages showed symmetry of the basic background amplitudes.  EKG monitor showed a mild tachycardia of 110 beats, albeit  regular.  With photic stimulation, there was some suggestion of biposterior  driving at the mid frequency ranges tested.  Hyperventilation was not performed.     Throughout the record, there was some intermittent nonfocal slow wave activity seen.  Initially, this was suggested more on the left side than the right but in the referential montages, this was not shown to be consistent in character.  At no time, was there a focal quality nor epileptiform discharge is seen.     IMPRESSION:  EEG is mildly abnormal because of slightly increased slow wave activity of a nonfocal nature.  Record is still very nonspecific, could be accounted by medication effect.  No distinct focus of slowing to suggest expanding structural lesion.         All relevant imaging and laboratory values personally reviewed.

## 2023-01-06 NOTE — PROGRESS NOTES
Gastroenterology Inpatient Sign Off Note    Inpatient GI consults service will sign off. No further recommendations at this time. If primary team has addition questions, please page consult fellow listed in [place].    Assessment:   This is a 63 y/o male with medical history significant for chronic type B aortic dissection, type 2 diabetes, hypertension, and morbid obesity (BMI is 48), schizoaffective disorder who was admitted on 12/22 due to lab abnormalities with c/f progression of dissection and transferred to MICU 12/26 due to confusion/agitation and hypoxic respiratory failure requiring code/emergent intubation (?angioedema given swollen tongue/airway after given ARB with known hx of ACEi allergy). He was then transferred to the floor but had recurrent episodes of agitation requiring aggressive sedation and compromised airway secondary to upper airway edema requiring intubation.      GI consulted for endoscopic enteral access placement, as attempts have been unsuccessful due to large hiatal hernia.     EGD on 1/6/2023 with NG tube placement  Findings:        Fluoroscopy utilized throughout procedure.  film showed coiled NG        tube in hiatal hernia. NG left in place and gastroscope advanced        alonside tube.        The esophagus was normal.        A large hiatal hernia was present with the distal end of the NG tube        coiled within hernia.        The examined duodenum was normal.        The 14 Fr nasogastric tube was pulled back to remove curl in the hiatal        hernia. Under endoscopic guidance, the tube was advanced into the        gastric antrum. Placement was confirmed by fluoroscopy and scope        visualization. Gastroscope was removed while maintaining the NG tube in        position. NG secured to ET tube with the tube at 80 cm at the nare.        Estimated blood loss was minimal.     Impression:            - NG tube coiled in a large hiatal hernia                          - Same NG tube  was uncoiled and repositioned into the                          gastric antrum under endoscopic and fluoroscopic                          guidance                          - No specimens collected.     NG tube placed successfully    Follow up recommendations:   No outpatient GI clinic follow-up indicated. Follow-up with primary care provider at timing determined by discharge physician.        Marta Koehler PA-C  GI Services  Text Page

## 2023-01-07 ENCOUNTER — APPOINTMENT (OUTPATIENT)
Dept: NEUROLOGY | Facility: CLINIC | Age: 63
End: 2023-01-07
Payer: COMMERCIAL

## 2023-01-07 LAB
ANION GAP SERPL CALCULATED.3IONS-SCNC: 14 MMOL/L (ref 7–15)
BUN SERPL-MCNC: 19.6 MG/DL (ref 8–23)
CALCIUM SERPL-MCNC: 8.9 MG/DL (ref 8.8–10.2)
CHLORIDE SERPL-SCNC: 103 MMOL/L (ref 98–107)
CREAT SERPL-MCNC: 1.4 MG/DL (ref 0.67–1.17)
DEPRECATED HCO3 PLAS-SCNC: 24 MMOL/L (ref 22–29)
ERYTHROCYTE [DISTWIDTH] IN BLOOD BY AUTOMATED COUNT: 16.4 % (ref 10–15)
FOLATE SERPL-MCNC: 4.5 NG/ML (ref 4.6–34.8)
GFR SERPL CREATININE-BSD FRML MDRD: 56 ML/MIN/1.73M2
GLUCOSE BLDC GLUCOMTR-MCNC: 100 MG/DL (ref 70–99)
GLUCOSE BLDC GLUCOMTR-MCNC: 130 MG/DL (ref 70–99)
GLUCOSE BLDC GLUCOMTR-MCNC: 135 MG/DL (ref 70–99)
GLUCOSE BLDC GLUCOMTR-MCNC: 138 MG/DL (ref 70–99)
GLUCOSE BLDC GLUCOMTR-MCNC: 156 MG/DL (ref 70–99)
GLUCOSE BLDC GLUCOMTR-MCNC: 181 MG/DL (ref 70–99)
GLUCOSE BLDC GLUCOMTR-MCNC: 218 MG/DL (ref 70–99)
GLUCOSE SERPL-MCNC: 129 MG/DL (ref 70–99)
HCT VFR BLD AUTO: 37.8 % (ref 40–53)
HGB BLD-MCNC: 12.2 G/DL (ref 13.3–17.7)
MAGNESIUM SERPL-MCNC: 2.1 MG/DL (ref 1.7–2.3)
MCH RBC QN AUTO: 28.4 PG (ref 26.5–33)
MCHC RBC AUTO-ENTMCNC: 32.3 G/DL (ref 31.5–36.5)
MCV RBC AUTO: 88 FL (ref 78–100)
PHOSPHATE SERPL-MCNC: 3.2 MG/DL (ref 2.5–4.5)
PLATELET # BLD AUTO: 285 10E3/UL (ref 150–450)
POTASSIUM SERPL-SCNC: 3.4 MMOL/L (ref 3.4–5.3)
POTASSIUM SERPL-SCNC: 4.2 MMOL/L (ref 3.4–5.3)
RBC # BLD AUTO: 4.29 10E6/UL (ref 4.4–5.9)
SODIUM SERPL-SCNC: 141 MMOL/L (ref 136–145)
TRIGL SERPL-MCNC: 148 MG/DL
WBC # BLD AUTO: 8.5 10E3/UL (ref 4–11)

## 2023-01-07 PROCEDURE — 95711 VEEG 2-12 HR UNMONITORED: CPT

## 2023-01-07 PROCEDURE — 250N000013 HC RX MED GY IP 250 OP 250 PS 637

## 2023-01-07 PROCEDURE — 250N000013 HC RX MED GY IP 250 OP 250 PS 637: Performed by: NURSE PRACTITIONER

## 2023-01-07 PROCEDURE — 250N000011 HC RX IP 250 OP 636: Performed by: NURSE PRACTITIONER

## 2023-01-07 PROCEDURE — 999N000253 HC STATISTIC WEANING TRIALS

## 2023-01-07 PROCEDURE — 99233 SBSQ HOSP IP/OBS HIGH 50: CPT | Mod: 25 | Performed by: PSYCHIATRY & NEUROLOGY

## 2023-01-07 PROCEDURE — 84132 ASSAY OF SERUM POTASSIUM: CPT

## 2023-01-07 PROCEDURE — 99291 CRITICAL CARE FIRST HOUR: CPT | Mod: GC | Performed by: INTERNAL MEDICINE

## 2023-01-07 PROCEDURE — 250N000013 HC RX MED GY IP 250 OP 250 PS 637: Performed by: STUDENT IN AN ORGANIZED HEALTH CARE EDUCATION/TRAINING PROGRAM

## 2023-01-07 PROCEDURE — 250N000009 HC RX 250: Performed by: NURSE PRACTITIONER

## 2023-01-07 PROCEDURE — 999N000157 HC STATISTIC RCP TIME EA 10 MIN

## 2023-01-07 PROCEDURE — 84100 ASSAY OF PHOSPHORUS: CPT | Performed by: STUDENT IN AN ORGANIZED HEALTH CARE EDUCATION/TRAINING PROGRAM

## 2023-01-07 PROCEDURE — 83735 ASSAY OF MAGNESIUM: CPT | Performed by: STUDENT IN AN ORGANIZED HEALTH CARE EDUCATION/TRAINING PROGRAM

## 2023-01-07 PROCEDURE — 94640 AIRWAY INHALATION TREATMENT: CPT | Mod: 76

## 2023-01-07 PROCEDURE — 200N000002 HC R&B ICU UMMC

## 2023-01-07 PROCEDURE — 999N000287 HC ICU ADULT ROUNDING, EACH 10 MINS

## 2023-01-07 PROCEDURE — 250N000011 HC RX IP 250 OP 636

## 2023-01-07 PROCEDURE — 80048 BASIC METABOLIC PNL TOTAL CA: CPT | Performed by: STUDENT IN AN ORGANIZED HEALTH CARE EDUCATION/TRAINING PROGRAM

## 2023-01-07 PROCEDURE — 94003 VENT MGMT INPAT SUBQ DAY: CPT

## 2023-01-07 PROCEDURE — 86160 COMPLEMENT ANTIGEN: CPT

## 2023-01-07 PROCEDURE — 250N000011 HC RX IP 250 OP 636: Performed by: STUDENT IN AN ORGANIZED HEALTH CARE EDUCATION/TRAINING PROGRAM

## 2023-01-07 PROCEDURE — 95718 EEG PHYS/QHP 2-12 HR W/VEEG: CPT | Performed by: PSYCHIATRY & NEUROLOGY

## 2023-01-07 PROCEDURE — 84478 ASSAY OF TRIGLYCERIDES: CPT

## 2023-01-07 PROCEDURE — 250N000009 HC RX 250

## 2023-01-07 PROCEDURE — 94640 AIRWAY INHALATION TREATMENT: CPT

## 2023-01-07 PROCEDURE — 85027 COMPLETE CBC AUTOMATED: CPT | Performed by: STUDENT IN AN ORGANIZED HEALTH CARE EDUCATION/TRAINING PROGRAM

## 2023-01-07 RX ORDER — HYDRALAZINE HYDROCHLORIDE 20 MG/ML
5 INJECTION INTRAMUSCULAR; INTRAVENOUS ONCE
Status: COMPLETED | OUTPATIENT
Start: 2023-01-07 | End: 2023-01-07

## 2023-01-07 RX ORDER — POTASSIUM CHLORIDE 20MEQ/15ML
40 LIQUID (ML) ORAL ONCE
Status: COMPLETED | OUTPATIENT
Start: 2023-01-07 | End: 2023-01-07

## 2023-01-07 RX ORDER — FOLIC ACID 1 MG/1
1 TABLET ORAL DAILY
Status: DISCONTINUED | OUTPATIENT
Start: 2023-01-07 | End: 2023-01-15 | Stop reason: HOSPADM

## 2023-01-07 RX ORDER — HYDRALAZINE HYDROCHLORIDE 25 MG/1
25 TABLET, FILM COATED ORAL EVERY 6 HOURS PRN
Status: DISCONTINUED | OUTPATIENT
Start: 2023-01-07 | End: 2023-01-07

## 2023-01-07 RX ORDER — PROPOFOL 10 MG/ML
5-75 INJECTION, EMULSION INTRAVENOUS CONTINUOUS
Status: DISCONTINUED | OUTPATIENT
Start: 2023-01-07 | End: 2023-01-07

## 2023-01-07 RX ORDER — AMLODIPINE BESYLATE 10 MG/1
10 TABLET ORAL DAILY
Status: DISCONTINUED | OUTPATIENT
Start: 2023-01-07 | End: 2023-01-11

## 2023-01-07 RX ORDER — ARIPIPRAZOLE 10 MG/1
10 TABLET ORAL AT BEDTIME
Status: DISCONTINUED | OUTPATIENT
Start: 2023-01-07 | End: 2023-01-15 | Stop reason: HOSPADM

## 2023-01-07 RX ORDER — HYDRALAZINE HYDROCHLORIDE 20 MG/ML
10 INJECTION INTRAMUSCULAR; INTRAVENOUS EVERY 6 HOURS PRN
Status: DISCONTINUED | OUTPATIENT
Start: 2023-01-07 | End: 2023-01-15 | Stop reason: HOSPADM

## 2023-01-07 RX ADMIN — IPRATROPIUM BROMIDE AND ALBUTEROL SULFATE 3 ML: 2.5; .5 SOLUTION RESPIRATORY (INHALATION) at 14:00

## 2023-01-07 RX ADMIN — ACETYLCYSTEINE 4 ML: 100 SOLUTION ORAL; RESPIRATORY (INHALATION) at 14:00

## 2023-01-07 RX ADMIN — FOLIC ACID 1 MG: 1 TABLET ORAL at 13:45

## 2023-01-07 RX ADMIN — DIPHENHYDRAMINE HYDROCHLORIDE 50 MG: 50 INJECTION, SOLUTION INTRAMUSCULAR; INTRAVENOUS at 00:05

## 2023-01-07 RX ADMIN — Medication 50 MCG: at 23:25

## 2023-01-07 RX ADMIN — ENOXAPARIN SODIUM 40 MG: 40 INJECTION SUBCUTANEOUS at 02:51

## 2023-01-07 RX ADMIN — MIDAZOLAM 2 MG: 1 INJECTION INTRAMUSCULAR; INTRAVENOUS at 02:55

## 2023-01-07 RX ADMIN — ENOXAPARIN SODIUM 40 MG: 40 INJECTION SUBCUTANEOUS at 13:45

## 2023-01-07 RX ADMIN — Medication 200 MCG/HR: at 14:44

## 2023-01-07 RX ADMIN — FAMOTIDINE 20 MG: 10 INJECTION, SOLUTION INTRAVENOUS at 13:45

## 2023-01-07 RX ADMIN — AMLODIPINE BESYLATE 10 MG: 10 TABLET ORAL at 11:23

## 2023-01-07 RX ADMIN — DEXMEDETOMIDINE HYDROCHLORIDE 0.4 MCG/KG/HR: 400 INJECTION INTRAVENOUS at 02:49

## 2023-01-07 RX ADMIN — Medication 200 MCG/HR: at 03:19

## 2023-01-07 RX ADMIN — DIPHENHYDRAMINE HYDROCHLORIDE 50 MG: 50 INJECTION, SOLUTION INTRAMUSCULAR; INTRAVENOUS at 06:02

## 2023-01-07 RX ADMIN — DIPHENHYDRAMINE HYDROCHLORIDE 50 MG: 50 INJECTION, SOLUTION INTRAMUSCULAR; INTRAVENOUS at 16:59

## 2023-01-07 RX ADMIN — BUMETANIDE 1 MG: 0.25 INJECTION INTRAMUSCULAR; INTRAVENOUS at 13:45

## 2023-01-07 RX ADMIN — DIPHENHYDRAMINE HYDROCHLORIDE 50 MG: 50 INJECTION, SOLUTION INTRAMUSCULAR; INTRAVENOUS at 11:23

## 2023-01-07 RX ADMIN — ACETAMINOPHEN 650 MG: 325 TABLET, FILM COATED ORAL at 23:33

## 2023-01-07 RX ADMIN — PROPOFOL 25 MCG/KG/MIN: 10 INJECTION, EMULSION INTRAVENOUS at 11:15

## 2023-01-07 RX ADMIN — DIPHENHYDRAMINE HYDROCHLORIDE 50 MG: 50 INJECTION, SOLUTION INTRAMUSCULAR; INTRAVENOUS at 23:21

## 2023-01-07 RX ADMIN — IPRATROPIUM BROMIDE AND ALBUTEROL SULFATE 3 ML: 2.5; .5 SOLUTION RESPIRATORY (INHALATION) at 07:58

## 2023-01-07 RX ADMIN — PROPOFOL 25 MCG/KG/MIN: 10 INJECTION, EMULSION INTRAVENOUS at 14:45

## 2023-01-07 RX ADMIN — QUETIAPINE FUMARATE 400 MG: 300 TABLET ORAL at 20:54

## 2023-01-07 RX ADMIN — PROPOFOL 20 MCG/KG/MIN: 10 INJECTION, EMULSION INTRAVENOUS at 18:53

## 2023-01-07 RX ADMIN — FAMOTIDINE 20 MG: 10 INJECTION, SOLUTION INTRAVENOUS at 00:05

## 2023-01-07 RX ADMIN — PROPOFOL 20 MCG/KG/MIN: 10 INJECTION, EMULSION INTRAVENOUS at 23:33

## 2023-01-07 RX ADMIN — FAMOTIDINE 20 MG: 10 INJECTION, SOLUTION INTRAVENOUS at 23:20

## 2023-01-07 RX ADMIN — ACETYLCYSTEINE 4 ML: 100 SOLUTION ORAL; RESPIRATORY (INHALATION) at 07:58

## 2023-01-07 RX ADMIN — ACETYLCYSTEINE 4 ML: 100 SOLUTION ORAL; RESPIRATORY (INHALATION) at 20:03

## 2023-01-07 RX ADMIN — BUMETANIDE 1 MG: 0.25 INJECTION INTRAMUSCULAR; INTRAVENOUS at 20:53

## 2023-01-07 RX ADMIN — HYDRALAZINE HYDROCHLORIDE 25 MG: 25 TABLET, FILM COATED ORAL at 06:22

## 2023-01-07 RX ADMIN — METHYLPREDNISOLONE SODIUM SUCCINATE 125 MG: 125 INJECTION, POWDER, FOR SOLUTION INTRAMUSCULAR; INTRAVENOUS at 08:22

## 2023-01-07 RX ADMIN — SENNOSIDES 8.6 MG: 8.6 TABLET, FILM COATED ORAL at 08:22

## 2023-01-07 RX ADMIN — IPRATROPIUM BROMIDE AND ALBUTEROL SULFATE 3 ML: 2.5; .5 SOLUTION RESPIRATORY (INHALATION) at 20:01

## 2023-01-07 RX ADMIN — IPRATROPIUM BROMIDE AND ALBUTEROL SULFATE 3 ML: 2.5; .5 SOLUTION RESPIRATORY (INHALATION) at 01:45

## 2023-01-07 RX ADMIN — ACETYLCYSTEINE 4 ML: 100 SOLUTION ORAL; RESPIRATORY (INHALATION) at 01:45

## 2023-01-07 RX ADMIN — DEXMEDETOMIDINE HYDROCHLORIDE 0.4 MCG/KG/HR: 400 INJECTION INTRAVENOUS at 09:30

## 2023-01-07 RX ADMIN — SENNOSIDES 8.6 MG: 8.6 TABLET, FILM COATED ORAL at 20:54

## 2023-01-07 RX ADMIN — BUMETANIDE 1 MG: 0.25 INJECTION INTRAMUSCULAR; INTRAVENOUS at 08:22

## 2023-01-07 RX ADMIN — POTASSIUM CHLORIDE 40 MEQ: 40 SOLUTION ORAL at 06:02

## 2023-01-07 RX ADMIN — MULTIVITAMIN 15 ML: LIQUID ORAL at 08:22

## 2023-01-07 ASSESSMENT — ACTIVITIES OF DAILY LIVING (ADL)
ADLS_ACUITY_SCORE: 29
ADLS_ACUITY_SCORE: 31
ADLS_ACUITY_SCORE: 31
ADLS_ACUITY_SCORE: 26
ADLS_ACUITY_SCORE: 31
ADLS_ACUITY_SCORE: 33
ADLS_ACUITY_SCORE: 31
ADLS_ACUITY_SCORE: 31
ADLS_ACUITY_SCORE: 27
ADLS_ACUITY_SCORE: 31

## 2023-01-07 NOTE — PROVIDER NOTIFICATION
Pt mildly hypertensive with SBP occasionally in 140's, 150's once.  There is a PRN order for labetolol, but only if HR is >50.     HR has consistently been in 40's tonight.  RN read yesterday's MD note which said plan for hydralyzine for SBP > 135 and HR < 50 but no order in place.  On call resident placed this order.  At 03, when writer was going to give med, SBP was in 120's, so not given.

## 2023-01-07 NOTE — PLAN OF CARE
Patient pulling at lines requiring restraints. Order placed by MD  Problem: Restraint, Nonviolent  Goal: Absence of Harm or Injury  Outcome: Unable to Meet  Intervention: Protect Dignity, Rights and Personal Wellbeing  Recent Flowsheet Documentation  Taken 1/7/2023 1200 by Kaycee Rollins RN  Trust Relationship/Rapport:   emotional support provided   reassurance provided   care explained  Taken 1/7/2023 0800 by Kaycee Rollins RN  Trust Relationship/Rapport:   emotional support provided   reassurance provided   care explained  Intervention: Protect Skin and Joint Integrity  Recent Flowsheet Documentation  Taken 1/7/2023 1200 by Kaycee Rollins RN  Body Position:   left   turned  Taken 1/7/2023 0800 by Kaycee Rollins RN  Body Position:   right   turned

## 2023-01-07 NOTE — PROGRESS NOTES
Neurocritical Care Note:  S: Encephalopathic sedated limiting exam.   O:  /71   Pulse 52   Temp 98  F (36.7  C) (Axillary)   Resp 18   Ht 1.829 m (6')   Wt 147 kg (324 lb)   SpO2 98%   BMI 43.94 kg/m      On exam, Pt sedated on Precedex, Versed, Fentanyl, Propofol. Does not open eyes or follow commands. No response to noxious stimulation. - cough/gag.     Imaging and labs personally reviewed in EMR.    A/P: No seizures seen on EEG. Full body twitching and eyelid flickering never visualized.   No further work up at this time. Do not resume PTA Keppra. We will sign off. Please call back if future needs arise.     This patient was seen and discussed with attending neurointensivist, Dr Jorge Alberto Amaro, DAMARI  Neurocritical Care   Team [facility] *[id number]  01/07/2023

## 2023-01-07 NOTE — PLAN OF CARE
"ICU End of Shift Summary. See flowsheets for vital signs and detailed assessment.    Changes this shift: Pt very minimally sedated at start of shift, was getting shoulders off of bed and legs out of bed on multiple occasions.  After Fentanyl bolus and increase in gtt, versed bolus and gtt started.  Pt denied pain, denied being anxious or scared, and nodded no when asked if he knew where he was.  Versed gtt seemed to sedate him a bit more and pt did not try to get out of bed later the rest of the shift, but pt still opens eyes to voice, so very lightly sedated.    BP was mildly elevated and hydralyzine order obtained and given orally once, see flowsheets.  Pulse in 40's to very low 50's the entire shift.  Low dose precedex also infusing. Bilateral wrist restraints and mitts in use to protect ETT given pt confusion and intermittent restlessness.     O2 weaned slightly, ETT somewhat positional/tends to bend when tubing not lateral to pt.  It is difficult to do oral suctioning still, tongue continues to be edematous.    Potassium replaced per protocal.    bedscale was reading twice pt's pervious weight and no weight recorded recently, so bed zeroe'd and pt reweighed.     Plan: Continue to monitor and update MD with changes and concerns.  Continue vent weaning as tolerated and extubate when it is safe to do so.   Problem: Plan of Care - These are the overarching goals to be used throughout the patient stay.    Goal: Patient-Specific Goal (Individualized)  Description: You can add care plan individualizations to a care plan. Examples of Individualization might be:  \"Parent requests to be called daily at 9am for status\", \"I have a hard time hearing out of my right ear\", or \"Do not touch me to wake me up as it startles me\".  Recent Flowsheet Documentation  Taken 1/6/2023 2000 by Dyan Ashley RN  Individualized Care Needs: reassured pt that he is ok  Goal: Absence of Hospital-Acquired Illness or Injury  Intervention: " Identify and Manage Fall Risk  Recent Flowsheet Documentation  Taken 1/7/2023 0400 by Dyan Ashley RN  Safety Promotion/Fall Prevention: activity supervised  Taken 1/7/2023 0000 by Dyan Ashley RN  Safety Promotion/Fall Prevention: activity supervised  Taken 1/6/2023 2000 by Dyan Ashley RN  Safety Promotion/Fall Prevention: activity supervised  Intervention: Prevent Skin Injury  Recent Flowsheet Documentation  Taken 1/7/2023 0600 by Dyan Ashley RN  Body Position:    turned    left    heels elevated    upper extremity elevated  Taken 1/7/2023 0400 by Dyan Ashley RN  Body Position:    turned    heels elevated    upper extremity elevated  Taken 1/7/2023 0200 by Dyan Ashley RN  Body Position:    turned    heels elevated    upper extremity elevated  Taken 1/7/2023 0000 by Dyan Ashley RN  Body Position:    turned    heels elevated    upper extremity elevated  Taken 1/6/2023 2200 by Dyan Ashley RN  Body Position:    turned    right  Taken 1/6/2023 2000 by Dyan Ashley RN  Body Position:    turned    heels elevated    upper extremity elevated  Intervention: Prevent and Manage VTE (Venous Thromboembolism) Risk  Recent Flowsheet Documentation  Taken 1/7/2023 0400 by Dyan Ashley RN  VTE Prevention/Management: foot pump device on  Taken 1/7/2023 0000 by Dyan Ashley RN  VTE Prevention/Management: foot pump device on  Taken 1/6/2023 2000 by Dyan Ashley RN  VTE Prevention/Management: other (see comments)  Goal: Optimal Comfort and Wellbeing  Intervention: Monitor Pain and Promote Comfort  Recent Flowsheet Documentation  Taken 1/7/2023 0400 by Dyan Ashley RN  Pain Management Interventions:    care clustered    pillow support provided    quiet environment facilitated    repositioned    rest  Taken 1/7/2023 0000 by Dyan Ashley RN  Pain Management Interventions:    care clustered    pillow support provided    repositioned    rest    quiet  environment facilitated  Taken 1/6/2023 2000 by Dyan Ashley RN  Pain Management Interventions:    medication (see MAR)    care clustered    pillow support provided    rest    repositioned    quiet environment facilitated  Intervention: Provide Person-Centered Care  Recent Flowsheet Documentation  Taken 1/7/2023 0400 by Dyan Ashley RN  Trust Relationship/Rapport:    emotional support provided    reassurance provided    care explained  Taken 1/7/2023 0000 by Dyan Ashley RN  Trust Relationship/Rapport:    emotional support provided    reassurance provided    care explained  Taken 1/6/2023 2000 by yDan Ashley RN  Trust Relationship/Rapport:    emotional support provided    reassurance provided    care explained     Problem: Acute Kidney Injury/Impairment  Goal: Effective Renal Function  Intervention: Monitor and Support Renal Function  Recent Flowsheet Documentation  Taken 1/7/2023 0400 by Dyan Ashley RN  Medication Review/Management:    medications reviewed    high-risk medications identified  Taken 1/7/2023 0000 by Dyan Ashley RN  Medication Review/Management:    medications reviewed    high-risk medications identified  Taken 1/6/2023 2000 by Dyan Ashley RN  Medication Review/Management:    medications reviewed    high-risk medications identified     Problem: Seclusion/Restraint, Behavioral  Goal: Absence of Harm or Injury  Intervention: Protect Dignity, Rights, and Personal Wellbeing  Recent Flowsheet Documentation  Taken 1/7/2023 0400 by Dyan Ashley RN  Trust Relationship/Rapport:    emotional support provided    reassurance provided    care explained  Taken 1/7/2023 0000 by Dyan Ashley RN  Trust Relationship/Rapport:    emotional support provided    reassurance provided    care explained  Taken 1/6/2023 2000 by Dyan Ashley RN  Trust Relationship/Rapport:    emotional support provided    reassurance provided    care explained  Intervention:  Protect Skin and Joint Integrity  Recent Flowsheet Documentation  Taken 1/7/2023 0600 by Dyan Ashley RN  Body Position:    turned    left    heels elevated    upper extremity elevated  Taken 1/7/2023 0400 by Dyan Ashley RN  Body Position:    turned    heels elevated    upper extremity elevated  Taken 1/7/2023 0200 by Dyan Ashley RN  Body Position:    turned    heels elevated    upper extremity elevated  Taken 1/7/2023 0000 by Dyan Ashley RN  Body Position:    turned    heels elevated    upper extremity elevated  Taken 1/6/2023 2200 by Dyan Ashley RN  Body Position:    turned    right  Taken 1/6/2023 2000 by Dyan Ashley RN  Body Position:    turned    heels elevated    upper extremity elevated     Problem: Risk for Delirium  Goal: Improved Behavioral Control  Intervention: Minimize Safety Risk  Recent Flowsheet Documentation  Taken 1/7/2023 0400 by Dyan Ashley RN  Enhanced Safety Measures: room near unit station  Trust Relationship/Rapport:    emotional support provided    reassurance provided    care explained  Taken 1/7/2023 0000 by Dyan Ashley RN  Enhanced Safety Measures: room near unit station  Trust Relationship/Rapport:    emotional support provided    reassurance provided    care explained  Taken 1/6/2023 2000 by Dyan Ashley RN  Enhanced Safety Measures: room near unit station  Trust Relationship/Rapport:    emotional support provided    reassurance provided    care explained     Problem: Hypertension Comorbidity  Goal: Blood Pressure in Desired Range  Intervention: Maintain Blood Pressure Management  Recent Flowsheet Documentation  Taken 1/7/2023 0400 by Dyan Ashley RN  Medication Review/Management:    medications reviewed    high-risk medications identified  Taken 1/7/2023 0000 by Dyan Ashley RN  Medication Review/Management:    medications reviewed    high-risk medications identified  Taken 1/6/2023 2000 by Dyan Ashley  RN  Medication Review/Management:    medications reviewed    high-risk medications identified     Problem: Hypertension Comorbidity  Goal: Blood Pressure in Desired Range  Intervention: Maintain Blood Pressure Management  Recent Flowsheet Documentation  Taken 1/7/2023 0400 by Dyan Ashley RN  Medication Review/Management:    medications reviewed    high-risk medications identified  Taken 1/7/2023 0000 by Dyan Ashley RN  Medication Review/Management:    medications reviewed    high-risk medications identified  Taken 1/6/2023 2000 by Dyan Ashley RN  Medication Review/Management:    medications reviewed    high-risk medications identified     Problem: Pneumonia  Goal: Effective Oxygenation and Ventilation  Intervention: Promote Airway Secretion Clearance  Recent Flowsheet Documentation  Taken 1/7/2023 0400 by Dyan Ashley RN  Cough And Deep Breathing: done with encouragement  Taken 1/7/2023 0000 by Dyan Ashley RN  Cough And Deep Breathing: done with encouragement  Taken 1/6/2023 2000 by Dyan Ashley RN  Cough And Deep Breathing: done with encouragement  Intervention: Optimize Oxygenation and Ventilation  Recent Flowsheet Documentation  Taken 1/7/2023 0600 by Dyan Ashley RN  Head of Bed (HOB) Positioning: HOB at 30 degrees  Taken 1/7/2023 0400 by Dyan Ashley RN  Head of Bed (HOB) Positioning: HOB at 30 degrees  Taken 1/7/2023 0200 by Dyan Ashley RN  Head of Bed (HOB) Positioning: HOB at 30 degrees  Taken 1/7/2023 0000 by Dyan Ashley RN  Head of Bed (HOB) Positioning: HOB at 30 degrees  Taken 1/6/2023 2200 by Dyan Ashley RN  Head of Bed (HOB) Positioning: HOB at 30 degrees  Taken 1/6/2023 2000 by Dyan Ashley RN  Head of Bed (HOB) Positioning: HOB at 30 degrees     Problem: Plan of Care - These are the overarching goals to be used throughout the patient stay.    Goal: Patient-Specific Goal (Individualized)  Description: You can add care plan  "individualizations to a care plan. Examples of Individualization might be:  \"Parent requests to be called daily at 9am for status\", \"I have a hard time hearing out of my right ear\", or \"Do not touch me to wake me up as it startles me\".  Outcome: Progressing  Flowsheets (Taken 1/6/2023 2000)  Individualized Care Needs: reassured pt that he is ok  Goal: Absence of Hospital-Acquired Illness or Injury  Intervention: Identify and Manage Fall Risk  Recent Flowsheet Documentation  Taken 1/7/2023 0400 by Dyan Ashley RN  Safety Promotion/Fall Prevention: activity supervised  Taken 1/7/2023 0000 by Dyan Ashley RN  Safety Promotion/Fall Prevention: activity supervised  Taken 1/6/2023 2000 by Dyan Ashley RN  Safety Promotion/Fall Prevention: activity supervised  Intervention: Prevent Skin Injury  Recent Flowsheet Documentation  Taken 1/7/2023 0600 by Dyan Ashley RN  Body Position:    turned    left    heels elevated    upper extremity elevated  Taken 1/7/2023 0400 by Dyan Ashley RN  Body Position:    turned    heels elevated    upper extremity elevated  Taken 1/7/2023 0200 by Dyan Ashley RN  Body Position:    turned    heels elevated    upper extremity elevated  Taken 1/7/2023 0000 by Dyan Ashley RN  Body Position:    turned    heels elevated    upper extremity elevated  Taken 1/6/2023 2200 by Dyan Ashley RN  Body Position:    turned    right  Taken 1/6/2023 2000 by Dyan Ashley RN  Body Position:    turned    heels elevated    upper extremity elevated  Intervention: Prevent and Manage VTE (Venous Thromboembolism) Risk  Recent Flowsheet Documentation  Taken 1/7/2023 0400 by Dyan Ashley RN  VTE Prevention/Management: foot pump device on  Taken 1/7/2023 0000 by Dyan Ashley RN  VTE Prevention/Management: foot pump device on  Taken 1/6/2023 2000 by Dyan Ashley RN  VTE Prevention/Management: other (see comments)  Goal: Optimal Comfort and " Wellbeing  Intervention: Monitor Pain and Promote Comfort  Recent Flowsheet Documentation  Taken 1/7/2023 0400 by Dyan Ashley, RN  Pain Management Interventions:    care clustered    pillow support provided    quiet environment facilitated    repositioned    rest  Taken 1/7/2023 0000 by Dyan Ashley RN  Pain Management Interventions:    care clustered    pillow support provided    repositioned    rest    quiet environment facilitated  Taken 1/6/2023 2000 by Dyan Ashley RN  Pain Management Interventions:    medication (see MAR)    care clustered    pillow support provided    rest    repositioned    quiet environment facilitated  Intervention: Provide Person-Centered Care  Recent Flowsheet Documentation  Taken 1/7/2023 0400 by Dyan Ashley RN  Trust Relationship/Rapport:    emotional support provided    reassurance provided    care explained  Taken 1/7/2023 0000 by Dyan Ashley RN  Trust Relationship/Rapport:    emotional support provided    reassurance provided    care explained  Taken 1/6/2023 2000 by Dyan Ashley RN  Trust Relationship/Rapport:    emotional support provided    reassurance provided    care explained     Problem: Restraint, Nonviolent  Goal: Absence of Harm or Injury  Intervention: Protect Dignity, Rights and Personal Wellbeing  Recent Flowsheet Documentation  Taken 1/7/2023 0400 by Dyan Ashley RN  Trust Relationship/Rapport:    emotional support provided    reassurance provided    care explained  Taken 1/7/2023 0000 by Dyan Ashley RN  Trust Relationship/Rapport:    emotional support provided    reassurance provided    care explained  Taken 1/6/2023 2000 by Dyan Ashley RN  Trust Relationship/Rapport:    emotional support provided    reassurance provided    care explained  Intervention: Protect Skin and Joint Integrity  Recent Flowsheet Documentation  Taken 1/7/2023 0600 by Dyan Ashley, RN  Body Position:    turned    left    heels  elevated    upper extremity elevated  Taken 1/7/2023 0400 by Dyan Ashley RN  Body Position:    turned    heels elevated    upper extremity elevated  Taken 1/7/2023 0200 by Dyan Ashley RN  Body Position:    turned    heels elevated    upper extremity elevated  Taken 1/7/2023 0000 by Dyan Ashley RN  Body Position:    turned    heels elevated    upper extremity elevated  Taken 1/6/2023 2200 by Dyan Ashley RN  Body Position:    turned    right  Taken 1/6/2023 2000 by Dyan Ashley RN  Body Position:    turned    heels elevated    upper extremity elevated     Problem: Restraint, Violent-Self-Destructive  Goal: Absence of Harm or Injury  Intervention: Protect Dignity, Rights and Personal Wellbeing  Recent Flowsheet Documentation  Taken 1/7/2023 0400 by Dyan Ashley RN  Trust Relationship/Rapport:    emotional support provided    reassurance provided    care explained  Taken 1/7/2023 0000 by Dyan Ashley RN  Trust Relationship/Rapport:    emotional support provided    reassurance provided    care explained  Taken 1/6/2023 2000 by Dyan Ashley RN  Trust Relationship/Rapport:    emotional support provided    reassurance provided    care explained  Intervention: Protect Skin and Joint Integrity  Recent Flowsheet Documentation  Taken 1/7/2023 0600 by Dyan Ashley RN  Body Position:    turned    left    heels elevated    upper extremity elevated  Taken 1/7/2023 0400 by Dyan Ashlye RN  Body Position:    turned    heels elevated    upper extremity elevated  Taken 1/7/2023 0200 by Dyan Ashley RN  Body Position:    turned    heels elevated    upper extremity elevated  Taken 1/7/2023 0000 by Dyan Ashley RN  Body Position:    turned    heels elevated    upper extremity elevated  Taken 1/6/2023 2200 by Dyan Ashley RN  Body Position:    turned    right  Taken 1/6/2023 2000 by Dyan Ashley RN  Body Position:    turned    heels elevated    upper  extremity elevated     Problem: Gas Exchange Impaired  Goal: Optimal Gas Exchange  Intervention: Optimize Oxygenation and Ventilation  Recent Flowsheet Documentation  Taken 1/7/2023 0600 by Dyan Ashley RN  Head of Bed (HOB) Positioning: HOB at 30 degrees  Taken 1/7/2023 0400 by Dyan Ashley RN  Head of Bed (HOB) Positioning: HOB at 30 degrees  Taken 1/7/2023 0200 by Dyan Ashley RN  Head of Bed (HOB) Positioning: HOB at 30 degrees  Taken 1/7/2023 0000 by Dyan Ashley RN  Head of Bed (HOB) Positioning: HOB at 30 degrees  Taken 1/6/2023 2200 by Dyan Ashley RN  Head of Bed (HOB) Positioning: HOB at 30 degrees  Taken 1/6/2023 2000 by Dyan Ashley RN  Head of Bed (HOB) Positioning: HOB at 30 degrees     Problem: Allergic/Anaphylactic Reaction  Goal: Resolution of Hypersensitivity Symptoms  Intervention: Manage Acute Allergic Reaction  Recent Flowsheet Documentation  Taken 1/7/2023 0400 by Dyan Ashley RN  Medication Review/Management:    medications reviewed    high-risk medications identified  Taken 1/7/2023 0000 by Dyan Ashley RN  Medication Review/Management:    medications reviewed    high-risk medications identified  Taken 1/6/2023 2000 by Dyan Ashley RN  Medication Review/Management:    medications reviewed    high-risk medications identified     Problem: Artificial Airway  Goal: Effective Communication  Intervention: Ensure Effective Communication  Recent Flowsheet Documentation  Taken 1/7/2023 0400 by Dyan Ashley RN  Trust Relationship/Rapport:    emotional support provided    reassurance provided    care explained  Taken 1/7/2023 0000 by Dyan Ashley RN  Trust Relationship/Rapport:    emotional support provided    reassurance provided    care explained  Taken 1/6/2023 2000 by Dyan Ashley RN  Trust Relationship/Rapport:    emotional support provided    reassurance provided    care explained  Goal: Optimal Device Function  Intervention:  Optimize Device Care and Function  Recent Flowsheet Documentation  Taken 1/7/2023 0400 by Dyan Ashley RN  Airway Safety Measures:    all equipment/monitors on and audible    mask at bedside    manual resuscitator/mask/valve at bedside    manual resuscitator/mask/valve in room    oxygen flowmeter    suction at bedside    suction equipment    suction regulator  Oral Care:    swabbed with antiseptic solution    tongue brushed    teeth brushed    lip/mouth moisturizer applied  Taken 1/7/2023 0000 by Dyan Ashley RN  Airway Safety Measures:    all equipment/monitors on and audible    mask at bedside    manual resuscitator/mask/valve at bedside    manual resuscitator/mask/valve in room    oxygen flowmeter    suction at bedside    suction equipment    suction regulator  Oral Care:    swabbed with antiseptic solution    tongue brushed    teeth brushed    lip/mouth moisturizer applied  Taken 1/6/2023 2000 by Dyan Ashley RN  Airway Safety Measures:    all equipment/monitors on and audible    mask at bedside    manual resuscitator/mask/valve at bedside    manual resuscitator/mask/valve in room    oxygen flowmeter    suction at bedside    suction equipment    suction regulator  Oral Care:    swabbed with antiseptic solution    tongue brushed    teeth brushed    lip/mouth moisturizer applied     Problem: Violence Risk or Actual  Goal: Anger and Impulse Control  Intervention: Minimize Safety Risk  Recent Flowsheet Documentation  Taken 1/7/2023 0400 by Dyan Ashley RN  Enhanced Safety Measures: room near unit station  Taken 1/7/2023 0000 by Dyan Ashley RN  Enhanced Safety Measures: room near unit station  Taken 1/6/2023 2000 by Dyan Ashley RN  Enhanced Safety Measures: room near unit station   Goal Outcome Evaluation:

## 2023-01-07 NOTE — PLAN OF CARE
ICU End of Shift Summary. See flowsheets for vital signs and detailed assessment.    Changes this shift: Propofol and pressor support weaned off, now maintaining RASS goal with Fentanyl 150 and Dex 0.4. Remains afebrile and hemodynamically stable in persistent sinus harsh.    Plan: continue to monitor patient status; notify provider of changes.

## 2023-01-07 NOTE — PROGRESS NOTES
MEDICAL ICU PROGRESS NOTE  01/07/2023      Date of Service (when I saw the patient): 01/07/2023    ASSESSMENT: Antony Aguila Jr. is a 62 year old male admitted on 12/21/2022. He has history chronic type B aortic dissection s/p exploration and median sternotomy (6/18/19), type 2 DM (poorly controlled), and HTN who is admitted for acute on chronic thoracic aortic aneurysm with new aortic root dilation. Hospitalization complicated by recurrent episodes of agitation requiring aggressive sedation and compromised airway secondary to upper airway edema requiring intubation.    CHANGES and MAJOR THINGS TODAY:   - check airleak  - wean off versed gtt  - hereditary angioedema work up negative   - resume propofol, trend TG tomorrow   - discontinue connor  - resume amlodipine  - resume quetiapine PM dose  - consider resuming Abilify tomorrow   - allergy consult   - EKG in am for QTc  - start folate supplementation     PLAN:    Neuro:  # Agitation     # Encephalopathy, likely metabolic  Patient has had several episodes of agitation requiring aggressive sedation and intubation, most recently on 1/5/2023. On the floor, had multiple code 21s called, ultimately given benadryl and ativan (hospitalist reported haldol was given additionally but this is not charted). He was ultimately started on Precedex, put in 4-point restraints. On transfer to the ICU, ultimately had respiratory compromise on transfer to the ICU and was more deeply sedated as a result.  - Psychiatry re-consulted, appreciate recs  - As we wean parenteral sedation, can use Zyprexa Zydis or IM 5 mg up to TID (less QTc prolonging)  - Resumed prior Seroquel 400mg at bedtime  - Consider resuming Abilify tomorrow   - Discontinued guanfacine due to concern this could explain the recurrence of his angioedema  - Sedation as below    # C/f seizure   - Hx of body twitching in 2019 and was treated with Keppra, MRI and EEG did not show epileptic etiology or structural lesion   -  Arm and leg shaking, as well as eye flickering overnight on 1/5  - Neuro crit consulted, appreciate recs   - Loaded with Keppra 3g given concern for seizure, however felt that suspicion was low therefore discontinued Keppra  - vEEG initiated , significant for encephalopathy    # Age-indeterminate right and chronic left BG lacunar infarcts  - Consider starting statin   - Consider starting ASA 81mg     # Pain and sedation  - Propofol gtt, check TGs in AM   - Precedex gtt   - Fentanyl gtt  - Midazolam 1-2mg Q1H PRN  - Discontinue midazolam gtt  - Avoiding midazolam gtt in preparation for extubation  - Resume Seroquel and Abilify as above  - Can try haldol or Zyprexa prn if patient agitated  - RASS goal -2 to -3      # Schizoaffective disorder  # PTSD  Meds per chart include Zyprexa, Abilify, and Seroquel. Per pharmacy med rec, appears to only have filled Seroquel recently.   - Resumed prior Seroquel 400mg at bedtime  - Resumed Abilify 10mg QHS  - Psych recs as above      # Tobacco use  Smoked cigarettes for 45 years. Currently 1 pack per week.  - Encourage cessation      Pulmonary:  # Acute hypoxic and hypercarbic respiratory failure  # Significant airway edema  # Concern for sleep apnea  # Tracheostomy placed 7/5/19, decannulated 8/2019  # Angioedema vs allergic reaction  Per hospitalization notes here in Sept 2022, noted to have hx of dyspnea on exertion in setting of being current smoker, tx with albuterol inhaler, recommended outpatient PFTs be obtained. Pulmonary progress note from 7/2019 notes current smoker with unknown COPD history, no PFTs available. Had tracheostomy placed 7/5/19, decannulated 8/2019. CTA chest with compressive atelectasis in L lung adjacent to thoracic aorta and hiatal hernia. Patient has now had multiple instances where he became agitated and non-compliant with attempts for patient to use BIPAP or HFNC and needed heavy sedation to treat agitation such that intubation was required to protect  airway. Patient is historically a difficult intubation and was very difficult this time. Patient has a possible hx of angioedema reaction to lisinopril or losartan, but the most recent episode occurred without these medications on board.   - Patient emergently intubated, very difficult airway  - Methylprednisolone 125mg QDay  - Benadryl 50mg q6h   - Famotidine 20mg q12h   - Discussed current medications with pharmacy - stopped aripiprazole, gabapentin, and amlodipine due to their (rare) association with edema - more often peripheral edema however.  - Could also consider allergy testing if concern for some other non-medication allergic cause  - Continue Bumex IV 1mg TID  - Angioedema workup labs: C4 is pending, tryptase normal  - Allergy consult for angioedema or allergy  - PST 15/5 1 hour BID    Vent Mode: (S) CMV/AC  (Continuous Mandatory Ventilation/ Assist Control)  FiO2 (%): 40 %  Resp Rate (Set): 18 breaths/min  Tidal Volume (Set, mL): 500 mL  PEEP (cm H2O): 8 cmH2O  Pressure Support (cm H2O): 10 cmH2O  Resp: 15       Cardiovascular:  # Chronic type B aortic dissection (proximal descending aorta to the abdominal aorta)  # Proximal ascending aorta saccular aneurysm   # Distal ascending aorta saccular aneurysm   # Mild nonobstructive CAD  Pt has history of stable type B aortic dissection as well as short segment of dissection vs pseudoaneurysms at the aortic root and distal ascending thoracic aorta. Previously underwent surgical exploration in 6/2019 with concern for type 1 aortic dissection but found to have chronic type B dissection. On admission, CXR with widened mediastinum slightly increased from prior. CT chest with aneurysmal dilatation ascending and descending thoracic aorta with type B aortic dissection distal to L subclavian artery extending to L common iliac artery with severe narrowing of true lumen and possible pseudoaneurysm at aortic root, new compared to prior 2019 images.   - TRINH 12/23 showed  saccular aneurysm of ascending aorta measuring 5.8 cm associated with a dissection flap. Thrombus is seen in the false lumen. Aortic root is dilated at 4.6 cm.  - CTA 12/22 two saccular aneurysms, one each in the proximal ascending aorta and distal ascending aorta. The maximal transaortic diameters of 46.9 x 39.2 mm and 62.7 x 54.4 mm, respectively. Mild, non-obstructive coronary artery disease without any high-grade stenoses.  - Cardiology and CVTS consulted and signed off, plan for outpatient follow up with CVTS  - Hold labetalol 400 mg BID 12/25, consider resuming  - Hold DVT ppx 12/23; restarted 12/24 for thrombus seen on TRINH, continue per CVTS recs  - No HR goal per cardiology, SBP preferably maintained <130  - PRN labetalol 10mg for SBP >135     # HTN  Based on outpatient note 12/14/2022 pt was previously hydordiuril 50 mg, amlodipine 10 mg, bumex TID and canaglaflozin. 12/25 Hypertensive overnight with highest SBP of 170s. Given hx of Type B sonam dissection and now new aortic aneurysm goals - hold Labetalol  BID (titrated from intially PO dose of 100 mg)  - holding PTA hydrochlorothiazide  - Was on Nicardipine gtt given persistent elevated SBPs  - PRN labetalol 10mg for SBP >135, PRN hydralazine for SBP>135 if HR<50     GI/Nutrition:  # Large hiatal hernia  # Nutrition  Previously unable to place feeding tube under fluoro due to large hiatal hernia, required GI assistance with feeding tube placement   - Appreciate GI assistance with advancing feeding tube past hiatal hernia   - advancing TFs      Renal/Fluids/Electrolytes:  # LAKISHA on CKD 2-resolved   Outpatient labs 12/16 with Cr 1.4 and eGFR 55. Now back at baseline Cr ~1.1-1.2, eGFR ~65-75. UA unremarkable.  - holding PTA metformin for now  - increase FWFs     # Mild hypokalemia, resolved  # Mild hypomagnesemia, resolved   - Repletion protocols      # Hypocalcemia, resolved  - Monitor BMP     Endocrine:  # Type 2 DM with hyperglycemia  Most recent  A1c 12.5 (9/16/22). Home regimen: metformin 1000 qPM and Lantus 50 unit(s) qAM + aspart 17 unit(s) TIDAC + aspart 9 unit(s) w/ snacks  - Lantus 25U while NPO   - HDSSI   - holding PTA metformin as above     ID:  # Increased pulmonary secretions  # Concern for hospital associated pneumonia-resolved   Has developed copious creamy secretions, has low grade temperature of 100.0F, no leukocytosis. CXR showing increased opacities. Previously on Zosyn, transitioned to nafcillin 1/01/23  - Nafcillin completed 1/4   - Pulm toilet: metaneb, mucomyst       Hematology:    # Anemia, normocytic  - Trend CBC  - Mild folate deficiency, start folic acid supplement     Musculoskeletal:  # BLE edema  Dimer elevated with acute on chronic leg swelling L < R per patient history. DVT US in ED negative for DVT b/l. Has had LE swelling for last 2 years, notes it is worse lately. Previously on diuretics, not currently per chart.  - lymphedema consult  - diuresis as above     Skin:  # Venous stasis ulcer LLE  # Venous insufficiency  # Peripheral vascular disease  # Tongue injury   - WOC consult placed     General Cares/Prophylaxis:    DVT Prophylaxis: lovenox   GI Prophylaxis: PPI  Restraints: bilateral mitts and wrist restraints  Family Communication: Sister - Message left on voice mail    Code Status: Full Code     Lines/tubes/drains:  - REMOVE Noble  - ET Tube  - PIV x3   - PICC     Disposition:  - Medical ICU    Patient seen and findings/plan discussed with medical ICU staff, Dr. Mccall.     Breann Khan DO  [facility]  PGY3, Internal Medicine  See Kathleen for pager      Clinically Significant Risk Factors        # Hypokalemia: Lowest K = 3.3 mmol/L in last 2 days, will replace as needed       # Hypoalbuminemia: Lowest albumin = 2.9 g/dL at 12/26/2022  6:03 AM, will monitor as appropriate          # DMII: A1C = 8.0 % (Ref range: <5.7 %) within past 3 months   # Severe Obesity: Estimated body mass index is 43.94 kg/m  as  calculated from the following:    Height as of this encounter: 1.829 m (6').    Weight as of this encounter: 147 kg (324 lb).               ====================================  INTERVAL HISTORY:   No acute events over night. Patient had swung his legs off the side of the bed and sedation was an issue so midazolam gtt was started. Received prn hydralazine for BP >130.     OBJECTIVE:   1. VITAL SIGNS:   Temp:  [97.6  F (36.4  C)-97.8  F (36.6  C)] 97.7  F (36.5  C)  Pulse:  [46-62] 49  Resp:  [16-19] 18  BP: (111-150)/(67-87) 140/84  FiO2 (%):  [40 %-50 %] 40 %  SpO2:  [96 %-99 %] 99 %  Vent Mode: CMV/AC  (Continuous Mandatory Ventilation/ Assist Control)  FiO2 (%): 40 %  Resp Rate (Set): 18 breaths/min  Tidal Volume (Set, mL): 500 mL  PEEP (cm H2O): 8 cmH2O  Resp: 18    2. INTAKE/ OUTPUT:   I/O last 3 completed shifts:  In: 1678.3 [I.V.:968.3; NG/GT:380]  Out: 2875 [Urine:2875]    3. PHYSICAL EXAMINATION:  General: Sedated, intubated, laying in bed   HEENT: No scleral icterus, MMM, tongue still swollen appearing  Neuro: Sedated, does not withdraw to pain currently.    Pulm/Resp: Clear breath sounds bilaterally, no wheezing, mechanically ventilated  CV: RRR, no murmurs  Abdomen: Soft, non-distended, non-tender  : connor catheter in place  Incisions/Skin: lower extremity edema, wrinkles in lower legs      4. LABS:   Arterial Blood Gases   Recent Labs   Lab 01/05/23  0536   PH 7.39   PCO2 52*   PO2 76*   HCO3 32*     Complete Blood Count   Recent Labs   Lab 01/07/23  0341 01/06/23  0352 01/05/23  0536 01/04/23  0408   WBC 8.5 8.7 9.2 10.9   HGB 12.2* 11.7* 11.5* 12.3*    283 257 247     Basic Metabolic Panel  Recent Labs   Lab 01/07/23  0341 01/07/23  0340 01/07/23  0003 01/06/23  2032 01/06/23  1255 01/06/23  1052 01/06/23  0757 01/06/23  0352 01/06/23  0002 01/05/23  2232 01/05/23  0934 01/05/23  0536     --   --   --   --   --   --  139  --  143  --  140   POTASSIUM 3.4  --   --   --   --  4.0  --   3.3*  --  3.7  --  3.8   CHLORIDE 103  --   --   --   --   --   --  102  --  104  --  103   CO2 24  --   --   --   --   --   --  25  --  26  --  27   BUN 19.6  --   --   --   --   --   --  15.0  --  14.8  --  15.0   CR 1.40*  --   --   --   --   --   --  1.24*  --  1.23*  --  1.19*   * 135* 100* 147*   < >  --    < > 103*  100*   < > 105*   < > 144*    < > = values in this interval not displayed.     Liver Function Tests  Recent Labs   Lab 01/04/23  1257   AST 60*   ALT 82*   ALKPHOS 68   BILITOTAL 0.3   ALBUMIN 3.4*     Coagulation Profile  No lab results found in last 7 days.    5. RADIOLOGY:   Recent Results (from the past 24 hour(s))   XR Fluoro Time 0/1 Hour    Narrative    This exam was marked as non-reportable because it will not be read by a   radiologist or a [place] non-radiologist provider.

## 2023-01-07 NOTE — PROGRESS NOTES
ICU End of Shift Summary. See flowsheets for vital signs and detailed assessment.    Changes this shift:  Patient sedated but does wake up and follow commands. Remains on vent, PS x2 today with no problems. Still having large amounts of angioedema present, with no cuff leak. Gave steroids and benadryl. Remains Bradycardiac in the 50s with no ectopy seen. Good urine output with the help of Bumex. Changed his sedation, discontinued versed and restarted propofol. Was able to wean precedex off. Remains in fentanyl.     Plan: extubate when angioedema resolves.

## 2023-01-07 NOTE — PROVIDER NOTIFICATION
RN was told pt was sedated but follows commands and that he was not moving in bed during the day and that pt was RASS -2/-3 all day, had HR in 40's all day. Airway very difficult and pt in bilateral wrist restraints and mitts for airway protection.     This RN walked into room at 2020 and found pt to have both legs out of the bed (over siderails) on the L (towards vent) with shoulders over extremely to the R (almost sideways in bed), pt up on elbows with shoulders off of bed, trying to get OOB.  With assistance, pt was assisted back to bed.    Pt was wide awake, denied pain when asked, denied knowing where he was, and was easily reoriented to situation.  Pt was not agitated or combative, but confused.      Called MD to discuss sedation options as pt was only on precedex 0.4 mcg/kg/hr and fentanyl 150 mcg/hr. Resident was leery of increasing Dex with HR in 40's, instructed to give fentanyl and said she would speak to attending MD.  RN gave fentanyl bolus and increased fentanyl to 200 mcg/hr. RN treated evening glucose and turned towards computer and pt again put legs out of bed.    Addendum: Versed gtt was started after bolus.  Pt still easily roused to voice or light touch, but no longer trying to get OOB.

## 2023-01-08 LAB
ANION GAP SERPL CALCULATED.3IONS-SCNC: 14 MMOL/L (ref 7–15)
ATRIAL RATE - MUSE: 58 BPM
BUN SERPL-MCNC: 28 MG/DL (ref 8–23)
CALCIUM SERPL-MCNC: 8.6 MG/DL (ref 8.8–10.2)
CHLORIDE SERPL-SCNC: 108 MMOL/L (ref 98–107)
CREAT SERPL-MCNC: 1.63 MG/DL (ref 0.67–1.17)
DEPRECATED HCO3 PLAS-SCNC: 23 MMOL/L (ref 22–29)
DIASTOLIC BLOOD PRESSURE - MUSE: NORMAL MMHG
ERYTHROCYTE [DISTWIDTH] IN BLOOD BY AUTOMATED COUNT: 17 % (ref 10–15)
GFR SERPL CREATININE-BSD FRML MDRD: 47 ML/MIN/1.73M2
GLUCOSE BLDC GLUCOMTR-MCNC: 135 MG/DL (ref 70–99)
GLUCOSE BLDC GLUCOMTR-MCNC: 137 MG/DL (ref 70–99)
GLUCOSE BLDC GLUCOMTR-MCNC: 156 MG/DL (ref 70–99)
GLUCOSE BLDC GLUCOMTR-MCNC: 173 MG/DL (ref 70–99)
GLUCOSE BLDC GLUCOMTR-MCNC: 190 MG/DL (ref 70–99)
GLUCOSE BLDC GLUCOMTR-MCNC: 201 MG/DL (ref 70–99)
GLUCOSE SERPL-MCNC: 147 MG/DL (ref 70–99)
HCT VFR BLD AUTO: 34.9 % (ref 40–53)
HGB BLD-MCNC: 11.3 G/DL (ref 13.3–17.7)
INTERPRETATION ECG - MUSE: NORMAL
MAGNESIUM SERPL-MCNC: 2.1 MG/DL (ref 1.7–2.3)
MCH RBC QN AUTO: 28.5 PG (ref 26.5–33)
MCHC RBC AUTO-ENTMCNC: 32.4 G/DL (ref 31.5–36.5)
MCV RBC AUTO: 88 FL (ref 78–100)
P AXIS - MUSE: 41 DEGREES
PHOSPHATE SERPL-MCNC: 3.3 MG/DL (ref 2.5–4.5)
PLATELET # BLD AUTO: 285 10E3/UL (ref 150–450)
POTASSIUM SERPL-SCNC: 3.6 MMOL/L (ref 3.4–5.3)
PR INTERVAL - MUSE: 140 MS
QRS DURATION - MUSE: 86 MS
QT - MUSE: 448 MS
QTC - MUSE: 439 MS
R AXIS - MUSE: 32 DEGREES
RBC # BLD AUTO: 3.97 10E6/UL (ref 4.4–5.9)
SODIUM SERPL-SCNC: 145 MMOL/L (ref 136–145)
SYSTOLIC BLOOD PRESSURE - MUSE: NORMAL MMHG
T AXIS - MUSE: 115 DEGREES
TRIGL SERPL-MCNC: 103 MG/DL
VENTRICULAR RATE- MUSE: 58 BPM
WBC # BLD AUTO: 10 10E3/UL (ref 4–11)

## 2023-01-08 PROCEDURE — 84478 ASSAY OF TRIGLYCERIDES: CPT | Performed by: NURSE PRACTITIONER

## 2023-01-08 PROCEDURE — 83735 ASSAY OF MAGNESIUM: CPT

## 2023-01-08 PROCEDURE — 99291 CRITICAL CARE FIRST HOUR: CPT | Mod: GC | Performed by: INTERNAL MEDICINE

## 2023-01-08 PROCEDURE — 85018 HEMOGLOBIN: CPT | Performed by: STUDENT IN AN ORGANIZED HEALTH CARE EDUCATION/TRAINING PROGRAM

## 2023-01-08 PROCEDURE — 250N000013 HC RX MED GY IP 250 OP 250 PS 637

## 2023-01-08 PROCEDURE — 94640 AIRWAY INHALATION TREATMENT: CPT

## 2023-01-08 PROCEDURE — 250N000011 HC RX IP 250 OP 636: Performed by: STUDENT IN AN ORGANIZED HEALTH CARE EDUCATION/TRAINING PROGRAM

## 2023-01-08 PROCEDURE — 250N000009 HC RX 250: Performed by: NURSE PRACTITIONER

## 2023-01-08 PROCEDURE — 84591 ASSAY OF NOS VITAMIN: CPT | Performed by: STUDENT IN AN ORGANIZED HEALTH CARE EDUCATION/TRAINING PROGRAM

## 2023-01-08 PROCEDURE — 999N000157 HC STATISTIC RCP TIME EA 10 MIN

## 2023-01-08 PROCEDURE — 250N000011 HC RX IP 250 OP 636

## 2023-01-08 PROCEDURE — 250N000013 HC RX MED GY IP 250 OP 250 PS 637: Performed by: NURSE PRACTITIONER

## 2023-01-08 PROCEDURE — 999N000287 HC ICU ADULT ROUNDING, EACH 10 MINS

## 2023-01-08 PROCEDURE — 94640 AIRWAY INHALATION TREATMENT: CPT | Mod: 76

## 2023-01-08 PROCEDURE — 80048 BASIC METABOLIC PNL TOTAL CA: CPT | Performed by: STUDENT IN AN ORGANIZED HEALTH CARE EDUCATION/TRAINING PROGRAM

## 2023-01-08 PROCEDURE — 250N000013 HC RX MED GY IP 250 OP 250 PS 637: Performed by: STUDENT IN AN ORGANIZED HEALTH CARE EDUCATION/TRAINING PROGRAM

## 2023-01-08 PROCEDURE — 93010 ELECTROCARDIOGRAM REPORT: CPT | Performed by: INTERNAL MEDICINE

## 2023-01-08 PROCEDURE — 200N000002 HC R&B ICU UMMC

## 2023-01-08 PROCEDURE — 93005 ELECTROCARDIOGRAM TRACING: CPT

## 2023-01-08 PROCEDURE — 94003 VENT MGMT INPAT SUBQ DAY: CPT

## 2023-01-08 PROCEDURE — 999N000253 HC STATISTIC WEANING TRIALS

## 2023-01-08 PROCEDURE — 84100 ASSAY OF PHOSPHORUS: CPT

## 2023-01-08 PROCEDURE — 250N000011 HC RX IP 250 OP 636: Performed by: NURSE PRACTITIONER

## 2023-01-08 RX ORDER — CHLOROTHIAZIDE SODIUM 500 MG/1
500 INJECTION INTRAVENOUS ONCE
Status: COMPLETED | OUTPATIENT
Start: 2023-01-08 | End: 2023-01-08

## 2023-01-08 RX ORDER — BUMETANIDE 0.25 MG/ML
1 INJECTION INTRAMUSCULAR; INTRAVENOUS ONCE
Status: DISCONTINUED | OUTPATIENT
Start: 2023-01-08 | End: 2023-01-08

## 2023-01-08 RX ORDER — BUMETANIDE 0.25 MG/ML
1 INJECTION INTRAMUSCULAR; INTRAVENOUS ONCE
Status: COMPLETED | OUTPATIENT
Start: 2023-01-08 | End: 2023-01-08

## 2023-01-08 RX ADMIN — FAMOTIDINE 20 MG: 10 INJECTION, SOLUTION INTRAVENOUS at 21:57

## 2023-01-08 RX ADMIN — FAMOTIDINE 20 MG: 10 INJECTION, SOLUTION INTRAVENOUS at 10:52

## 2023-01-08 RX ADMIN — ACETYLCYSTEINE 4 ML: 100 SOLUTION ORAL; RESPIRATORY (INHALATION) at 13:03

## 2023-01-08 RX ADMIN — PROPOFOL 15 MCG/KG/MIN: 10 INJECTION, EMULSION INTRAVENOUS at 19:52

## 2023-01-08 RX ADMIN — BUMETANIDE 1 MG: 0.25 INJECTION INTRAMUSCULAR; INTRAVENOUS at 10:48

## 2023-01-08 RX ADMIN — MULTIVITAMIN 15 ML: LIQUID ORAL at 07:53

## 2023-01-08 RX ADMIN — ENOXAPARIN SODIUM 40 MG: 40 INJECTION SUBCUTANEOUS at 01:48

## 2023-01-08 RX ADMIN — DIPHENHYDRAMINE HYDROCHLORIDE 50 MG: 50 INJECTION, SOLUTION INTRAMUSCULAR; INTRAVENOUS at 04:49

## 2023-01-08 RX ADMIN — IPRATROPIUM BROMIDE AND ALBUTEROL SULFATE 3 ML: 2.5; .5 SOLUTION RESPIRATORY (INHALATION) at 07:46

## 2023-01-08 RX ADMIN — IPRATROPIUM BROMIDE AND ALBUTEROL SULFATE 3 ML: 2.5; .5 SOLUTION RESPIRATORY (INHALATION) at 01:58

## 2023-01-08 RX ADMIN — DIPHENHYDRAMINE HYDROCHLORIDE 50 MG: 50 INJECTION, SOLUTION INTRAMUSCULAR; INTRAVENOUS at 16:43

## 2023-01-08 RX ADMIN — IPRATROPIUM BROMIDE AND ALBUTEROL SULFATE 3 ML: 2.5; .5 SOLUTION RESPIRATORY (INHALATION) at 13:03

## 2023-01-08 RX ADMIN — Medication 200 MCG/HR: at 14:53

## 2023-01-08 RX ADMIN — DIPHENHYDRAMINE HYDROCHLORIDE 50 MG: 50 INJECTION, SOLUTION INTRAMUSCULAR; INTRAVENOUS at 23:19

## 2023-01-08 RX ADMIN — IPRATROPIUM BROMIDE AND ALBUTEROL SULFATE 3 ML: 2.5; .5 SOLUTION RESPIRATORY (INHALATION) at 20:02

## 2023-01-08 RX ADMIN — SENNOSIDES 8.6 MG: 8.6 TABLET, FILM COATED ORAL at 19:48

## 2023-01-08 RX ADMIN — Medication 200 MCG/HR: at 03:05

## 2023-01-08 RX ADMIN — ACETYLCYSTEINE 4 ML: 100 SOLUTION ORAL; RESPIRATORY (INHALATION) at 01:59

## 2023-01-08 RX ADMIN — SENNOSIDES 8.6 MG: 8.6 TABLET, FILM COATED ORAL at 07:53

## 2023-01-08 RX ADMIN — QUETIAPINE FUMARATE 400 MG: 300 TABLET ORAL at 19:48

## 2023-01-08 RX ADMIN — PROPOFOL 20 MCG/KG/MIN: 10 INJECTION, EMULSION INTRAVENOUS at 03:04

## 2023-01-08 RX ADMIN — ACETYLCYSTEINE 4 ML: 100 SOLUTION ORAL; RESPIRATORY (INHALATION) at 07:46

## 2023-01-08 RX ADMIN — ACETYLCYSTEINE 4 ML: 100 SOLUTION ORAL; RESPIRATORY (INHALATION) at 20:04

## 2023-01-08 RX ADMIN — FOLIC ACID 1 MG: 1 TABLET ORAL at 07:53

## 2023-01-08 RX ADMIN — DIPHENHYDRAMINE HYDROCHLORIDE 50 MG: 50 INJECTION, SOLUTION INTRAMUSCULAR; INTRAVENOUS at 10:44

## 2023-01-08 RX ADMIN — ENOXAPARIN SODIUM 40 MG: 40 INJECTION SUBCUTANEOUS at 13:39

## 2023-01-08 RX ADMIN — METHYLPREDNISOLONE SODIUM SUCCINATE 125 MG: 125 INJECTION, POWDER, FOR SOLUTION INTRAMUSCULAR; INTRAVENOUS at 07:53

## 2023-01-08 RX ADMIN — ARIPIPRAZOLE 10 MG: 10 TABLET ORAL at 21:57

## 2023-01-08 RX ADMIN — PROPOFOL 15 MCG/KG/MIN: 10 INJECTION, EMULSION INTRAVENOUS at 08:01

## 2023-01-08 RX ADMIN — CHLOROTHIAZIDE SODIUM 500 MG: 500 INJECTION, POWDER, LYOPHILIZED, FOR SOLUTION INTRAVENOUS at 10:43

## 2023-01-08 RX ADMIN — PROPOFOL 15 MCG/KG/MIN: 10 INJECTION, EMULSION INTRAVENOUS at 14:22

## 2023-01-08 ASSESSMENT — ACTIVITIES OF DAILY LIVING (ADL)
ADLS_ACUITY_SCORE: 35
ADLS_ACUITY_SCORE: 31
ADLS_ACUITY_SCORE: 35
ADLS_ACUITY_SCORE: 31
ADLS_ACUITY_SCORE: 35

## 2023-01-08 NOTE — PROGRESS NOTES
MEDICAL ICU PROGRESS NOTE  01/08/2023      Date of Service (when I saw the patient): 01/08/2023    ASSESSMENT: Antony Aguila Jr. is a 62 year old male admitted on 12/21/2022. He has history chronic type B aortic dissection s/p exploration and median sternotomy (6/18/19), type 2 DM (poorly controlled), and HTN who is admitted for acute on chronic thoracic aortic aneurysm with new aortic root dilation. Hospitalization complicated by recurrent episodes of agitation requiring aggressive sedation and compromised airway secondary to upper airway edema requiring intubation.    CHANGES and MAJOR THINGS TODAY:   - Assess for cuff leak  - Give 500 diuril prior to 1mg Bumex  - FWF 100cc/hr for 24 hours  - Consider resuming Abilify tomorrow  - discontinue midaz pushes       PLAN:    Neuro:  # Agitation     # Encephalopathy, likely metabolic  Patient has had several episodes of agitation requiring aggressive sedation and intubation, most recently on 1/5/2023. On the floor, had multiple code 21s called, ultimately given benadryl and ativan (hospitalist reported haldol was given additionally but this is not charted). He was ultimately started on Precedex, put in 4-point restraints. On transfer to the ICU, ultimately had respiratory compromise on transfer to the ICU and was more deeply sedated as a result.  - Psychiatry re-consulted, appreciate recs  - As we wean parenteral sedation, can use Zyprexa Zydis or IM 5 mg up to TID (less QTc prolonging)  - Resumed prior Seroquel 400mg at bedtime  - Resume Abilify  - Discontinued guanfacine due to concern this could explain the recurrence of his angioedema  - Sedation as below    # C/f seizure   - Hx of body twitching in 2019 and was treated with Keppra, MRI and EEG did not show epileptic etiology or structural lesion   - Arm and leg shaking, as well as eye flickering overnight on 1/5  - Neuro crit consulted, signed off    - Loaded with Keppra 3g given concern for seizure, however felt  that suspicion was low therefore discontinued Keppra  - vEEG initiated, significant for encephalopathy  - No need for Keppra moving forward     # Age-indeterminate right and chronic left BG lacunar infarcts  - Consider starting statin   - Consider starting ASA 81mg     # Pain and sedation  - Propofol gtt   - Fentanyl gtt  - Discontinue Midazolam gtt and 1-2mg Q1H PRN  - Avoiding midazolam gtt in preparation for extubation  - Resume Seroquel and Abilify as above  - Can try haldol or Zyprexa prn if patient agitated  - RASS goal -1 to -2      # Schizoaffective disorder  # PTSD  Meds per chart include Zyprexa, Abilify, and Seroquel. Per pharmacy med rec, appears to only have filled Seroquel recently.   - Resumed prior Seroquel 400mg at bedtime  - Resumed Abilify 10mg QHS  - Psych recs as above      # Tobacco use  Smoked cigarettes for 45 years. Currently 1 pack per week.  - Encourage cessation      Pulmonary:  # Acute hypoxic and hypercarbic respiratory failure  # Significant airway edema  # Concern for sleep apnea  # Tracheostomy placed 7/5/19, decannulated 8/2019  # Angioedema vs allergic reaction  Per hospitalization notes here in Sept 2022, noted to have hx of dyspnea on exertion in setting of being current smoker, tx with albuterol inhaler, recommended outpatient PFTs be obtained. Pulmonary progress note from 7/2019 notes current smoker with unknown COPD history, no PFTs available. Had tracheostomy placed 7/5/19, decannulated 8/2019. CTA chest with compressive atelectasis in L lung adjacent to thoracic aorta and hiatal hernia. Patient has now had multiple instances where he became agitated and non-compliant with attempts for patient to use BIPAP or HFNC and needed heavy sedation to treat agitation such that intubation was required to protect airway. Patient is historically a difficult intubation and was very difficult this time. Patient has a possible hx of angioedema reaction to lisinopril or losartan, but the  most recent episode occurred without these medications on board.   - Patient emergently intubated, very difficult airway  - Methylprednisolone 125mg QDay  - Benadryl 50mg q6h   - Famotidine 20mg q12h   - Discussed current medications with pharmacy - stopped aripiprazole, gabapentin, and amlodipine due to their (rare) association with edema - more often peripheral edema however.  - Could also consider allergy testing if concern for some other non-medication allergic cause  - Diuresis as below   - Angioedema workup labs: C4 is pending, tryptase normal  - PST 15/5 1 hour BID    Vent Mode: (S) CMV/AC  (Continuous Mandatory Ventilation/ Assist Control)  FiO2 (%): 35 %  Resp Rate (Set): 18 breaths/min  Tidal Volume (Set, mL): 500 mL  PEEP (cm H2O): 8 cmH2O  Pressure Support (cm H2O): 7 cmH2O  Resp: 18       Cardiovascular:  # Chronic type B aortic dissection (proximal descending aorta to the abdominal aorta)  # Proximal ascending aorta saccular aneurysm   # Distal ascending aorta saccular aneurysm   # Mild nonobstructive CAD  Pt has history of stable type B aortic dissection as well as short segment of dissection vs pseudoaneurysms at the aortic root and distal ascending thoracic aorta. Previously underwent surgical exploration in 6/2019 with concern for type 1 aortic dissection but found to have chronic type B dissection. On admission, CXR with widened mediastinum slightly increased from prior. CT chest with aneurysmal dilatation ascending and descending thoracic aorta with type B aortic dissection distal to L subclavian artery extending to L common iliac artery with severe narrowing of true lumen and possible pseudoaneurysm at aortic root, new compared to prior 2019 images.   - TRINH 12/23 showed saccular aneurysm of ascending aorta measuring 5.8 cm associated with a dissection flap. Thrombus is seen in the false lumen. Aortic root is dilated at 4.6 cm.  - CTA 12/22 two saccular aneurysms, one each in the proximal  ascending aorta and distal ascending aorta. The maximal transaortic diameters of 46.9 x 39.2 mm and 62.7 x 54.4 mm, respectively. Mild, non-obstructive coronary artery disease without any high-grade stenoses.  - Cardiology and CVTS consulted and signed off, plan for outpatient follow up with CVTS  - Hold labetalol 400 mg BID 12/25, consider resuming  - Hold DVT ppx 12/23; restarted 12/24 for thrombus seen on TRINH, continue per CVTS recs  - No HR goal per cardiology, SBP preferably maintained <130  - PRN labetalol 10mg for SBP >135     # HTN  Based on outpatient note 12/14/2022 pt was previously hydordiuril 50 mg, amlodipine 10 mg, bumex TID and canaglaflozin. 12/25 Hypertensive overnight with highest SBP of 170s. Given hx of Type B sonam dissection and now new aortic aneurysm goals - hold Labetalol  BID (titrated from intially PO dose of 100 mg)  - holding PTA hydrochlorothiazide  - Was on Nicardipine gtt given persistent elevated SBPs  - PRN labetalol 10mg for SBP >135, PRN hydralazine for SBP>135 if HR<50     GI/Nutrition:  # Large hiatal hernia  # Nutrition  Previously unable to place feeding tube under fluoro due to large hiatal hernia, required GI assistance with feeding tube placement   - Appreciate GI assistance with advancing feeding tube past hiatal hernia   - advancing TFs      Renal/Fluids/Electrolytes:  # LAKISHA on CKD 2  Outpatient labs 12/16 with Cr 1.4 and eGFR 55. Now back at baseline Cr ~1.1-1.2, eGFR ~65-75. UA unremarkable.  - holding PTA metformin for now  - FWF 100cc/hr for 24 hours    - Give 500 diuril prior to 1mg Bumex     # Mild hypokalemia, resolved  # Mild hypomagnesemia, resolved   - Repletion protocols      # Hypocalcemia, resolved  - Monitor BMP     Endocrine:  # Type 2 DM with hyperglycemia  Most recent A1c 12.5 (9/16/22). Home regimen: metformin 1000 qPM and Lantus 50 unit(s) qAM + aspart 17 unit(s) TIDAC + aspart 9 unit(s) w/ snacks  - Lantus 25U while NPO   - HDSSI   - holding  PTA metformin as above     ID:  # Increased pulmonary secretions  # Concern for hospital associated pneumonia-resolved   Has developed copious creamy secretions, has low grade temperature of 100.0F, no leukocytosis. CXR showing increased opacities. Previously on Zosyn, transitioned to nafcillin 1/01/23  - Nafcillin completed 1/4   - Pulm toilet: metaneb, mucomyst       Hematology:    # Anemia, normocytic  - Trend CBC  - Mild folate deficiency, start folic acid supplement     Musculoskeletal:  # BLE edema  Dimer elevated with acute on chronic leg swelling L < R per patient history. DVT US in ED negative for DVT b/l. Has had LE swelling for last 2 years, notes it is worse lately. Previously on diuretics, not currently per chart.  - lymphedema consult  - diuresis as above     Skin:  # Venous stasis ulcer LLE  # Venous insufficiency  # Peripheral vascular disease  # Tongue injury   - WOC consult placed     General Cares/Prophylaxis:    DVT Prophylaxis: lovenox   GI Prophylaxis: PPI  Restraints: bilateral mitts and wrist restraints  Family Communication: Sister updated by phone     Code Status: Full Code     Lines/tubes/drains:  - ET Tube  - PIV x3   - PICC     Disposition:  - Medical ICU    Patient seen and findings/plan discussed with medical ICU staff, Dr. Mccall.     Ruthann Moser MD  PGY-1 Internal Medicine       Clinically Significant Risk Factors              # Hypoalbuminemia: Lowest albumin = 2.9 g/dL at 12/26/2022  6:03 AM, will monitor as appropriate          # DMII: A1C = 8.0 % (Ref range: <5.7 %) within past 3 months   # Severe Obesity: Estimated body mass index is 45.57 kg/m  as calculated from the following:    Height as of this encounter: 1.829 m (6').    Weight as of this encounter: 152.4 kg (336 lb).               ====================================  INTERVAL HISTORY:   No acute events over night. Following commands. Did not require PRNs for HTN, BP now a little soft.     OBJECTIVE:   1. VITAL SIGNS:    Temp:  [98.1  F (36.7  C)-99.7  F (37.6  C)] 98.1  F (36.7  C)  Pulse:  [51-84] 59  Resp:  [10-21] 18  BP: ()/(45-79) 121/68  FiO2 (%):  [35 %] 35 %  SpO2:  [91 %-98 %] 98 %  Vent Mode: (S) CMV/AC  (Continuous Mandatory Ventilation/ Assist Control)  FiO2 (%): 35 %  Resp Rate (Set): 18 breaths/min  Tidal Volume (Set, mL): 500 mL  PEEP (cm H2O): 8 cmH2O  Pressure Support (cm H2O): 7 cmH2O  Resp: 18    2. INTAKE/ OUTPUT:   I/O last 3 completed shifts:  In: 2791.38 [I.V.:776.38; NG/GT:590]  Out: 2720 [Urine:2720]    3. PHYSICAL EXAMINATION:  General: Sedated, intubated, laying in bed   HEENT: No scleral icterus, MMM, tongue still swollen appearing  Neuro: Sedated, following commands     Pulm/Resp: Clear breath sounds bilaterally, no wheezing, mechanically ventilated  CV: RRR, no murmurs  Abdomen: Soft, non-distended, non-tender  : connor catheter in place  Incisions/Skin: lower extremity edema, wrinkles in lower legs      4. LABS:   Arterial Blood Gases   Recent Labs   Lab 01/05/23  0536   PH 7.39   PCO2 52*   PO2 76*   HCO3 32*     Complete Blood Count   Recent Labs   Lab 01/08/23  0459 01/07/23  0341 01/06/23  0352 01/05/23  0536   WBC 10.0 8.5 8.7 9.2   HGB 11.3* 12.2* 11.7* 11.5*    285 283 257     Basic Metabolic Panel  Recent Labs   Lab 01/08/23  1639 01/08/23  1324 01/08/23  0816 01/08/23  0501 01/08/23  0459 01/07/23  1352 01/07/23  1128 01/07/23  0825 01/07/23  0341 01/06/23  1255 01/06/23  1052 01/06/23  0757 01/06/23  0352 01/06/23 0002 01/05/23 2232   NA  --   --   --   --  145  --   --   --  141  --   --   --  139  --  143   POTASSIUM  --   --   --   --  3.6  --  4.2  --  3.4  --  4.0  --  3.3*  --  3.7   CHLORIDE  --   --   --   --  108*  --   --   --  103  --   --   --  102  --  104   CO2  --   --   --   --  23  --   --   --  24  --   --   --  25  --  26   BUN  --   --   --   --  28.0*  --   --   --  19.6  --   --   --  15.0  --  14.8   CR  --   --   --   --  1.63*  --   --   --  1.40*   --   --   --  1.24*  --  1.23*   * 201* 137* 156* 147*   < >  --    < > 129*   < >  --    < > 103*  100*   < > 105*    < > = values in this interval not displayed.     Liver Function Tests  Recent Labs   Lab 01/04/23  1257   AST 60*   ALT 82*   ALKPHOS 68   BILITOTAL 0.3   ALBUMIN 3.4*     Coagulation Profile  No lab results found in last 7 days.    5. RADIOLOGY:   No results found for this or any previous visit (from the past 24 hour(s)).

## 2023-01-08 NOTE — PLAN OF CARE
"  Problem: Plan of Care - These are the overarching goals to be used throughout the patient stay.    Goal: Patient-Specific Goal (Individualized)  Description: You can add care plan individualizations to a care plan. Examples of Individualization might be:  \"Parent requests to be called daily at 9am for status\", \"I have a hard time hearing out of my right ear\", or \"Do not touch me to wake me up as it startles me\".  Outcome: Not Progressing  Goal: Absence of Hospital-Acquired Illness or Injury  Outcome: Not Progressing  Intervention: Identify and Manage Fall Risk  Recent Flowsheet Documentation  Taken 1/7/2023 1600 by Fifi Swift RN  Safety Promotion/Fall Prevention: activity supervised  Intervention: Prevent Skin Injury  Recent Flowsheet Documentation  Taken 1/7/2023 1800 by Fifi Swift RN  Body Position:   side-lying   left  Taken 1/7/2023 1600 by Fifi Swift RN  Body Position:   side-lying   right  Intervention: Prevent and Manage VTE (Venous Thromboembolism) Risk  Recent Flowsheet Documentation  Taken 1/7/2023 1600 by Fifi Swift RN  VTE Prevention/Management: foot pump device on  Goal: Optimal Comfort and Wellbeing  Outcome: Not Progressing  Intervention: Monitor Pain and Promote Comfort  Recent Flowsheet Documentation  Taken 1/7/2023 1600 by Fifi Swift RN  Pain Management Interventions:   medication (see MAR)   around-the-clock dosing utilized   care clustered   diversional activity provided   emotional support   environmental changes   music therapy   pillow support provided   quiet environment facilitated   relaxation techniques promoted   repositioned  Intervention: Provide Person-Centered Care  Recent Flowsheet Documentation  Taken 1/7/2023 1600 by Fifi Swift RN  Trust Relationship/Rapport:   emotional support provided   reassurance provided   care explained  Goal: Readiness for Transition of Care  Outcome: Not Progressing   "   Problem: Restraint, Nonviolent  Goal: Absence of Harm or Injury  Outcome: Not Progressing  Intervention: Protect Dignity, Rights and Personal Wellbeing  Recent Flowsheet Documentation  Taken 1/7/2023 1600 by Fifi Swift RN  Trust Relationship/Rapport:   emotional support provided   reassurance provided   care explained  Intervention: Protect Skin and Joint Integrity  Recent Flowsheet Documentation  Taken 1/7/2023 1800 by Fifi Swift RN  Body Position:   side-lying   left  Taken 1/7/2023 1600 by Fifi Swift RN  Body Position:   side-lying   right     Problem: Gas Exchange Impaired  Goal: Optimal Gas Exchange  Outcome: Not Progressing  Intervention: Optimize Oxygenation and Ventilation  Recent Flowsheet Documentation  Taken 1/7/2023 1800 by Fifi Swift RN  Head of Bed (HOB) Positioning: HOB at 30 degrees  Taken 1/7/2023 1600 by Fifi Swift RN  Head of Bed (HOB) Positioning: HOB at 30 degrees     Problem: Allergic/Anaphylactic Reaction  Goal: Resolution of Hypersensitivity Symptoms  Outcome: Not Progressing  Intervention: Manage Acute Allergic Reaction  Recent Flowsheet Documentation  Taken 1/7/2023 1600 by Fifi Swift RN  Medication Review/Management:   medications reviewed   high-risk medications identified     Problem: Artificial Airway  Goal: Effective Communication  Outcome: Not Progressing  Intervention: Ensure Effective Communication  Recent Flowsheet Documentation  Taken 1/7/2023 1600 by Fifi Swift RN  Family/Support System Care: involvement promoted  Trust Relationship/Rapport:   emotional support provided   reassurance provided   care explained  Goal: Optimal Device Function  Outcome: Not Progressing  Intervention: Optimize Device Care and Function  Recent Flowsheet Documentation  Taken 1/7/2023 1600 by Fifi Swift RN  Airway Safety Measures:   manual resuscitator/mask/valve in room   manual  resuscitator/mask/valve at bedside   mask at bedside  Goal: Absence of Device-Related Skin or Tissue Injury  Outcome: Not Progressing     Problem: Violence Risk or Actual  Goal: Anger and Impulse Control  Outcome: Not Progressing  Intervention: Minimize Safety Risk  Recent Flowsheet Documentation  Taken 1/7/2023 1600 by Fifi Swift RN  Enhanced Safety Measures: room near unit station   Goal Outcome Evaluation:         ICU End of Shift Summary. See flowsheets for vital signs and detailed assessment.    Changes this shift: 8619-3400  Neuro: Patient had another rhythmic head movement event; MD called to the bedside. VSS. Patient followed commands at the time. Patient intermittently followed commands during the shift. Attempts to find a way for him to express yes and no consistently failed (blinking, hand squeezing, and nodding were attempted). Patent intermittently nods to questioning.  Cardiac: Hypotension. Decreased propofol and MAP increased to over 65. See MAR for details. HR seen between 44 and 50s.   Pulm: Pt PSx2 for 1 hour today without issue.   Family: Patient's sister visited via ipad this evening.   Plan:     Continue to provide support to patient and family.

## 2023-01-09 ENCOUNTER — APPOINTMENT (OUTPATIENT)
Dept: GENERAL RADIOLOGY | Facility: CLINIC | Age: 63
End: 2023-01-09
Payer: COMMERCIAL

## 2023-01-09 LAB
ANION GAP SERPL CALCULATED.3IONS-SCNC: 12 MMOL/L (ref 7–15)
ATRIAL RATE - MUSE: 49 BPM
BASE EXCESS BLDV CALC-SCNC: 4.4 MMOL/L (ref -7.7–1.9)
BASE EXCESS BLDV CALC-SCNC: 4.4 MMOL/L (ref -7.7–1.9)
BUN SERPL-MCNC: 32.5 MG/DL (ref 8–23)
C4 SERPL-MCNC: 34 MG/DL (ref 13–39)
CALCIUM SERPL-MCNC: 8.6 MG/DL (ref 8.8–10.2)
CHLORIDE SERPL-SCNC: 103 MMOL/L (ref 98–107)
CREAT SERPL-MCNC: 1.43 MG/DL (ref 0.67–1.17)
DEPRECATED HCO3 PLAS-SCNC: 25 MMOL/L (ref 22–29)
DIASTOLIC BLOOD PRESSURE - MUSE: NORMAL MMHG
ERYTHROCYTE [DISTWIDTH] IN BLOOD BY AUTOMATED COUNT: 17.3 % (ref 10–15)
GFR SERPL CREATININE-BSD FRML MDRD: 55 ML/MIN/1.73M2
GLUCOSE BLDC GLUCOMTR-MCNC: 137 MG/DL (ref 70–99)
GLUCOSE BLDC GLUCOMTR-MCNC: 148 MG/DL (ref 70–99)
GLUCOSE BLDC GLUCOMTR-MCNC: 167 MG/DL (ref 70–99)
GLUCOSE BLDC GLUCOMTR-MCNC: 212 MG/DL (ref 70–99)
GLUCOSE BLDC GLUCOMTR-MCNC: 267 MG/DL (ref 70–99)
GLUCOSE SERPL-MCNC: 170 MG/DL (ref 70–99)
HCO3 BLDV-SCNC: 32 MMOL/L (ref 21–28)
HCO3 BLDV-SCNC: 32 MMOL/L (ref 21–28)
HCT VFR BLD AUTO: 35.5 % (ref 40–53)
HGB BLD-MCNC: 11.4 G/DL (ref 13.3–17.7)
INTERPRETATION ECG - MUSE: NORMAL
KAPPA LC FREE SER-MCNC: 3.86 MG/DL (ref 0.33–1.94)
KAPPA LC FREE/LAMBDA FREE SER NEPH: 1.9 {RATIO} (ref 0.26–1.65)
LAMBDA LC FREE SERPL-MCNC: 2.03 MG/DL (ref 0.57–2.63)
MAGNESIUM SERPL-MCNC: 2.1 MG/DL (ref 1.7–2.3)
MCH RBC QN AUTO: 29.2 PG (ref 26.5–33)
MCHC RBC AUTO-ENTMCNC: 32.1 G/DL (ref 31.5–36.5)
MCV RBC AUTO: 91 FL (ref 78–100)
O2/TOTAL GAS SETTING VFR VENT: 5 %
O2/TOTAL GAS SETTING VFR VENT: 5 %
P AXIS - MUSE: 24 DEGREES
PCO2 BLDV: 60 MM HG (ref 40–50)
PCO2 BLDV: 64 MM HG (ref 40–50)
PH BLDV: 7.31 [PH] (ref 7.32–7.43)
PH BLDV: 7.33 [PH] (ref 7.32–7.43)
PHOSPHATE SERPL-MCNC: 2.8 MG/DL (ref 2.5–4.5)
PLATELET # BLD AUTO: 294 10E3/UL (ref 150–450)
PO2 BLDV: 37 MM HG (ref 25–47)
PO2 BLDV: 39 MM HG (ref 25–47)
POTASSIUM SERPL-SCNC: 3.3 MMOL/L (ref 3.4–5.3)
POTASSIUM SERPL-SCNC: 4.2 MMOL/L (ref 3.4–5.3)
PR INTERVAL - MUSE: 150 MS
QRS DURATION - MUSE: 80 MS
QT - MUSE: 566 MS
QTC - MUSE: 511 MS
R AXIS - MUSE: 30 DEGREES
RBC # BLD AUTO: 3.91 10E6/UL (ref 4.4–5.9)
SODIUM SERPL-SCNC: 140 MMOL/L (ref 136–145)
SYSTOLIC BLOOD PRESSURE - MUSE: NORMAL MMHG
T AXIS - MUSE: 73 DEGREES
VENTRICULAR RATE- MUSE: 49 BPM
WBC # BLD AUTO: 9.7 10E3/UL (ref 4–11)

## 2023-01-09 PROCEDURE — 999N000065 XR ABDOMEN PORT 1 VIEW

## 2023-01-09 PROCEDURE — 250N000011 HC RX IP 250 OP 636

## 2023-01-09 PROCEDURE — 74018 RADEX ABDOMEN 1 VIEW: CPT

## 2023-01-09 PROCEDURE — 200N000002 HC R&B ICU UMMC

## 2023-01-09 PROCEDURE — 250N000009 HC RX 250: Performed by: NURSE PRACTITIONER

## 2023-01-09 PROCEDURE — 84100 ASSAY OF PHOSPHORUS: CPT

## 2023-01-09 PROCEDURE — 80048 BASIC METABOLIC PNL TOTAL CA: CPT | Performed by: STUDENT IN AN ORGANIZED HEALTH CARE EDUCATION/TRAINING PROGRAM

## 2023-01-09 PROCEDURE — 94003 VENT MGMT INPAT SUBQ DAY: CPT

## 2023-01-09 PROCEDURE — 74018 RADEX ABDOMEN 1 VIEW: CPT | Mod: 26 | Performed by: RADIOLOGY

## 2023-01-09 PROCEDURE — 258N000001 HC RX 258

## 2023-01-09 PROCEDURE — 250N000013 HC RX MED GY IP 250 OP 250 PS 637: Performed by: NURSE PRACTITIONER

## 2023-01-09 PROCEDURE — 250N000009 HC RX 250

## 2023-01-09 PROCEDURE — 250N000011 HC RX IP 250 OP 636: Performed by: STUDENT IN AN ORGANIZED HEALTH CARE EDUCATION/TRAINING PROGRAM

## 2023-01-09 PROCEDURE — 82803 BLOOD GASES ANY COMBINATION: CPT | Performed by: STUDENT IN AN ORGANIZED HEALTH CARE EDUCATION/TRAINING PROGRAM

## 2023-01-09 PROCEDURE — 83735 ASSAY OF MAGNESIUM: CPT

## 2023-01-09 PROCEDURE — 250N000013 HC RX MED GY IP 250 OP 250 PS 637

## 2023-01-09 PROCEDURE — 999N000259 HC STATISTIC EXTUBATION

## 2023-01-09 PROCEDURE — 94640 AIRWAY INHALATION TREATMENT: CPT

## 2023-01-09 PROCEDURE — 999N000253 HC STATISTIC WEANING TRIALS

## 2023-01-09 PROCEDURE — 94640 AIRWAY INHALATION TREATMENT: CPT | Mod: 76

## 2023-01-09 PROCEDURE — 250N000013 HC RX MED GY IP 250 OP 250 PS 637: Performed by: STUDENT IN AN ORGANIZED HEALTH CARE EDUCATION/TRAINING PROGRAM

## 2023-01-09 PROCEDURE — 250N000013 HC RX MED GY IP 250 OP 250 PS 637: Performed by: INTERNAL MEDICINE

## 2023-01-09 PROCEDURE — 85027 COMPLETE CBC AUTOMATED: CPT | Performed by: STUDENT IN AN ORGANIZED HEALTH CARE EDUCATION/TRAINING PROGRAM

## 2023-01-09 PROCEDURE — 999N000157 HC STATISTIC RCP TIME EA 10 MIN

## 2023-01-09 PROCEDURE — 99291 CRITICAL CARE FIRST HOUR: CPT | Mod: GC | Performed by: INTERNAL MEDICINE

## 2023-01-09 PROCEDURE — 99221 1ST HOSP IP/OBS SF/LOW 40: CPT | Performed by: PHYSICIAN ASSISTANT

## 2023-01-09 PROCEDURE — 84132 ASSAY OF SERUM POTASSIUM: CPT | Performed by: INTERNAL MEDICINE

## 2023-01-09 PROCEDURE — 250N000011 HC RX IP 250 OP 636: Performed by: NURSE PRACTITIONER

## 2023-01-09 RX ORDER — BISACODYL 10 MG
10 SUPPOSITORY, RECTAL RECTAL ONCE
Status: COMPLETED | OUTPATIENT
Start: 2023-01-09 | End: 2023-01-09

## 2023-01-09 RX ORDER — POLYETHYLENE GLYCOL 3350 17 G/17G
17 POWDER, FOR SOLUTION ORAL 2 TIMES DAILY
Status: DISCONTINUED | OUTPATIENT
Start: 2023-01-09 | End: 2023-01-15 | Stop reason: HOSPADM

## 2023-01-09 RX ORDER — BUMETANIDE 0.25 MG/ML
1 INJECTION INTRAMUSCULAR; INTRAVENOUS 2 TIMES DAILY
Status: DISCONTINUED | OUTPATIENT
Start: 2023-01-09 | End: 2023-01-14

## 2023-01-09 RX ORDER — POTASSIUM CHLORIDE 1.5 G/1.58G
40 POWDER, FOR SOLUTION ORAL ONCE
Status: COMPLETED | OUTPATIENT
Start: 2023-01-09 | End: 2023-01-09

## 2023-01-09 RX ORDER — POLYETHYLENE GLYCOL 3350 17 G/17G
17 POWDER, FOR SOLUTION ORAL ONCE
Status: COMPLETED | OUTPATIENT
Start: 2023-01-09 | End: 2023-01-09

## 2023-01-09 RX ORDER — DEXMEDETOMIDINE HYDROCHLORIDE 4 UG/ML
.1-.5 INJECTION, SOLUTION INTRAVENOUS CONTINUOUS
Status: DISCONTINUED | OUTPATIENT
Start: 2023-01-09 | End: 2023-01-11

## 2023-01-09 RX ORDER — OLANZAPINE 5 MG/1
5 TABLET ORAL 3 TIMES DAILY
Status: DISCONTINUED | OUTPATIENT
Start: 2023-01-09 | End: 2023-01-15 | Stop reason: HOSPADM

## 2023-01-09 RX ADMIN — Medication 200 MCG/HR: at 03:13

## 2023-01-09 RX ADMIN — ARIPIPRAZOLE 10 MG: 10 TABLET ORAL at 23:09

## 2023-01-09 RX ADMIN — PROPOFOL 15 MCG/KG/MIN: 10 INJECTION, EMULSION INTRAVENOUS at 01:23

## 2023-01-09 RX ADMIN — ACETYLCYSTEINE 4 ML: 100 SOLUTION ORAL; RESPIRATORY (INHALATION) at 02:10

## 2023-01-09 RX ADMIN — ACETYLCYSTEINE 4 ML: 100 SOLUTION ORAL; RESPIRATORY (INHALATION) at 21:26

## 2023-01-09 RX ADMIN — SENNOSIDES 8.6 MG: 8.6 TABLET, FILM COATED ORAL at 07:49

## 2023-01-09 RX ADMIN — Medication 50 MCG: at 06:32

## 2023-01-09 RX ADMIN — MULTIVITAMIN 15 ML: LIQUID ORAL at 07:49

## 2023-01-09 RX ADMIN — IPRATROPIUM BROMIDE AND ALBUTEROL SULFATE 3 ML: 2.5; .5 SOLUTION RESPIRATORY (INHALATION) at 21:25

## 2023-01-09 RX ADMIN — DIPHENHYDRAMINE HYDROCHLORIDE 50 MG: 50 INJECTION, SOLUTION INTRAMUSCULAR; INTRAVENOUS at 23:11

## 2023-01-09 RX ADMIN — IPRATROPIUM BROMIDE AND ALBUTEROL SULFATE 3 ML: 2.5; .5 SOLUTION RESPIRATORY (INHALATION) at 07:39

## 2023-01-09 RX ADMIN — DEXMEDETOMIDINE HYDROCHLORIDE 0.2 MCG/KG/HR: 400 INJECTION INTRAVENOUS at 15:13

## 2023-01-09 RX ADMIN — DIPHENHYDRAMINE HYDROCHLORIDE 50 MG: 50 INJECTION, SOLUTION INTRAMUSCULAR; INTRAVENOUS at 17:44

## 2023-01-09 RX ADMIN — FAMOTIDINE 20 MG: 10 INJECTION, SOLUTION INTRAVENOUS at 10:20

## 2023-01-09 RX ADMIN — BUMETANIDE 1 MG: 0.25 INJECTION INTRAMUSCULAR; INTRAVENOUS at 19:58

## 2023-01-09 RX ADMIN — OLANZAPINE 5 MG: 5 TABLET, FILM COATED ORAL at 14:10

## 2023-01-09 RX ADMIN — OLANZAPINE 5 MG: 5 TABLET, FILM COATED ORAL at 19:59

## 2023-01-09 RX ADMIN — ENOXAPARIN SODIUM 40 MG: 40 INJECTION SUBCUTANEOUS at 14:16

## 2023-01-09 RX ADMIN — DIPHENHYDRAMINE HYDROCHLORIDE 50 MG: 50 INJECTION, SOLUTION INTRAMUSCULAR; INTRAVENOUS at 12:36

## 2023-01-09 RX ADMIN — FAMOTIDINE 20 MG: 10 INJECTION, SOLUTION INTRAVENOUS at 23:11

## 2023-01-09 RX ADMIN — DEXMEDETOMIDINE HYDROCHLORIDE 0.4 MCG/KG/HR: 400 INJECTION INTRAVENOUS at 20:33

## 2023-01-09 RX ADMIN — QUETIAPINE FUMARATE 400 MG: 300 TABLET ORAL at 19:59

## 2023-01-09 RX ADMIN — ACETYLCYSTEINE 4 ML: 100 SOLUTION ORAL; RESPIRATORY (INHALATION) at 13:48

## 2023-01-09 RX ADMIN — INSULIN GLARGINE 30 UNITS: 100 INJECTION, SOLUTION SUBCUTANEOUS at 12:40

## 2023-01-09 RX ADMIN — ACETYLCYSTEINE 4 ML: 100 SOLUTION ORAL; RESPIRATORY (INHALATION) at 07:39

## 2023-01-09 RX ADMIN — POTASSIUM CHLORIDE 40 MEQ: 1.5 POWDER, FOR SOLUTION ORAL at 06:17

## 2023-01-09 RX ADMIN — IPRATROPIUM BROMIDE AND ALBUTEROL SULFATE 3 ML: 2.5; .5 SOLUTION RESPIRATORY (INHALATION) at 02:09

## 2023-01-09 RX ADMIN — POLYETHYLENE GLYCOL 3350 17 G: 17 POWDER, FOR SOLUTION ORAL at 19:59

## 2023-01-09 RX ADMIN — POLYETHYLENE GLYCOL 3350 17 G: 17 POWDER, FOR SOLUTION ORAL at 14:10

## 2023-01-09 RX ADMIN — Medication 50 MCG: at 02:31

## 2023-01-09 RX ADMIN — METHYLPREDNISOLONE SODIUM SUCCINATE 125 MG: 125 INJECTION, POWDER, FOR SOLUTION INTRAMUSCULAR; INTRAVENOUS at 07:49

## 2023-01-09 RX ADMIN — FOLIC ACID 1 MG: 1 TABLET ORAL at 07:49

## 2023-01-09 RX ADMIN — BUMETANIDE 1 MG: 0.25 INJECTION INTRAMUSCULAR; INTRAVENOUS at 12:38

## 2023-01-09 RX ADMIN — PROPOFOL 15 MCG/KG/MIN: 10 INJECTION, EMULSION INTRAVENOUS at 07:46

## 2023-01-09 RX ADMIN — DIPHENHYDRAMINE HYDROCHLORIDE 50 MG: 50 INJECTION, SOLUTION INTRAMUSCULAR; INTRAVENOUS at 04:51

## 2023-01-09 RX ADMIN — SENNOSIDES 8.6 MG: 8.6 TABLET, FILM COATED ORAL at 19:59

## 2023-01-09 RX ADMIN — ENOXAPARIN SODIUM 40 MG: 40 INJECTION SUBCUTANEOUS at 01:21

## 2023-01-09 RX ADMIN — IPRATROPIUM BROMIDE AND ALBUTEROL SULFATE 3 ML: 2.5; .5 SOLUTION RESPIRATORY (INHALATION) at 13:48

## 2023-01-09 RX ADMIN — DEXTROSE MONOHYDRATE 1000 ML: 100 INJECTION, SOLUTION INTRAVENOUS at 23:19

## 2023-01-09 ASSESSMENT — ACTIVITIES OF DAILY LIVING (ADL)
ADLS_ACUITY_SCORE: 31

## 2023-01-09 NOTE — PROGRESS NOTES
[facility], Procedure Note          Extubation:       Antony Aguila Jr.  MRN# [id number]   January 9, 2023         Patient extubated at: 2:55 PM   Supplemental Oxygen: Via face mask at 5 liters per minute   Cough: The cough is strong   Secretion Mode: Able to clear  PRN suction with assistance   Secretion Amount: Large amount, moderately thick and tan in color           Skin Exam:: Patient color: natural   Patient Status: Currently appears comfortable   Arterial Blood Gasses: pH Arterial (no units)   Date Value   01/05/2023 7.39     pO2 Arterial (mm Hg)   Date Value   01/05/2023 76 (L)     pCO2 Arterial (mm Hg)   Date Value   01/05/2023 52 (H)     Bicarbonate Arterial (mmol/L)   Date Value   01/05/2023 32 (H)              Completed a cuff leak test with a positive result. Patent extubated over a cook-cath exchange with anaesthesia at bedside. Patient unable to tolerate cook catheter removed it 2 mins post extubation for aspiration risks. Patient currently remains on 5L oxy mask with no respiratory concerns at this time.RT will be called if needed.

## 2023-01-09 NOTE — CONSULTS
Otolaryngology Consult Note  January 9, 2023      CC: Tongue swelling; c/f angioedema     HPI: Antony Aguila Jr. is a 62 year old male with a past medical history of chronic type B aortic dissection s/p exploration and median sternotomy (6/18/19), prior tracheostomy decannulated 8/2019, poorly controlled DM-2, and HTN now admitted for acute on chronic thoracic aortic aneurysm with new aortic root dilation. His hospitalization has been complicated by episodes of agitation requiring aggressive sedation and compromised airway requiring intubation. He has had multiple instances of agitation requiring sedation and subsequent inability to protect his airway and requiring intubation. He was intubated 1/5 and at that time there was concern for angioedema with tongue swelling protruding from the mouth. Per MICU notes, patient was unable to speak due to significant tongue swelling and was in distress with tachypnea and dyspnea prior to intubation. ENT was consulted for further assessment of upper airway swelling. Patient has been receiving methylprednisolone 125 mg daily, benadryl, and famotidine for 5 days for presumed angioedema. He has a history of a prior episode of angioedema in 2019 thought 2/2 to lisinopril. He has not been taking an ACE inhibitor since then. He did receive losartan earlier this admission.     Patient is intubated, but nodding appropriately to questions. He indicates that his tongue does feel swollen, but has improved. Denies lip swelling. He has had prior episodes of tongue swelling in the past. Denies oral or throat pain.       Past Medical History:   Diagnosis Date     Chronic dissection of thoracic aorta     S/p exploration and median sternotomy by Dr. Marcelo on 6/18/19     Diabetes (H)      Hypertension        Past Surgical History:   Procedure Laterality Date     ESOPHAGOGASTRODUODENOSCOPY, WITH NASOGASTRIC TUBE INSERTION N/A 12/27/2022    Procedure: ESOPHAGOGASTRODUODENOSCOPY, WITH NASOJEJUNAL  TUBE INSERTION;  Surgeon: Olu Jeffries MD;  Location: [facility]     ESOPHAGOSCOPY, GASTROSCOPY, DUODENOSCOPY (EGD), COMBINED N/A 1/6/2023    Procedure: ESOPHAGOGASTRODUODENOSCOPY (EGD) with NG tube placement;  Surgeon: Olu Jeffries MD;  Location: [facility]     MEDIAN STERNOTOMY  06/18/2019    Chronic dissection of thoracic aorta s/p exploration and median sternotomy by Dr. Marcelo     PICC TRIPLE LUMEN PLACEMENT Right 12/26/2022    Cephalic Vein 51 cm, 2 cm out     TRACHEOSTOMY  07/07/2019    decannulated 8/8/19     TRANSESOPHAGEAL ECHOCARDIOGRAM INTRAOPERATIVE N/A 12/23/2022    Procedure: Echocardiogram, Transesophageal, with anesthesia;  Surgeon: GENERIC ANESTHESIA PROVIDER;  Location: [facility]       No current outpatient medications on file.          Allergies   Allergen Reactions     Lisinopril Angioedema     Hospitalization in 7/2019 notes possible ACE-inhibitor related angioedema.      Losartan Angioedema     Angioedema on 12/26/22, received losartan 12/25/22.     Lidocaine      Pt states this is not an allergy and doesn't recall this to be an allergy     Pollen Extract        Social History     Socioeconomic History     Marital status:      Spouse name: Not on file     Number of children: Not on file     Years of education: Not on file     Highest education level: Not on file   Occupational History     Not on file   Tobacco Use     Smoking status: Every Day     Years: 45.00     Types: Cigarettes     Smokeless tobacco: Never     Tobacco comments:     Smokes 1 pack per week currently (12/2022).   Vaping Use     Vaping Use: Never used   Substance and Sexual Activity     Alcohol use: Not Currently     Drug use: Yes     Types: Marijuana     Sexual activity: Not on file   Other Topics Concern     Not on file   Social History Narrative     Not on file     Social Determinants of Health     Financial Resource Strain: Not on file   Food Insecurity: Not on file   Transportation Needs: Not on file   Physical Activity: Not  on file   Stress: Not on file   Social Connections: Not on file   Intimate Partner Violence: Not on file   Housing Stability: Not on file       History reviewed. No pertinent family history.    ROS: 12 point review of systems is negative unless noted in HPI.    PHYSICAL EXAM:  General: laying in bed, no acute distress, intubated but alert  /69   Pulse 54   Temp 98.4  F (36.9  C) (Oral)   Resp 18   Ht 1.829 m (6')   Wt 146.5 kg (323 lb)   SpO2 93%   BMI 43.81 kg/m    HEAD: normocephalic, atraumatic  Face: symmetrical, no swelling, edema, or erythema  Eyes: EOMI, clear sclera  Ears: external auricles appear normal  Nose: no anterior drainage  Mouth: No appreciable lip swelling. Tongue is within the oral cavity, soft and nontender to palpation with no appreciable swelling. There is a small 0.5cm ulceration on the mid-dorsal tongue. Mucosa moist, no ulcers, no jaw or tooth tenderness. FOM is soft.   Oropharynx: Posterior oropharyngeal wall visible, oropharynx clear, uvula midline, no oropharyngeal erythema  Neck: Thick neck, but no appreciable swelling, no palpable LAD, trachea midline  Neuro: cranial nerves 2-12 grossly intact  Respiratory: mechanically ventilated, + cuff leak on ETT cuff deflation  Skin: no rashes or skin lesions of the face/neck    ROUTINE IP LABS (Last four results)  BMP  Recent Labs   Lab 01/09/23  0804 01/09/23  0450 01/09/23  0345 01/08/23  2312 01/08/23  0501 01/08/23  0459 01/07/23  1352 01/07/23  1128 01/07/23  0825 01/07/23  0341 01/06/23  0757 01/06/23  0352   NA  --  140  --   --   --  145  --   --   --  141  --  139   POTASSIUM  --  3.3*  --   --   --  3.6  --  4.2  --  3.4   < > 3.3*   CHLORIDE  --  103  --   --   --  108*  --   --   --  103  --  102   ANGELLA  --  8.6*  --   --   --  8.6*  --   --   --  8.9  --  8.6*   CO2  --  25  --   --   --  23  --   --   --  24  --  25   BUN  --  32.5*  --   --   --  28.0*  --   --   --  19.6  --  15.0   CR  --  1.43*  --   --   --  1.63*   --   --   --  1.40*  --  1.24*   * 170* 167* 135*   < > 147*   < >  --    < > 129*   < > 103*  100*    < > = values in this interval not displayed.     CBC  Recent Labs   Lab 01/09/23  0450 01/08/23  0459 01/07/23  0341 01/06/23  0352   WBC 9.7 10.0 8.5 8.7   RBC 3.91* 3.97* 4.29* 3.65*   HGB 11.4* 11.3* 12.2* 11.7*   HCT 35.5* 34.9* 37.8* 33.3*   MCV 91 88 88 91   MCH 29.2 28.5 28.4 32.1   MCHC 32.1 32.4 32.3 35.1   RDW 17.3* 17.0* 16.4* 16.9*    285 285 283     INRNo lab results found in last 7 days.      Assessment and Plan  Antony Aguila Jr. is a 62 year old male with a past medical history of chronic type B aortic dissection s/p exploration and median sternotomy (6/18/19), prior tracheostomy decannulated 8/2019, poorly controlled DM-2, and HTN now admitted for acute on chronic thoracic aortic aneurysm with new aortic root dilation. Patient intubated 1/5 due to concern for tongue swelling causing respiratory compromise. ENT was consulted to assess tongue swelling. No appreciable tongue or lip swelling on exam. No evidence of oral trauma, infection, or other reason/etiology for tongue swelling at this time.    - No ENT intervention indicated at this time  - WOC following for superficial ulceration to dorsal tongue, likely 2/2 pressure injury from ETT. Ensure ETT is being rotated frequently and not placed over existing pressure wound  - Reasonable to proceed with extubation when patient meeting standard ICU protocol   - Remainder of care per primary team    -- Patient and above plan to be discussed with Dr. Archer.    Beronica Montgomery PA-C  Otolaryngology-Head & Neck Surgery  Please page ENT with questions by dialing [phone] and entering job code [phone] when prompted.

## 2023-01-09 NOTE — PLAN OF CARE
ICU End of Shift Summary. See flowsheets for vital signs and detailed assessment.    Changes this shift: Pt tolerated PS trials today 7/8, longest was 3 hours. FWF increased to 100qhr. Skin looks good upon assessment, however, pt refusing most repositioning and back skin assessment, reiterated this will need to be done on night shift during his bath and pt was agreeable. Denies pain. Sister visited for short amount of time today.     Plan: Continue plan of care.

## 2023-01-09 NOTE — PROGRESS NOTES
MEDICAL ICU PROGRESS NOTE  01/09/2023      Date of Service (when I saw the patient): 01/09/2023    ASSESSMENT: Antony Aguila Jr. is a 62 year old male admitted on 12/21/2022. He has history chronic type B aortic dissection s/p exploration and median sternotomy (6/18/19), type 2 DM (poorly controlled), and HTN who is admitted for acute on chronic thoracic aortic aneurysm with new aortic root dilation. Hospitalization complicated by recurrent episodes of agitation requiring aggressive sedation and compromised airway secondary to upper airway edema requiring intubation.    CHANGES and MAJOR THINGS TODAY:   - Hold propofol   - Started Zyprexa 5 mg TID  - ENT consult  - Resume Bumex 1 mg BID       PLAN:    Neuro:  # Agitation     # Encephalopathy, likely metabolic  Patient has had several episodes of agitation requiring aggressive sedation and intubation, most recently on 1/5/2023. On the floor, had multiple code 21s called, ultimately given benadryl and ativan (hospitalist reported haldol was given additionally but this is not charted). He was ultimately started on Precedex, put in 4-point restraints. On transfer to the ICU, ultimately had respiratory compromise on transfer to the ICU and was more deeply sedated as a result.  - Psychiatry re-consulted, appreciate recs  - Start Zyprexa 5mg TID   - Resumed prior Seroquel 400mg at bedtime  - Resume Abilify  - Discontinued guanfacine due to concern this could explain the recurrence of his angioedema  - Sedation as below    # C/f seizure   - Hx of body twitching in 2019 and was treated with Keppra, MRI and EEG did not show epileptic etiology or structural lesion   - Arm and leg shaking, as well as eye flickering overnight on 1/5  - Neuro crit consulted, signed off    - Loaded with Keppra 3g given concern for seizure, however felt that suspicion was low therefore discontinued Keppra  - vEEG initiated, significant for encephalopathy  - No need for Keppra moving forward      # Age-indeterminate right and chronic left BG lacunar infarcts  - Consider starting statin   - Consider starting ASA 81mg     # Pain and sedation  - Propofol gtt- wean    - Fentanyl gtt  - Discontinue Midazolam gtt and 1-2mg Q1H PRN  - Avoiding midazolam gtt in preparation for extubation  - RASS goal -1 to -2      # Schizoaffective disorder  # PTSD  Meds per chart include Zyprexa, Abilify, and Seroquel. Per pharmacy med rec, appears to only have filled Seroquel recently.   - Resumed prior Seroquel 400mg at bedtime  - Resumed Abilify 10mg QHS  - Psych recs as above      # Tobacco use  Smoked cigarettes for 45 years. Currently 1 pack per week.  - Encourage cessation      Pulmonary:  # Acute hypoxic and hypercarbic respiratory failure  # Significant airway edema  # Concern for sleep apnea  # Tracheostomy placed 7/5/19, decannulated 8/2019  # Angioedema vs allergic reaction  Per hospitalization notes here in Sept 2022, noted to have hx of dyspnea on exertion in setting of being current smoker, tx with albuterol inhaler, recommended outpatient PFTs be obtained. Pulmonary progress note from 7/2019 notes current smoker with unknown COPD history, no PFTs available. Had tracheostomy placed 7/5/19, decannulated 8/2019. CTA chest with compressive atelectasis in L lung adjacent to thoracic aorta and hiatal hernia. Patient has now had multiple instances where he became agitated and non-compliant with attempts for patient to use BIPAP or HFNC and needed heavy sedation to treat agitation such that intubation was required to protect airway. Patient is historically a difficult intubation and was very difficult this time. Patient has a possible hx of angioedema reaction to lisinopril or losartan, but the most recent episode occurred without these medications on board.   - Patient emergently intubated, very difficult airway  - Methylprednisolone 125mg QDay  - Benadryl 50mg q6h   - Famotidine 20mg q12h   - Discussed current  medications with pharmacy - stopped gabapentin and amlodipine due to their (rare) association with edema - more often peripheral edema however.  - Could also consider allergy testing if concern for some other non-medication allergic cause  - Diuresis as below   - Angioedema workup labs: C4 normal, tryptase normal  - ENT consult   - PST BID    Vent Mode: CMV/AC  (Continuous Mandatory Ventilation/ Assist Control)  FiO2 (%): 40 %  Resp Rate (Set): 18 breaths/min  Tidal Volume (Set, mL): 500 mL  PEEP (cm H2O): 8 cmH2O  Pressure Support (cm H2O): 7 cmH2O  Resp: 18       Cardiovascular:  # Chronic type B aortic dissection (proximal descending aorta to the abdominal aorta)  # Proximal ascending aorta saccular aneurysm   # Distal ascending aorta saccular aneurysm   # Mild nonobstructive CAD  Pt has history of stable type B aortic dissection as well as short segment of dissection vs pseudoaneurysms at the aortic root and distal ascending thoracic aorta. Previously underwent surgical exploration in 6/2019 with concern for type 1 aortic dissection but found to have chronic type B dissection. On admission, CXR with widened mediastinum slightly increased from prior. CT chest with aneurysmal dilatation ascending and descending thoracic aorta with type B aortic dissection distal to L subclavian artery extending to L common iliac artery with severe narrowing of true lumen and possible pseudoaneurysm at aortic root, new compared to prior 2019 images.   - TRINH 12/23 showed saccular aneurysm of ascending aorta measuring 5.8 cm associated with a dissection flap. Thrombus is seen in the false lumen. Aortic root is dilated at 4.6 cm.  - CTA 12/22 two saccular aneurysms, one each in the proximal ascending aorta and distal ascending aorta. The maximal transaortic diameters of 46.9 x 39.2 mm and 62.7 x 54.4 mm, respectively. Mild, non-obstructive coronary artery disease without any high-grade stenoses.  - Cardiology and CVTS consulted and  signed off, plan for outpatient follow up with CVTS  - Hold labetalol 400 mg BID 12/25, consider resuming  - Hold DVT ppx 12/23; restarted 12/24 for thrombus seen on TRINH, continue per CVTS recs  - No HR goal per cardiology, SBP preferably maintained <130  - PRN labetalol 10mg for SBP >135     # HTN  Based on outpatient note 12/14/2022 pt was previously hydordiuril 50 mg, amlodipine 10 mg, bumex TID and canaglaflozin. 12/25 Hypertensive overnight with highest SBP of 170s. Given hx of Type B sonam dissection and now new aortic aneurysm goals - hold Labetalol  BID (titrated from intially PO dose of 100 mg)  - holding PTA hydrochlorothiazide  - Was on Nicardipine gtt given persistent elevated SBPs  - PRN labetalol 10mg for SBP >135, PRN hydralazine for SBP>135 if HR<50     GI/Nutrition:  # Large hiatal hernia  # Nutrition  Previously unable to place feeding tube under fluoro due to large hiatal hernia, required GI assistance with feeding tube placement   - Appreciate GI assistance with advancing feeding tube past hiatal hernia   - TFs at goal       Renal/Fluids/Electrolytes:  # LAKISHA on CKD 2 - improved   Outpatient labs 12/16 with Cr 1.4 and eGFR 55. Now back at baseline Cr ~1.1-1.2, eGFR ~65-75. UA unremarkable.  - holding PTA metformin for now  - FWF 100cc q4h     - Bumex 1mg BID      # Mild hypokalemia, resolved  # Mild hypomagnesemia, resolved   - Repletion protocols      # Hypocalcemia, resolved  - Monitor BMP     Endocrine:  # Type 2 DM with hyperglycemia  Most recent A1c 12.5 (9/16/22). Home regimen: metformin 1000 qPM and Lantus 50 unit(s) qAM + aspart 17 unit(s) TIDAC + aspart 9 unit(s) w/ snacks  - Lantus 30U  - HDSSI   - holding PTA metformin as above     ID:  # Increased pulmonary secretions  # Concern for hospital associated pneumonia-resolved   Has developed copious creamy secretions, has low grade temperature of 100.0F, no leukocytosis. CXR showing increased opacities. Previously on Zosyn,  transitioned to nafcillin 1/01/23  - Nafcillin completed 1/4   - Pulm toilet: metaneb, mucomyst       Hematology:    # Anemia, normocytic  - Trend CBC  - Mild folate deficiency, start folic acid supplement     Musculoskeletal:  # BLE edema  Dimer elevated with acute on chronic leg swelling L < R per patient history. DVT US in ED negative for DVT b/l. Has had LE swelling for last 2 years, notes it is worse lately. Previously on diuretics, not currently per chart.  - lymphedema consult  - diuresis as above     Skin:  # Venous stasis ulcer LLE  # Venous insufficiency  # Peripheral vascular disease  # Tongue injury   - WOC consult placed     General Cares/Prophylaxis:    DVT Prophylaxis: lovenox   GI Prophylaxis: PPI  Restraints: bilateral mitts and wrist restraints  Family Communication: Sister updated by phone     Code Status: Full Code     Lines/tubes/drains:  - ET Tube  - PIV x3   - PICC     Disposition:  - Medical ICU    Patient seen and findings/plan discussed with medical ICU staff, Dr. Hooker.     Ruthann Moser MD  PGY-1 Internal Medicine       Clinically Significant Risk Factors        # Hypokalemia: Lowest K = 3.3 mmol/L in last 2 days, will replace as needed       # Hypoalbuminemia: Lowest albumin = 2.9 g/dL at 12/26/2022  6:03 AM, will monitor as appropriate          # DMII: A1C = 8.0 % (Ref range: <5.7 %) within past 3 months   # Severe Obesity: Estimated body mass index is 43.81 kg/m  as calculated from the following:    Height as of this encounter: 1.829 m (6').    Weight as of this encounter: 146.5 kg (323 lb).               ====================================  INTERVAL HISTORY:   No acute events over night. Tolerated pressure support for 3 hours yesterday.     OBJECTIVE:   1. VITAL SIGNS:   Temp:  [98.1  F (36.7  C)-98.6  F (37  C)] 98.4  F (36.9  C)  Pulse:  [52-84] 54  Resp:  [14-21] 18  BP: ()/(56-78) 111/69  FiO2 (%):  [35 %-40 %] 40 %  SpO2:  [93 %-98 %] 93 %  Vent Mode: CMV/AC   (Continuous Mandatory Ventilation/ Assist Control)  FiO2 (%): 40 %  Resp Rate (Set): 18 breaths/min  Tidal Volume (Set, mL): 500 mL  PEEP (cm H2O): 8 cmH2O  Pressure Support (cm H2O): 7 cmH2O  Resp: 18    2. INTAKE/ OUTPUT:   I/O last 3 completed shifts:  In: 3968.58 [I.V.:698.58; NG/GT:1830]  Out: 3610 [Urine:3610]    3. PHYSICAL EXAMINATION:  General: Sedated, intubated, laying in bed   HEENT: No scleral icterus, MMM, tongue still swollen appearing  Neuro: Sedated, following commands     Pulm/Resp: Clear breath sounds bilaterally, no wheezing, mechanically ventilated  CV: RRR, no murmurs  Abdomen: Soft, non-distended, non-tender  : connor catheter in place  Incisions/Skin: lower extremity edema, wrinkles in lower legs      4. LABS:   Arterial Blood Gases   Recent Labs   Lab 01/05/23  0536   PH 7.39   PCO2 52*   PO2 76*   HCO3 32*     Complete Blood Count   Recent Labs   Lab 01/09/23  0450 01/08/23  0459 01/07/23  0341 01/06/23  0352   WBC 9.7 10.0 8.5 8.7   HGB 11.4* 11.3* 12.2* 11.7*    285 285 283     Basic Metabolic Panel  Recent Labs   Lab 01/09/23  0804 01/09/23  0450 01/09/23  0345 01/08/23  2312 01/08/23  0501 01/08/23  0459 01/07/23  1352 01/07/23  1128 01/07/23  0825 01/07/23  0341 01/06/23  0757 01/06/23  0352   NA  --  140  --   --   --  145  --   --   --  141  --  139   POTASSIUM  --  3.3*  --   --   --  3.6  --  4.2  --  3.4   < > 3.3*   CHLORIDE  --  103  --   --   --  108*  --   --   --  103  --  102   CO2  --  25  --   --   --  23  --   --   --  24  --  25   BUN  --  32.5*  --   --   --  28.0*  --   --   --  19.6  --  15.0   CR  --  1.43*  --   --   --  1.63*  --   --   --  1.40*  --  1.24*   * 170* 167* 135*   < > 147*   < >  --    < > 129*   < > 103*  100*    < > = values in this interval not displayed.     Liver Function Tests  Recent Labs   Lab 01/04/23  1257   AST 60*   ALT 82*   ALKPHOS 68   BILITOTAL 0.3   ALBUMIN 3.4*     Coagulation Profile  No lab results found in last 7  days.    5. RADIOLOGY:   No results found for this or any previous visit (from the past 24 hour(s)).

## 2023-01-09 NOTE — PROGRESS NOTES
CLINICAL NUTRITION SERVICES - REASSESSMENT NOTE     Nutrition Prescription    RECOMMENDATIONS FOR MDs/PROVIDERS TO ORDER:  --Advance diet as medically appropriate.  --Discussed increasing bowel regimen with MICU team.     Malnutrition Status:    Patient does not meet two of the established criteria necessary for diagnosing malnutrition    Recommendations already ordered by Registered Dietitian (RD):  --Continue current TF regimen as ordered via NGT: Vital AF 1.2 @ 60 ml/hr + 3 Prosource TF20 packets to provide 1440 ml volume, 1968 kcals (24 kcal/kg IBW), 168 g pro (2.1 g/kg IBW), 159 g CHO, 7 g soluble Fiber, and 1168 ml free water.     Future/Additional Recommendations:  --Ongoing TF tolerance.  --Diet advancement.   --Weight trends.      EVALUATION OF THE PROGRESS TOWARD GOALS   Diet: NPO  Nutrition Support:   12/26-1/2; 1/6 - ___ : Vital AF 1.2 @ 60 ml/hr + 3 Prosource TF20 packets to provide 1440 ml volume, 1968 kcals (24 kcal/kg IBW), 168 g pro (2.1 g/kg IBW), 159 g CHO, 7 g soluble Fiber, and 1168 ml free water.   Intake: pt was eating well prior to re-intubation, mostly 100% of meals. TF reached goal rate again on 1/7. Pt was receiving >400 kcal/day from Propofol, as well.      NEW FINDINGS   Weight: variable, confounded by fluid status and bed scales/bariatric bed. One standing scale weight obtained prior to re-intubation on 1/3, consistent with admission weight. Dosing weight remains appropriate.   Labs: K+ 3.3 (L), BUN 32.5 (H), Cr 1.43 (H), Phos 2.8 (WNL)  Meds: Bumex TID (on hold), Folic acid, High sliding scale insulin, Lantus 30 units daily, Solumedrol, Certavite, Prosource TF20 packets TID, Sennosides BID, Fentanyl, Propofol off this morning  GI: last BM 1/4       --NGT advanced by GI via endoscope 2/2 large hiatal hernia.   Resp: extubated 12/31 and re-intubated 1/5; extubated this afternoon  Skin: WOCN following    MALNUTRITION  % Intake: Decreased intake does not meet criteria  % Weight Loss: None  noted - confounded by fluid/scales  Subcutaneous Fat Loss: None observed  Muscle Loss: None observed  Fluid Accumulation/Edema: Mild (noted 4+ tongue edema)  Malnutrition Diagnosis: Patient does not meet two of the established criteria necessary for diagnosing malnutrition    Previous Goals   Patient to consume % of nutritionally adequate meal trays TID, or the equivalent with supplements/snacks.  Evaluation: no longer appropriate    Previous Nutrition Diagnosis  Predicted inadequate nutrient intake (energy and protein) related to recent extubation as evidenced by diet just advanced and feeding tube removed, pt at risk of meeting <100% of nutrition needs.  Evaluation: No change    CURRENT NUTRITION DIAGNOSIS  Inadequate oral intake related to recent extubation as evidenced by NPO status reliant on EN to meet 100% of nutrition needs.       INTERVENTIONS  Implementation  Collaboration with other providers - rounds  Enteral Nutrition - continue    Goals  Total avg nutritional intake to meet a minimum of 22 kcal/kg and 2 g PRO/kg daily (per dosing wt 81 kg IBW).    Monitoring/Evaluation  Progress toward goals will be monitored and evaluated per protocol.    Julia Wright, MS, RD, LD, [facility]  MICU pager: [phone]  ASCOM: [id number]

## 2023-01-09 NOTE — PLAN OF CARE
Major Shift Events:  Pt is A&Ox4. Discontinued wrist restraints after extubation. Dex started for anxiety/agitation. Sinus rhythm. Systolic goal <135, no PRNs needed. Afebrile. Tongue still swollen. Extubated at 1445 to 5L oxymask. Oral secretions, slightly bloody. Trialed ice chips, pt unable to tell if he swallowed them, cannot chew ice chips. Need speech consult. Small PI on tongue. Noble removed at 0645, pt voided 300mL at 1800, bladder scanned, still 179mL left. NG, TF at goal. NG moved with extubation, advanced 5cm, waiting on XR to verify, TF on hold. FWF 100mL Q4h for sodium. Intermittently refuses turns.  Plan: Wean Dex as able. Monitor O2 and airway. Bladder scan if not voiding. Continue w/ POC.  For vital signs and complete assessments, please see documentation flowsheets.                           Problem: Gas Exchange Impaired  Goal: Optimal Gas Exchange  Intervention: Optimize Oxygenation and Ventilation  Recent Flowsheet Documentation  Taken 1/9/2023 1600 by Connie Gates RN  Head of Bed (HOB) Positioning: HOB at 30-45 degrees  Taken 1/9/2023 1400 by Connie Gates RN  Head of Bed (HOB) Positioning: HOB at 30 degrees  Taken 1/9/2023 1200 by Connie Gates RN  Head of Bed (HOB) Positioning: HOB at 30 degrees  Taken 1/9/2023 1000 by Connie Gates RN  Head of Bed (HOB) Positioning: HOB at 30 degrees  Taken 1/9/2023 0800 by Connie Gates RN  Head of Bed (HOB) Positioning: HOB at 30 degrees     Problem: Allergic/Anaphylactic Reaction  Goal: Resolution of Hypersensitivity Symptoms  1/9/2023 1652 by Connie Gates RN  Outcome: Progressing  1/9/2023 1652 by Connie Gates RN  Outcome: Progressing  Intervention: Manage Acute Allergic Reaction  Recent Flowsheet Documentation  Taken 1/9/2023 1600 by Connie Gates RN  Medication Review/Management: medications reviewed  Taken 1/9/2023 1200 by Connie Gates RN  Medication Review/Management: medications reviewed  Taken 1/9/2023 0800 by Connie Gates  RN  Medication Review/Management: medications reviewed     Problem: Artificial Airway  Goal: Effective Communication  Intervention: Ensure Effective Communication  Recent Flowsheet Documentation  Taken 1/9/2023 1600 by Connie Gates, RN  Family/Support System Care: involvement promoted  Trust Relationship/Rapport:    care explained    choices provided    reassurance provided  Taken 1/9/2023 1200 by Connie Gates, RN  Family/Support System Care: involvement promoted  Trust Relationship/Rapport:    care explained    choices provided    reassurance provided  Taken 1/9/2023 0800 by Connie Gates, RN  Family/Support System Care: involvement promoted  Trust Relationship/Rapport:    care explained    choices provided    reassurance provided

## 2023-01-09 NOTE — PLAN OF CARE
Goal Outcome Evaluation:       ICU End of Shift Summary. See flowsheets for vital signs and detailed assessment.    Changes this shift: Patient respiratory status maintained on CMV [id number]. Increasing oral secretions throughout shift causing discomfort, fent given for discomfort. Internal Noble catheter removed, external placed. Patient intermittently refused repositioning throughout shift. Potassium replaced.    Plan:  Continue POC and airway management.

## 2023-01-10 ENCOUNTER — APPOINTMENT (OUTPATIENT)
Dept: SPEECH THERAPY | Facility: CLINIC | Age: 63
End: 2023-01-10
Payer: COMMERCIAL

## 2023-01-10 ENCOUNTER — APPOINTMENT (OUTPATIENT)
Dept: GENERAL RADIOLOGY | Facility: CLINIC | Age: 63
End: 2023-01-10
Payer: COMMERCIAL

## 2023-01-10 LAB
ANION GAP SERPL CALCULATED.3IONS-SCNC: 10 MMOL/L (ref 7–15)
BASE EXCESS BLDV CALC-SCNC: 2.3 MMOL/L (ref -7.7–1.9)
BASE EXCESS BLDV CALC-SCNC: 5.4 MMOL/L (ref -7.7–1.9)
BASE EXCESS BLDV CALC-SCNC: 6.5 MMOL/L (ref -7.7–1.9)
BUN SERPL-MCNC: 31.6 MG/DL (ref 8–23)
CA-I BLD-MCNC: 4.9 MG/DL (ref 4.4–5.2)
CALCIUM SERPL-MCNC: 8.2 MG/DL (ref 8.8–10.2)
CHLORIDE SERPL-SCNC: 108 MMOL/L (ref 98–107)
CREAT SERPL-MCNC: 1.23 MG/DL (ref 0.67–1.17)
DEPRECATED HCO3 PLAS-SCNC: 26 MMOL/L (ref 22–29)
ERYTHROCYTE [DISTWIDTH] IN BLOOD BY AUTOMATED COUNT: 17.2 % (ref 10–15)
GFR SERPL CREATININE-BSD FRML MDRD: 66 ML/MIN/1.73M2
GLUCOSE BLDC GLUCOMTR-MCNC: 124 MG/DL (ref 70–99)
GLUCOSE BLDC GLUCOMTR-MCNC: 156 MG/DL (ref 70–99)
GLUCOSE BLDC GLUCOMTR-MCNC: 173 MG/DL (ref 70–99)
GLUCOSE BLDC GLUCOMTR-MCNC: 173 MG/DL (ref 70–99)
GLUCOSE BLDC GLUCOMTR-MCNC: 175 MG/DL (ref 70–99)
GLUCOSE BLDC GLUCOMTR-MCNC: 222 MG/DL (ref 70–99)
GLUCOSE BLDC GLUCOMTR-MCNC: 234 MG/DL (ref 70–99)
GLUCOSE SERPL-MCNC: 188 MG/DL (ref 70–99)
HCO3 BLDV-SCNC: 31 MMOL/L (ref 21–28)
HCO3 BLDV-SCNC: 33 MMOL/L (ref 21–28)
HCO3 BLDV-SCNC: 34 MMOL/L (ref 21–28)
HCT VFR BLD AUTO: 37.1 % (ref 40–53)
HGB BLD-MCNC: 11.6 G/DL (ref 13.3–17.7)
MAGNESIUM SERPL-MCNC: 2.2 MG/DL (ref 1.7–2.3)
MCH RBC QN AUTO: 28.9 PG (ref 26.5–33)
MCHC RBC AUTO-ENTMCNC: 31.3 G/DL (ref 31.5–36.5)
MCV RBC AUTO: 92 FL (ref 78–100)
O2/TOTAL GAS SETTING VFR VENT: 30 %
O2/TOTAL GAS SETTING VFR VENT: 40 %
O2/TOTAL GAS SETTING VFR VENT: 5 %
PCO2 BLDV: 62 MM HG (ref 40–50)
PCO2 BLDV: 63 MM HG (ref 40–50)
PCO2 BLDV: 66 MM HG (ref 40–50)
PH BLDV: 7.27 [PH] (ref 7.32–7.43)
PH BLDV: 7.33 [PH] (ref 7.32–7.43)
PH BLDV: 7.35 [PH] (ref 7.32–7.43)
PHOSPHATE SERPL-MCNC: 3.1 MG/DL (ref 2.5–4.5)
PLATELET # BLD AUTO: 277 10E3/UL (ref 150–450)
PO2 BLDV: 33 MM HG (ref 25–47)
PO2 BLDV: 34 MM HG (ref 25–47)
PO2 BLDV: 36 MM HG (ref 25–47)
POTASSIUM SERPL-SCNC: 4.2 MMOL/L (ref 3.4–5.3)
RBC # BLD AUTO: 4.02 10E6/UL (ref 4.4–5.9)
SODIUM SERPL-SCNC: 144 MMOL/L (ref 136–145)
WBC # BLD AUTO: 10.1 10E3/UL (ref 4–11)

## 2023-01-10 PROCEDURE — 82330 ASSAY OF CALCIUM: CPT

## 2023-01-10 PROCEDURE — 99233 SBSQ HOSP IP/OBS HIGH 50: CPT | Mod: 25 | Performed by: PHYSICIAN ASSISTANT

## 2023-01-10 PROCEDURE — 31575 DIAGNOSTIC LARYNGOSCOPY: CPT | Performed by: PHYSICIAN ASSISTANT

## 2023-01-10 PROCEDURE — 83883 ASSAY NEPHELOMETRY NOT SPEC: CPT

## 2023-01-10 PROCEDURE — 250N000009 HC RX 250

## 2023-01-10 PROCEDURE — 80048 BASIC METABOLIC PNL TOTAL CA: CPT | Performed by: STUDENT IN AN ORGANIZED HEALTH CARE EDUCATION/TRAINING PROGRAM

## 2023-01-10 PROCEDURE — 258N000001 HC RX 258

## 2023-01-10 PROCEDURE — 250N000013 HC RX MED GY IP 250 OP 250 PS 637: Performed by: STUDENT IN AN ORGANIZED HEALTH CARE EDUCATION/TRAINING PROGRAM

## 2023-01-10 PROCEDURE — 71045 X-RAY EXAM CHEST 1 VIEW: CPT | Mod: 26 | Performed by: RADIOLOGY

## 2023-01-10 PROCEDURE — 250N000011 HC RX IP 250 OP 636: Performed by: STUDENT IN AN ORGANIZED HEALTH CARE EDUCATION/TRAINING PROGRAM

## 2023-01-10 PROCEDURE — 94003 VENT MGMT INPAT SUBQ DAY: CPT

## 2023-01-10 PROCEDURE — 999N000157 HC STATISTIC RCP TIME EA 10 MIN

## 2023-01-10 PROCEDURE — 99232 SBSQ HOSP IP/OBS MODERATE 35: CPT | Performed by: PSYCHIATRY & NEUROLOGY

## 2023-01-10 PROCEDURE — 250N000009 HC RX 250: Performed by: NURSE PRACTITIONER

## 2023-01-10 PROCEDURE — 94640 AIRWAY INHALATION TREATMENT: CPT | Mod: 76

## 2023-01-10 PROCEDURE — 94660 CPAP INITIATION&MGMT: CPT

## 2023-01-10 PROCEDURE — 84100 ASSAY OF PHOSPHORUS: CPT

## 2023-01-10 PROCEDURE — 250N000011 HC RX IP 250 OP 636

## 2023-01-10 PROCEDURE — 93005 ELECTROCARDIOGRAM TRACING: CPT

## 2023-01-10 PROCEDURE — 250N000013 HC RX MED GY IP 250 OP 250 PS 637

## 2023-01-10 PROCEDURE — 93010 ELECTROCARDIOGRAM REPORT: CPT | Performed by: INTERNAL MEDICINE

## 2023-01-10 PROCEDURE — 85014 HEMATOCRIT: CPT | Performed by: STUDENT IN AN ORGANIZED HEALTH CARE EDUCATION/TRAINING PROGRAM

## 2023-01-10 PROCEDURE — 86160 COMPLEMENT ANTIGEN: CPT

## 2023-01-10 PROCEDURE — 74018 RADEX ABDOMEN 1 VIEW: CPT | Mod: 26 | Performed by: RADIOLOGY

## 2023-01-10 PROCEDURE — 82803 BLOOD GASES ANY COMBINATION: CPT | Performed by: STUDENT IN AN ORGANIZED HEALTH CARE EDUCATION/TRAINING PROGRAM

## 2023-01-10 PROCEDURE — 92610 EVALUATE SWALLOWING FUNCTION: CPT | Mod: GN

## 2023-01-10 PROCEDURE — G0463 HOSPITAL OUTPT CLINIC VISIT: HCPCS

## 2023-01-10 PROCEDURE — 200N000002 HC R&B ICU UMMC

## 2023-01-10 PROCEDURE — 71045 X-RAY EXAM CHEST 1 VIEW: CPT

## 2023-01-10 PROCEDURE — 99291 CRITICAL CARE FIRST HOUR: CPT | Mod: GC | Performed by: INTERNAL MEDICINE

## 2023-01-10 PROCEDURE — 999N000065 XR ABDOMEN PORT 1 VIEW

## 2023-01-10 PROCEDURE — 250N000011 HC RX IP 250 OP 636: Performed by: NURSE PRACTITIONER

## 2023-01-10 PROCEDURE — 83735 ASSAY OF MAGNESIUM: CPT

## 2023-01-10 RX ORDER — CLONIDINE HYDROCHLORIDE 0.1 MG/1
0.1 TABLET ORAL 3 TIMES DAILY
Status: DISCONTINUED | OUTPATIENT
Start: 2023-01-10 | End: 2023-01-11

## 2023-01-10 RX ORDER — CLONIDINE HYDROCHLORIDE 0.1 MG/1
0.1 TABLET ORAL 2 TIMES DAILY
Status: DISCONTINUED | OUTPATIENT
Start: 2023-01-10 | End: 2023-01-10

## 2023-01-10 RX ORDER — CLONIDINE 0.3 MG/24H
1 PATCH, EXTENDED RELEASE TRANSDERMAL WEEKLY
Status: DISCONTINUED | OUTPATIENT
Start: 2023-01-10 | End: 2023-01-10

## 2023-01-10 RX ORDER — QUETIAPINE FUMARATE 100 MG/1
200 TABLET, FILM COATED ORAL ONCE
Status: COMPLETED | OUTPATIENT
Start: 2023-01-10 | End: 2023-01-10

## 2023-01-10 RX ORDER — QUETIAPINE FUMARATE 300 MG/1
600 TABLET, FILM COATED ORAL AT BEDTIME
Status: DISCONTINUED | OUTPATIENT
Start: 2023-01-10 | End: 2023-01-15 | Stop reason: HOSPADM

## 2023-01-10 RX ORDER — OLANZAPINE 5 MG/1
5 TABLET, ORALLY DISINTEGRATING ORAL AT BEDTIME
Status: DISCONTINUED | OUTPATIENT
Start: 2023-01-10 | End: 2023-01-15 | Stop reason: HOSPADM

## 2023-01-10 RX ORDER — HYDRALAZINE HYDROCHLORIDE 20 MG/ML
10 INJECTION INTRAMUSCULAR; INTRAVENOUS ONCE
Status: COMPLETED | OUTPATIENT
Start: 2023-01-10 | End: 2023-01-10

## 2023-01-10 RX ORDER — CALCIUM GLUCONATE 20 MG/ML
1 INJECTION, SOLUTION INTRAVENOUS ONCE
Status: DISCONTINUED | OUTPATIENT
Start: 2023-01-10 | End: 2023-01-10

## 2023-01-10 RX ADMIN — DEXMEDETOMIDINE HYDROCHLORIDE 0.8 MCG/KG/HR: 400 INJECTION INTRAVENOUS at 01:54

## 2023-01-10 RX ADMIN — OLANZAPINE 5 MG: 5 TABLET, ORALLY DISINTEGRATING ORAL at 20:40

## 2023-01-10 RX ADMIN — IPRATROPIUM BROMIDE AND ALBUTEROL SULFATE 3 ML: 2.5; .5 SOLUTION RESPIRATORY (INHALATION) at 02:05

## 2023-01-10 RX ADMIN — DIPHENHYDRAMINE HYDROCHLORIDE 50 MG: 50 INJECTION, SOLUTION INTRAMUSCULAR; INTRAVENOUS at 23:27

## 2023-01-10 RX ADMIN — QUETIAPINE FUMARATE 200 MG: 100 TABLET ORAL at 01:34

## 2023-01-10 RX ADMIN — IPRATROPIUM BROMIDE AND ALBUTEROL SULFATE 3 ML: 2.5; .5 SOLUTION RESPIRATORY (INHALATION) at 13:49

## 2023-01-10 RX ADMIN — HYDRALAZINE HYDROCHLORIDE 10 MG: 20 INJECTION INTRAMUSCULAR; INTRAVENOUS at 14:19

## 2023-01-10 RX ADMIN — MULTIVITAMIN 15 ML: LIQUID ORAL at 08:15

## 2023-01-10 RX ADMIN — OLANZAPINE 5 MG: 5 TABLET, FILM COATED ORAL at 13:47

## 2023-01-10 RX ADMIN — BUMETANIDE 1 MG: 0.25 INJECTION INTRAMUSCULAR; INTRAVENOUS at 08:17

## 2023-01-10 RX ADMIN — ACETYLCYSTEINE 4 ML: 100 SOLUTION ORAL; RESPIRATORY (INHALATION) at 13:49

## 2023-01-10 RX ADMIN — IPRATROPIUM BROMIDE AND ALBUTEROL SULFATE 3 ML: 2.5; .5 SOLUTION RESPIRATORY (INHALATION) at 08:44

## 2023-01-10 RX ADMIN — METHYLPREDNISOLONE SODIUM SUCCINATE 125 MG: 125 INJECTION, POWDER, FOR SOLUTION INTRAMUSCULAR; INTRAVENOUS at 08:16

## 2023-01-10 RX ADMIN — DEXMEDETOMIDINE HYDROCHLORIDE 0.2 MCG/KG/HR: 400 INJECTION INTRAVENOUS at 23:28

## 2023-01-10 RX ADMIN — ENOXAPARIN SODIUM 40 MG: 40 INJECTION SUBCUTANEOUS at 01:34

## 2023-01-10 RX ADMIN — FOLIC ACID 1 MG: 1 TABLET ORAL at 08:15

## 2023-01-10 RX ADMIN — ENOXAPARIN SODIUM 40 MG: 40 INJECTION SUBCUTANEOUS at 13:47

## 2023-01-10 RX ADMIN — INSULIN GLARGINE 30 UNITS: 100 INJECTION, SOLUTION SUBCUTANEOUS at 11:08

## 2023-01-10 RX ADMIN — DIPHENHYDRAMINE HYDROCHLORIDE 50 MG: 50 INJECTION, SOLUTION INTRAMUSCULAR; INTRAVENOUS at 05:29

## 2023-01-10 RX ADMIN — FAMOTIDINE 20 MG: 10 INJECTION, SOLUTION INTRAVENOUS at 09:51

## 2023-01-10 RX ADMIN — DIPHENHYDRAMINE HYDROCHLORIDE 50 MG: 50 INJECTION, SOLUTION INTRAMUSCULAR; INTRAVENOUS at 16:50

## 2023-01-10 RX ADMIN — OLANZAPINE 5 MG: 5 TABLET, FILM COATED ORAL at 08:15

## 2023-01-10 RX ADMIN — HYDRALAZINE HYDROCHLORIDE 10 MG: 20 INJECTION INTRAMUSCULAR; INTRAVENOUS at 18:43

## 2023-01-10 RX ADMIN — FAMOTIDINE 20 MG: 10 INJECTION, SOLUTION INTRAVENOUS at 22:12

## 2023-01-10 RX ADMIN — ACETYLCYSTEINE 4 ML: 100 SOLUTION ORAL; RESPIRATORY (INHALATION) at 08:44

## 2023-01-10 RX ADMIN — DEXMEDETOMIDINE HYDROCHLORIDE 0.2 MCG/KG/HR: 400 INJECTION INTRAVENOUS at 09:59

## 2023-01-10 RX ADMIN — LABETALOL HYDROCHLORIDE 10 MG: 5 INJECTION, SOLUTION INTRAVENOUS at 16:48

## 2023-01-10 RX ADMIN — DEXMEDETOMIDINE HYDROCHLORIDE 0.6 MCG/KG/HR: 400 INJECTION INTRAVENOUS at 04:01

## 2023-01-10 RX ADMIN — DEXTROSE MONOHYDRATE 1000 ML: 100 INJECTION, SOLUTION INTRAVENOUS at 16:18

## 2023-01-10 RX ADMIN — ACETAMINOPHEN 650 MG: 325 TABLET, FILM COATED ORAL at 16:18

## 2023-01-10 RX ADMIN — BUMETANIDE 1 MG: 0.25 INJECTION INTRAMUSCULAR; INTRAVENOUS at 20:40

## 2023-01-10 RX ADMIN — DIPHENHYDRAMINE HYDROCHLORIDE 50 MG: 50 INJECTION, SOLUTION INTRAMUSCULAR; INTRAVENOUS at 11:03

## 2023-01-10 RX ADMIN — ACETYLCYSTEINE 4 ML: 100 SOLUTION ORAL; RESPIRATORY (INHALATION) at 02:05

## 2023-01-10 RX ADMIN — ACETYLCYSTEINE 4 ML: 100 SOLUTION ORAL; RESPIRATORY (INHALATION) at 20:33

## 2023-01-10 ASSESSMENT — ACTIVITIES OF DAILY LIVING (ADL)
ADLS_ACUITY_SCORE: 33
ADLS_ACUITY_SCORE: 35
ADLS_ACUITY_SCORE: 37
ADLS_ACUITY_SCORE: 37
ADLS_ACUITY_SCORE: 33
ADLS_ACUITY_SCORE: 35
ADLS_ACUITY_SCORE: 33

## 2023-01-10 NOTE — PROVIDER NOTIFICATION
Around 2300 provider notified that pt mentation is worsening, speech is more garbled, and tongue appears more swollen. Resident, attending, and anaesthesia came to bedside. VBG sent. Placed on BiPap.     0030: Provider notified that pt is becoming more agitated and is uncooperative/not following commands. Resident and attending at bedside, ordered to increase Precedex and give additional dose of Seroquel. Concerns expressed regarding previous episodes of worsening agitation and associated arway issues. No other orders at that time.     0300: Provider notified that pt's work of breathing has increased. Pt now very lethargic. VBG collected. Precedex decreased.

## 2023-01-10 NOTE — PROGRESS NOTES
Initiated BIPAP treatment per MD order, ST 10/5, RR 16, Fio2 40%. Tolerating current BIPAP settings. No further respiratory suggestions at this time, RT will continue to assess and monitor per [facility]AT protcol.    BP (!) 143/77   Pulse 64   Temp 97.7  F (36.5  C) (Oral)   Resp 18   Ht 1.829 m (6')   Wt 146.5 kg (323 lb)   SpO2 92%   BMI 43.81 kg/m      Clifton Dallas, RT on 1/10/2023 at 12:35 AM

## 2023-01-10 NOTE — PROGRESS NOTES
[facility] ([place]) - Clinical Swallow Evaluation   01/10/23 5660   Appointment Info   Signing Clinician's Name / Credentials (SLP) Lydia Vegas MS CCC SLP   General Information   Onset of Illness/Injury or Date of Surgery 12/21/22   Referring Physician Ar Frazier MD   Patient/Family Therapy Goal Statement (SLP) None stated   Pertinent History of Current Problem Per provider documentation - Antony Aguila Jr. is a 62 year old male admitted on 12/21/2022. He has history chronic type B aortic dissection s/p exploration and median sternotomy (6/18/19), type 2 DM (poorly controlled), and HTN who is admitted for acute on chronic thoracic aortic aneurysm with new aortic root dilation. Hospitalization complicated by recurrent episodes of agitation requiring aggressive sedation and compromised airway secondary to upper airway edema requiring intubation. Pt was scoped by ENT today, and they noted feeding tube was coiled around larynx and reommended it be removed. However, it is to remain in per RN>   General Observations Pt is alert, confused, does follow commands.   Type of Evaluation   Type of Evaluation Swallow Evaluation   Oral Motor   Oral Musculature anomalies present  (enlarged tongue)   Structural Abnormalities none present   Mucosal Quality adequate   Dentition (Oral Motor)   Dentition (Oral Motor) adequate dentition   Facial Symmetry (Oral Motor)   Facial Symmetry (Oral Motor) WNL   Lip Function (Oral Motor)   Lip Range of Motion (Oral Motor) WNL   Tongue Function (Oral Motor)   Tongue ROM (Oral Motor) WNL   Vocal Quality/Secretion Management (Oral Motor)   Vocal Quality (Oral Motor) WFL   Secretion Management (Oral Motor)   (pt able to cough up secretions and use oral suction)   General Swallowing Observations   Past History of Dysphagia None noted upon chart review   Respiratory Support (General Swallowing Observations) supplemental oxygen;nasal cannula   Current Diet/Method  of Nutritional Intake (General Swallowing Observations, NIS) NPO;nasogastric tube (NG)   Swallowing Evaluation Clinical swallow evaluation   Clinical Swallow Evaluation   Feeding Assistance frequent cues/help required   Clinical Swallow Evaluation Textures Trialed thin liquids;mildly thick liquids;pureed   Clinical Swallow Eval: Thin Liquid Texture Trial   Mode of Presentation, Thin Liquids spoon;fed by clinician;straw   Volume of Liquid or Food Presented ice chips x 2, thin liquids by spoon and straw   Oral Phase of Swallow WFL   Pharyngeal Phase of Swallow impaired;repeated swallows;throat clearing;wet vocal quality after swallow   Diagnostic Statement Intermittent throat clear and wet vocal quality, slightly delayed throat clearing   Clinical Swallow Eval: Mildly Thick Liquids   Mode of Presentation straw;fed by clinician   Volume Presented 4 oz   Oral Phase WFL   Pharyngeal Phase impaired;throat clearing   Diagnostic Statement Apparent improved coordination but multiple swallows per bolus and intermittent throat clearing   Clinical Swallow Evaluation: Puree Solid Texture Trial   Mode of Presentation, Puree spoon;fed by clinician   Volume of Puree Presented 1 oz   Oral Phase, Puree WFL   Pharyngeal Phase, Puree impaired;repeated swallows   Diagnostic Statement Multiple swallows per bolus and pt reports some difficulty swallowing   Esophageal Phase of Swallow   Patient reports or presents with symptoms of esophageal dysphagia No   Swallowing Recommendations   Diet Consistency Recommendations ice chips only;NPO   Supervision Level for Intake 1:1 supervision needed   Mode of Delivery Recommendations bolus size, small;slow rate of intake   Monitoring/Assistance Required (Eating/Swallowing) stop eating activities when fatigue is present;monitor for cough or change in vocal quality with intake   Recommended Feeding/Eating Techniques (Swallow Eval) maintain upright sitting position for eating;provide oral hygiene prior  to intake   Medication Administration Recommendations, Swallowing (SLP) Alternative means of nutrition/hydration at this time   General Therapy Interventions   Planned Therapy Interventions Dysphagia Treatment   Dysphagia treatment Instruction of safe swallow strategies   Clinical Impression   Criteria for Skilled Therapeutic Interventions Met (SLP Eval) Yes, treatment indicated   SLP Diagnosis Oropharyngeal dysphagia   Risks & Benefits of therapy have been explained evaluation/treatment results reviewed;care plan/treatment goals reviewed;participants included;patient   Clinical Impression Comments Pt seen for clinical swallow evaluation. Pt presented with intermittent throat clearing across trials of thin and mildly thick liquids. Pt reported some difficulty swallowing puree solids and he consistently completed double swallows per bolus. Laryngeal edema and recent extubation could be leading to difficulty swallowing. ENT also noted feeding tube to be coiled around larynx which could be complicating pt's dysphagia as well. Oropharyngeal dysphagia, pt at an increased aspiration risk secondary to the previously stated concerns as well as fluctuating mentation. Recommend NPO status with thorough oral cares and ice chips as tolerated with supervision. SLP will continue to follow. Consider alternatives to feeding tube if it is in fact coiled in pt's larynx.   SLP Total Evaluation Time   Eval: oral/pharyngeal swallow function, clinical swallow Minutes ([id number]) 28

## 2023-01-10 NOTE — PROGRESS NOTES
[facility]  WOC Nurse Inpatient Assessment     Consulted for: LLE wounds   1/2: NEW:  Consulted for tongue wound   Patient History (according to provider note(s):      62 year old male admitted on 12/21/2022. He has history chronic type B aortic dissection s/p exploration and median sternotomy (6/18/19), type 2 DM (poorly controlled), and HTN who is admitted for acute on chronic thoracic aortic aneurysm with new aortic root dilation. Hospitalization complicated by hypokalemia      Areas Assessed:      Pressure Injury Location: Right tongue       Last photo: 1/5/23  Wound type: Pressure Injury     Pressure Injury Stage: Mucosal, hospital acquired      This is a Medical Device Related Pressure Injury (MDRPI) due to ETT  Wound history/plan of care:   Wound noted 12/30 when intubated with edematous tongue, extubated 12/31 and now reintubated.  Intubation was traumatic with bloody drainage noted. Extubated 1/9/23 O2 via face mask.    Wound base: 100 % eroded mucosa ,      Palpation of the wound bed: normal      Drainage: none     Description of drainage: none     Measurements (length x width x depth, in cm) 0.3  x 0.3  x  0.2 cm   Periwound skin: Intact,  edematous       Color: normal and consistent with surrounding tissue      Temperature: normal   Odor: none  Pain: denies ,   Pain intervention prior to dressing change: N/A  Treatment goal: Heal   STATUS: evolving,   Supplies ordered: at bedside      Treatment Plan:     Tongue wound:  Oral cares per unit routine     Orders: Reviewed    RECOMMEND PRIMARY TEAM ORDER: None, at this time  Education provided: plan of care and wound progress  Discussed plan of care with: Patient and Nurse  WOC nurse follow-up plan: signing off LE consult, following tongue wound twice weekly  Notify WOC if wound(s) deteriorate.  Nursing to notify the Provider(s) and re-consult the WOC Nurse if new skin concern.    DATA:     Current support surface:  Standard  Low air loss (JORI pump, Isolibrium, Pulsate, skin guard, etc)  BMI: Body mass index is 43.94 kg/m .   Active diet order: Orders Placed This Encounter      NPO for Medical/Clinical Reasons Except for: No Exceptions     Output: I/O last 3 completed shifts:  In: 2835.41 [I.V.:895.41; NG/GT:1100]  Out: 1400 [Urine:1400]     Labs:   Recent Labs   Lab 01/10/23  0351 01/05/23  0536 01/04/23  1257   ALBUMIN  --   --  3.4*   HGB 11.6*   < >  --    WBC 10.1   < >  --     < > = values in this interval not displayed.     Pressure injury risk assessment:   Sensory Perception: 3-->slightly limited  Moisture: 3-->occasionally moist  Activity: 1-->bedfast  Mobility: 3-->slightly limited  Nutrition: 3-->adequate  Friction and Shear: 2-->potential problem  Abhinav Score: 15    [facility]  Phone: [phone]  Pager: [phone]

## 2023-01-10 NOTE — PLAN OF CARE
ICU End of Shift Summary. See flowsheets for vital signs and detailed assessment.    Changes this shift: Pt fell at beginning of shift. No signs of injury noted, team aware. Continued to be impulsive, sitter now at bedside. RASS 2 to -4. Garbled speech. Tongue appears more swollen. 5L oxymask to Bipap 40%. Increased WOB. LS coarse. SB, HR 40-50s. Occasionally SBP > 135, per MICU no PRN medications needed. BM x2. TF stopped, D10 started. Good UOP, using urinal but also incontinent at times. See previous note for more detailed shift events.     Plan: Continue with plan of care. Monitor respiratory status closely.

## 2023-01-10 NOTE — PLAN OF CARE
ICU End of Shift Summary. See flowsheets for vital signs and detailed assessment.    Changes this shift: 1:1 sitter continued until 3pm. Pt slightly agitated this morning but has now become alert and oriented. Tolerating NC with cool humidity. ENT saw pt today, no marked increase in swelling per their assessment/communication. NG found to coiled near epiglottis and suspicion for irritation of oropharyngeal area. Order to remove NG. NG removed. Precedex weaned off. PRN BP medications given x2, MICU called for SBP> than 135 in 1800 hour. 1x dose of hydralazine given. MICU Dr. Mercedes informed of lack of effectiveness of hydralazine.    Plan:  Try to deescalate as much as possible overnight to avoid intubation per MICU team. Intervene as needed for BP.

## 2023-01-10 NOTE — CONSULTS
"          Psychiatry Consultation; Follow up              Reason for Consult, requesting source:    Ongoing AMS, agitation. I have seen him 12/26 and 1/5  Requesting source:     Labs and imaging reviewed, discussed with nursing               Interim history:    From H and P 12/22:  \"\"Antony Aguila Jr. is a 62 year old male who has a history of chronic thoracic aortic dissection ([place] type B) s/p exploration and median sternotomy (6/18/19), type 2 DM, and HTN and presents due to abnormal clinic labs (K 3.1, Cr 2.4) and Has had LE swelling for last 2 years, notes it is worse lately. Reports intermittent dyspnea, but not at time of exam. No chest pain, no sob, no nausea or vomiting. Newly diaphoretic x 30 mins when seen in ED. Has a nurse that helps with meds 1x/week and PCA with cares\"  From my note 1/5.   Soon after that admission he ran some problems with his airway to the extent that he needed to be transferred to the ICU for intubation and sedation.  He has slowly cleared and was extubated and transferred to .  However he again decompensated and required transfer back to the ICU. He is currently sedated with versed and propofol.  It was noted during intubation that he had some angioedema which has improved with Solu-Medrol.  Due to his reports of angioedema with Abilify this was discontinued.   I suggested discontinuing guanfacine since it was the most recently added.   He is now back on Abilify at 10 mg HS and Zyprexa 5 mg TID started yesterday. Has now received 4 doses. He is seen again today due to fluctuating mental status with occasional agitation. According to his nurse his CAM-ICU has been ok, and mild to moderate agitation seems to respond to Zyprexa, but he remains on 0.25 mg of Precedex.     During my visit he was quite upbeat and cooperative, says mood is \"ok\" and denies any visual hallucinations, but may have had some yesterday. He knows he is in the hospital but doesn't know which one, says we " "are in January, but doesn't know date (although he had just been told).   Per swallow evaluation, he is limited to ice chips.           Current Medications:       acetylcysteine  4 mL Nebulization Q6H     [Held by provider] amLODIPine  10 mg Oral or Feeding Tube Daily     ARIPiprazole  10 mg Oral At Bedtime     bumetanide  1 mg Intravenous BID     diphenhydrAMINE  50 mg Intravenous Q6H     enoxaparin ANTICOAGULANT  40 mg Subcutaneous Q12H     famotidine  20 mg Intravenous Q12H     folic acid  1 mg Oral Daily     heparin lock flush  5-20 mL Intracatheter Q24H     insulin aspart  1-12 Units Subcutaneous Q4H     insulin glargine  30 Units Subcutaneous Daily     ipratropium - albuterol 0.5 mg/2.5 mg/3 mL  3 mL Nebulization Q6H     methylPREDNISolone  125 mg Intravenous Daily     multivitamins w/minerals  15 mL Per Feeding Tube Daily     OLANZapine  5 mg Oral TID     polyethylene glycol  17 g Oral BID     protein modular  1 packet Per Feeding Tube TID     QUEtiapine  400 mg Oral or Feeding Tube At Bedtime     sennosides  8.6 mg Oral BID     sodium chloride (PF)  10-40 mL Intracatheter Q8H     sodium chloride (PF)  3 mL Intracatheter Q8H              MSE:   Appearance: awake, alert and adequately groomed  Attitude:  cooperative  Eye Contact:  fair  Mood:  \"OK\"  Affect:  : slightly restricted  Speech:  dysarthria; tongue still swollen   Psychomotor Behavior:  no evidence of tardive dyskinesia, dystonia, or tics  Muscle strength and tone: intact   Thought Process:  circumstantial  Associations:  no loose associations  Thought Content:  no evidence of suicidal ideation or homicidal ideation and no evidence of psychotic thought  Insight:  fair  Judgement:  limited  Oriented to:  person and \"hospital\"   Attention Span and Concentration:  limited  Recent and Remote Memory:  limited    Vital signs:  Temp: 98.9  F (37.2  C) Temp src: Axillary BP: (!) 146/81 Pulse: 56   Resp: 22 SpO2: 92 % O2 Device: Nasal cannula Oxygen Delivery: " "5 LPM Height: 182.9 cm (6') Weight: 147 kg (324 lb)  Estimated body mass index is 43.94 kg/m  as calculated from the following:    Height as of this encounter: 1.829 m (6').    Weight as of this encounter: 147 kg (324 lb).    Qtc: 439 ms 1/8,  511 ms 1/6          DSM-5 Diagnosis:   295.90  (F20.9) Schizophrenia   Delirium           Assessment:   His fluctuating mental status is consistent with ongoing delirium, but does seem to respond somewhat to Zyprexa and Abilify. Still receiving 400 mg HS of Seroquel.   For now his QTc is better, but would still avoid Haldol.   He is still complaining of not sleeping. I don't have any great ideas to address his ongoing agitation, but increase in Seroquel HS may help sleep.          Summary of Recommendations:   I would continue Zyprexa and Abilify as currently dosed, consider increasing HS Seroquel to 500 to 600 mg for awhile   Clonidine may make it easier to get him off Precedex (I'm hesitant to suggest guanfacine again)   Page me or re-consult psychiatry as needed (psychiatry is signing off).     Jai Aly M.D.   Consult liaison psychiatry   [facility]   Contact information available via [facility] Paging/Directory.  If I am not available, then [facility] intake ([phone]) should know who   Is on call      \"Much or all of the text in this note was generated through the use of Dragon Dictate voice to text software. Errors in spelling or words which appear to be out of contact are unintentional, may be present due having escaped editing\"           "

## 2023-01-10 NOTE — PROGRESS NOTES
Otolaryngology Progress Note  January 10, 2023    S: Patient extubated yesterday. Required bipap overnight, now weaned to nasal cannula. ENT asked to evaluate for airway swelling due to recent concerns for tongue swelling and difficult intubation. Patient with no report of stridor or difficulty breathing per primary team and nursing. Patient reports he is breathing well, mild shortness of breath intermittently. He reports his tongue swelling has greatly improved. He does state he has some difficulty moving his tongue while talking, but that this is getting better. Denies any tongue or throat pain. He reports he has had episodes of tongue swelling in the past, he does not know what causes it.     O: /59   Pulse 63   Temp 98.9  F (37.2  C) (Axillary)   Resp 15   Ht 1.829 m (6')   Wt 147 kg (324 lb)   SpO2 96%   BMI 43.94 kg/m     General: Alert and oriented x 3, No acute distress   HEENT: EOMI. Face symmetric. Tongue within the oral cavity and appears non-edematous, soft and nontender to palpation. Small superficial ulceration over mid dorsal oral tongue and appears stable. FOM soft. Uvula and posterior oropharyngeal wall visible through the mouth. No oropharyngeal erythema. Uvula is midline with no swelling.    Pulmonary: Breathing non-labored on nasal cannula, no stridor, no stertor, no accessory muscle use.    FIBEROPTIC ENDOSCOPY:  Due to concerns for upper airway swelling, fiberoptic laryngoscopy was indicated. After obtaining verbal consent, the fiberoptic laryngoscope was passed under endoscopic vision through the right nasal passage. The turbinates were normal. The inferior and middle meati were clear without purulence, masses, or polyps. The nasopharynx was clear. The eustachian tubes were clear. The soft palate appeared normal with good mobility. The epiglottis was mildly edematous and erythematous, but not obstructive to the airway. Initially there was thick yellow secretions within the  vallecula and piriform sinuses that was mostly able to be cleared with deep suctioning through the mouth. After suctioning, the visualized portion of the vallecula was clear. The larynx was clear with mobile cords. There was some mild eccymosis of the right anterior cord. The arytenoids were clear. The nasogastric tube is noted to be coiled across the larynx.                 A/P: Antony Aguila Jr. is a 62 year old male with a past medical history of chronic type B aortic dissection s/p exploration and median sternotomy (6/18/19), prior tracheostomy decannulated 8/2019, poorly controlled DM-2, and HTN now admitted for acute on chronic thoracic aortic aneurysm with new aortic root dilation. Patient intubated 1/5 due to concern for tongue swelling causing respiratory compromise. ENT was consulted to assess tongue swelling. No appreciable tongue or lip swelling on exam 1/9 or 1/10. No evidence of oral trauma, infection, or other reason/etiology for tongue swelling at this time. There is mild swelling/erythema of the epiglottis, which could be related to recent intubations vs resolving swelling from ?angioedema. In regards to history of recurrent tongue swelling, would recommend considering angioedema labs, rheumatologic workup, allergy review, and further workup per medicine team.      - No ENT intervention indicated at this time  - Recommend removal of NG tube as it is coiled over the larynx  - WOC following for superficial ulceration to dorsal tongue, likely 2/2 pressure injury from ETT. Ensure ETT is being rotated frequently and not placed over existing pressure wound  - No evidence of tongue swelling, and no upper airway obstruction at this time. Patient would be intubatable if necessary as there is no anatomical obstruction  - Agree with SLP evaluation prior to oral intake  - Remainder of care per primary team    -- Patient and above plan to be discussed with Dr. Gianni Montgomery,  [facility]  Otolaryngology-Head & Neck Surgery  Please contact ENT with questions by dialing [phone] and entering job code [phone] when prompted.

## 2023-01-10 NOTE — PROGRESS NOTES
MEDICAL ICU PROGRESS NOTE  01/10/2023      Date of Service (when I saw the patient): 01/10/2023    ASSESSMENT: Antony Aguila Jr. is a 62 year old male admitted on 12/21/2022. He has history chronic type B aortic dissection s/p exploration and median sternotomy (6/18/19), type 2 DM (poorly controlled), and HTN who is admitted for acute on chronic thoracic aortic aneurysm with new aortic root dilation. Hospitalization complicated by recurrent episodes of agitation requiring aggressive sedation and compromised airway secondary to upper airway edema leading to intubation, and now extubated.    CHANGES and MAJOR THINGS TODAY:   - Weaned Precedex given bradycardia  - Ordered VBG  - Ordered C1 esterase inhibitor lab  - ENT consult - assessment of airway edema.  - Psych consult - medication regimen for agitation  - NG Tube removed  - Ordered EKG for QTC  - Patient's sister and Home Health Nurse called, for oral airway baseline  - Ordered Clonidine 0.1 mg TID for HTN (to start on 1/11/23)    PLAN:    Neuro:  # Agitation     # Encephalopathy, likely metabolic  Patient has had several episodes of agitation requiring aggressive sedation and intubation, most recently on 1/5/2023. On the floor, had multiple code 21s called, ultimately given benadryl and ativan (hospitalist reported haldol was given additionally but this is not charted). He was ultimately started on Precedex, put in 4-point restraints. On transfer to the ICU, had respiratory compromise upon transfer to the ICU and was more deeply sedated as a result and was intubated on 1/5.   - Precedex initiated for agitation, weaning off due to concerns for sinus bradycardia. Avoid restarting Precedex  - Clonidine 0.1mg TID for weaning off Precedex (start on 1/11/23)  - Psychiatry re-consulted, recommendations below   -- Seroquel 200mg am and 600mg bedtime   -- Continue Zyprexa 5mg TID    -- Continue Abilify 10mg bedtime  - EKG showed qTC of 440   - Discontinued guanfacine  due to concern this could explain the recurrence of his angioedema  - Will attempt to avoid  Haldol given hx of prolonged QTc, okay for 1x dose overnight if severely agitated.     # C/f seizure   - Hx of body twitching in 2019 and was treated with Keppra, MRI and EEG did not show epileptic etiology or structural lesion   - Arm and leg shaking, as well as eye flickering overnight on 1/5  - Neuro crit consulted, signed off    - Loaded with Keppra 3g given concern for seizure, however felt that suspicion was low therefore discontinued Keppra  - vEEG initiated, significant for encephalopathy  - No need for Keppra moving forward     # Age-indeterminate right and chronic left BG lacunar infarcts  - Consider starting statin   - Consider starting ASA 81mg     # Pain and sedation  - Stopped propofol  - Stopped fentanyl  - Discontinued Midazolam gtt and 1-2mg Q1H PRN  - Patient extubated on 1/09  - RASS goal -1 to -2      # Schizoaffective disorder  # PTSD  Meds per chart include Zyprexa, Abilify, and Seroquel. Per pharmacy med rec, appears to only have filled Seroquel recently.   - Seroquel 600mg at bedtime (start 1/11)  - Resumed Abilify 10mg QHS  - Psych recs as above      # Tobacco use  Smoked cigarettes for 45 years. Currently 1 pack per week.  - Encourage cessation      Pulmonary:  # Acute hypoxic and hypercarbic respiratory failure  # Significant airway edema  # Concern for sleep apnea  # Tracheostomy placed 7/5/19, decannulated 8/2019  # Angioedema vs allergic reaction  Per hospitalization notes here in Sept 2022, noted to have hx of dyspnea on exertion in setting of being current smoker, tx with albuterol inhaler, recommended outpatient PFTs be obtained. Pulmonary progress note from 7/2019 notes current smoker with unknown COPD history, no PFTs available. Had tracheostomy placed 7/5/19, decannulated 8/2019. CTA chest with compressive atelectasis in L lung adjacent to thoracic aorta and hiatal hernia. Patient has now  had multiple instances where he became agitated and non-compliant with attempts for patient to use BIPAP or HFNC and needed heavy sedation to treat agitation such that intubation was required to protect airway. Patient is historically a difficult intubation and was very difficult this time. Patient has a possible hx of angioedema reaction to lisinopril or losartan, but the most recent episode occurred without these medications on board. Patient's sister called for information of patient's oral airway and tongue at baseline. She considers the tongue and airway are of normal size and not out of the ordinary. She also states he has normal speech, and does not have difficulty conversing at baseline. Additionally, patient's home health nurse was called, states tongue and airway were of normal size prior to hospitalization, patient also had clear speech and was easy to converse with.   - Patient emergently intubated on 1/5, very difficult airway, now extubated (1/09)  - Patient Currently on Oxygen Mask 5 LPM  - Methylprednisolone 125mg QDay  - Benadryl 50mg q6h   - Famotidine 20mg q12h   - Discussed current medications with pharmacy - stopped gabapentin and amlodipine due to their (rare) association with edema - more often peripheral edema however.  - Could also consider allergy testing outpatient if concern for some other non-medication allergic cause  - Diuresis as below   - Angioedema workup labs: C4 normal, tryptase normal  - C1 esterase ordered, pending results  - ENT consulted- ENT assessed airway, recommended removal of NG tube as it is coiled over the larynx. No evidence of tongue swelling, and no upper airway obst found base of tongue to not impede on airway, resolved swelling of the airway and epiglottis, suctioning revealed mucus and secretion removal. NG tube found to coiled in the hypopharynx. ENT states patient would be intubatable if necesseary.  - PST BID     Cardiovascular:  # Chronic type B aortic  dissection (proximal descending aorta to the abdominal aorta)  # Proximal ascending aorta saccular aneurysm   # Distal ascending aorta saccular aneurysm   # Mild nonobstructive CAD  Pt has history of stable type B aortic dissection as well as short segment of dissection vs pseudoaneurysms at the aortic root and distal ascending thoracic aorta. Previously underwent surgical exploration in 6/2019 with concern for type 1 aortic dissection but found to have chronic type B dissection. On admission, CXR with widened mediastinum slightly increased from prior. CT chest with aneurysmal dilatation ascending and descending thoracic aorta with type B aortic dissection distal to L subclavian artery extending to L common iliac artery with severe narrowing of true lumen and possible pseudoaneurysm at aortic root, new compared to prior 2019 images.   - TRINH 12/23 showed saccular aneurysm of ascending aorta measuring 5.8 cm associated with a dissection flap. Thrombus is seen in the false lumen. Aortic root is dilated at 4.6 cm.  - CTA 12/22 two saccular aneurysms, one each in the proximal ascending aorta and distal ascending aorta. The maximal transaortic diameters of 46.9 x 39.2 mm and 62.7 x 54.4 mm, respectively. Mild, non-obstructive coronary artery disease without any high-grade stenoses.  - Cardiology and CVTS consulted and signed off, plan for outpatient follow up with CVTS  - Hold labetalol 400 mg BID 12/25, consider resuming  - Hold DVT ppx 12/23; restarted 12/24 for thrombus seen on TRINH, continue per CVTS recs  - No HR goal per cardiology, SBP preferably maintained <130  - PRN labetalol 10mg for SBP >135       # HTN  Based on outpatient note 12/14/2022 pt was previously hydordiuril 50 mg, amlodipine 10 mg, bumex TID and canaglaflozin. 12/25 Hypertensive overnight with highest SBP of 170s. Given hx of Type B sonam dissection and now new aortic aneurysm goals - hold Labetalol  BID (titrated from intially PO dose of  100 mg)  - holding PTA hydrochlorothiazide  - Was on Nicardipine gtt given persistent elevated SBPs  - PRN labetalol 10mg for SBP >135, PRN hydralazine for SBP>135 if HR<50  - Ordered Clonidine 0.1 mg TID     GI/Nutrition:  # Large hiatal hernia  # Nutrition  Previously unable to place feeding tube under fluoro due to large hiatal hernia, required GI assistance with feeding tube placement   - Appreciate GI assistance with advancing feeding tube past hiatal hernia   - TFs at goal       Renal/Fluids/Electrolytes:  # LAKISHA on CKD 2 - improved   Outpatient labs 12/16 with Cr 1.4 and eGFR 55. Now back at baseline Cr ~1.1-1.2, eGFR ~65-75. UA unremarkable.  - holding PTA metformin for now  - FWF 30cc q4h     - Bumex 1mg BID   - VBG ordered     # Mild hypokalemia, resolved  # Mild hypomagnesemia, resolved   - Repletion protocols      # Hypocalcemia, resolved  - Monitor BMP  - Ionized calcium WNL 4.9     Endocrine:  # Type 2 DM with hyperglycemia  Most recent A1c 12.5 (9/16/22). Home regimen: metformin 1000 qPM and Lantus 50 unit(s) qAM + aspart 17 unit(s) TIDAC + aspart 9 unit(s) w/ snacks  - Lantus 30U  - HDSSI   - holding PTA metformin as above     ID:  # Increased pulmonary secretions  # Concern for hospital associated pneumonia-resolved   Has developed copious creamy secretions, has low grade temperature of 100.0F, no leukocytosis. CXR showing increased opacities. Previously on Zosyn, transitioned to nafcillin 1/01/23  - Nafcillin completed 1/4   - Pulm toilet: metaneb, mucomyst       Hematology:    # Anemia, normocytic  - Trend CBC  - Mild folate deficiency, started folic acid supplement     Musculoskeletal:  # BLE edema  Dimer elevated with acute on chronic leg swelling L < R per patient history. DVT US in ED negative for DVT b/l. Has had LE swelling for last 2 years, notes it is worse lately. Previously on diuretics, not currently per chart.  - lymphedema consult  - diuresis as above     Skin:  # Venous stasis ulcer  LLE  # Venous insufficiency  # Peripheral vascular disease  # Tongue injury   - WOC consult placed     General Cares/Prophylaxis:    DVT Prophylaxis: lovenox   GI Prophylaxis: PPI  Restraints: bilateral mitts and wrist restraints  Family Communication: Sister updated by phone     Code Status: Full Code     Lines/tubes/drains:  - PIV x 1  - PICC  - NG Tube    Disposition:  - Medical ICU    Patient seen and findings/plan discussed with medical ICU staff, Dr. Hooker.     Houston Naylor, MS4  [facility] [employer]    Resident/Fellow Attestation   I, Radha Decker MD, was present with the medical/DENA student who participated in the service and in the documentation of the note.  I have verified the history and personally performed the physical exam and medical decision making.  I agree with the assessment and plan of care as documented in the note.      Radha Decker MD  Internal Medicine-PGY2  [facility]  Pager: [phone]      Clinically Significant Risk Factors        # Hypokalemia: Lowest K = 3.3 mmol/L in last 2 days, will replace as needed       # Hypoalbuminemia: Lowest albumin = 2.9 g/dL at 12/26/2022  6:03 AM, will monitor as appropriate          # DMII: A1C = 8.0 % (Ref range: <5.7 %) within past 3 months   # Severe Obesity: Estimated body mass index is 43.94 kg/m  as calculated from the following:    Height as of this encounter: 1.829 m (6').    Weight as of this encounter: 147 kg (324 lb).               ====================================  INTERVAL HISTORY:   Per nursing patient had worsening mentation overnight with garbled speech. Patient was placed on BiPaP overnight for concern of tongue swelling. Patient received seroquel and precedex for agiation. Additionally patient fell at beginning of shift. No signs of injury noted and team aware. Sitter at bedside for impusliveness, Patient had two Bowel movements and good urine output.     OBJECTIVE:   1. VITAL SIGNS:   Temp:  [96.7   F (35.9  C)-98.9  F (37.2  C)] 98.9  F (37.2  C)  Pulse:  [42-89] 49  Resp:  [10-24] 18  BP: (113-156)/() 118/70  FiO2 (%):  [30 %-40 %] 30 %  SpO2:  [92 %-99 %] 97 %  Vent Mode: CMV/AC  (Continuous Mandatory Ventilation/ Assist Control)  FiO2 (%): 30 %  Resp Rate (Set): 18 breaths/min  Tidal Volume (Set, mL): 500 mL  PEEP (cm H2O): 8 cmH2O  Pressure Support (cm H2O): 7 cmH2O  Resp: 18    2. INTAKE/ OUTPUT:   I/O last 3 completed shifts:  In: 2835.41 [I.V.:895.41; NG/GT:1100]  Out: 1400 [Urine:1400]    3. PHYSICAL EXAMINATION:  General: Patient extubated (1/09), alert and oriented  HEENT: No scleral icterus, MMM, tongue swollen in appearance, and slightly protruding from mouth at the time.  Neuro: Patient following commands     Pulm/Resp: No wheezing, breath sounds appreciated bilaterally, patient on oxygen mask 5L   CV: RRR, no murmurs  Abdomen: Soft, non-distended, non-tender  : no connor  Incisions/Skin: lower extremity edema, wrinkles in lower legs      4. LABS:   Arterial Blood Gases   Recent Labs   Lab 01/05/23  0536   PH 7.39   PCO2 52*   PO2 76*   HCO3 32*     Complete Blood Count   Recent Labs   Lab 01/10/23  0351 01/09/23  0450 01/08/23  0459 01/07/23  0341   WBC 10.1 9.7 10.0 8.5   HGB 11.6* 11.4* 11.3* 12.2*    294 285 285     Basic Metabolic Panel  Recent Labs   Lab 01/10/23  1100 01/10/23  0804 01/10/23  0351 01/10/23  0338 01/09/23  1234 01/09/23  1231 01/09/23  0804 01/09/23  0450 01/08/23  0501 01/08/23  0459 01/07/23  0825 01/07/23  0341   NA  --   --  144  --   --   --   --  140  --  145  --  141   POTASSIUM  --   --  4.2  --   --  4.2  --  3.3*  --  3.6   < > 3.4   CHLORIDE  --   --  108*  --   --   --   --  103  --  108*  --  103   CO2  --   --  26  --   --   --   --  25  --  23  --  24   BUN  --   --  31.6*  --   --   --   --  32.5*  --  28.0*  --  19.6   CR  --   --  1.23*  --   --   --   --  1.43*  --  1.63*  --  1.40*   * 173* 188* 175*   < >  --    < > 170*   < > 147*    < > 129*    < > = values in this interval not displayed.     Liver Function Tests  Recent Labs   Lab 01/04/23  1257   AST 60*   ALT 82*   ALKPHOS 68   BILITOTAL 0.3   ALBUMIN 3.4*     Coagulation Profile  No lab results found in last 7 days.    5. RADIOLOGY:   Recent Results (from the past 24 hour(s))   XR Abdomen Port 1 View    Narrative    EXAMINATION:  XR ABDOMEN PORT 1 VIEW 1/9/2023     COMPARISON: Abdominal radiograph 12/31/2022    HISTORY: confirm NG placement s/p extubation.    TECHNIQUE: Frontal supine view of the abdomen.    FINDINGS: Enteric tube sidehole projects over the gastroesophageal  junction. No abnormally dilated loops of bowel, free air, or  pneumatosis in the visualized abdomen. No portal venous gas.   Partially visualized sternotomy wires.      Impression    IMPRESSION:   Enteric tube sidehole projects over the gastroesophageal junction.  Recommend advancement by 5 cm.    I have personally reviewed the examination and initial interpretation  and I agree with the findings.    ANDIE EDWARDS MD         SYSTEM ID:  [id number]   XR Abdomen Port 1 View    Narrative    Exam: XR ABDOMEN PORT 1 VIEW, 1/9/2023 5:52 PM    Indication: OG placement    Comparison: 1/9/2023 XR abdomen    Findings:   Supine view the abdomen. Enteric tube tip projects over the gastric  lumen. Nonobstructive bowel gas pattern. No free air or pneumatosis.  No portal venous gas.      Impression    Impression: Enteric tube minimally advanced, though tip and sidehole  are likely within the stomach. Nonobstructive bowel gas pattern.    I have personally reviewed the examination and initial interpretation  and I agree with the findings.    AMANDA CORDERO DO         SYSTEM ID:  [id number]

## 2023-01-11 ENCOUNTER — APPOINTMENT (OUTPATIENT)
Dept: SPEECH THERAPY | Facility: CLINIC | Age: 63
End: 2023-01-11
Payer: COMMERCIAL

## 2023-01-11 LAB
ANION GAP SERPL CALCULATED.3IONS-SCNC: 11 MMOL/L (ref 7–15)
ATRIAL RATE - MUSE: 56 BPM
BUN SERPL-MCNC: 25.9 MG/DL (ref 8–23)
CALCIUM SERPL-MCNC: 9 MG/DL (ref 8.8–10.2)
CHLORIDE SERPL-SCNC: 106 MMOL/L (ref 98–107)
CREAT SERPL-MCNC: 1.29 MG/DL (ref 0.67–1.17)
DEPRECATED HCO3 PLAS-SCNC: 28 MMOL/L (ref 22–29)
DIASTOLIC BLOOD PRESSURE - MUSE: NORMAL MMHG
ERYTHROCYTE [DISTWIDTH] IN BLOOD BY AUTOMATED COUNT: 17 % (ref 10–15)
GFR SERPL CREATININE-BSD FRML MDRD: 62 ML/MIN/1.73M2
GLUCOSE BLDC GLUCOMTR-MCNC: 103 MG/DL (ref 70–99)
GLUCOSE BLDC GLUCOMTR-MCNC: 133 MG/DL (ref 70–99)
GLUCOSE BLDC GLUCOMTR-MCNC: 135 MG/DL (ref 70–99)
GLUCOSE BLDC GLUCOMTR-MCNC: 153 MG/DL (ref 70–99)
GLUCOSE BLDC GLUCOMTR-MCNC: 256 MG/DL (ref 70–99)
GLUCOSE BLDC GLUCOMTR-MCNC: 257 MG/DL (ref 70–99)
GLUCOSE SERPL-MCNC: 137 MG/DL (ref 70–99)
HCT VFR BLD AUTO: 38.8 % (ref 40–53)
HGB BLD-MCNC: 12.4 G/DL (ref 13.3–17.7)
INTERPRETATION ECG - MUSE: NORMAL
KAPPA LC UR-MCNC: 1.14 MG/DL
KAPPA LC/LAMBDA UR: >1.63 {RATIO} (ref 0.7–6.2)
LAMBDA LC UR-MCNC: <0.7 MG/DL
MAGNESIUM SERPL-MCNC: 2.1 MG/DL (ref 1.7–2.3)
MCH RBC QN AUTO: 29.5 PG (ref 26.5–33)
MCHC RBC AUTO-ENTMCNC: 32 G/DL (ref 31.5–36.5)
MCV RBC AUTO: 92 FL (ref 78–100)
P AXIS - MUSE: 39 DEGREES
PHOSPHATE SERPL-MCNC: 2.7 MG/DL (ref 2.5–4.5)
PLATELET # BLD AUTO: 328 10E3/UL (ref 150–450)
POTASSIUM SERPL-SCNC: 3.4 MMOL/L (ref 3.4–5.3)
POTASSIUM SERPL-SCNC: 5 MMOL/L (ref 3.4–5.3)
PR INTERVAL - MUSE: 146 MS
QRS DURATION - MUSE: 86 MS
QT - MUSE: 456 MS
QTC - MUSE: 440 MS
R AXIS - MUSE: 32 DEGREES
RBC # BLD AUTO: 4.21 10E6/UL (ref 4.4–5.9)
SODIUM SERPL-SCNC: 145 MMOL/L (ref 136–145)
SYSTOLIC BLOOD PRESSURE - MUSE: NORMAL MMHG
T AXIS - MUSE: 66 DEGREES
TRIGL SERPL-MCNC: 105 MG/DL
VENTRICULAR RATE- MUSE: 56 BPM
WBC # BLD AUTO: 9.5 10E3/UL (ref 4–11)

## 2023-01-11 PROCEDURE — 250N000011 HC RX IP 250 OP 636: Performed by: PHYSICIAN ASSISTANT

## 2023-01-11 PROCEDURE — 250N000011 HC RX IP 250 OP 636: Performed by: NURSE PRACTITIONER

## 2023-01-11 PROCEDURE — 92526 ORAL FUNCTION THERAPY: CPT | Mod: GN

## 2023-01-11 PROCEDURE — 93010 ELECTROCARDIOGRAM REPORT: CPT | Performed by: INTERNAL MEDICINE

## 2023-01-11 PROCEDURE — 84478 ASSAY OF TRIGLYCERIDES: CPT

## 2023-01-11 PROCEDURE — 85027 COMPLETE CBC AUTOMATED: CPT | Performed by: STUDENT IN AN ORGANIZED HEALTH CARE EDUCATION/TRAINING PROGRAM

## 2023-01-11 PROCEDURE — 250N000013 HC RX MED GY IP 250 OP 250 PS 637: Performed by: NURSE PRACTITIONER

## 2023-01-11 PROCEDURE — 250N000013 HC RX MED GY IP 250 OP 250 PS 637

## 2023-01-11 PROCEDURE — 999N000215 HC STATISTIC HFNC ADULT NON-CPAP

## 2023-01-11 PROCEDURE — 250N000011 HC RX IP 250 OP 636

## 2023-01-11 PROCEDURE — 120N000002 HC R&B MED SURG/OB UMMC

## 2023-01-11 PROCEDURE — 94660 CPAP INITIATION&MGMT: CPT

## 2023-01-11 PROCEDURE — 999N000043 HC STATISTIC CTO2 CONT OXYGEN TECH TIME EA 90 MIN

## 2023-01-11 PROCEDURE — 84132 ASSAY OF SERUM POTASSIUM: CPT | Performed by: PHYSICIAN ASSISTANT

## 2023-01-11 PROCEDURE — 93005 ELECTROCARDIOGRAM TRACING: CPT

## 2023-01-11 PROCEDURE — 80048 BASIC METABOLIC PNL TOTAL CA: CPT | Performed by: STUDENT IN AN ORGANIZED HEALTH CARE EDUCATION/TRAINING PROGRAM

## 2023-01-11 PROCEDURE — 999N000157 HC STATISTIC RCP TIME EA 10 MIN

## 2023-01-11 PROCEDURE — 83735 ASSAY OF MAGNESIUM: CPT

## 2023-01-11 PROCEDURE — 94640 AIRWAY INHALATION TREATMENT: CPT | Mod: 76

## 2023-01-11 PROCEDURE — 250N000009 HC RX 250: Performed by: NURSE PRACTITIONER

## 2023-01-11 PROCEDURE — 999N000127 HC STATISTIC PERIPHERAL IV START W US GUIDANCE

## 2023-01-11 PROCEDURE — 99233 SBSQ HOSP IP/OBS HIGH 50: CPT | Mod: GC | Performed by: INTERNAL MEDICINE

## 2023-01-11 PROCEDURE — 250N000011 HC RX IP 250 OP 636: Performed by: STUDENT IN AN ORGANIZED HEALTH CARE EDUCATION/TRAINING PROGRAM

## 2023-01-11 PROCEDURE — 250N000013 HC RX MED GY IP 250 OP 250 PS 637: Performed by: STUDENT IN AN ORGANIZED HEALTH CARE EDUCATION/TRAINING PROGRAM

## 2023-01-11 PROCEDURE — 84100 ASSAY OF PHOSPHORUS: CPT

## 2023-01-11 RX ORDER — PREDNISONE 20 MG/1
20 TABLET ORAL DAILY
Status: DISCONTINUED | OUTPATIENT
Start: 2023-01-20 | End: 2023-01-15 | Stop reason: HOSPADM

## 2023-01-11 RX ORDER — POTASSIUM CHLORIDE 29.8 MG/ML
20 INJECTION INTRAVENOUS
Status: COMPLETED | OUTPATIENT
Start: 2023-01-11 | End: 2023-01-11

## 2023-01-11 RX ORDER — PREDNISONE 20 MG/1
40 TABLET ORAL DAILY
Status: DISCONTINUED | OUTPATIENT
Start: 2023-01-16 | End: 2023-01-15 | Stop reason: HOSPADM

## 2023-01-11 RX ORDER — PREDNISONE 10 MG/1
10 TABLET ORAL DAILY
Status: DISCONTINUED | OUTPATIENT
Start: 2023-01-24 | End: 2023-01-15 | Stop reason: HOSPADM

## 2023-01-11 RX ORDER — ACETYLCYSTEINE 100 MG/ML
4 SOLUTION ORAL; RESPIRATORY (INHALATION) EVERY 6 HOURS PRN
Status: DISCONTINUED | OUTPATIENT
Start: 2023-01-11 | End: 2023-01-15 | Stop reason: HOSPADM

## 2023-01-11 RX ORDER — ALBUTEROL SULFATE 90 UG/1
2 AEROSOL, METERED RESPIRATORY (INHALATION) EVERY 4 HOURS PRN
Status: DISCONTINUED | OUTPATIENT
Start: 2023-01-11 | End: 2023-01-11

## 2023-01-11 RX ORDER — IPRATROPIUM BROMIDE AND ALBUTEROL SULFATE 2.5; .5 MG/3ML; MG/3ML
3 SOLUTION RESPIRATORY (INHALATION) EVERY 4 HOURS PRN
Status: DISCONTINUED | OUTPATIENT
Start: 2023-01-11 | End: 2023-01-15 | Stop reason: HOSPADM

## 2023-01-11 RX ORDER — LABETALOL 100 MG/1
100 TABLET, FILM COATED ORAL EVERY 12 HOURS SCHEDULED
Status: DISCONTINUED | OUTPATIENT
Start: 2023-01-11 | End: 2023-01-12

## 2023-01-11 RX ORDER — DIPHENHYDRAMINE HYDROCHLORIDE 50 MG/ML
50 INJECTION INTRAMUSCULAR; INTRAVENOUS EVERY 12 HOURS
Status: DISCONTINUED | OUTPATIENT
Start: 2023-01-11 | End: 2023-01-12

## 2023-01-11 RX ADMIN — ENOXAPARIN SODIUM 40 MG: 40 INJECTION SUBCUTANEOUS at 02:22

## 2023-01-11 RX ADMIN — BUMETANIDE 1 MG: 0.25 INJECTION INTRAMUSCULAR; INTRAVENOUS at 08:25

## 2023-01-11 RX ADMIN — SENNOSIDES 8.6 MG: 8.6 TABLET, FILM COATED ORAL at 09:02

## 2023-01-11 RX ADMIN — POTASSIUM CHLORIDE 20 MEQ: 29.8 INJECTION, SOLUTION INTRAVENOUS at 08:24

## 2023-01-11 RX ADMIN — POLYETHYLENE GLYCOL 3350 17 G: 17 POWDER, FOR SOLUTION ORAL at 19:43

## 2023-01-11 RX ADMIN — LABETALOL HYDROCHLORIDE 100 MG: 100 TABLET ORAL at 19:43

## 2023-01-11 RX ADMIN — OLANZAPINE 5 MG: 5 TABLET, FILM COATED ORAL at 19:44

## 2023-01-11 RX ADMIN — OLANZAPINE 5 MG: 5 TABLET, FILM COATED ORAL at 09:02

## 2023-01-11 RX ADMIN — SENNOSIDES 8.6 MG: 8.6 TABLET, FILM COATED ORAL at 19:44

## 2023-01-11 RX ADMIN — ACETYLCYSTEINE 4 ML: 100 SOLUTION ORAL; RESPIRATORY (INHALATION) at 02:08

## 2023-01-11 RX ADMIN — FAMOTIDINE 20 MG: 10 INJECTION, SOLUTION INTRAVENOUS at 09:56

## 2023-01-11 RX ADMIN — INSULIN GLARGINE 30 UNITS: 100 INJECTION, SOLUTION SUBCUTANEOUS at 12:26

## 2023-01-11 RX ADMIN — FOLIC ACID 1 MG: 1 TABLET ORAL at 09:02

## 2023-01-11 RX ADMIN — LABETALOL HYDROCHLORIDE 10 MG: 5 INJECTION, SOLUTION INTRAVENOUS at 10:07

## 2023-01-11 RX ADMIN — METHYLPREDNISOLONE SODIUM SUCCINATE 125 MG: 125 INJECTION, POWDER, FOR SOLUTION INTRAMUSCULAR; INTRAVENOUS at 08:25

## 2023-01-11 RX ADMIN — DIPHENHYDRAMINE HYDROCHLORIDE 50 MG: 50 INJECTION, SOLUTION INTRAMUSCULAR; INTRAVENOUS at 23:17

## 2023-01-11 RX ADMIN — IPRATROPIUM BROMIDE AND ALBUTEROL SULFATE 3 ML: 2.5; .5 SOLUTION RESPIRATORY (INHALATION) at 02:08

## 2023-01-11 RX ADMIN — OLANZAPINE 5 MG: 5 TABLET, FILM COATED ORAL at 13:38

## 2023-01-11 RX ADMIN — HYDRALAZINE HYDROCHLORIDE 10 MG: 20 INJECTION INTRAMUSCULAR; INTRAVENOUS at 17:09

## 2023-01-11 RX ADMIN — ENOXAPARIN SODIUM 40 MG: 40 INJECTION SUBCUTANEOUS at 13:38

## 2023-01-11 RX ADMIN — MULTIVITAMIN 15 ML: LIQUID ORAL at 09:02

## 2023-01-11 RX ADMIN — ARIPIPRAZOLE 10 MG: 10 TABLET ORAL at 19:44

## 2023-01-11 RX ADMIN — QUETIAPINE FUMARATE 600 MG: 300 TABLET ORAL at 19:44

## 2023-01-11 RX ADMIN — DIPHENHYDRAMINE HYDROCHLORIDE 50 MG: 50 INJECTION, SOLUTION INTRAMUSCULAR; INTRAVENOUS at 05:42

## 2023-01-11 RX ADMIN — DIPHENHYDRAMINE HYDROCHLORIDE 50 MG: 50 INJECTION, SOLUTION INTRAMUSCULAR; INTRAVENOUS at 12:29

## 2023-01-11 RX ADMIN — BUMETANIDE 1 MG: 0.25 INJECTION INTRAMUSCULAR; INTRAVENOUS at 19:44

## 2023-01-11 RX ADMIN — POTASSIUM CHLORIDE 20 MEQ: 29.8 INJECTION, SOLUTION INTRAVENOUS at 05:42

## 2023-01-11 RX ADMIN — FAMOTIDINE 20 MG: 10 INJECTION, SOLUTION INTRAVENOUS at 19:49

## 2023-01-11 ASSESSMENT — ACTIVITIES OF DAILY LIVING (ADL)
ADLS_ACUITY_SCORE: 31

## 2023-01-11 NOTE — PLAN OF CARE
ICU End of Shift Summary. See flowsheets for vital signs and detailed assessment.    Changes this shift: A/O x4, pleasant this shift. Bedside sitter d/c'd, bed alarm set for potential impulsive behavior. SBP >135 x 2, PRN labetalol given x 1 then PRN hydralazine given x1 in the afternoon with adequate relief. On 4L NC still. Full liquid diet ordered, tolerating well. No BM this shift. Voiding via bedside urinal. R PICC line removed. Pt talking with sister via phone and updated her on POC. Intermediate care transfer orders placed.    K replaced this shift x 1.    Plan:  Transfer to floor when able.

## 2023-01-11 NOTE — PROGRESS NOTES
[facility]    Brief Transfer Acceptance/Progress Note - Medicine Service, ERICK TEAM 4       Date of Admission:  12/21/2022    Assessment & Plan    Antony Aguila Jr. is a 62 year old male admitted on 12/21/2022. He has history chronic type B aortic dissection s/p exploration and median sternotomy (6/18/19), type 2 DM (poorly controlled), and HTN who is admitted for acute on chronic thoracic aortic aneurysm with new aortic root dilation. Hospitalization complicated by recurrent episodes of agitation requiring aggressive sedation and compromised airway secondary to upper airway edema leading to intubation and ICU stay. Patient extubated 1/09 and now stable for transfer back to [facility].     Patient seen & examined independently. Agree with care plan as per ICU note.     # Acute hypoxic and hypercarbic respiratory failure  # Significant airway edema  # Concern for sleep apnea  # Tracheostomy placed 7/5/19, decannulated 8/2019  # Angioedema vs allergic reaction  Per hospitalization notes here in Sept 2022, noted to have hx of dyspnea on exertion in setting of being current smoker, tx with albuterol inhaler, recommended outpatient PFTs be obtained. Pulmonary progress note from 7/2019 notes current smoker with unknown COPD history, no PFTs available. Had tracheostomy placed 7/5/19, decannulated 8/2019. CTA chest with compressive atelectasis in L lung adjacent to thoracic aorta and hiatal hernia. Patient has now had multiple instances where he became agitated and non-compliant with attempts for patient to use BIPAP or HFNC and needed heavy sedation to treat agitation such that intubation was required to protect airway. Patient is historically a difficult intubation and was very difficult this time. Patient has a possible hx of angioedema reaction to lisinopril or losartan, but the most recent episode occurred without these medications on board. Patient's sister called for  information of patient's oral airway and tongue at baseline. She considers the tongue and airway are of normal size and not out of the ordinary. She also states he has normal speech, and does not have difficulty conversing at baseline. Additionally, patient's home health nurse was called, states tongue and airway were of normal size prior to hospitalization, patient also had clear speech and was easy to converse with.   - Patient emergently intubated on 1/5, very difficult airway, now extubated (1/09)  - Methylprednisolone 125mg Qday-->Prednisone 60 mg x4 days, 40 mg x 4, 20 mg x 4 days and 10 mg x 4 days  - Benadryl 50mg q6H--> q12H on 1/11  - Famotidine 20mg q12h   - Discussed current medications with pharmacy - stopped gabapentin and amlodipine due to their (rare) association with edema - more often peripheral edema however.  - Could also consider allergy testing outpatient if concern for some other non-medication allergic cause  - Diuresis as below   - Angioedema workup labs: C4 normal, tryptase normal  - C1 esterase ordered, pending results  - ENT consulted- ENT assessed airway, recommended removal of NG tube as it is coiled over the larynx. No evidence of tongue swelling, and no upper airway obst found base of tongue to not impede on airway, resolved swelling of the airway and epiglottis, suctioning revealed mucus and secretion removal. NG tube found to coiled in the hypopharynx. ENT states patient would be intubatable if necesseary.    # Chronic type B aortic dissection (proximal descending aorta to the abdominal aorta)  # Proximal ascending aorta saccular aneurysm   # Distal ascending aorta saccular aneurysm   # Mild nonobstructive CAD  Pt has history of stable type B aortic dissection as well as short segment of dissection vs pseudoaneurysms at the aortic root and distal ascending thoracic aorta. Previously underwent surgical exploration in 6/2019 with concern for type 1 aortic dissection but found to have  chronic type B dissection. On admission, CXR with widened mediastinum slightly increased from prior. CT chest with aneurysmal dilatation ascending and descending thoracic aorta with type B aortic dissection distal to L subclavian artery extending to L common iliac artery with severe narrowing of true lumen and possible pseudoaneurysm at aortic root, new compared to prior 2019 images.   - TRINH 12/22 showed saccular aneurysm of ascending aorta measuring 5.8 cm associated with a dissection flap. Thrombus is seen in the false lumen. Aortic root is dilated at 4.6 cm.  - CTA 12/22 two saccular aneurysms, one each in the proximal ascending aorta and distal ascending aorta. The maximal transaortic diameters of 46.9 x 39.2 mm and 62.7 x 54.4 mm, respectively. Mild, non-obstructive coronary artery disease without any high-grade stenoses.  - Cardiology and CVTS consulted and signed off, plan for outpatient follow up with CVTS  - Resume labetalol 100 mg BID 1/11  - Held DVT ppx 12/23; restarted 12/24 for thrombus seen on TRINH, continue per CVTS recs  - No HR goal per cardiology, SBP preferably maintained <130  - PRN labetalol 10mg for SBP >135        # HTN  Based on outpatient note 12/14/2022 pt was previously hydordiuril 50 mg, amlodipine 10 mg, bumex TID and canaglaflozin. 12/25 Hypertensive overnight with highest SBP of 170s. Given hx of Type B sonam dissection and now new aortic aneurysm goals - SBP < 135   - holding PTA hydrochlorothiazide  - Previously oon Nicardipine gtt given persistent elevated SBPs  - PRN labetalol 10mg for SBP >135, PRN hydralazine for SBP>135 if HR<55  - Labetolol 100mg BID    # Agitation     # Encephalopathy, likely metabolic  # Schizoaffective disorder  # PTSD  Hx of schizoaffective disorder. Meds per chart include Zyprexa, Abilify, and Seroquel. Per pharmacy med rec, appears to only have filled Seroquel recently. Patient has had several episodes of agitation requiring aggressive sedation and  intubation, most recently on 1/5/2023. On the floor, had multiple code 21s called, ultimately given benadryl and ativan (hospitalist reported haldol was given additionally but this is not charted). He was ultimately started on Precedex, put in 4-point restraints. On transfer to the ICU, ultimately had respiratory compromise on transfer to the ICU and was more deeply sedated as a result.  - Psychiatry previously involved - appreciate input  - Restarted aripiprizole 10 mg at bedtime   - Guanfacine 0.5 mg BID (alpha-2 agonist like Precedex)   - PTA quetiapine 600 mg at bedtime  - PTA zyprexa 5 mg q AM & lunch; 10 mg at bedtime   - Psych recs as above   - Delirium precautions, please attempt re-direction prior to sedation    # LAKISHA on CKD 2 - improved   Outpatient labs 12/16 with Cr 1.4 and eGFR 55. Now back at baseline Cr ~1.1-1.2, eGFR ~65-75. UA unremarkable.  - holding PTA metformin for now  - Bumex 1mg BID   - VBG ordered    # Type 2 DM with hyperglycemia  Most recent A1c 12.5 (9/16/22). Home regimen: metformin 1000 qPM and Lantus 50 unit(s) qAM + aspart 17 unit(s) TIDAC + aspart 9 unit(s) w/ snacks  - Lantus 30U  - HDSSI   - holding PTA metformin as above    # Increased pulmonary secretions  # Concern for hospital associated pneumonia-resolved   Has developed copious creamy secretions, has low grade temperature of 100.0F, no leukocytosis. CXR showing increased opacities. Previously on Zosyn, transitioned to nafcillin 1/01/23  - Nafcillin completed 1/4   - Pulm toilet: metaneb, mucomyst      # Anemia, normocytic  - Trend CBC  - Mild folate deficiency, started folic acid supplement    # BLE edema  Dimer elevated with acute on chronic leg swelling L < R per patient history. DVT US in ED negative for DVT b/l. Has had LE swelling for last 2 years, notes it is worse lately. Previously on diuretics, not currently per chart.  - lymphedema consult  - diuresis as above    # Venous stasis ulcer LLE  # Venous insufficiency  #  Peripheral vascular disease  # Tongue injury   - WOC consult placed    # Mild hypokalemia, resolved  # Mild hypomagnesemia, resolved   - Repletion protocols      # Hypocalcemia, resolved  - Monitor BMP  - Ionized calcium WNL 4.9       Diet: Full Liquid Diet (No straws)    DVT Prophylaxis: Enoxaparin (Lovenox) SQ  Noble Catheter: Not present  Fluids: none  Lines: None     Cardiac Monitoring: None  Code Status: Full Code      Clinically Significant Risk Factors              # Hypoalbuminemia: Lowest albumin = 2.9 g/dL at 12/26/2022  6:03 AM, will monitor as appropriate          # DMII: A1C = 8.0 % (Ref range: <5.7 %) within past 3 months   # Severe Obesity: Estimated body mass index is 43.81 kg/m  as calculated from the following:    Height as of this encounter: 1.829 m (6').    Weight as of this encounter: 146.5 kg (323 lb).          Disposition Plan       The patient's care was discussed with the Patient and Transferring service  Team.    Arleen Gallegos MD  Medicine Service, [facility] TEAM [address] [place] [address]  Securely message with Vocera (more info)  Text page via [facility] Paging/Directory   See signed in provider for up to date coverage information  ______________________________________________________________________    Interval History   Please see daily MICU note. Patient alert and conversational at this provider evaluation.     Physical Exam   Vital Signs: Temp: 98.7  F (37.1  C) Temp src: Oral BP: (!) 162/82 (PRN med given) Pulse: 90   Resp: 28 SpO2: 98 % O2 Device: Nasal cannula with humidification Oxygen Delivery: 4 LPM  Weight: 323 lbs 0 oz    Constitutional: obese appears uncomfortable  HEENT: EOMI, MMM  Neck: Symmetric  Cardiovascular: regular without murmurs or gallops  Pulmonary/Chest: +LFNC in place. CTAB prolonged expiratory phase. No respiratory distress.  GI: Soft, non-tender , non-distended    Musculoskeletal:  2+ bilateral edema increased, oozing blisters  under gauze on L  Skin: Skin is warm and dry; please see RN & WOC notes for further documentation  Neurological: Alert and oriented, moves all extremities   Psychiatric:  euthymic       Data      Recent Labs   Lab 01/11/23  1513 01/11/23  1159 01/11/23  0957 01/11/23  0822 01/11/23  0406 01/11/23  0357 01/10/23  0804 01/10/23  0351 01/09/23  0804 01/09/23  0450   WBC  --   --   --   --   --  9.5  --  10.1  --  9.7   HGB  --   --   --   --   --  12.4*  --  11.6*  --  11.4*   MCV  --   --   --   --   --  92  --  92  --  91   PLT  --   --   --   --   --  328  --  277  --  294   NA  --   --   --   --   --  145  --  144  --  140   POTASSIUM  --   --  5.0  --   --  3.4  --  4.2   < > 3.3*   CHLORIDE  --   --   --   --   --  106  --  108*  --  103   CO2  --   --   --   --   --  28  --  26  --  25   BUN  --   --   --   --   --  25.9*  --  31.6*  --  32.5*   CR  --   --   --   --   --  1.29*  --  1.23*  --  1.43*   ANIONGAP  --   --   --   --   --  11  --  10  --  12   ANGELLA  --   --   --   --   --  9.0  --  8.2*  --  8.6*   * 256*  --  133*   < > 137*   < > 188*   < > 170*    < > = values in this interval not displayed.       Imaging results reviewed over the past 24 hrs:   No results found for this or any previous visit (from the past 24 hour(s)).

## 2023-01-11 NOTE — PLAN OF CARE
ICU End of Shift Summary. See flowsheets for vital signs and detailed assessment.    Changes this shift: Pt had a much better night tonight. AOx4. Slightly restless at beginning of the shift, Precedex on for a few hours. Has been calm and cooperative since. Unable to give any PO medications d/t NG being pulled on day shift - sublingual Zyprexa substituted. Tolerated BiPap for ~7.5 hrs. 4L NC otherwise. SR/SB, HR 55-80s. SBP < 135, no PRNs needed. Hesitant/straining with urination at times. Facial/tongue edema improved. Speech is more clear today. Potassium replaced per protocol, recheck @ 1030.    Plan: Continue with plan of care.

## 2023-01-11 NOTE — PROGRESS NOTES
MEDICAL ICU PROGRESS NOTE  01/11/2023      Date of Service (when I saw the patient): 01/11/2023    ASSESSMENT: Antony Aguila Jr. is a 62 year old male admitted on 12/21/2022. He has history chronic type B aortic dissection s/p exploration and median sternotomy (6/18/19), type 2 DM (poorly controlled), and HTN who is admitted for acute on chronic thoracic aortic aneurysm with new aortic root dilation. Hospitalization complicated by recurrent episodes of agitation requiring aggressive sedation and compromised airway secondary to upper airway edema leading to intubation, and now extubated.    CHANGES and MAJOR THINGS TODAY:  - Ordered CPAP at night  - EKG  - Seroquel  600mg bedtime (increased from 400mg)  - Methylprednisolone 125mg Qday-->Prednisone 60 mg x4 days, 40 mg x 4, 20 mg x 4 days and 10 mg x 4 days  - Benadryl 50mg q6H--> q12H on 1/11  - Labetolol 100mg BID  - PICC remove on 1/11  - Transfer to intermediate care- please de-escalate if agitated and AVOID sedation given issues with intubation    PLAN:    Neuro:  # Agitation     # Encephalopathy, likely metabolic  Patient has had several episodes of agitation requiring aggressive sedation and intubation, most recently on 1/5/2023. On the floor, had multiple code 21s called, ultimately given benadryl and ativan (hospitalist reported haldol was given additionally but this is not charted). He was ultimately started on Precedex, put in 4-point restraints. On transfer to the ICU, had respiratory compromise upon transfer to the ICU and was more deeply sedated as a result and was intubated on 1/5.   - Precedex initiated for agitation, weaning off due to concerns for sinus bradycardia. Avoid restarting Precedex  - Clonidine 0.1mg TID for weaning off Precedex (start on 1/11/23)  - Psychiatry re-consulted, recommendations below   -- Seroquel 200mg am and 600mg bedtime   -- Continue Zyprexa 5mg TID    -- Continue Abilify 10mg bedtime  - EKG ordered   - Discontinued  guanfacine due to concern this could explain the recurrence of his angioedema  - Will attempt to avoid  Haldol given hx of prolonged QTc, okay for 1x dose overnight if severely agitated.     # Concern for seizure   - Hx of body twitching in 2019 and was treated with Keppra, MRI and EEG did not show epileptic etiology or structural lesion   - Arm and leg shaking, as well as eye flickering overnight on 1/5  - Neuro crit consulted, signed off    - Loaded with Keppra 3g given concern for seizure, however felt that suspicion was low therefore discontinued Keppra  - vEEG initiated, significant for encephalopathy  - No need for Keppra moving forward     # Age-indeterminate right and chronic left BG lacunar infarcts  - Consider starting statin   - Consider starting ASA 81mg     # Pain and sedation  - Stopped propofol  - Stopped fentanyl  - Discontinued Midazolam gtt and 1-2mg Q1H PRN  - Patient extubated on 1/09  - RASS goal -1 to -2      # Schizoaffective disorder  # PTSD  Meds per chart include Zyprexa, Abilify, and Seroquel. Per pharmacy med rec, appears to only have filled Seroquel recently.   - Seroquel 600mg at bedtime (start 1/11)  - Resumed Abilify 10mg QHS  - Psych recs as above      # Tobacco use  Smoked cigarettes for 45 years. Currently 1 pack per week.  - Encourage cessation      Pulmonary:  # Acute hypoxic and hypercarbic respiratory failure  # Significant airway edema  # Concern for sleep apnea  # Tracheostomy placed 7/5/19, decannulated 8/2019  # Angioedema vs allergic reaction  Per hospitalization notes here in Sept 2022, noted to have hx of dyspnea on exertion in setting of being current smoker, tx with albuterol inhaler, recommended outpatient PFTs be obtained. Pulmonary progress note from 7/2019 notes current smoker with unknown COPD history, no PFTs available. Had tracheostomy placed 7/5/19, decannulated 8/2019. CTA chest with compressive atelectasis in L lung adjacent to thoracic aorta and hiatal  hernia. Patient has now had multiple instances where he became agitated and non-compliant with attempts for patient to use BIPAP or HFNC and needed heavy sedation to treat agitation such that intubation was required to protect airway. Patient is historically a difficult intubation and was very difficult this time. Patient has a possible hx of angioedema reaction to lisinopril or losartan, but the most recent episode occurred without these medications on board. Patient's sister called for information of patient's oral airway and tongue at baseline. She considers the tongue and airway are of normal size and not out of the ordinary. She also states he has normal speech, and does not have difficulty conversing at baseline. Additionally, patient's home health nurse was called, states tongue and airway were of normal size prior to hospitalization, patient also had clear speech and was easy to converse with.   - Patient emergently intubated on 1/5, very difficult airway, now extubated (1/09)  - Methylprednisolone 125mg Qday-->Prednisone 60 mg x4 days, 40 mg x 4, 20 mg x 4 days and 10 mg x 4 days  - Benadryl 50mg q6H--> q12H on 1/11  - Famotidine 20mg q12h   - Discussed current medications with pharmacy - stopped gabapentin and amlodipine due to their (rare) association with edema - more often peripheral edema however.  - Could also consider allergy testing outpatient if concern for some other non-medication allergic cause  - Diuresis as below   - Angioedema workup labs: C4 normal, tryptase normal  - C1 esterase ordered, pending results  - ENT consulted- ENT assessed airway, recommended removal of NG tube as it is coiled over the larynx. No evidence of tongue swelling, and no upper airway obst found base of tongue to not impede on airway, resolved swelling of the airway and epiglottis, suctioning revealed mucus and secretion removal. NG tube found to coiled in the hypopharynx. ENT states patient would be intubatable if  necesseary.     Cardiovascular:  # Chronic type B aortic dissection (proximal descending aorta to the abdominal aorta)  # Proximal ascending aorta saccular aneurysm   # Distal ascending aorta saccular aneurysm   # Mild nonobstructive CAD  Pt has history of stable type B aortic dissection as well as short segment of dissection vs pseudoaneurysms at the aortic root and distal ascending thoracic aorta. Previously underwent surgical exploration in 6/2019 with concern for type 1 aortic dissection but found to have chronic type B dissection. On admission, CXR with widened mediastinum slightly increased from prior. CT chest with aneurysmal dilatation ascending and descending thoracic aorta with type B aortic dissection distal to L subclavian artery extending to L common iliac artery with severe narrowing of true lumen and possible pseudoaneurysm at aortic root, new compared to prior 2019 images.   - TRINH 12/22 showed saccular aneurysm of ascending aorta measuring 5.8 cm associated with a dissection flap. Thrombus is seen in the false lumen. Aortic root is dilated at 4.6 cm.  - CTA 12/22 two saccular aneurysms, one each in the proximal ascending aorta and distal ascending aorta. The maximal transaortic diameters of 46.9 x 39.2 mm and 62.7 x 54.4 mm, respectively. Mild, non-obstructive coronary artery disease without any high-grade stenoses.  - Cardiology and CVTS consulted and signed off, plan for outpatient follow up with CVTS  - Resume labetalol 400 mg BID 1/11  - Hold DVT ppx 12/23; restarted 12/24 for thrombus seen on TRINH, continue per CVTS recs  - No HR goal per cardiology, SBP preferably maintained <130  - PRN labetalol 10mg for SBP >135       # HTN  Based on outpatient note 12/14/2022 pt was previously hydordiuril 50 mg, amlodipine 10 mg, bumex TID and canaglaflozin. 12/25 Hypertensive overnight with highest SBP of 170s. Given hx of Type B sonam dissection and now new aortic aneurysm goals - hold Labetalol   BID (titrated from intially PO dose of 100 mg)  - holding PTA hydrochlorothiazide  - Was on Nicardipine gtt given persistent elevated SBPs  - PRN labetalol 10mg for SBP >135, PRN hydralazine for SBP>135 if HR<50  - Labetolol 100mg BID     GI/Nutrition:  # Large hiatal hernia  # Nutrition  Previously unable to place feeding tube under fluoro due to large hiatal hernia, required GI assistance with feeding tube placement   - FL diet      Renal/Fluids/Electrolytes:  # LAKISHA on CKD 2 - improved   Outpatient labs 12/16 with Cr 1.4 and eGFR 55. Now back at baseline Cr ~1.1-1.2, eGFR ~65-75. UA unremarkable.  - holding PTA metformin for now  - Bumex 1mg BID   - VBG ordered     # Mild hypokalemia, resolved  # Mild hypomagnesemia, resolved   - Repletion protocols      # Hypocalcemia, resolved  - Monitor BMP  - Ionized calcium WNL 4.9     Endocrine:  # Type 2 DM with hyperglycemia  Most recent A1c 12.5 (9/16/22). Home regimen: metformin 1000 qPM and Lantus 50 unit(s) qAM + aspart 17 unit(s) TIDAC + aspart 9 unit(s) w/ snacks  - Lantus 30U  - HDSSI   - holding PTA metformin as above     ID:  # Increased pulmonary secretions  # Concern for hospital associated pneumonia-resolved   Has developed copious creamy secretions, has low grade temperature of 100.0F, no leukocytosis. CXR showing increased opacities. Previously on Zosyn, transitioned to nafcillin 1/01/23  - Nafcillin completed 1/4   - Pulm toilet: metaneb, mucomyst       Hematology:    # Anemia, normocytic  - Trend CBC  - Mild folate deficiency, started folic acid supplement     Musculoskeletal:  # BLE edema  Dimer elevated with acute on chronic leg swelling L < R per patient history. DVT US in ED negative for DVT b/l. Has had LE swelling for last 2 years, notes it is worse lately. Previously on diuretics, not currently per chart.  - lymphedema consult  - diuresis as above     Skin:  # Venous stasis ulcer LLE  # Venous insufficiency  # Peripheral vascular disease  # Tongue  "injury   - WOC consult placed     General Cares/Prophylaxis:    DVT Prophylaxis: lovenox   GI Prophylaxis: PPI  Restraints: bilateral mitts and wrist restraints  Family Communication: Sister updated by phone     Code Status: Full Code     Lines/tubes/drains:  - PIV x 1  - PICC removed on 1/11    Disposition:  - intermediate care    Patient seen and findings/plan discussed with medical ICU staff, Dr. Hooker.     Radha Decker MD  Internal Medicine-PGY2  [facility]  Pager: [phone]      Clinically Significant Risk Factors              # Hypoalbuminemia: Lowest albumin = 2.9 g/dL at 12/26/2022  6:03 AM, will monitor as appropriate          # DMII: A1C = 8.0 % (Ref range: <5.7 %) within past 3 months   # Severe Obesity: Estimated body mass index is 43.81 kg/m  as calculated from the following:    Height as of this encounter: 1.829 m (6').    Weight as of this encounter: 146.5 kg (323 lb).               ====================================  INTERVAL HISTORY:   Per nursing patient doing well. Pt also states he is doing well today. Was evaluated by SLP and transitioned to full liquid diet. He denies SOB, CP, abd pain, N/V. Also denies pain. States \"I cannot get up and go anywhere so how can my legs hurt?\" Encouraged pt regarding progress he has made. Informed him that he will be transferred to the intermediate care floor today.     OBJECTIVE:   1. VITAL SIGNS:   Temp:  [98.2  F (36.8  C)-99.2  F (37.3  C)] 98.7  F (37.1  C)  Pulse:  [48-83] 83  Resp:  [13-43] 19  BP: ()/() 139/77  FiO2 (%):  [30 %] 30 %  SpO2:  [90 %-100 %] 95 %  Vent Mode: CMV/AC  (Continuous Mandatory Ventilation/ Assist Control)  FiO2 (%): 30 %  Resp Rate (Set): 18 breaths/min  Tidal Volume (Set, mL): 500 mL  PEEP (cm H2O): 8 cmH2O  Resp: 19    2. INTAKE/ OUTPUT:   I/O last 3 completed shifts:  In: 2025.32 [I.V.:1650.32; NG/GT:375]  Out: 3400 [Urine:3400]    3. PHYSICAL EXAMINATION:  General: Patient extubated (1/09), " alert and oriented  HEENT: No scleral icterus, MMM, tongue normal in appearance, no tongue swelling, and NOT protruding from mouth.  Neuro: Patient following commands, grossly nonfocal   Pulm/Resp: No wheezing, breath sounds appreciated bilaterally, patient on 4L NC   CV: RRR, no murmurs  Abdomen: Soft, non-distended, non-tender  : no connor  Incisions/Skin: lower extremity edema with woody appearance  Psych: very appropriate. Very pleasant and chatty today    4. LABS:   Arterial Blood Gases   Recent Labs   Lab 01/05/23  0536   PH 7.39   PCO2 52*   PO2 76*   HCO3 32*     Complete Blood Count   Recent Labs   Lab 01/11/23  0357 01/10/23  0351 01/09/23  0450 01/08/23  0459   WBC 9.5 10.1 9.7 10.0   HGB 12.4* 11.6* 11.4* 11.3*    277 294 285     Basic Metabolic Panel  Recent Labs   Lab 01/11/23  0822 01/11/23  0406 01/11/23  0357 01/11/23  0025 01/10/23  0804 01/10/23  0351 01/09/23  1234 01/09/23  1231 01/09/23  0804 01/09/23  0450 01/08/23  0501 01/08/23  0459   NA  --   --  145  --   --  144  --   --   --  140  --  145   POTASSIUM  --   --  3.4  --   --  4.2  --  4.2  --  3.3*  --  3.6   CHLORIDE  --   --  106  --   --  108*  --   --   --  103  --  108*   CO2  --   --  28  --   --  26  --   --   --  25  --  23   BUN  --   --  25.9*  --   --  31.6*  --   --   --  32.5*  --  28.0*   CR  --   --  1.29*  --   --  1.23*  --   --   --  1.43*  --  1.63*   * 135* 137* 103*   < > 188*   < >  --    < > 170*   < > 147*    < > = values in this interval not displayed.     Liver Function Tests  Recent Labs   Lab 01/04/23  1257   AST 60*   ALT 82*   ALKPHOS 68   BILITOTAL 0.3   ALBUMIN 3.4*     Coagulation Profile  No lab results found in last 7 days.    5. RADIOLOGY:   Recent Results (from the past 24 hour(s))   XR Abdomen Port 1 View    Narrative    EXAM: XR ABDOMEN PORT 1 VIEW  1/10/2023 4:11 PM      HISTORY: NG tube placement    COMPARISON: 1/9/2023    FINDINGS: Single AP view of the abdomen. Entire abdomen not  within the  field of view. Sternotomy wires. Central venous catheter terminates  over the right atrium. Enteric tube tip and sidehole project over the  stomach.    Nonobstructive bowel gas pattern. Visualized portions of the lung  demonstrate no acute airspace opacities. No acute osseous amount.      Impression    IMPRESSION: Enteric tube tip and sidehole in the stomach.    I have personally reviewed the examination and initial interpretation  and I agree with the findings.    AMANDA CORDERO DO         SYSTEM ID:  [id number]   XR Chest Port 1 View    Narrative    XR CHEST PORT 1 VIEW  1/10/2023 4:12 PM      HISTORY: Coiled NG in esophagus, will try to visualize if NG is in  esophagus or coiled in oropharynx    COMPARISON: 1/5/2023    FINDINGS:   Single AP view. Stable postoperative changes. Enteric tube courses  below the field of view with proximal coiling over the neck. Stable  appearance of the visualized cardiomediastinal silhouette, widened.  Dilated thoracic aorta. Right arm PICC tip in similar position. No  pneumothorax. Costophrenic angles and lower lung zone are excluded  from the field of view on today's exam. Increased diffuse interstitial  and airspace opacities. No visualized acute osseous abnormality.      Impression    IMPRESSION:   1. Enteric tube courses below the field-of-view with coiling over the  neck. Correlate with physical exam.  2. Increased diffuse interstitial and airspace opacities, which could  represent infection versus edema and atelectasis.  3. Lower lung zones were excluded from field-of-view on today's exam.  4. Stable widening of the cardiac silhouette and dilated thoracic  aorta.    I have personally reviewed the examination and initial interpretation  and I agree with the findings.    VIDYA MONZON MD         SYSTEM ID:  [id number]

## 2023-01-12 ENCOUNTER — APPOINTMENT (OUTPATIENT)
Dept: SPEECH THERAPY | Facility: CLINIC | Age: 63
End: 2023-01-12
Payer: COMMERCIAL

## 2023-01-12 ENCOUNTER — APPOINTMENT (OUTPATIENT)
Dept: OCCUPATIONAL THERAPY | Facility: CLINIC | Age: 63
End: 2023-01-12
Payer: COMMERCIAL

## 2023-01-12 LAB
ANION GAP SERPL CALCULATED.3IONS-SCNC: 14 MMOL/L (ref 7–15)
ATRIAL RATE - MUSE: 77 BPM
BUN SERPL-MCNC: 20.4 MG/DL (ref 8–23)
C1INH SERPL-MCNC: 41 MG/DL
CALCIUM SERPL-MCNC: 9.4 MG/DL (ref 8.8–10.2)
CHLORIDE SERPL-SCNC: 102 MMOL/L (ref 98–107)
CREAT SERPL-MCNC: 1.26 MG/DL (ref 0.67–1.17)
DEPRECATED HCO3 PLAS-SCNC: 25 MMOL/L (ref 22–29)
DIASTOLIC BLOOD PRESSURE - MUSE: NORMAL MMHG
ERYTHROCYTE [DISTWIDTH] IN BLOOD BY AUTOMATED COUNT: 17.2 % (ref 10–15)
GFR SERPL CREATININE-BSD FRML MDRD: 64 ML/MIN/1.73M2
GLUCOSE BLDC GLUCOMTR-MCNC: 136 MG/DL (ref 70–99)
GLUCOSE BLDC GLUCOMTR-MCNC: 137 MG/DL (ref 70–99)
GLUCOSE BLDC GLUCOMTR-MCNC: 204 MG/DL (ref 70–99)
GLUCOSE BLDC GLUCOMTR-MCNC: 243 MG/DL (ref 70–99)
GLUCOSE BLDC GLUCOMTR-MCNC: 259 MG/DL (ref 70–99)
GLUCOSE SERPL-MCNC: 264 MG/DL (ref 70–99)
HCT VFR BLD AUTO: 41.2 % (ref 40–53)
HGB BLD-MCNC: 13.1 G/DL (ref 13.3–17.7)
INTERPRETATION ECG - MUSE: NORMAL
MAGNESIUM SERPL-MCNC: 1.7 MG/DL (ref 1.7–2.3)
MCH RBC QN AUTO: 28.7 PG (ref 26.5–33)
MCHC RBC AUTO-ENTMCNC: 31.8 G/DL (ref 31.5–36.5)
MCV RBC AUTO: 90 FL (ref 78–100)
P AXIS - MUSE: 62 DEGREES
PHOSPHATE SERPL-MCNC: 2.7 MG/DL (ref 2.5–4.5)
PLATELET # BLD AUTO: 339 10E3/UL (ref 150–450)
POTASSIUM SERPL-SCNC: 3.5 MMOL/L (ref 3.4–5.3)
PR INTERVAL - MUSE: 142 MS
QRS DURATION - MUSE: 82 MS
QT - MUSE: 406 MS
QTC - MUSE: 459 MS
R AXIS - MUSE: 61 DEGREES
RBC # BLD AUTO: 4.56 10E6/UL (ref 4.4–5.9)
SODIUM SERPL-SCNC: 141 MMOL/L (ref 136–145)
SYSTOLIC BLOOD PRESSURE - MUSE: NORMAL MMHG
T AXIS - MUSE: 82 DEGREES
VENTRICULAR RATE- MUSE: 77 BPM
WBC # BLD AUTO: 10.5 10E3/UL (ref 4–11)

## 2023-01-12 PROCEDURE — 83735 ASSAY OF MAGNESIUM: CPT

## 2023-01-12 PROCEDURE — 120N000002 HC R&B MED SURG/OB UMMC

## 2023-01-12 PROCEDURE — 250N000011 HC RX IP 250 OP 636: Performed by: NURSE PRACTITIONER

## 2023-01-12 PROCEDURE — 250N000011 HC RX IP 250 OP 636

## 2023-01-12 PROCEDURE — 250N000012 HC RX MED GY IP 250 OP 636 PS 637: Performed by: NURSE PRACTITIONER

## 2023-01-12 PROCEDURE — 250N000011 HC RX IP 250 OP 636: Performed by: STUDENT IN AN ORGANIZED HEALTH CARE EDUCATION/TRAINING PROGRAM

## 2023-01-12 PROCEDURE — 250N000013 HC RX MED GY IP 250 OP 250 PS 637: Performed by: NURSE PRACTITIONER

## 2023-01-12 PROCEDURE — 92526 ORAL FUNCTION THERAPY: CPT | Mod: GN

## 2023-01-12 PROCEDURE — 97535 SELF CARE MNGMENT TRAINING: CPT | Mod: GO | Performed by: OCCUPATIONAL THERAPIST

## 2023-01-12 PROCEDURE — 250N000013 HC RX MED GY IP 250 OP 250 PS 637

## 2023-01-12 PROCEDURE — 36415 COLL VENOUS BLD VENIPUNCTURE: CPT | Performed by: STUDENT IN AN ORGANIZED HEALTH CARE EDUCATION/TRAINING PROGRAM

## 2023-01-12 PROCEDURE — 80048 BASIC METABOLIC PNL TOTAL CA: CPT | Performed by: STUDENT IN AN ORGANIZED HEALTH CARE EDUCATION/TRAINING PROGRAM

## 2023-01-12 PROCEDURE — 250N000013 HC RX MED GY IP 250 OP 250 PS 637: Performed by: STUDENT IN AN ORGANIZED HEALTH CARE EDUCATION/TRAINING PROGRAM

## 2023-01-12 PROCEDURE — 84100 ASSAY OF PHOSPHORUS: CPT

## 2023-01-12 PROCEDURE — 250N000012 HC RX MED GY IP 250 OP 636 PS 637

## 2023-01-12 PROCEDURE — 85027 COMPLETE CBC AUTOMATED: CPT | Performed by: STUDENT IN AN ORGANIZED HEALTH CARE EDUCATION/TRAINING PROGRAM

## 2023-01-12 PROCEDURE — 99233 SBSQ HOSP IP/OBS HIGH 50: CPT | Mod: GC | Performed by: PEDIATRICS

## 2023-01-12 RX ORDER — LABETALOL 200 MG/1
200 TABLET, FILM COATED ORAL EVERY 12 HOURS SCHEDULED
Status: DISCONTINUED | OUTPATIENT
Start: 2023-01-12 | End: 2023-01-14

## 2023-01-12 RX ORDER — DIPHENHYDRAMINE HCL 25 MG
50 CAPSULE ORAL 2 TIMES DAILY
Status: DISCONTINUED | OUTPATIENT
Start: 2023-01-12 | End: 2023-01-15 | Stop reason: HOSPADM

## 2023-01-12 RX ORDER — LABETALOL 100 MG/1
100 TABLET, FILM COATED ORAL ONCE
Status: COMPLETED | OUTPATIENT
Start: 2023-01-12 | End: 2023-01-12

## 2023-01-12 RX ORDER — FAMOTIDINE 20 MG/1
20 TABLET, FILM COATED ORAL 2 TIMES DAILY
Status: DISCONTINUED | OUTPATIENT
Start: 2023-01-12 | End: 2023-01-15 | Stop reason: HOSPADM

## 2023-01-12 RX ADMIN — OLANZAPINE 5 MG: 5 TABLET, ORALLY DISINTEGRATING ORAL at 21:23

## 2023-01-12 RX ADMIN — SENNOSIDES 8.6 MG: 8.6 TABLET, FILM COATED ORAL at 07:30

## 2023-01-12 RX ADMIN — BUMETANIDE 1 MG: 0.25 INJECTION INTRAMUSCULAR; INTRAVENOUS at 20:05

## 2023-01-12 RX ADMIN — FOLIC ACID 1 MG: 1 TABLET ORAL at 07:30

## 2023-01-12 RX ADMIN — LABETALOL HYDROCHLORIDE 100 MG: 100 TABLET, FILM COATED ORAL at 13:02

## 2023-01-12 RX ADMIN — HYDRALAZINE HYDROCHLORIDE 10 MG: 20 INJECTION INTRAMUSCULAR; INTRAVENOUS at 00:11

## 2023-01-12 RX ADMIN — LABETALOL HYDROCHLORIDE 10 MG: 5 INJECTION, SOLUTION INTRAVENOUS at 20:10

## 2023-01-12 RX ADMIN — ARIPIPRAZOLE 10 MG: 10 TABLET ORAL at 21:23

## 2023-01-12 RX ADMIN — LABETALOL HYDROCHLORIDE 100 MG: 100 TABLET ORAL at 07:32

## 2023-01-12 RX ADMIN — BUMETANIDE 1 MG: 0.25 INJECTION INTRAMUSCULAR; INTRAVENOUS at 07:30

## 2023-01-12 RX ADMIN — OLANZAPINE 5 MG: 5 TABLET, FILM COATED ORAL at 20:05

## 2023-01-12 RX ADMIN — INSULIN GLARGINE 30 UNITS: 100 INJECTION, SOLUTION SUBCUTANEOUS at 12:29

## 2023-01-12 RX ADMIN — LABETALOL HYDROCHLORIDE 10 MG: 5 INJECTION, SOLUTION INTRAVENOUS at 10:12

## 2023-01-12 RX ADMIN — DIPHENHYDRAMINE HYDROCHLORIDE 50 MG: 25 CAPSULE ORAL at 20:05

## 2023-01-12 RX ADMIN — QUETIAPINE FUMARATE 600 MG: 300 TABLET ORAL at 20:05

## 2023-01-12 RX ADMIN — HYDRALAZINE HYDROCHLORIDE 10 MG: 20 INJECTION INTRAMUSCULAR; INTRAVENOUS at 06:08

## 2023-01-12 RX ADMIN — OLANZAPINE 5 MG: 5 TABLET, FILM COATED ORAL at 13:03

## 2023-01-12 RX ADMIN — ONDANSETRON HYDROCHLORIDE 4 MG: 2 INJECTION, SOLUTION INTRAMUSCULAR; INTRAVENOUS at 13:02

## 2023-01-12 RX ADMIN — DIPHENHYDRAMINE HYDROCHLORIDE 50 MG: 25 CAPSULE ORAL at 13:03

## 2023-01-12 RX ADMIN — LABETALOL HYDROCHLORIDE 10 MG: 5 INJECTION, SOLUTION INTRAVENOUS at 05:08

## 2023-01-12 RX ADMIN — ENOXAPARIN SODIUM 40 MG: 40 INJECTION SUBCUTANEOUS at 02:03

## 2023-01-12 RX ADMIN — FAMOTIDINE 20 MG: 20 TABLET ORAL at 20:05

## 2023-01-12 RX ADMIN — ENOXAPARIN SODIUM 40 MG: 40 INJECTION SUBCUTANEOUS at 13:03

## 2023-01-12 RX ADMIN — OLANZAPINE 5 MG: 5 TABLET, FILM COATED ORAL at 07:31

## 2023-01-12 RX ADMIN — PREDNISONE 60 MG: 50 TABLET ORAL at 07:30

## 2023-01-12 RX ADMIN — FAMOTIDINE 20 MG: 10 INJECTION, SOLUTION INTRAVENOUS at 09:15

## 2023-01-12 ASSESSMENT — ACTIVITIES OF DAILY LIVING (ADL)
ADLS_ACUITY_SCORE: 29
ADLS_ACUITY_SCORE: 24
ADLS_ACUITY_SCORE: 31
ADLS_ACUITY_SCORE: 24
ADLS_ACUITY_SCORE: 31
ADLS_ACUITY_SCORE: 31
ADLS_ACUITY_SCORE: 24
ADLS_ACUITY_SCORE: 31
ADLS_ACUITY_SCORE: 31
ADLS_ACUITY_SCORE: 29
ADLS_ACUITY_SCORE: 29
ADLS_ACUITY_SCORE: 24

## 2023-01-12 NOTE — PROGRESS NOTES
"    SPIRITUAL HEALTH SERVICES Progress Note  [place] 4C    Saw pt Antony Aguila . per Length of Stay.      Patient/Family Understanding of Illness and Goals of Care - Pt said he is tired of being in the hospital and wants to go home.  He said \"I'm giving it until Friday and then I'm signing myself out.\"  He said that he does not feel respected or listened to by the team, and that he is frustrated with the lack of progress he feels regarding discharge.      Distress and Loss - Pt named his distress about being in the hospital and his myriad health challenges.  He also said that he feels \"homesick and bored,\" and that normally he watches movies or listens to music but hasn't been able to do that here.      Strengths, Coping, and Resources - Pt has family supporting him but they all live out of state.  Pt said he has his own apartment and is proud of being independent.      Meaning, Beliefs, and Spirituality - Pt is [employer], said he has henny in God.  Believes \"if God wanted me he would have taken me already.\"       Plan of Care - [facility] will remain available and continue to visit. RN will get materials from resource library for pt to read/distract himself.     Rabbi Natalie Rodas, [facility]  Staff   [phone]      "

## 2023-01-12 NOTE — PLAN OF CARE
"ICU End of Shift Summary. See flowsheets for vital signs and detailed assessment.    Changes this shift:  A&Ox4. Denies pain. SR. SBP > 135, PRN labetalol given and oral dose increased by primary team. RA - 2L NC. BMx2. Voiding spontaneously. Full liquid diet; pt refused to work with SLP this AM. Up to chair majority of the day, SBA for transfer.     Writer entered room ~1400 and pt was calm and dressed. Expressing that he \"will sign anything needed to go home\". Maroon 4 to bedside to discuss.    Plan: Continue POC.      "

## 2023-01-12 NOTE — PROGRESS NOTES
Overnight no acute events. BP elevated, PRN given x2. Otherwise vitals stable, resting comfortably.

## 2023-01-12 NOTE — PROGRESS NOTES
[facility]    Progress Note - Medicine Service, [facility] TEAM 4       Date of Admission:  12/21/2022    Assessment & Plan    Antony Aguila Jr. is a 62 year old male admitted on 12/21/2022. He has history chronic type B aortic dissection s/p exploration and median sternotomy (6/18/19), type 2 DM (poorly controlled), and HTN who is admitted for acute on chronic thoracic aortic aneurysm with new aortic root dilation. Hospitalization complicated by recurrent episodes of agitation requiring aggressive sedation and compromised airway secondary to upper airway edema leading to intubation and ICU stay. Patient extubated 1/09 and now stable for transfer back to [facility].     Today's changes:  - Increase labetalol to 200 mg BID   -Transition famotidine & benadryl to oral   - Cont steroid taper as below   - Advance diet if cleared per speech     # Acute hypoxic and hypercarbic respiratory failure  # Significant airway edema  # Concern for sleep apnea  # Tracheostomy placed 7/5/19, decannulated 8/2019  # Angioedema vs allergic reaction  Per hospitalization notes here in Sept 2022, noted to have hx of dyspnea on exertion in setting of being current smoker, tx with albuterol inhaler, recommended outpatient PFTs be obtained. Pulmonary progress note from 7/2019 notes current smoker with unknown COPD history, no PFTs available. Had tracheostomy placed 7/5/19, decannulated 8/2019. CTA chest with compressive atelectasis in L lung adjacent to thoracic aorta and hiatal hernia. Patient has now had multiple instances where he became agitated and non-compliant with attempts for patient to use BIPAP or HFNC and needed heavy sedation to treat agitation such that intubation was required to protect airway. Patient is historically a difficult intubation and was very difficult this time. Patient has a possible hx of angioedema reaction to lisinopril or losartan, but the most recent episode occurred  without these medications on board. Patient's sister called for information of patient's oral airway and tongue at baseline. She considers the tongue and airway are of normal size and not out of the ordinary. She also states he has normal speech, and does not have difficulty conversing at baseline. Additionally, patient's home health nurse was called, states tongue and airway were of normal size prior to hospitalization, patient also had clear speech and was easy to converse with.   - Patient emergently intubated on 1/5, very difficult airway, now extubated (1/09)  - Methylprednisolone 125mg Qday-->Prednisone 60 mg x4 days, 40 mg x 4, 20 mg x 4 days and 10 mg x 4 days  - Benadryl 50mg q12H PO   - Famotidine 20mg q12h  PO  - Discussed current medications with pharmacy - stopped gabapentin and amlodipine due to their (rare) association with edema - more often peripheral edema however.  - Could also consider allergy testing outpatient if concern for some other non-medication allergic cause  - Angioedema workup labs: C4 normal, tryptase normal  - C1 esterase ordered, mildly elevated at 41; will review   - ENT consulted- ENT assessed airway, recommended removal of NG tube as it is coiled over the larynx. No evidence of tongue swelling, and no upper airway obst found base of tongue to not impede on airway, resolved swelling of the airway and epiglottis, suctioning revealed mucus and secretion removal. NG tube found to coiled in the hypopharynx. ENT states patient would be intubatable if necesseary.    # Chronic type B aortic dissection (proximal descending aorta to the abdominal aorta)  # Proximal ascending aorta saccular aneurysm   # Distal ascending aorta saccular aneurysm   # Mild nonobstructive CAD  Pt has history of stable type B aortic dissection as well as short segment of dissection vs pseudoaneurysms at the aortic root and distal ascending thoracic aorta. Previously underwent surgical exploration in 6/2019 with  concern for type 1 aortic dissection but found to have chronic type B dissection. On admission, CXR with widened mediastinum slightly increased from prior. CT chest with aneurysmal dilatation ascending and descending thoracic aorta with type B aortic dissection distal to L subclavian artery extending to L common iliac artery with severe narrowing of true lumen and possible pseudoaneurysm at aortic root, new compared to prior 2019 images.   - TRINH 12/22 showed saccular aneurysm of ascending aorta measuring 5.8 cm associated with a dissection flap. Thrombus is seen in the false lumen. Aortic root is dilated at 4.6 cm.  - CTA 12/22 two saccular aneurysms, one each in the proximal ascending aorta and distal ascending aorta. The maximal transaortic diameters of 46.9 x 39.2 mm and 62.7 x 54.4 mm, respectively. Mild, non-obstructive coronary artery disease without any high-grade stenoses.  - Cardiology and CVTS consulted and signed off, plan for outpatient follow up with CVTS  - Increase labetalol to 200 mg BID 1/12  - Held DVT ppx 12/23; restarted 12/24 for thrombus seen on TRINH, continue per CVTS recs  - No HR goal per cardiology, SBP preferably maintained <130  - PRN labetalol 10mg for SBP >135       # HTN  Based on outpatient note 12/14/2022 pt was previously hydordiuril 50 mg, amlodipine 10 mg, bumex TID and canaglaflozin. 12/25 Hypertensive overnight with highest SBP of 170s. Given hx of Type B sonam dissection and now new aortic aneurysm goals - SBP < 135   - holding PTA hydrochlorothiazide  - Previously on Nicardipine gtt given persistent elevated SBPs  - PRN labetalol 10mg for SBP >135, PRN hydralazine for SBP>135 if HR<55  - Labetolol 200mg BID as above     # Agitation     # Encephalopathy, likely metabolic  # Schizoaffective disorder  # PTSD  Hx of schizoaffective disorder. Meds per chart include Zyprexa, Abilify, and Seroquel. Per pharmacy med rec, appears to only have filled Seroquel recently. Patient has had  several episodes of agitation requiring aggressive sedation and intubation, most recently on 1/5/2023. On the floor, had multiple code 21s called, ultimately given benadryl and ativan (hospitalist reported haldol was given additionally but this is not charted). He was ultimately started on Precedex, put in 4-point restraints. On transfer to the ICU, ultimately had respiratory compromise on transfer to the ICU and was more deeply sedated as a result.  - Psychiatry previously involved - appreciate input  - Restarted aripiprizole 10 mg at bedtime   - Guanfacine 0.5 mg BID (alpha-2 agonist like Precedex)   - PTA quetiapine 600 mg at bedtime  - PTA zyprexa 5 mg q AM & lunch; 10 mg at bedtime   - Psych recs as above   - Delirium precautions, please attempt re-direction prior to sedation    # LAKISHA on CKD 2 - improved   Outpatient labs 12/16 with Cr 1.4 and eGFR 55. Now back at baseline Cr ~1.1-1.2, eGFR ~65-75. UA unremarkable.  - holding PTA metformin for now  - Bumex 1mg BID   - VBG ordered    # Type 2 DM with hyperglycemia  Most recent A1c 12.5 (9/16/22). Home regimen: metformin 1000 qPM and Lantus 50 unit(s) qAM + aspart 17 unit(s) TIDAC + aspart 9 unit(s) w/ snacks  - Lantus 30U  - HDSSI   - holding PTA metformin as above    # Increased pulmonary secretions  # Concern for hospital associated pneumonia-resolved   Has developed copious creamy secretions, has low grade temperature of 100.0F, no leukocytosis. CXR showing increased opacities. Previously on Zosyn, transitioned to nafcillin 1/01/23  - Nafcillin completed 1/4   - Pulm toilet: metaneb, mucomyst      # Anemia, normocytic  - Trend CBC  - Mild folate deficiency, started folic acid supplement    # BLE edema  Dimer elevated with acute on chronic leg swelling L < R per patient history. DVT US in ED negative for DVT b/l. Has had LE swelling for last 2 years, notes it is worse lately. Previously on diuretics, not currently per chart.  - lymphedema consult  - diuresis  "as above    # Venous stasis ulcer LLE  # Venous insufficiency  # Peripheral vascular disease  # Tongue injury   - WOC consult placed    # Mild hypokalemia, resolved  # Mild hypomagnesemia, resolved   - Repletion protocols      # Hypocalcemia, resolved  - Monitor BMP  - Ionized calcium WNL 4.9       Diet: Full Liquid Diet (No straws)    DVT Prophylaxis: Enoxaparin (Lovenox) SQ  Noble Catheter: Not present  Fluids: none  Lines: None     Cardiac Monitoring: None  Code Status: Full Code      Clinically Significant Risk Factors              # Hypoalbuminemia: Lowest albumin = 2.9 g/dL at 12/26/2022  6:03 AM, will monitor as appropriate          # DMII: A1C = 8.0 % (Ref range: <5.7 %) within past 3 months   # Severe Obesity: Estimated body mass index is 43.81 kg/m  as calculated from the following:    Height as of this encounter: 1.829 m (6').    Weight as of this encounter: 146.5 kg (323 lb).          Disposition Plan      Expected Discharge Date: 01/16/2023      Destination: home with help/services;nursing home  Discharge Comments: Still working on  finding oral BP regimen, switching medicines back to oral post ICU    The patient's care was discussed with the Patient and Transferring service  Team.    Arleen Gallegos MD  Medicine Service, [facility] TEAM [address] [place] [address]  Securely message with [employer] (more info)  Text page via [facility] Paging/Directory   See signed in provider for up to date coverage information  ______________________________________________________________________    Interval History   NAEO. Patient required 2x hydralazine and 1x prn labetalol for BP above goal. No reports of agitation. Refused BiPAP overnight & remained on supplemental O2. Oxygen weaned to 2L NC.     Today patient sitting up and comfortable. States he is frustrated with still being in the hospital and is \"giving the team until Friday\". Coopeartive with author just wants to get home as he has " family coming into town.     Physical Exam   Vital Signs: Temp: 98.6  F (37  C) Temp src: Oral BP: (!) 145/94 Pulse: 98   Resp: 15 SpO2: 97 % O2 Device: None (Room air) Oxygen Delivery: 2 LPM  Weight: 323 lbs 0 oz    Constitutional: obese appears uncomfortable  HEENT: EOMI, MMM  Neck: Symmetric  Cardiovascular: regular without murmurs or gallops  Pulmonary/Chest: +LFNC in place. CTAB prolonged expiratory phase. No respiratory distress.  GI: Soft, non-tender , non-distended    Musculoskeletal:  2+ bilateral edema increased, oozing blisters under gauze on L  Skin: Skin is warm and dry; please see RN & WOC notes for further documentation  Neurological: Alert and oriented, moves all extremities   Psychiatric:  euthymic       Data      Recent Labs   Lab 01/12/23  0956 01/12/23  0729 01/12/23  0409 01/11/23  1159 01/11/23  0957 01/11/23  0406 01/11/23  0357 01/10/23  0804 01/10/23  0351   WBC 10.5  --   --   --   --   --  9.5  --  10.1   HGB 13.1*  --   --   --   --   --  12.4*  --  11.6*   MCV 90  --   --   --   --   --  92  --  92     --   --   --   --   --  328  --  277     --   --   --   --   --  145  --  144   POTASSIUM 3.5  --   --   --  5.0  --  3.4  --  4.2   CHLORIDE 102  --   --   --   --   --  106  --  108*   CO2 25  --   --   --   --   --  28  --  26   BUN 20.4  --   --   --   --   --  25.9*  --  31.6*   CR 1.26*  --   --   --   --   --  1.29*  --  1.23*   ANIONGAP 14  --   --   --   --   --  11  --  10   ANGELLA 9.4  --   --   --   --   --  9.0  --  8.2*   * 136* 137*   < >  --    < > 137*   < > 188*    < > = values in this interval not displayed.       Imaging results reviewed over the past 24 hrs:   No results found for this or any previous visit (from the past 24 hour(s)).

## 2023-01-13 ENCOUNTER — APPOINTMENT (OUTPATIENT)
Dept: SPEECH THERAPY | Facility: CLINIC | Age: 63
End: 2023-01-13
Payer: COMMERCIAL

## 2023-01-13 LAB
ANION GAP SERPL CALCULATED.3IONS-SCNC: 14 MMOL/L (ref 7–15)
BUN SERPL-MCNC: 20.5 MG/DL (ref 8–23)
CALCIUM SERPL-MCNC: 8.6 MG/DL (ref 8.8–10.2)
CHLORIDE SERPL-SCNC: 103 MMOL/L (ref 98–107)
CREAT SERPL-MCNC: 1.42 MG/DL (ref 0.67–1.17)
DEPRECATED HCO3 PLAS-SCNC: 25 MMOL/L (ref 22–29)
ERYTHROCYTE [DISTWIDTH] IN BLOOD BY AUTOMATED COUNT: 17.2 % (ref 10–15)
GFR SERPL CREATININE-BSD FRML MDRD: 56 ML/MIN/1.73M2
GLUCOSE BLDC GLUCOMTR-MCNC: 133 MG/DL (ref 70–99)
GLUCOSE BLDC GLUCOMTR-MCNC: 141 MG/DL (ref 70–99)
GLUCOSE BLDC GLUCOMTR-MCNC: 142 MG/DL (ref 70–99)
GLUCOSE BLDC GLUCOMTR-MCNC: 228 MG/DL (ref 70–99)
GLUCOSE BLDC GLUCOMTR-MCNC: 249 MG/DL (ref 70–99)
GLUCOSE BLDC GLUCOMTR-MCNC: 314 MG/DL (ref 70–99)
GLUCOSE SERPL-MCNC: 116 MG/DL (ref 70–99)
HCT VFR BLD AUTO: 38.3 % (ref 40–53)
HGB BLD-MCNC: 12.1 G/DL (ref 13.3–17.7)
MAGNESIUM SERPL-MCNC: 2 MG/DL (ref 1.7–2.3)
MCH RBC QN AUTO: 28.7 PG (ref 26.5–33)
MCHC RBC AUTO-ENTMCNC: 31.6 G/DL (ref 31.5–36.5)
MCV RBC AUTO: 91 FL (ref 78–100)
PHOSPHATE SERPL-MCNC: 3.4 MG/DL (ref 2.5–4.5)
PLATELET # BLD AUTO: 277 10E3/UL (ref 150–450)
POTASSIUM SERPL-SCNC: 3.2 MMOL/L (ref 3.4–5.3)
POTASSIUM SERPL-SCNC: 4.3 MMOL/L (ref 3.4–5.3)
RBC # BLD AUTO: 4.22 10E6/UL (ref 4.4–5.9)
SODIUM SERPL-SCNC: 142 MMOL/L (ref 136–145)
WBC # BLD AUTO: 8.1 10E3/UL (ref 4–11)

## 2023-01-13 PROCEDURE — 250N000013 HC RX MED GY IP 250 OP 250 PS 637

## 2023-01-13 PROCEDURE — 250N000013 HC RX MED GY IP 250 OP 250 PS 637: Performed by: STUDENT IN AN ORGANIZED HEALTH CARE EDUCATION/TRAINING PROGRAM

## 2023-01-13 PROCEDURE — G0463 HOSPITAL OUTPT CLINIC VISIT: HCPCS

## 2023-01-13 PROCEDURE — 84132 ASSAY OF SERUM POTASSIUM: CPT | Performed by: PEDIATRICS

## 2023-01-13 PROCEDURE — 36415 COLL VENOUS BLD VENIPUNCTURE: CPT | Performed by: PEDIATRICS

## 2023-01-13 PROCEDURE — 99233 SBSQ HOSP IP/OBS HIGH 50: CPT | Mod: GC | Performed by: PEDIATRICS

## 2023-01-13 PROCEDURE — 250N000011 HC RX IP 250 OP 636

## 2023-01-13 PROCEDURE — 250N000012 HC RX MED GY IP 250 OP 636 PS 637: Performed by: NURSE PRACTITIONER

## 2023-01-13 PROCEDURE — 83735 ASSAY OF MAGNESIUM: CPT

## 2023-01-13 PROCEDURE — 250N000013 HC RX MED GY IP 250 OP 250 PS 637: Performed by: PEDIATRICS

## 2023-01-13 PROCEDURE — 92526 ORAL FUNCTION THERAPY: CPT | Mod: GN

## 2023-01-13 PROCEDURE — 85027 COMPLETE CBC AUTOMATED: CPT | Performed by: STUDENT IN AN ORGANIZED HEALTH CARE EDUCATION/TRAINING PROGRAM

## 2023-01-13 PROCEDURE — 120N000002 HC R&B MED SURG/OB UMMC

## 2023-01-13 PROCEDURE — 84100 ASSAY OF PHOSPHORUS: CPT

## 2023-01-13 PROCEDURE — 80048 BASIC METABOLIC PNL TOTAL CA: CPT | Performed by: STUDENT IN AN ORGANIZED HEALTH CARE EDUCATION/TRAINING PROGRAM

## 2023-01-13 PROCEDURE — 999N000248 HC STATISTIC IV INSERT WITH US BY RN

## 2023-01-13 PROCEDURE — 250N000011 HC RX IP 250 OP 636: Performed by: STUDENT IN AN ORGANIZED HEALTH CARE EDUCATION/TRAINING PROGRAM

## 2023-01-13 PROCEDURE — 36415 COLL VENOUS BLD VENIPUNCTURE: CPT | Performed by: STUDENT IN AN ORGANIZED HEALTH CARE EDUCATION/TRAINING PROGRAM

## 2023-01-13 PROCEDURE — 999N000157 HC STATISTIC RCP TIME EA 10 MIN

## 2023-01-13 RX ORDER — POTASSIUM CHLORIDE 750 MG/1
40 TABLET, EXTENDED RELEASE ORAL ONCE
Status: COMPLETED | OUTPATIENT
Start: 2023-01-13 | End: 2023-01-13

## 2023-01-13 RX ADMIN — DIPHENHYDRAMINE HYDROCHLORIDE 50 MG: 25 CAPSULE ORAL at 08:55

## 2023-01-13 RX ADMIN — POLYETHYLENE GLYCOL 3350 17 G: 17 POWDER, FOR SOLUTION ORAL at 08:55

## 2023-01-13 RX ADMIN — POLYETHYLENE GLYCOL 3350 17 G: 17 POWDER, FOR SOLUTION ORAL at 20:31

## 2023-01-13 RX ADMIN — ARIPIPRAZOLE 10 MG: 10 TABLET ORAL at 21:49

## 2023-01-13 RX ADMIN — SIMETHICONE 80 MG: 80 TABLET, CHEWABLE ORAL at 20:31

## 2023-01-13 RX ADMIN — DIPHENHYDRAMINE HYDROCHLORIDE 50 MG: 25 CAPSULE ORAL at 20:31

## 2023-01-13 RX ADMIN — SENNOSIDES 8.6 MG: 8.6 TABLET, FILM COATED ORAL at 20:31

## 2023-01-13 RX ADMIN — FAMOTIDINE 20 MG: 20 TABLET ORAL at 08:53

## 2023-01-13 RX ADMIN — FAMOTIDINE 20 MG: 20 TABLET ORAL at 20:31

## 2023-01-13 RX ADMIN — POTASSIUM CHLORIDE 40 MEQ: 750 TABLET, EXTENDED RELEASE ORAL at 08:55

## 2023-01-13 RX ADMIN — OLANZAPINE 5 MG: 5 TABLET, ORALLY DISINTEGRATING ORAL at 21:49

## 2023-01-13 RX ADMIN — OLANZAPINE 5 MG: 5 TABLET, FILM COATED ORAL at 08:55

## 2023-01-13 RX ADMIN — SENNOSIDES 8.6 MG: 8.6 TABLET, FILM COATED ORAL at 08:54

## 2023-01-13 RX ADMIN — PREDNISONE 60 MG: 50 TABLET ORAL at 08:53

## 2023-01-13 RX ADMIN — LABETALOL HCL 200 MG: 200 TABLET, FILM COATED ORAL at 00:08

## 2023-01-13 RX ADMIN — FOLIC ACID 1 MG: 1 TABLET ORAL at 08:55

## 2023-01-13 RX ADMIN — ENOXAPARIN SODIUM 40 MG: 40 INJECTION SUBCUTANEOUS at 01:19

## 2023-01-13 RX ADMIN — LABETALOL HCL 200 MG: 200 TABLET, FILM COATED ORAL at 20:31

## 2023-01-13 RX ADMIN — OLANZAPINE 5 MG: 5 TABLET, FILM COATED ORAL at 20:32

## 2023-01-13 RX ADMIN — INSULIN GLARGINE 30 UNITS: 100 INJECTION, SOLUTION SUBCUTANEOUS at 12:59

## 2023-01-13 RX ADMIN — QUETIAPINE FUMARATE 600 MG: 300 TABLET ORAL at 20:47

## 2023-01-13 RX ADMIN — BUMETANIDE 1 MG: 0.25 INJECTION INTRAMUSCULAR; INTRAVENOUS at 08:53

## 2023-01-13 RX ADMIN — HYDRALAZINE HYDROCHLORIDE 10 MG: 20 INJECTION INTRAMUSCULAR; INTRAVENOUS at 17:23

## 2023-01-13 RX ADMIN — LABETALOL HCL 200 MG: 200 TABLET, FILM COATED ORAL at 08:55

## 2023-01-13 RX ADMIN — BUMETANIDE 1 MG: 0.25 INJECTION INTRAMUSCULAR; INTRAVENOUS at 21:00

## 2023-01-13 ASSESSMENT — ACTIVITIES OF DAILY LIVING (ADL)
ADLS_ACUITY_SCORE: 24
ADLS_ACUITY_SCORE: 24
ADLS_ACUITY_SCORE: 23
ADLS_ACUITY_SCORE: 24
ADLS_ACUITY_SCORE: 24
ADLS_ACUITY_SCORE: 23
ADLS_ACUITY_SCORE: 23
ADLS_ACUITY_SCORE: 24

## 2023-01-13 NOTE — PROGRESS NOTES
[facility]    Progress Note - Medicine Service, [facility] TEAM 4       Date of Admission:  12/21/2022    Assessment & Plan    Antony Aguila Jr. is a 62 year old male admitted on 12/21/2022. He has history chronic type B aortic dissection s/p exploration and median sternotomy (6/18/19), type 2 DM (poorly controlled), and HTN who is admitted for acute on chronic thoracic aortic aneurysm with new aortic root dilation. Hospitalization complicated by recurrent episodes of agitation requiring aggressive sedation and compromised airway secondary to upper airway edema leading to intubation and ICU stay. Patient extubated 1/09 and now stable for [facility].     Today's changes:  - No major changes today. Continue to optimize BP medications in preparation for discharge   - Replete potassium prn   - encourage increased oral fluid intake     # Acute hypoxic and hypercarbic respiratory failure, resolved   # Significant airway edema, resolved   # Concern for sleep apnea  # Hx Tracheostomy placed 7/5/19, decannulated 8/2019  # Angioedema vs allergic reaction  Per hospitalization notes here in Sept 2022, noted to have hx of dyspnea on exertion in setting of being current smoker, tx with albuterol inhaler, recommended outpatient PFTs be obtained. Had tracheostomy placed 7/5/19, decannulated 8/2019. CTA chest with compressive atelectasis in L lung adjacent to thoracic aorta and hiatal hernia.This admission w/  multiple instances where he became agitated and non-compliant with attempts for patient to use BIPAP or HFNC and needed heavy sedation to treat agitation such that intubation was required to protect airway. Patient is historically a difficult intubation and was very difficult this time. Patient has a possible hx of angioedema reaction to lisinopril or losartan, but the most recent episode occurred without these medications on board. Family confirmed at baseline patient  anatomy normal. Work up for hereditary angioedema negative.  - S/p extubated (1/09)  - S/p Methylprednisolone 125mg Qday-->Prednisone 60 mg x4 days, 40 mg x 4, 20 mg x 4 days and 10 mg x 4 days  - Discussed medications with pharmacy - stopped gabapentin and amlodipine due to their (rare) association with edema - more often peripheral edema however.  - Angioedema workup labs: C4 normal, tryptase normal  - C1 esterase inhibitor mildly elevated at 41, not c/w hereditary angioedema  - Could also consider allergy testing outpatient if concern for some other non-medication allergic cause  - Medical management:     - Benadryl 50mg q12H PO    - Famotidine 20mg q12h  PO   - S/p Methylprednisolone 125mg Qday-->Prednisone 60 mg x4 days, 40 mg x 4, 20 mg x 4 days and 10 mg x 4 days  - ENT consulted- ENT assessed airway, recommended removal of NG tube as it is coiled over the larynx. No evidence of tongue swelling, and no upper airway obst found base of tongue to not impede on airway, resolved swelling of the airway and epiglottis, suctioning revealed mucus and secretion removal. NG tube found to coiled in the hypopharynx. ENT states patient would be intubatable if necesseary.    # Chronic type B aortic dissection (proximal descending aorta to the abdominal aorta)  # Proximal ascending aorta saccular aneurysm   # Distal ascending aorta saccular aneurysm   # Mild nonobstructive CAD  Pt has history of stable type B aortic dissection as well as short segment of dissection vs pseudoaneurysms at the aortic root and distal ascending thoracic aorta. Previously underwent surgical exploration in 6/2019 with concern for type 1 aortic dissection but found to have chronic type B dissection. On admission, CXR with widened mediastinum slightly increased from prior. CT chest with aneurysmal dilatation ascending and descending thoracic aorta with type B aortic dissection distal to L subclavian artery extending to L common iliac artery with  severe narrowing of true lumen and possible pseudoaneurysm at aortic root, new compared to prior 2019 images.   - TRINH 12/22 showed saccular aneurysm of ascending aorta measuring 5.8 cm associated with a dissection flap. Thrombus is seen in the false lumen. Aortic root is dilated at 4.6 cm.  - CTA 12/22 two saccular aneurysms, one each in the proximal ascending aorta and distal ascending aorta. The maximal transaortic diameters of 46.9 x 39.2 mm and 62.7 x 54.4 mm, respectively. Mild, non-obstructive coronary artery disease without any high-grade stenoses.  - Held DVT ppx 12/23; restarted 12/24 for thrombus seen on TRINH, continue per CVTS recs  - Cardiology and CVTS consulted and signed off,   -plan for outpatient follow up with CVTS  - Increased labetalol to 200 mg BID 1/12  - No HR goal per cardiology, SBP preferably maintained <130  - PRN labetalol 10mg for SBP >135, PRN hydralazine for SBP>135 if HR<55    # HTN  Based on outpatient note 12/14/2022 pt was previously hydordiuril 50 mg, amlodipine 10 mg, bumex TID and canaglaflozin. 12/25 Hypertensive overnight with highest SBP of 170s. Given hx of Type B sonam dissection and now new aortic aneurysm goals - SBP < 135   - holding PTA hydrochlorothiazide  - Previously on Nicardipine gtt given persistent elevated SBPs  - PRN labetalol & hydralazine as above   - Labetolol 200mg BID as above     # Agitation     # Encephalopathy, likely metabolic  # Schizoaffective disorder  # PTSD  Hx of schizoaffective disorder. Meds per chart include Zyprexa, Abilify, and Seroquel. Per pharmacy med rec, appears to only have filled Seroquel recently. Patient has had several episodes of agitation requiring aggressive sedation and intubation, most recently on 1/5/2023. On the floor, had multiple code 21s called, ultimately given benadryl and ativan (hospitalist reported haldol was given additionally but this is not charted). He was ultimately started on Precedex, put in 4-point  restraints. On transfer to the ICU, ultimately had respiratory compromise on transfer to the ICU and was more deeply sedated as a result.  - Psychiatry previously involved - appreciate input   - cont aripiprizole 10 mg at bedtime    - Guanfacine 0.5 mg BID (alpha-2 agonist like Precedex)    - PTA quetiapine 600 mg at bedtime   - PTA zyprexa 5 mg q AM & lunch; 10 mg at bedtime   - Delirium precautions, please attempt re-direction prior to sedation    # LAKISHA on CKD 2   Outpatient labs 12/16 with Cr 1.4 and eGFR 55. Now back at baseline Cr ~1.1-1.2, eGFR ~65-75. UA unremarkable. Trend of LAKISHA on CKD with stopping IV fluids this admission, encouraged patient to take 64 oz fluids daily.   - holding PTA metformin for now  - Bumex 1mg BID     # Type 2 DM with hyperglycemia  Most recent A1c 12.5 (9/16/22). Home regimen: metformin 1000 qPM and Lantus 50 unit(s) qAM + aspart 17 unit(s) TIDAC + aspart 9 unit(s) w/ snacks  - Lantus 30U  - HDSSI   - holding PTA metformin as above    # Increased pulmonary secretions, resolved   # Concern for hospital associated pneumonia-resolved   Has developed copious creamy secretions, has low grade temperature of 100.0F, no leukocytosis. CXR showing increased opacities. Previously on Zosyn, transitioned to nafcillin 1/01/23  - Nafcillin completed 1/4   - Pulm toilet: metaneb, mucomyst  prn    # Anemia, normocytic  - Trend CBC  - Mild folate deficiency, started folic acid supplement    # BLE edema  Dimer elevated with acute on chronic leg swelling L < R per patient history. DVT US in ED negative for DVT b/l. Has had LE swelling for last 2 years, notes it is worse lately. Previously on diuretics, not currently per chart.  - lymphedema consult  - diuresis as above    # Venous stasis ulcer LLE  # Venous insufficiency  # Peripheral vascular disease  # Tongue injury   - WOC consult placed    # Mild hypokalemia, resolved  # Mild hypomagnesemia, resolved   - Repletion protocols      # Hypocalcemia,  resolved  - Monitor BMP  - Ionized calcium WNL 4.9       Diet: Regular Diet Adult    DVT Prophylaxis: Enoxaparin (Lovenox) SQ  Noble Catheter: Not present  Fluids: none  Lines: None     Cardiac Monitoring: None  Code Status: Full Code      Clinically Significant Risk Factors        # Hypokalemia: Lowest K = 3.2 mmol/L in last 2 days, will replace as needed       # Hypoalbuminemia: Lowest albumin = 2.9 g/dL at 12/26/2022  6:03 AM, will monitor as appropriate          # DMII: A1C = 8.0 % (Ref range: <5.7 %) within past 3 months   # Severe Obesity: Estimated body mass index is 43.81 kg/m  as calculated from the following:    Height as of this encounter: 1.829 m (6').    Weight as of this encounter: 146.5 kg (323 lb).          Disposition Plan      Expected Discharge Date: 01/16/2023      Destination: home with help/services;nursing home  Discharge Comments: Dispo: TCU vs home with services, hopeful will be medically ready by 1/16. Anticipate patient may try to leave AMA    The patient's care was discussed with the Patient and Transferring service  Team.    Arleen Gallegos MD  Medicine Service, [facility] TEAM [address] [place] [address]  Securely message with [employer] (more info)  Text page via [facility] Paging/Directory   See signed in provider for up to date coverage information  ______________________________________________________________________    Interval History   NAEO. RN reports patient redirectable but remains easily frustrated regarding prolonged hospitalization. Voiding adequately. ON labetalol x2 IV prn and hydralazine x2 IV prn for BP > goal. Asymptomatic.     This morning Antony in better spirits. Watching wade 2 in room. States he feels like he could go home. Reports he has home health aid that visits. Does not want to go to TCU at discharge. Denies chest pain, shortness of breath. Primary concern for patient is bloating and discomfort. Denies need for tums/simethicone      Physical Exam   Vital Signs: Temp: 99  F (37.2  C) Temp src: Oral BP: (!) 142/91 Pulse: 82   Resp: 16 SpO2: 94 % O2 Device: None (Room air)    Weight: 323 lbs 0 oz    Constitutional: obese, sitting up in bed comfortably  HEENT: EOMI, MMM  Neck: Symmetric  Cardiovascular: regular without murmurs or gallops  Pulmonary/Chest: RA. CTAB prolonged expiratory phase. No respiratory distress or accessory muscle use   GI: Soft, non-tender , non-distended    Musculoskeletal:  2+ bilateral edema increased, oozing blisters under gauze on L  Skin: Skin is warm and dry; please see RN & WOC notes for further documentation  Neurological: Alert and oriented, moves all extremities   Psychiatric:  euthymic       Data      Recent Labs   Lab 01/13/23  1554 01/13/23  1309 01/13/23  1258 01/13/23  0752 01/13/23  0454 01/12/23  1228 01/12/23  0956 01/11/23  0406 01/11/23  0357   WBC  --   --   --   --  8.1  --  10.5  --  9.5   HGB  --   --   --   --  12.1*  --  13.1*  --  12.4*   MCV  --   --   --   --  91  --  90  --  92   PLT  --   --   --   --  277  --  339  --  328   NA  --   --   --   --  142  --  141  --  145   POTASSIUM  --  4.3  --   --  3.2*  --  3.5   < > 3.4   CHLORIDE  --   --   --   --  103  --  102  --  106   CO2  --   --   --   --  25  --  25  --  28   BUN  --   --   --   --  20.5  --  20.4  --  25.9*   CR  --   --   --   --  1.42*  --  1.26*  --  1.29*   ANIONGAP  --   --   --   --  14  --  14  --  11   ANGELLA  --   --   --   --  8.6*  --  9.4  --  9.0   *  --  249* 141* 116*   < > 264*   < > 137*    < > = values in this interval not displayed.       Imaging results reviewed over the past 24 hrs:   No results found for this or any previous visit (from the past 24 hour(s)).

## 2023-01-13 NOTE — CARE PLAN
Speech Language Therapy Discharge Summary    Reason for therapy discharge:    All goals and outcomes met, no further needs identified.    Progress towards therapy goal(s). See goals on Care Plan in [facility] electronic health record for goal details.  Goals met    Therapy recommendation(s):    No further therapy is recommended. Recommend regular diet and thin liquids w/ close supervision. Meds OK as tolerated. Ensure pt is fully upright and alert, taking small sips/bites at a slow rate.

## 2023-01-13 NOTE — PLAN OF CARE
Major Shift Events:  VSS, on RA, labetalol given x1, spot checked tele rhythm due to patient refusing to keep leads on, redirectable but easily frustrated, voiding adequately, slept through most of the night     Plan: continue with plan of care    For vital signs and complete assessments, please see documentation flowsheets.

## 2023-01-13 NOTE — PROGRESS NOTES
[facility]  WOC Nurse Inpatient Assessment     Consulted for: LLE wounds   1/2: NEW:  Consulted for tongue wound   Patient History (according to provider note(s):      62 year old male admitted on 12/21/2022. He has history chronic type B aortic dissection s/p exploration and median sternotomy (6/18/19), type 2 DM (poorly controlled), and HTN who is admitted for acute on chronic thoracic aortic aneurysm with new aortic root dilation. Hospitalization complicated by hypokalemia      Areas Assessed:      Pressure Injury Location: Right tongue      1/5/23    Last photo: 1/13/23  Wound type: Pressure Injury     Pressure Injury Stage: Mucosal, hospital acquired      This is a Medical Device Related Pressure Injury (MDRPI) due to ETT  Wound history/plan of care:   Wound noted 12/30 when intubated with edematous tongue, extubated 12/31 and now reintubated.  Intubation was traumatic with bloody drainage noted. Extubated 1/9/23 O2 via face mask.    Wound base: 100 % healed mucosa      Palpation of the wound bed: normal      Drainage: none     Description of drainage: none     Measurements (length x width x depth, in cm) healed  Periwound skin: Intact,  edematous       Color: normal and consistent with surrounding tissue      Temperature: normal   Odor: none  Pain: denies ,   Pain intervention prior to dressing change: N/A  Treatment goal: Heal   STATUS: resurfaced ,   Supplies ordered: at bedside      Treatment Plan:     Tongue wound:  Oral cares per unit routine     Orders: Reviewed    RECOMMEND PRIMARY TEAM ORDER: None, at this time  Education provided: plan of care and wound progress  Discussed plan of care with: Patient and Nurse  WOC nurse follow-up plan: signing off tongue wound   Notify WOC if wound(s) deteriorate.  Nursing to notify the Provider(s) and re-consult the WOC Nurse if new skin concern.    DATA:     Current support surface: Standard  Low air loss (JORI pump,  Isolibrium, Pulsate, skin guard, etc)  BMI: Body mass index is 43.81 kg/m .   Active diet order: Orders Placed This Encounter      Regular Diet Adult     Output: I/O last 3 completed shifts:  In: 1200 [P.O.:1200]  Out: 200 [Urine:200]     Labs:   Recent Labs   Lab 01/13/23  0454   HGB 12.1*   WBC 8.1     Pressure injury risk assessment:   Sensory Perception: 3-->slightly limited  Moisture: 3-->occasionally moist  Activity: 2-->chairfast  Mobility: 3-->slightly limited  Nutrition: 2-->probably inadequate  Friction and Shear: 2-->potential problem  Abhinav Score: 15    [facility]  Phone: [phone]  Pager: [phone]

## 2023-01-14 LAB
ANION GAP SERPL CALCULATED.3IONS-SCNC: 14 MMOL/L (ref 7–15)
BUN SERPL-MCNC: 18.1 MG/DL (ref 8–23)
CALCIUM SERPL-MCNC: 9.3 MG/DL (ref 8.8–10.2)
CHLORIDE SERPL-SCNC: 102 MMOL/L (ref 98–107)
CREAT SERPL-MCNC: 1.29 MG/DL (ref 0.67–1.17)
DEPRECATED HCO3 PLAS-SCNC: 24 MMOL/L (ref 22–29)
ERYTHROCYTE [DISTWIDTH] IN BLOOD BY AUTOMATED COUNT: 16.9 % (ref 10–15)
GFR SERPL CREATININE-BSD FRML MDRD: 62 ML/MIN/1.73M2
GLUCOSE BLDC GLUCOMTR-MCNC: 146 MG/DL (ref 70–99)
GLUCOSE BLDC GLUCOMTR-MCNC: 150 MG/DL (ref 70–99)
GLUCOSE BLDC GLUCOMTR-MCNC: 157 MG/DL (ref 70–99)
GLUCOSE BLDC GLUCOMTR-MCNC: 175 MG/DL (ref 70–99)
GLUCOSE BLDC GLUCOMTR-MCNC: 226 MG/DL (ref 70–99)
GLUCOSE BLDC GLUCOMTR-MCNC: 287 MG/DL (ref 70–99)
GLUCOSE BLDC GLUCOMTR-MCNC: 328 MG/DL (ref 70–99)
GLUCOSE BLDC GLUCOMTR-MCNC: 370 MG/DL (ref 70–99)
GLUCOSE SERPL-MCNC: 177 MG/DL (ref 70–99)
HCT VFR BLD AUTO: 40.1 % (ref 40–53)
HGB BLD-MCNC: 12.8 G/DL (ref 13.3–17.7)
MAGNESIUM SERPL-MCNC: 1.9 MG/DL (ref 1.7–2.3)
MCH RBC QN AUTO: 28.6 PG (ref 26.5–33)
MCHC RBC AUTO-ENTMCNC: 31.9 G/DL (ref 31.5–36.5)
MCV RBC AUTO: 90 FL (ref 78–100)
PHOSPHATE SERPL-MCNC: 2.6 MG/DL (ref 2.5–4.5)
PLATELET # BLD AUTO: 303 10E3/UL (ref 150–450)
POTASSIUM SERPL-SCNC: 3.6 MMOL/L (ref 3.4–5.3)
RBC # BLD AUTO: 4.47 10E6/UL (ref 4.4–5.9)
SODIUM SERPL-SCNC: 140 MMOL/L (ref 136–145)
WBC # BLD AUTO: 9.1 10E3/UL (ref 4–11)

## 2023-01-14 PROCEDURE — 250N000013 HC RX MED GY IP 250 OP 250 PS 637: Performed by: STUDENT IN AN ORGANIZED HEALTH CARE EDUCATION/TRAINING PROGRAM

## 2023-01-14 PROCEDURE — 36415 COLL VENOUS BLD VENIPUNCTURE: CPT | Performed by: STUDENT IN AN ORGANIZED HEALTH CARE EDUCATION/TRAINING PROGRAM

## 2023-01-14 PROCEDURE — 250N000013 HC RX MED GY IP 250 OP 250 PS 637

## 2023-01-14 PROCEDURE — 250N000011 HC RX IP 250 OP 636

## 2023-01-14 PROCEDURE — 999N000157 HC STATISTIC RCP TIME EA 10 MIN

## 2023-01-14 PROCEDURE — 85014 HEMATOCRIT: CPT | Performed by: STUDENT IN AN ORGANIZED HEALTH CARE EDUCATION/TRAINING PROGRAM

## 2023-01-14 PROCEDURE — 84100 ASSAY OF PHOSPHORUS: CPT

## 2023-01-14 PROCEDURE — 250N000013 HC RX MED GY IP 250 OP 250 PS 637: Performed by: PEDIATRICS

## 2023-01-14 PROCEDURE — 82310 ASSAY OF CALCIUM: CPT | Performed by: STUDENT IN AN ORGANIZED HEALTH CARE EDUCATION/TRAINING PROGRAM

## 2023-01-14 PROCEDURE — 120N000002 HC R&B MED SURG/OB UMMC

## 2023-01-14 PROCEDURE — 250N000011 HC RX IP 250 OP 636: Performed by: STUDENT IN AN ORGANIZED HEALTH CARE EDUCATION/TRAINING PROGRAM

## 2023-01-14 PROCEDURE — 83735 ASSAY OF MAGNESIUM: CPT

## 2023-01-14 PROCEDURE — 250N000012 HC RX MED GY IP 250 OP 636 PS 637: Performed by: NURSE PRACTITIONER

## 2023-01-14 RX ORDER — BUMETANIDE 1 MG/1
1 TABLET ORAL 3 TIMES DAILY
Status: DISCONTINUED | OUTPATIENT
Start: 2023-01-14 | End: 2023-01-15 | Stop reason: HOSPADM

## 2023-01-14 RX ORDER — ARIPIPRAZOLE 10 MG/1
10 TABLET ORAL AT BEDTIME
Qty: 30 TABLET | Refills: 0 | Status: SHIPPED | OUTPATIENT
Start: 2023-01-14 | End: 2023-01-15

## 2023-01-14 RX ORDER — LABETALOL 300 MG/1
300 TABLET, FILM COATED ORAL EVERY 12 HOURS SCHEDULED
Status: DISCONTINUED | OUTPATIENT
Start: 2023-01-14 | End: 2023-01-15 | Stop reason: HOSPADM

## 2023-01-14 RX ORDER — FOLIC ACID 1 MG/1
1 TABLET ORAL DAILY
Qty: 30 TABLET | Refills: 0 | Status: SHIPPED | OUTPATIENT
Start: 2023-01-15 | End: 2023-01-15

## 2023-01-14 RX ORDER — PREDNISONE 20 MG/1
20 TABLET ORAL DAILY
Qty: 4 TABLET | Refills: 0 | Status: SHIPPED | OUTPATIENT
Start: 2023-01-20 | End: 2023-01-15

## 2023-01-14 RX ORDER — HYDROCHLOROTHIAZIDE 50 MG/1
50 TABLET ORAL DAILY
Status: DISCONTINUED | OUTPATIENT
Start: 2023-01-14 | End: 2023-01-15 | Stop reason: HOSPADM

## 2023-01-14 RX ORDER — LABETALOL 300 MG/1
300 TABLET, FILM COATED ORAL EVERY 12 HOURS
Qty: 180 TABLET | Refills: 3 | Status: SHIPPED | OUTPATIENT
Start: 2023-01-15 | End: 2023-01-15

## 2023-01-14 RX ORDER — FAMOTIDINE 20 MG/1
20 TABLET, FILM COATED ORAL 2 TIMES DAILY
Qty: 30 TABLET | Refills: 0 | Status: SHIPPED | OUTPATIENT
Start: 2023-01-15 | End: 2023-01-15

## 2023-01-14 RX ORDER — PREDNISONE 20 MG/1
60 TABLET ORAL DAILY
Qty: 3 TABLET | Refills: 0 | Status: SHIPPED | OUTPATIENT
Start: 2023-01-15 | End: 2023-01-15

## 2023-01-14 RX ORDER — PREDNISONE 20 MG/1
40 TABLET ORAL DAILY
Qty: 8 TABLET | Refills: 0 | Status: SHIPPED | OUTPATIENT
Start: 2023-01-16 | End: 2023-01-15

## 2023-01-14 RX ORDER — QUETIAPINE FUMARATE 200 MG/1
600 TABLET, FILM COATED ORAL AT BEDTIME
Qty: 270 TABLET | Refills: 0 | Status: SHIPPED | OUTPATIENT
Start: 2023-01-14 | End: 2023-01-15

## 2023-01-14 RX ORDER — SIMETHICONE 80 MG
80 TABLET,CHEWABLE ORAL EVERY 6 HOURS PRN
Qty: 30 TABLET | Refills: 0 | Status: SHIPPED | OUTPATIENT
Start: 2023-01-14 | End: 2023-01-15

## 2023-01-14 RX ORDER — LABETALOL 100 MG/1
100 TABLET, FILM COATED ORAL ONCE
Status: COMPLETED | OUTPATIENT
Start: 2023-01-14 | End: 2023-01-14

## 2023-01-14 RX ADMIN — BUMETANIDE 1 MG: 1 TABLET ORAL at 20:36

## 2023-01-14 RX ADMIN — FOLIC ACID 1 MG: 1 TABLET ORAL at 09:22

## 2023-01-14 RX ADMIN — BUMETANIDE 1 MG: 1 TABLET ORAL at 15:59

## 2023-01-14 RX ADMIN — LABETALOL HYDROCHLORIDE 10 MG: 5 INJECTION, SOLUTION INTRAVENOUS at 06:50

## 2023-01-14 RX ADMIN — OLANZAPINE 5 MG: 5 TABLET, ORALLY DISINTEGRATING ORAL at 20:37

## 2023-01-14 RX ADMIN — ENOXAPARIN SODIUM 40 MG: 40 INJECTION SUBCUTANEOUS at 13:31

## 2023-01-14 RX ADMIN — LABETALOL HCL 200 MG: 200 TABLET, FILM COATED ORAL at 09:21

## 2023-01-14 RX ADMIN — LABETALOL HYDROCHLORIDE 100 MG: 100 TABLET, FILM COATED ORAL at 11:31

## 2023-01-14 RX ADMIN — BUMETANIDE 1 MG: 0.25 INJECTION INTRAMUSCULAR; INTRAVENOUS at 09:23

## 2023-01-14 RX ADMIN — LABETALOL HYDROCHLORIDE 10 MG: 5 INJECTION, SOLUTION INTRAVENOUS at 16:35

## 2023-01-14 RX ADMIN — INSULIN GLARGINE 30 UNITS: 100 INJECTION, SOLUTION SUBCUTANEOUS at 11:36

## 2023-01-14 RX ADMIN — OLANZAPINE 5 MG: 5 TABLET, FILM COATED ORAL at 13:31

## 2023-01-14 RX ADMIN — DIPHENHYDRAMINE HYDROCHLORIDE 50 MG: 25 CAPSULE ORAL at 09:22

## 2023-01-14 RX ADMIN — HYDROCHLOROTHIAZIDE 50 MG: 50 TABLET ORAL at 15:59

## 2023-01-14 RX ADMIN — OLANZAPINE 5 MG: 5 TABLET, FILM COATED ORAL at 09:22

## 2023-01-14 RX ADMIN — LABETALOL HYDROCHLORIDE 10 MG: 5 INJECTION, SOLUTION INTRAVENOUS at 23:22

## 2023-01-14 RX ADMIN — ARIPIPRAZOLE 10 MG: 10 TABLET ORAL at 22:48

## 2023-01-14 RX ADMIN — DIPHENHYDRAMINE HYDROCHLORIDE 50 MG: 25 CAPSULE ORAL at 20:36

## 2023-01-14 RX ADMIN — HYDRALAZINE HYDROCHLORIDE 10 MG: 20 INJECTION INTRAMUSCULAR; INTRAVENOUS at 15:06

## 2023-01-14 RX ADMIN — QUETIAPINE FUMARATE 600 MG: 300 TABLET ORAL at 20:36

## 2023-01-14 RX ADMIN — PREDNISONE 60 MG: 50 TABLET ORAL at 09:22

## 2023-01-14 RX ADMIN — ENOXAPARIN SODIUM 40 MG: 40 INJECTION SUBCUTANEOUS at 03:13

## 2023-01-14 RX ADMIN — LABETALOL HYDROCHLORIDE 300 MG: 300 TABLET ORAL at 20:36

## 2023-01-14 RX ADMIN — FAMOTIDINE 20 MG: 20 TABLET ORAL at 09:22

## 2023-01-14 RX ADMIN — LABETALOL HYDROCHLORIDE 10 MG: 5 INJECTION, SOLUTION INTRAVENOUS at 13:35

## 2023-01-14 ASSESSMENT — ACTIVITIES OF DAILY LIVING (ADL)
ADLS_ACUITY_SCORE: 22
ADLS_ACUITY_SCORE: 25
ADLS_ACUITY_SCORE: 23
ADLS_ACUITY_SCORE: 25
ADLS_ACUITY_SCORE: 22
ADLS_ACUITY_SCORE: 25
ADLS_ACUITY_SCORE: 22
ADLS_ACUITY_SCORE: 25

## 2023-01-14 NOTE — PLAN OF CARE
"ICU End of Shift Summary. See flowsheets for vital signs and detailed assessment.    Changes this shift: AOx4, pleasant however fixates on discharge. Increased PO Labetalol to 300mg BID and added back PTA medications hydrochlorothiazide. Interim, pt still requiring PRN anti-HTN~ Labetalol IVP & Hydralazine IVP, with temporary improvement. MD and nursing at bedside 2x times today to discuss safe discharge plan 2/2 ongoing need to adjust cardiac med regimen; pt amenable to discharge to home around 11am tomorrow. No other changes or nursing concerns; ex SW paged, per family request to f/u on adding home PT/OT home cares, and help with \"organizing medications and checking blood pressure and sugars\".  Per family, pt has skilled nursing visits \"once a week\".  BGs very labile and at time significantly hyperglycemic d/t excellent PO intake. Pt drinks numerous glasses of orange juice.     Plan: Titrate cardiac regimen, pending ongoing HTN. Discharge home tomorrow 1/15. AIDAN follow up with home cares.     Goal Outcome Evaluation:      Plan of Care Reviewed With: patient    Overall Patient Progress: improvingOverall Patient Progress: improving    Outcome Evaluation: PO Labetalol increased with good response thus far; however still borderline HTN and intermittently above SBP goal >135.    Problem: Allergic/Anaphylactic Reaction  Goal: Resolution of Hypersensitivity Symptoms  Outcome: Progressing  Intervention: Manage Acute Allergic Reaction  Recent Flowsheet Documentation  Taken 1/14/2023 1200 by Adelso Cummings, RN  Medication Review/Management:    medications reviewed    dosing adjusted    high-risk medications identified  Taken 1/14/2023 0800 by Adelso Cummings, RN  Medication Review/Management:    medications reviewed    dosing adjusted    high-risk medications identified     "

## 2023-01-14 NOTE — PLAN OF CARE
"Goal Outcome Evaluation:           Overall Patient Progress: no changeOverall Patient Progress: no change    Outcome Evaluation: Refusing labetalol and hydralazine for SBP >135. Refusing PM medications. Asking about going home.    ICU End of Shift Summary. See flowsheets for vital signs and detailed assessment.    Changes this shift: Alert and oriented. Cooperative and pleasant in morning. For lunch and PM meals, not alerting RN to check BS before meals. RN educated importance on checking BS. Patient said \"I'm 62 I know more than you, I don't need to do that.\" SBP greater than 135 in evening, patient also refusing all meds and BP meds stating \"medications make me feel bloated, and my blood pressure isn't high\". RN reiterated importance on managing blood pressure, and that the BP meds are not oral but IV. Patient continued to refuse. MD notified. Up independently in room to void and use toilet. WOC visited, mouth PI is healed.     Plan: Administer cares and medications as patient allows. Potential discharge in next couple days.    "

## 2023-01-14 NOTE — PLAN OF CARE
ICU End of Shift Summary. See flowsheets for vital signs and detailed assessment.    Changes this shift: Pt A&Ox4, refuses some cares. Pleasant overnight, reports frustration with still being in hospital. sleeping well. Up independent to bathroom, reports 1x BM.  1x PRN labetalol. WDL on RA all shift.     Plan: Transfer when able, continue to monitor BP and BG      Goal Outcome Evaluation:      Plan of Care Reviewed With: patient    Overall Patient Progress: no changeOverall Patient Progress: no change    Outcome Evaluation: Labaetalol given 1x.

## 2023-01-15 VITALS
BODY MASS INDEX: 42.66 KG/M2 | DIASTOLIC BLOOD PRESSURE: 82 MMHG | OXYGEN SATURATION: 96 % | HEART RATE: 93 BPM | TEMPERATURE: 98.5 F | WEIGHT: 315 LBS | HEIGHT: 72 IN | SYSTOLIC BLOOD PRESSURE: 134 MMHG | RESPIRATION RATE: 20 BRPM

## 2023-01-15 LAB — GLUCOSE BLDC GLUCOMTR-MCNC: 225 MG/DL (ref 70–99)

## 2023-01-15 PROCEDURE — 99239 HOSP IP/OBS DSCHRG MGMT >30: CPT | Mod: GC | Performed by: PEDIATRICS

## 2023-01-15 RX ORDER — SIMETHICONE 80 MG
80 TABLET,CHEWABLE ORAL EVERY 6 HOURS PRN
Qty: 30 TABLET | Refills: 0 | Status: SHIPPED | OUTPATIENT
Start: 2023-01-15

## 2023-01-15 RX ORDER — PREDNISONE 20 MG/1
60 TABLET ORAL DAILY
Qty: 3 TABLET | Refills: 0 | Status: SHIPPED | OUTPATIENT
Start: 2023-01-15

## 2023-01-15 RX ORDER — FOLIC ACID 1 MG/1
1 TABLET ORAL DAILY
Qty: 30 TABLET | Refills: 0 | Status: SHIPPED | OUTPATIENT
Start: 2023-01-15

## 2023-01-15 RX ORDER — QUETIAPINE FUMARATE 200 MG/1
600 TABLET, FILM COATED ORAL AT BEDTIME
Qty: 270 TABLET | Refills: 0 | Status: SHIPPED | OUTPATIENT
Start: 2023-01-15

## 2023-01-15 RX ORDER — BUMETANIDE 1 MG/1
1 TABLET ORAL 3 TIMES DAILY
Qty: 270 TABLET | Refills: 3 | Status: SHIPPED | OUTPATIENT
Start: 2023-01-15 | End: 2024-01-10

## 2023-01-15 RX ORDER — HYDROCHLOROTHIAZIDE 50 MG/1
50 TABLET ORAL DAILY
Qty: 90 TABLET | Refills: 3 | Status: SHIPPED | OUTPATIENT
Start: 2023-01-15 | End: 2024-01-10

## 2023-01-15 RX ORDER — ALBUTEROL SULFATE 90 UG/1
2 AEROSOL, METERED RESPIRATORY (INHALATION) EVERY 4 HOURS PRN
Qty: 18 G | Refills: 1 | Status: SHIPPED | OUTPATIENT
Start: 2023-01-15

## 2023-01-15 RX ORDER — OLANZAPINE 5 MG/1
5 TABLET ORAL 3 TIMES DAILY
Qty: 270 TABLET | Refills: 0 | Status: SHIPPED | OUTPATIENT
Start: 2023-01-15 | End: 2023-01-15

## 2023-01-15 RX ORDER — ARIPIPRAZOLE 10 MG/1
10 TABLET ORAL AT BEDTIME
Qty: 30 TABLET | Refills: 0 | Status: SHIPPED | OUTPATIENT
Start: 2023-01-15 | End: 2023-04-15

## 2023-01-15 RX ORDER — POTASSIUM CHLORIDE 1500 MG/1
40 TABLET, EXTENDED RELEASE ORAL DAILY
Qty: 180 TABLET | Refills: 3 | Status: SHIPPED | OUTPATIENT
Start: 2023-01-15 | End: 2024-01-10

## 2023-01-15 RX ORDER — OLANZAPINE 5 MG/1
5 TABLET ORAL 3 TIMES DAILY
Qty: 270 TABLET | Refills: 0 | Status: SHIPPED | OUTPATIENT
Start: 2023-01-15

## 2023-01-15 RX ORDER — FAMOTIDINE 20 MG/1
20 TABLET, FILM COATED ORAL 2 TIMES DAILY
Qty: 30 TABLET | Refills: 0 | Status: SHIPPED | OUTPATIENT
Start: 2023-01-15

## 2023-01-15 RX ORDER — PREDNISONE 20 MG/1
20 TABLET ORAL DAILY
Qty: 4 TABLET | Refills: 0 | Status: SHIPPED | OUTPATIENT
Start: 2023-01-20

## 2023-01-15 RX ORDER — METFORMIN HCL 500 MG
1000 TABLET, EXTENDED RELEASE 24 HR ORAL
Qty: 180 TABLET | Refills: 3 | Status: SHIPPED | OUTPATIENT
Start: 2023-01-15 | End: 2024-01-10

## 2023-01-15 RX ORDER — PREDNISONE 20 MG/1
40 TABLET ORAL DAILY
Qty: 8 TABLET | Refills: 0 | Status: SHIPPED | OUTPATIENT
Start: 2023-01-16

## 2023-01-15 RX ORDER — OLANZAPINE 5 MG/1
5 TABLET, ORALLY DISINTEGRATING ORAL AT BEDTIME
Qty: 90 TABLET | Refills: 0 | Status: SHIPPED | OUTPATIENT
Start: 2023-01-15 | End: 2023-01-15

## 2023-01-15 RX ORDER — OLANZAPINE 5 MG/1
5 TABLET, ORALLY DISINTEGRATING ORAL AT BEDTIME
Qty: 90 TABLET | Refills: 0 | Status: SHIPPED | OUTPATIENT
Start: 2023-01-15

## 2023-01-15 RX ORDER — LABETALOL 300 MG/1
300 TABLET, FILM COATED ORAL EVERY 12 HOURS
Qty: 180 TABLET | Refills: 3 | Status: SHIPPED | OUTPATIENT
Start: 2023-01-15

## 2023-01-15 ASSESSMENT — ACTIVITIES OF DAILY LIVING (ADL)
ADLS_ACUITY_SCORE: 22

## 2023-01-15 NOTE — PROGRESS NOTES
Brief Social Work Progress Note    Information Obtained: Writer received after hour page requesting if writer can assist with arranging discharge services. Due to writer's after hours nature, writer is unable to secure services at this time and will have to defer to daytime care coordination team.     Follow-Up Plan: Care coordination team to f/u tomorrow.    ________________    CELI Pagan, [facility]  ED/Observation   [facility]  Phone: [phone]  Pager: [phone]  Fax: [phone]    On-call pager, [phone], 4:00pm to midnight

## 2023-01-15 NOTE — PLAN OF CARE
ICU End of Shift Summary. See flowsheets for vital signs and detailed assessment.    Changes this shift: Pt anxious and restless to leave. Refusing some cares and meds. Pt becomes confused about plan going forward and home care situation. Wanting to leave AMA in morning MD paged. Working on discharge.  came and got discharge info and will follow up with home cares.  consulted with SW.      Goal Outcome Evaluation:      Plan of Care Reviewed With: patient    Overall Patient Progress: no changeOverall Patient Progress: no change    Outcome Evaluation: Pt discharging

## 2023-01-15 NOTE — DISCHARGE SUMMARY
[facility]  Discharge Summary - Medicine & Pediatrics       Date of Admission:  12/21/2022  Date of Discharge:  1/15/2023  8:23 AM   Discharging Provider: Dr. Ector Wood   Discharge Service: Medicine Service, ERICK TEAM 4    Discharge Diagnoses   # Acute hypoxic and hypercarbic respiratory failure, resolved   # Angioedema vs allergic reaction  # Hx Tracheostomy placed 7/5/19, decannulated 8/2019  # Chronic type B aortic dissection (proximal descending aorta to the abdominal aorta)  # Proximal ascending aorta saccular aneurysm   # Distal ascending aorta saccular aneurysm   # Mild nonobstructive CAD  # HTN  # Acute encephalopathy, resolved   # Schizoaffective disorder  # PTSD  # Hiatal hernia   # LAKISHA on CKD 2   # Type 2 DM with hyperglycemia  # Increased pulmonary secretions, resolved   # Hospital associated pneumonia -resolved   # Venous stasis ulcer LLE  # Venous insufficiency  # Peripheral vascular disease  # Hypokalemia, resolved  # Hypomagnesemia, resolved     Follow-ups Needed After Discharge   Follow-up Appointments     Adult [facility] Follow-up and recommended labs and tests      Follow up with primary care provider, Jacky Gaitan, within 7 days to   evaluate medication change and for hospital follow- up.  The following   labs/tests are recommended: Blood pressure check and BMP  .    Follow up with Dr. Jimenez , at Cardiovascular Surgery clinic, within 1   month  for hospital follow- up and evaluation of elective interventions   for aortic dissection/root dilation.     Appointments on [place] and/or [facility] (with [facility] or [facility]   provider or service). Call [phone] if you haven't heard regarding   these appointments within 7 days of discharge.             Unresulted Labs Ordered in the Past 30 Days of this Admission     Date and Time Order Name Status Description    1/7/2023 10:01 PM Vitamin B3 In process       These results will be followed up by PCP      Discharge Disposition   Discharged to home  Condition at discharge: Fair    Hospital Course    Antony Aguila Jr. is a 62 year old male admitted on 12/21/2022. He has history chronic type B aortic dissection s/p exploration and median sternotomy (6/18/19), type 2 DM (poorly controlled), and HTN who is admitted for acute on chronic thoracic aortic aneurysm with new aortic root dilation. Hospitalization complicated by recurrent episodes of agitation requiring aggressive sedation and compromised airway secondary to upper airway edema leading to intubation and ICU stay  The following problems were addressed during his hospitalization:    # Chronic type B aortic dissection (proximal descending aorta to the abdominal aorta)  # Proximal ascending aorta saccular aneurysm   # Distal ascending aorta saccular aneurysm   # Mild nonobstructive CAD  # HTN, non-optimal control   History of stable type B aortic dissection as well as short segment of dissection vs pseudoaneurysms at the aortic root and distal ascending thoracic aorta. Previously underwent surgical exploration in 6/2019 with concern for type 1 aortic dissection but found to have chronic type B dissection. On admission, CXR with widened mediastinum slightly increased from prior. CT chest with aneurysmal dilatation ascending and descending thoracic aorta with type B aortic dissection distal to L subclavian artery extending to L common iliac artery with severe narrowing of true lumen and possible pseudoaneurysm at aortic root.  -TRINH 12/22 showed saccular aneurysm of ascending aorta measuring 5.8 cm associated with a dissection flap. Thrombus is seen in the false lumen. Aortic root is dilated at 4.6 cm  -CTA 12/22 two saccular aneurysms, one each in the proximal ascending aorta and distal ascending aorta. The maximal transaortic diameters of 46.9 x 39.2 mm and 62.7 x 54.4 mm, respectively. Mild, non-obstructive coronary artery disease without any high-grade  stenoses  -CV surgery and cardiology consulted and signed off. Patient scheduled for outpatient follow up with Dr. Jimenez at CV surgery clinic on 2/2/23   -Blood pressure medications adjusted during hospitalization, briefly on nicardipine drip for sustained elevation. Did intermittently hold with hypotension associated to respiratory failure and sedation (see below). Medications restarted and up titration at time of discharge. Goal SBPeual to or  < 130:    - Hydrochlorothiazide 50 mg daily    - Labetalol 300 mg BID (previously 400)    - Bumex 1 mg TID   -At time of discharge patient BP still sightly above goal. Discussed risks of elevated blood pressures in setting of aortic dissection & aneurysm, understood & still requesting discharge. In discussion with patient and shared decision making discharge orders place as mild BP elevation (140-150/80, intermittent) not life threatening at this time. Anti-hypertensive refills sent to pharmacy. Patient agreeable to close follow-up with PCP     # Acute hypoxic and hypercarbic respiratory failure, resolved   # Angioedema vs allergic reaction  # Hx Tracheostomy placed 7/5/19, decannulated 8/2019  Hx of prior trach. CTA chest with compressive atelectasis in L lung adjacent to thoracic aorta and hiatal hernia.This admission w/  multiple instances where he became agitated and non-compliant with attempts for patient to use BIPAP or HFNC, requiring heavy sedation such that intubation was required to protect airway. Patient in ICU 12/26/22-1/01/23 and again 1/5/23-1/10/23. Second intubation very difficult.  Patient has a possible hx of angioedema reaction to lisinopril or losartan, but the most recent episode occurred while not on above mentioned medications. Family and home nurse confirmed no throat or tongue edema at baseline. Airway swelling rapidly improved with steroids and antihistamines. ENT evaluated airway and confirmed resolution of airway edema. Extubated on 1/9 and  ultimately weaned to RA on 1/11 without event. Hereditary angioedema work up was unremarkable with normal complement, tryptase & C1 esterase inhibitor levels.   - Cont steroid taper on discharge: Prednisone 60 mg x4 days, 40 mg x 4, 20 mg x 4 days and 10 mg x 4 days (ends 1/24/23)   - discussed with pharmacy stopped gabapentin and amlodipine due to their (rare) association with edema - more often peripheral edema however  - discuss need for allergy testing at PCP follow up    # Acute encephalopathy, resolved   # Schizoaffective disorder  # PTSD  Patient has had several episodes of agitation requiring aggressive sedation and intubation, most recently on 1/5/2023. On the floor, had multiple code 21s called. He was ultimately started on Precedex, put in 4-point restraints. On transfer to the ICU, ultimately had respiratory compromise (see above). Psychiatry consulted and patient status improved on adjusted regimen. Discharged home with:   - Aripiprizole 10 mg at bedtime   - Guanfacine 0.5 mg BID   - PTA quetiapine 600 mg at bedtime (increased from prior 400 mg)  - PTA zyprexa 5 mg q AM & lunch; 10 mg at bedtime     # Hiatal hernia  Hx of hiatal hernia. On 1/6 patient with NG noted to be coiled in hiatal hernia. EGD completed and NG uncoiled and repositioned. No specimens collected.     # LAKISHA on CKD 2   Outpatient labs 12/16 with Cr 1.4 and eGFR 55. Now back at baseline Cr ~1.1-1.2, eGFR ~65-75. UA unremarkable. Trend of LAKISHA on CKD with stopping IV fluids this admission, encouraged patient to take 64 oz fluids daily.   -Recommend BMP at PCP follow-up for monitoring, suspect new baseline 1.2-1.4     #Type 2 DM w/ hyperglycemia  Most recent A1c 12.5 (9/16/22). Oral anti-hypergycemics held during admission and patient placed on adjusted insulin regimen. Previously on Lantus 50 unit(s) qAM + aspart 17 unit(s) TIDAC + aspart 9 unit(s) w/ snacks prior to admission. Lantus continued at reduced dose (adjusted per intake needs)  and placed on high resistance sliding scale. Requiring substantially less than 17 units of aspart with meals during stay.   Discharge regimen:   -Lantus 40 units at bedtime   -Aspart 1-12 units with meals and snacks, sliding scale regimen provided  -Close PCP follow up as expect patient will have increase in blood glucose w/ resuming home diet     # Hospital associated pneumonia -resolved   Developed copious creamy secretions, has low grade temperature of 100.0F, no leukocytosis. CXR showing increased opacities. Previously on Zosyn, transitioned to nafcillin 1/01/23 - 1/04/23.     # Venous stasis ulcer LLE  # Venous insufficiency  # Peripheral vascular disease  Wound RN following during hospitalization    # Hypokalemia, resolved  # Hypomagnesemia, resolved   Repletion completed as needed during hospitalization.   -Resume potassium chloride 40 mEq daily at discharge     Consultations This Hospital Stay   WOUND OSTOMY CONTINENCE NURSE  IP CONSULT  CARDIOTHORACIC SURGERY ADULT IP CONSULT  MEDICATION HISTORY IP PHARMACY CONSULT  CARDIOLOGY GENERAL ADULT IP CONSULT  LYMPHEDEMA THERAPY IP CONSULT  NURSING TO CONSULT FOR VASCULAR ACCESS CARE IP CONSULT  NURSING TO CONSULT FOR VASCULAR ACCESS CARE IP CONSULT  NURSING TO CONSULT FOR VASCULAR ACCESS CARE IP CONSULT  NURSING TO CONSULT FOR VASCULAR ACCESS CARE IP CONSULT  PSYCHIATRY IP CONSULT  NURSING TO CONSULT FOR VASCULAR ACCESS CARE IP CONSULT  VASCULAR ACCESS FOR PICC PLACEMENT ADULT IP CONSULT  NUTRITION SERVICES ADULT IP CONSULT  PHYSICAL THERAPY ADULT IP CONSULT  OCCUPATIONAL THERAPY ADULT IP CONSULT  PHARMACY IP CONSULT  GI LUMINAL ADULT IP CONSULT  CARE MANAGEMENT / SOCIAL WORK IP CONSULT  WOUND OSTOMY CONTINENCE NURSE  IP CONSULT  MEDICATION HISTORY IP PHARMACY CONSULT  CARE MANAGEMENT / SOCIAL WORK IP CONSULT  PSYCHIATRY IP CONSULT  WOUND OSTOMY CONTINENCE NURSE  IP CONSULT  NEUROLOGY CRITICAL CARE ADULT IP CONSULT  NUTRITION SERVICES ADULT IP CONSULT  PHARMACY IP  CONSULT  ALLERGY IP CONSULT  ENT IP CONSULT  SPEECH LANGUAGE PATH ADULT IP CONSULT  PSYCHIATRY IP CONSULT  NURSING TO CONSULT FOR VASCULAR ACCESS CARE IP CONSULT  NURSING TO CONSULT FOR VASCULAR ACCESS CARE IP CONSULT    Code Status   Prior       The patient was discussed with MD Arleen Knig MD [facility] Service  [facility] [address] [place] [id number]  Phone: [phone]  ______________________________________________________________________    Physical Exam   Vital Signs: Temp: 98.5  F (36.9  C) Temp src: Oral BP: 134/82 Pulse: 93   Resp: 20 SpO2: 96 %      Weight: 317 lbs 0 oz    Constitutional: obese, standing at side of bed, conversational   HEENT: Conjunctiva clear, MMM  Cardiovascular: regular without murmur  Pulmonary/Chest: RA. CTAB. No respiratory distress or accessory muscle use   GI: Soft, non-tender , non-distended    Musculoskeletal:  2+ bilateral edema increased, oozing blisters under gauze on L  Skin: Skin is warm and dry; please see RN & WOC notes for further documentation  Neurological: Alert and oriented, moves all extremities   Psychiatric: anxious to leave hospital, alert and oriented      Primary Care Physician   Jacky Gaitan    Discharge Orders      Wound Care Referral      Reason for your hospital stay    You were admitted to the hospital for further evaluation and management of aortic root dilation and chronic type B aortic dissection (tear in aorta vessel wall) in setting of high blood pressure.     Your hospital stay was complicated by over sedation and angioedema (airway swelling) of uncertain cause requiring two stays in the ICU for breathing support (intubation). Your airway swelling has greatly improved with medication.     Activity    Your activity upon discharge: activity as tolerated, limit strenuous and stressful activity     Adult [facility] Follow-up and recommended labs and tests    Follow up with primary  care provider, Jacky Gaitan, within 7 days to evaluate medication change and for hospital follow- up.  The following labs/tests are recommended: Blood pressure check and BMP  .    Follow up with Dr. Jimenez , at Cardiovascular Surgery clinic, within 1 month  for hospital follow- up and evaluation of elective interventions for aortic dissection/root dilation.     Appointments on [place] and/or [facility] (with [facility] or [facility] provider or service). Call [phone] if you haven't heard regarding these appointments within 7 days of discharge.     Resume Home Care Services     Miscellaneous DME Order    DME Documentation:   Describe the reason for need to support medical necessity: blood glucose monitoring for diabetes mellitus.     I, the undersigned, certify that the above prescribed supplies are medically necessary for this patient and is both reasonable and necessary in reference to accepted standards of medical and necessary in reference to accepted standards of medical practice in the treatment of this patient's condition and is not prescribed as a convenience.     Diet    Follow this diet upon discharge: Orders Placed This Encounter      Regular Diet Adult       Significant Results and Procedures   Most Recent 3 CBC's:Recent Labs   Lab Test 01/14/23  0637 01/13/23  0454 01/12/23  0956   WBC 9.1 8.1 10.5   HGB 12.8* 12.1* 13.1*   MCV 90 91 90    277 339     Most Recent 3 BMP's:Recent Labs   Lab Test 01/15/23  0445 01/14/23  2321 01/14/23  2048 01/14/23  0930 01/14/23  0637 01/13/23  1554 01/13/23  1309 01/13/23  0752 01/13/23  0454 01/12/23  1228 01/12/23  0956   NA  --   --   --   --  140  --   --   --  142  --  141   POTASSIUM  --   --   --   --  3.6  --  4.3  --  3.2*  --  3.5   CHLORIDE  --   --   --   --  102  --   --   --  103  --  102   CO2  --   --   --   --  24  --   --   --  25  --  25   BUN  --   --   --   --  18.1  --   --   --  20.5  --  20.4   CR  --   --   --   --  1.29*  --   --   --   1.42*  --  1.26*   ANIONGAP  --   --   --   --  14  --   --   --  14  --  14   ANGELLA  --   --   --   --  9.3  --   --   --  8.6*  --  9.4   * 175* 226*   < > 177*   < >  --    < > 116*   < > 264*    < > = values in this interval not displayed.     Most Recent 2 LFT's:Recent Labs   Lab Test 01/04/23  1257 12/26/22  0603   AST 60* 26   ALT 82* 18   ALKPHOS 68 58   BILITOTAL 0.3 0.4     7-Day Micro Results     No results found for the last 168 hours.        Imaging:   -TRINH 12/22 showed saccular aneurysm of ascending aorta measuring 5.8 cm associated with a dissection flap. Thrombus is seen in the false lumen. Aortic root is dilated at 4.6 cm  -CTA 12/22 two saccular aneurysms, one each in the proximal ascending aorta and distal ascending aorta. The maximal transaortic diameters of 46.9 x 39.2 mm and 62.7 x 54.4 mm, respectively. Mild, non-obstructive coronary artery disease without any high-grade stenoses    CT & CTA Head and Neck 1/5:   Impression:    1. Head CTA demonstrates no aneurysm or stenosis of the major  intracranial arteries.   2. Neck CTA demonstrates no stenosis of the major cervical arteries.   3. No acute intracranial hemorrhage identified on separate noncontrast  head CT at 1710 today. Please refer to separate study for additional  Findings.    IMPRESSION:  1. Age-indeterminate small right basal ganglia lacunar infarct.  2. Slightly more chronic appearing small left basal ganglia lacunar  infarct. MRI would be a better study to evaluate age of these lacunar  infarcts.  3. Mild diffuse cerebral parenchymal volume loss and mild sequela of  chronic small vessel ischemic disease    CXR 1/10/23:   IMPRESSION:   1. Enteric tube courses below the field-of-view with coiling over the  neck. Correlate with physical exam.  2. Increased diffuse interstitial and airspace opacities, which could  represent infection versus edema and atelectasis.  3. Lower lung zones were excluded from field-of-view on today's  exam.  4. Stable widening of the cardiac silhouette and dilated thoracic  aorta.       Discharge Medications   Discharge Medication List as of 1/15/2023 12:17 PM      CONTINUE these medications which have CHANGED    Details   albuterol (PROAIR HFA/PROVENTIL HFA/VENTOLIN HFA) 108 (90 Base) MCG/ACT inhaler Inhale 2 puffs into the lungs every 4 hours as needed for shortness of breath or wheezing, Disp-18 g, R-1, E-PrescribePharmacy may dispense brand covered by insurance (Proair, or proventil or ventolin or generic albuterol inhaler)      ARIPiprazole (ABILIFY) 10 MG tablet Take 1 tablet (10 mg) by mouth At Bedtime for 90 days, Disp-30 tablet, R-0, E-Prescribe      bumetanide (BUMEX) 1 MG tablet Take 1 tablet (1 mg) by mouth 3 times daily for 360 days Frequency unknown (filled as 90 tab for 30 days), Disp-270 tablet, R-3, E-Prescribe      canagliflozin (INVOKANA) 100 MG tablet Take 1 tablet (100 mg) by mouth every morning (before breakfast) for 360 days, Disp-90 tablet, R-3, E-Prescribe      famotidine (PEPCID) 20 MG tablet Take 1 tablet (20 mg) by mouth 2 times daily, Disp-30 tablet, R-0, E-Prescribe      folic acid (FOLVITE) 1 MG tablet Take 1 tablet (1 mg) by mouth daily, Disp-30 tablet, R-0, E-Prescribe      hydrochlorothiazide (HYDRODIURIL) 50 MG tablet Take 1 tablet (50 mg) by mouth daily for 360 days, Disp-90 tablet, R-3, E-Prescribe      insulin aspart (NOVOLOG PEN) 100 UNIT/ML pen Inject 1-12 Units Subcutaneous 3 times daily (with meals) for 360 days Do Not give Correction Insulin if BG less than 140 For  - 164 give 1 unit. For  - 189 give 2 units. For  - 214 give 3 units. For  - 239 give 4 units. For BG 24 0 - 264 give 5 units. For  - 289 give 6 units. For  - 314 give 7 units. For  - 339 give 8 units For  - 364 give 9 units For  - 389 give 10 units For  - 414 give 11 units For BG greater than or equal to 415 give 12 unit s, Disp-32.4 mL, R-3,  E-Prescribe      insulin glargine (LANTUS PEN) 100 UNIT/ML pen Inject 40 Units Subcutaneous every morning, Disp-40 mL, R-3, E-PrescribeIf Lantus is not covered by insurance, may substitute Basaglar or Semglee or other insulin glargine product per insurance preference at same dose and frequency.        labetalol (NORMODYNE) 300 MG tablet Take 1 tablet (300 mg) by mouth every 12 hours, Disp-180 tablet, R-3, E-Prescribe      metFORMIN (GLUCOPHAGE XR) 500 MG 24 hr tablet Take 2 tablets (1,000 mg) by mouth daily (with dinner) for 360 days, Disp-180 tablet, R-3, E-Prescribe      OLANZapine (ZYPREXA) 5 MG tablet Take 1 tablet (5 mg) by mouth 3 times daily, Disp-270 tablet, R-0, E-Prescribe      OLANZapine zydis (ZYPREXA) 5 MG ODT Take 1 tablet (5 mg) by mouth At Bedtime, Disp-90 tablet, R-0, E-Prescribe      potassium chloride ER (K-TAB) 20 MEQ CR tablet Take 2 tablets (40 mEq) by mouth daily for 360 days Pt reports taking KCL 40 mEq daily, Disp-180 tablet, R-3, E-Prescribe      !! predniSONE (DELTASONE) 20 MG tablet Take 3 tablets (60 mg) by mouth daily, Disp-3 tablet, R-0, E-Prescribe      !! predniSONE (DELTASONE) 20 MG tablet Take 2 tablets (40 mg) by mouth daily, Disp-8 tablet, R-0, E-Prescribe      !! predniSONE (DELTASONE) 20 MG tablet Take 1 tablet (20 mg) by mouth daily, Disp-4 tablet, R-0, E-Prescribe      QUEtiapine (SEROQUEL) 200 MG tablet Take 3 tablets (600 mg) by mouth At Bedtime Takes 400 mg po at bedtime, Disp-270 tablet, R-0, E-Prescribe      simethicone (MYLICON) 80 MG chewable tablet Take 1 tablet (80 mg) by mouth every 6 hours as needed for cramping, flatulence or other (bloating and indigestion), Disp-30 tablet, R-0, E-Prescribe       !! - Potential duplicate medications found. Please discuss with provider.      CONTINUE these medications which have NOT CHANGED    Details   acetaminophen (TYLENOL) 500 MG tablet Take 500-1,000 mg by mouth every 8 hours as needed for mild pain, Historical            Allergies   Allergies   Allergen Reactions     Lisinopril Angioedema     Hospitalization in 7/2019 notes possible ACE-inhibitor related angioedema.      Losartan Angioedema     Angioedema on 12/26/22, received losartan 12/25/22.     Lidocaine      Pt states this is not an allergy and doesn't recall this to be an allergy     Pollen Extract

## 2023-01-16 NOTE — PLAN OF CARE
Physical Therapy Discharge Summary    Reason for therapy discharge:    No further expectations of functional progress.    Progress towards therapy goal(s). See goals on Care Plan in [facility] electronic health record for goal details.  Goals met    Therapy recommendation(s):    No further therapy is recommended.

## 2023-01-17 ENCOUNTER — PATIENT OUTREACH (OUTPATIENT)
Dept: CARE COORDINATION | Facility: CLINIC | Age: 63
End: 2023-01-17
Payer: COMMERCIAL

## 2023-01-17 ENCOUNTER — TELEPHONE (OUTPATIENT)
Dept: WOUND CARE | Facility: CLINIC | Age: 63
End: 2023-01-17
Payer: COMMERCIAL

## 2023-01-17 LAB — NIACIN SERPL-MCNC: 3.83 UG/ML

## 2023-01-17 NOTE — PROGRESS NOTES
Clinic Care Coordination Contact  [facility]/Voicemail       Clinical Data: Care Coordinator Outreach  Outreach attempted x 2.  Left message on patient's voicemail.   Plan:  Care Coordinator will do no further outreaches at this time.            MITCH Kaur  [phone]  [facility]

## 2023-01-17 NOTE — TELEPHONE ENCOUNTER
"LVMx1, no mychart    Regarding a Wound Care referral placed by Dr. Romero for \"Venous stasis ulcer of left calf limited to breakdown of skin\". Schedule a new appt with Dr. Herrera, using [facility] New visit type, next available appt.  "

## 2023-01-20 ENCOUNTER — MEDICAL CORRESPONDENCE (OUTPATIENT)
Dept: HEALTH INFORMATION MANAGEMENT | Facility: CLINIC | Age: 63
End: 2023-01-20
Payer: COMMERCIAL

## 2023-01-24 ENCOUNTER — TRANSCRIBE ORDERS (OUTPATIENT)
Dept: OTHER | Age: 63
End: 2023-01-24

## 2023-01-24 DIAGNOSIS — T78.3XXD ANGIOEDEMA, SUBSEQUENT ENCOUNTER: Primary | ICD-10-CM

## 2023-01-24 DIAGNOSIS — L60.0 INGROWN TOENAIL: Primary | ICD-10-CM

## 2023-02-02 ENCOUNTER — OFFICE VISIT (OUTPATIENT)
Dept: CARDIOLOGY | Facility: CLINIC | Age: 63
End: 2023-02-02
Attending: SURGERY
Payer: COMMERCIAL

## 2023-02-02 VITALS
OXYGEN SATURATION: 100 % | SYSTOLIC BLOOD PRESSURE: 164 MMHG | DIASTOLIC BLOOD PRESSURE: 106 MMHG | WEIGHT: 315 LBS | HEIGHT: 71 IN | BODY MASS INDEX: 44.1 KG/M2 | HEART RATE: 94 BPM

## 2023-02-02 DIAGNOSIS — I77.810 AORTIC ROOT DILATION (H): Primary | ICD-10-CM

## 2023-02-02 DIAGNOSIS — E66.01 MORBID OBESITY (H): ICD-10-CM

## 2023-02-02 PROCEDURE — G0463 HOSPITAL OUTPT CLINIC VISIT: HCPCS

## 2023-02-02 PROCEDURE — 99214 OFFICE O/P EST MOD 30 MIN: CPT | Performed by: SURGERY

## 2023-02-02 PROCEDURE — G0463 HOSPITAL OUTPT CLINIC VISIT: HCPCS | Performed by: SURGERY

## 2023-02-02 ASSESSMENT — PAIN SCALES - GENERAL: PAINLEVEL: NO PAIN (0)

## 2023-02-02 NOTE — NURSING NOTE
Chief Complaint   Patient presents with     New Patient     New CV surgery           Vitals were taken and medications reconciled.     Jarred Azul, EMT   2:37 PM

## 2023-02-02 NOTE — LETTER
2/2/2023      RE: Antony Mingo Valencia  [address]  [place] [address]       Dear Colleague,    Thank you for the opportunity to participate in the care of your patient, Antony Aguila Jr., at the [facility] [place] [facility]. Please see a copy of my visit note below.    CV Surgery    Patient seen, clinic note dictated #[id number].        Please do not hesitate to contact me if you have any questions/concerns.     Sincerely,     Laury Jimenez MD

## 2023-02-04 PROBLEM — E66.01 MORBID OBESITY (H): Status: ACTIVE | Noted: 2023-02-04

## 2023-02-05 NOTE — PROGRESS NOTES
Service Date: 02/02/2023    HISTORY OF PRESENT ILLNESS:  Mr. Aguila is a very pleasant 62-year-old morbidly obese  gentleman with a BMI of 48, poorly controlled type 2 diabetes with a history of type B aortic dissection.  Per note, the patient was taken to the operating room 2 years ago at [facility] for this acute type aortic dissection.  It was thought initially to be a type A; therefore, underwent a sternotomy and upon entry, it was discovered that this was a type B; therefore, it was not repaired.  Unfortunately, I do not have the operative records to prove this or to look at any of the details, but the imaging studies are suggestive that he definitely had an acute type B dissection back then.  This was managed medically and he was reportedly doing well, but he presented to the ER in 12/21/2022 with a subacute onset shortness of breath.  He was scanned on 12/21/2022, which demonstrated the known chronic type B aortic dissection that was stable, but a new aneurysmal findings involving the aortic root and ascending aorta were seen.  Therefore, we were consulted.  I briefly saw the patient at that time, but we thought these were incidental findings unrelated to his symptoms at that time.  Again, he denied any chest pain or back pain at that time and his symptoms were mainly shortness of breath and heart failure type symptoms.  He was admitted to the hospital and underwent further workup including a coronary CT scan that demonstrated no significant coronary artery disease and an echocardiogram demonstrated normal ventricular function and no valvular disease, except for mild to moderate aortic valve insufficiency with a trileaflet aortic valve.  He was eventually discharged from the hospital with followup in our surgical clinic to discuss surgical repair options.  He presents today with his sister.  He reports that he is doing well and is back to his baseline health.  He denies any chest  pain or back pain and his shortness of breath has improved to his baseline.  He has recently seen Dr. Gaitan, his primary physician, who has been managing his medical issues including his diabetes.  Looking at his chart, it looks like his blood sugar levels have been not well controlled in the 200s and even into the 300s.  His last A1c was back in 09/2022 and it was about 12.    PAST MEDICAL HISTORY:  Significant for now a chronic type B dissection, uncontrolled type 2 diabetes, hypertension.    PAST SURGICAL HISTORY:  Median sternotomy for acute aortic dissection requiring exploration with Dr. Ector Marcelo at [facility] on 06/18/2019.  Again, we do not have the operative records at this time.  He had a prolonged recovery course and had a tracheostomy at that time.    ALLERGIES:  Lisinopril, losartan, Lidocaine.    SOCIAL HISTORY:  He is .  He still smokes.  He has a 45-pack-year smoking history, smoking about 1 pack per week.  He does not drink alcohol.    REVIEW OF SYSTEMS:  As per HPI.  All other 10-point review of systems were completed and were otherwise negative unless stated above.    PHYSICAL EXAMINATION:    VITAL SIGNS:  All vital signs are stable.  GENERAL:  He appears in no acute distress.  HEENT:  Within normal limits.  NECK:  Supple.  No lymphadenopathy.  CARDIOVASCULAR:  Regular rate and rhythm, normal S1, S2.  No murmurs are audible.  LUNGS:  Clear bilaterally.  ABDOMEN:  Soft, nontender, nondistended.  EXTREMITIES:  Positive for 1+ bilateral lower extremity edema.  NEUROLOGIC:  A and O x3.  No focal deficits.    IMPRESSION AND PLAN:  Mr. Aguila is a 62-year-old gentleman with poorly controlled type 2 diabetes and BMI of 48 with a chronic type B dissection, status post sternotomy for exploration in 2019 at [facility] for acute aortic dissection that was initially presumed to be type A aortic dissection (we do not have the operative records at this time).  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  This was based on a CT angiogram of the chest performed 12/21/2022.  The aortic root aneurysm measured around 4.5 cm and the distal aortic aneurysm measured about 6.3 x 5.4 cm.  There are no definite signs of dissection and these appeared to be either saccular aneurysms or pseudoaneurysms.  His LV function is preserved and he has mild to moderate aortic valve insufficiency with a trileaflet aortic valve.  My recommendation is aortic root replacement via a Bentall procedure and ascending aorta replacement in a hemiarch fashion.  The distal ascending aneurysm is quite large and is very close to the innominate artery takeoff, so if the hemiarch reconstruction is not feasible, he might need an arch reconstruction/replacement or at least a partial aortic arch replacement.  This would have to be done via a redo sternotomy approach.  Unfortunately, at this time, I do not think he is medically optimized for this big operation as his diabetes is not well controlled as his last A1c was greater than 12 last fall.  I would also like to have him stop smoking.  I explained to him aortic aneurysms in detail and the reason for recommending the operation proposed.  I went over the risks and benefits of the operation and the potential complications associated with the surgery.  These complications include, but not strictly limited to infection, bleeding, stroke, postop MI, postop arrhythmias, pulmonary or renal complications.  I think he would be a reasonable surgical candidate, but at this time, I think it would be prudent to optimize him medically to diminish his preoperative risk as much as possible.  His main thing is his diabetes control.  He understands this.  He is also not mentally ready to sign up for the surgery.  So, we discussed that we will have him work on his diabetes control with his primary care physician and reassess things in a  couple of months, repeat A1c level and see him back in clinic and discuss this surgery.  The surgery eventually will require a redo sternotomy, Bentall procedure and hemiarch replacement of the ascending aorta and possible aortic arch replacement via deep hypothermic circulatory arrest.  I will likely plan on either cannulating the right axillary artery or the right common femoral artery.  We will discuss this when we see him back in clinic in a couple of months.  It was a pleasure meeting Mr. Jones today.    Laury Jimenez MD        D: 2023   T: 2023   MT: aaron    Name:     DAVID JONES  MRN:      [id number]        Account:      [id number]   :      1960           Service Date: 2023       Document: [id number]

## 2023-02-25 NOTE — TELEPHONE ENCOUNTER
FUTURE VISIT INFORMATION      FUTURE VISIT INFORMATION:    Date: 5.10.23    Time: 7:30    Location: [facility]  REFERRAL INFORMATION:    Referring provider:  Kandy    Referring providers clinic:  [facility]    Reason for visit/diagnosis  Angioedema, subsequent encounter; Skin testing. Multiple episodes of lip swelling - Referred by Dr Jacky Gaitan at [facility] (Phone: [phone], Fax: [phone]) - Recs in [facility] - Recs with [facility] - no other Recs - per Pt Antony    RECORDS REQUESTED FROM:       Clinic name Comments Records Status Imaging Status   [facility] 1.20.23  Kandy PEREZ

## 2023-03-03 ENCOUNTER — LAB REQUISITION (OUTPATIENT)
Dept: LAB | Facility: CLINIC | Age: 63
End: 2023-03-03
Payer: COMMERCIAL

## 2023-03-03 DIAGNOSIS — E11.9 TYPE 2 DIABETES MELLITUS WITHOUT COMPLICATIONS (H): ICD-10-CM

## 2023-03-03 LAB — HBA1C MFR BLD: 8.6 %

## 2023-03-03 PROCEDURE — 83036 HEMOGLOBIN GLYCOSYLATED A1C: CPT | Mod: ORL | Performed by: INTERNAL MEDICINE

## 2023-05-10 ENCOUNTER — PRE VISIT (OUTPATIENT)
Dept: ALLERGY | Facility: CLINIC | Age: 63
End: 2023-05-10

## 2023-06-26 DIAGNOSIS — E11.65 UNCONTROLLED TYPE 2 DIABETES MELLITUS WITH HYPERGLYCEMIA (H): Primary | ICD-10-CM

## 2023-08-17 ENCOUNTER — ANCILLARY ORDERS (OUTPATIENT)
Dept: GENERAL RADIOLOGY | Facility: CLINIC | Age: 63
End: 2023-08-17

## 2023-08-17 ENCOUNTER — LAB REQUISITION (OUTPATIENT)
Dept: LAB | Facility: CLINIC | Age: 63
End: 2023-08-17
Payer: COMMERCIAL

## 2023-08-17 ENCOUNTER — ANCILLARY PROCEDURE (OUTPATIENT)
Dept: GENERAL RADIOLOGY | Facility: CLINIC | Age: 63
End: 2023-08-17
Attending: PEDIATRICS
Payer: COMMERCIAL

## 2023-08-17 DIAGNOSIS — E11.9 TYPE 2 DIABETES MELLITUS WITHOUT COMPLICATIONS (H): ICD-10-CM

## 2023-08-17 DIAGNOSIS — M25.561 ACUTE PAIN OF RIGHT KNEE: ICD-10-CM

## 2023-08-17 DIAGNOSIS — R56.9 SEIZURES (H): Primary | ICD-10-CM

## 2023-08-17 DIAGNOSIS — R56.9 SEIZURES (H): ICD-10-CM

## 2023-08-17 LAB
ALBUMIN SERPL BCG-MCNC: 4.2 G/DL (ref 3.5–5.2)
ALP SERPL-CCNC: 90 U/L (ref 40–129)
ALT SERPL W P-5'-P-CCNC: 79 U/L (ref 0–70)
ANION GAP SERPL CALCULATED.3IONS-SCNC: 14 MMOL/L (ref 7–15)
AST SERPL W P-5'-P-CCNC: 49 U/L (ref 0–45)
BILIRUB SERPL-MCNC: 0.5 MG/DL
BUN SERPL-MCNC: 8.6 MG/DL (ref 8–23)
CALCIUM SERPL-MCNC: 9.7 MG/DL (ref 8.8–10.2)
CHLORIDE SERPL-SCNC: 100 MMOL/L (ref 98–107)
CHOLEST SERPL-MCNC: 155 MG/DL
CREAT SERPL-MCNC: 1.26 MG/DL (ref 0.67–1.17)
CREAT UR-MCNC: 376 MG/DL
DEPRECATED HCO3 PLAS-SCNC: 26 MMOL/L (ref 22–29)
GFR SERPL CREATININE-BSD FRML MDRD: 64 ML/MIN/1.73M2
GLUCOSE SERPL-MCNC: 271 MG/DL (ref 70–99)
HDLC SERPL-MCNC: 36 MG/DL
LDLC SERPL CALC-MCNC: 95 MG/DL
MICROALBUMIN UR-MCNC: 35.3 MG/L
MICROALBUMIN/CREAT UR: 9.39 MG/G CR (ref 0–17)
NONHDLC SERPL-MCNC: 119 MG/DL
POTASSIUM SERPL-SCNC: 4 MMOL/L (ref 3.4–5.3)
PROT SERPL-MCNC: 7.4 G/DL (ref 6.4–8.3)
SODIUM SERPL-SCNC: 140 MMOL/L (ref 136–145)
TRIGL SERPL-MCNC: 118 MG/DL

## 2023-08-17 PROCEDURE — 82570 ASSAY OF URINE CREATININE: CPT | Mod: ORL | Performed by: PEDIATRICS

## 2023-08-17 PROCEDURE — 73562 X-RAY EXAM OF KNEE 3: CPT | Mod: RT | Performed by: RADIOLOGY

## 2023-08-17 PROCEDURE — 80053 COMPREHEN METABOLIC PANEL: CPT | Mod: ORL | Performed by: PEDIATRICS

## 2023-08-17 PROCEDURE — 80061 LIPID PANEL: CPT | Mod: ORL | Performed by: PEDIATRICS

## 2023-08-18 ENCOUNTER — TRANSCRIBE ORDERS (OUTPATIENT)
Dept: OTHER | Age: 63
End: 2023-08-18

## 2023-08-18 ENCOUNTER — ANCILLARY ORDERS (OUTPATIENT)
Dept: GENERAL RADIOLOGY | Facility: CLINIC | Age: 63
End: 2023-08-18

## 2023-08-18 DIAGNOSIS — M25.561 ACUTE PAIN OF RIGHT KNEE: ICD-10-CM

## 2023-08-18 DIAGNOSIS — T78.3XXD ANGIOEDEMA, SUBSEQUENT ENCOUNTER: Primary | ICD-10-CM

## 2023-08-18 DIAGNOSIS — R56.9 SEIZURE (H): Primary | ICD-10-CM

## 2023-08-18 DIAGNOSIS — E11.9 TYPE 2 DIABETES MELLITUS WITHOUT COMPLICATION, WITH LONG TERM CURRENT USE OF INSULIN PUMP (H): Primary | ICD-10-CM

## 2023-08-18 DIAGNOSIS — Z96.41 TYPE 2 DIABETES MELLITUS WITHOUT COMPLICATION, WITH LONG TERM CURRENT USE OF INSULIN PUMP (H): Primary | ICD-10-CM

## 2023-08-19 ENCOUNTER — HOSPITAL ENCOUNTER (EMERGENCY)
Facility: CLINIC | Age: 63
Discharge: HOME | End: 2023-08-19
Attending: EMERGENCY MEDICINE | Admitting: EMERGENCY MEDICINE
Payer: COMMERCIAL

## 2023-08-19 ENCOUNTER — APPOINTMENT (OUTPATIENT)
Dept: GENERAL RADIOLOGY | Facility: CLINIC | Age: 63
End: 2023-08-19
Attending: EMERGENCY MEDICINE
Payer: COMMERCIAL

## 2023-08-19 VITALS
HEART RATE: 78 BPM | DIASTOLIC BLOOD PRESSURE: 81 MMHG | WEIGHT: 315 LBS | RESPIRATION RATE: 16 BRPM | OXYGEN SATURATION: 95 % | SYSTOLIC BLOOD PRESSURE: 138 MMHG | BODY MASS INDEX: 47.77 KG/M2 | TEMPERATURE: 98.5 F

## 2023-08-19 DIAGNOSIS — S81.802D LEG WOUND, LEFT, SUBSEQUENT ENCOUNTER: Primary | ICD-10-CM

## 2023-08-19 LAB
ANION GAP SERPL CALCULATED.3IONS-SCNC: 6 MMOL/L (ref 7–15)
BASOPHILS # BLD AUTO: 0.1 10E3/UL (ref 0–0.2)
BASOPHILS NFR BLD AUTO: 1 %
BUN SERPL-MCNC: 6.9 MG/DL (ref 8–23)
CALCIUM SERPL-MCNC: 9.2 MG/DL (ref 8.8–10.2)
CHLORIDE SERPL-SCNC: 101 MMOL/L (ref 98–107)
CREAT SERPL-MCNC: 1.2 MG/DL (ref 0.67–1.17)
DEPRECATED HCO3 PLAS-SCNC: 31 MMOL/L (ref 22–29)
EOSINOPHIL # BLD AUTO: 0.2 10E3/UL (ref 0–0.7)
EOSINOPHIL NFR BLD AUTO: 4 %
ERYTHROCYTE [DISTWIDTH] IN BLOOD BY AUTOMATED COUNT: 15.2 % (ref 10–15)
GFR SERPL CREATININE-BSD FRML MDRD: 68 ML/MIN/1.73M2
GLUCOSE SERPL-MCNC: 214 MG/DL (ref 70–99)
HCT VFR BLD AUTO: 44 % (ref 40–53)
HGB BLD-MCNC: 14.8 G/DL (ref 13.3–17.7)
IMM GRANULOCYTES # BLD: 0 10E3/UL
IMM GRANULOCYTES NFR BLD: 1 %
LYMPHOCYTES # BLD AUTO: 1.5 10E3/UL (ref 0.8–5.3)
LYMPHOCYTES NFR BLD AUTO: 23 %
MCH RBC QN AUTO: 29 PG (ref 26.5–33)
MCHC RBC AUTO-ENTMCNC: 33.6 G/DL (ref 31.5–36.5)
MCV RBC AUTO: 86 FL (ref 78–100)
MONOCYTES # BLD AUTO: 0.6 10E3/UL (ref 0–1.3)
MONOCYTES NFR BLD AUTO: 10 %
NEUTROPHILS # BLD AUTO: 4.1 10E3/UL (ref 1.6–8.3)
NEUTROPHILS NFR BLD AUTO: 61 %
NRBC # BLD AUTO: 0 10E3/UL
NRBC BLD AUTO-RTO: 0 /100
NT-PROBNP SERPL-MCNC: 297 PG/ML (ref 0–900)
PLATELET # BLD AUTO: 215 10E3/UL (ref 150–450)
POTASSIUM SERPL-SCNC: 3.9 MMOL/L (ref 3.4–5.3)
RBC # BLD AUTO: 5.1 10E6/UL (ref 4.4–5.9)
SODIUM SERPL-SCNC: 138 MMOL/L (ref 136–145)
WBC # BLD AUTO: 6.5 10E3/UL (ref 4–11)

## 2023-08-19 PROCEDURE — 36415 COLL VENOUS BLD VENIPUNCTURE: CPT | Performed by: EMERGENCY MEDICINE

## 2023-08-19 PROCEDURE — 99283 EMERGENCY DEPT VISIT LOW MDM: CPT | Performed by: EMERGENCY MEDICINE

## 2023-08-19 PROCEDURE — 80048 BASIC METABOLIC PNL TOTAL CA: CPT | Performed by: EMERGENCY MEDICINE

## 2023-08-19 PROCEDURE — 73590 X-RAY EXAM OF LOWER LEG: CPT | Mod: LT

## 2023-08-19 PROCEDURE — 83880 ASSAY OF NATRIURETIC PEPTIDE: CPT | Performed by: EMERGENCY MEDICINE

## 2023-08-19 PROCEDURE — 85004 AUTOMATED DIFF WBC COUNT: CPT | Performed by: EMERGENCY MEDICINE

## 2023-08-19 PROCEDURE — 99284 EMERGENCY DEPT VISIT MOD MDM: CPT | Performed by: EMERGENCY MEDICINE

## 2023-08-19 ASSESSMENT — ACTIVITIES OF DAILY LIVING (ADL)
ADLS_ACUITY_SCORE: 35
ADLS_ACUITY_SCORE: 35

## 2023-08-19 NOTE — ED PROVIDER NOTES
[facility] EMERGENCY DEPARTMENT ([facility])    8/19/23      ED PROVIDER NOTE   ED 9   History     Chief Complaint   Patient presents with    Leg Swelling    Wound Check     The history is provided by the patient and medical records.     Antony Aguila Jr. is a 63 year old male with history of type II diabetes mellitus, diabetic neuropathy, PVD, venous insufficiency, venous stasis ulcers to left lower extremity who presents to the Emergency Department with left lower extremity swelling and a wound.     Epic records reviewed.  Patient lives at home independently, ambulates with walker.  He was seen at [facility] 2 days ago, was brought in by his sister on a walk-in basis.  He had had a reported seizure at home while in bed on 8/15/2023; this is in the setting of discontinuation of Keppra in October 2021.  He woke up on the floor in his right leg was trapped in his bed.  He was unable to get himself back up, stayed on the ground until the following morning when his sister came to his house and got him up.  He reported having persistent pain in his right knee and feeling like both of his legs were quite swollen and that his right knee was giving out a little bit.  He was noted to have bilateral lower extremity edema with an abrasion over his left anterior shin without overlying cellulitis.  He was given a neurology referral as well as referral to lymphedema clinic.  Right knee x-ray, lab work was ordered.  He is on diuretics with Bumex 1 mg 3 times a day, hydrochlorothiazide 50 mg daily.      Patient reports that he has a chronic wound to the left shin.  Patient states that there has been chronic clear drainage from that site for the last 2 months as well.  He thinks that recently has been increased drainage.  No change in the color of the drainage, no surrounding redness, no fevers.  No new numbness or weakness.  Patient states that swelling is not significant changed. Patient states that this  wound preceded his fall.    Past Medical History  Past Medical History:   Diagnosis Date    Chronic dissection of thoracic aorta     S/p exploration and median sternotomy by Dr. Marcelo on 6/18/19    Diabetes (H)     Hypertension      Past Surgical History:   Procedure Laterality Date    ESOPHAGOGASTRODUODENOSCOPY, WITH NASOGASTRIC TUBE INSERTION N/A 12/27/2022    Procedure: ESOPHAGOGASTRODUODENOSCOPY, WITH NASOJEJUNAL TUBE INSERTION;  Surgeon: Olu Jeffries MD;  Location: [facility]    ESOPHAGOSCOPY, GASTROSCOPY, DUODENOSCOPY (EGD), COMBINED N/A 1/6/2023    Procedure: ESOPHAGOGASTRODUODENOSCOPY (EGD) with NG tube placement;  Surgeon: Olu Jeffries MD;  Location: [facility]    MEDIAN STERNOTOMY  06/18/2019    Chronic dissection of thoracic aorta s/p exploration and median sternotomy by Dr. Marcelo    PICC TRIPLE LUMEN PLACEMENT Right 12/26/2022    Cephalic Vein 51 cm, 2 cm out    TRACHEOSTOMY  07/07/2019    decannulated 8/8/19    TRANSESOPHAGEAL ECHOCARDIOGRAM INTRAOPERATIVE N/A 12/23/2022    Procedure: Echocardiogram, Transesophageal, with anesthesia;  Surgeon: GENERIC ANESTHESIA PROVIDER;  Location: [facility] OR     acetaminophen (TYLENOL) 500 MG tablet  albuterol (PROAIR HFA/PROVENTIL HFA/VENTOLIN HFA) 108 (90 Base) MCG/ACT inhaler  ARIPiprazole (ABILIFY) 10 MG tablet  bumetanide (BUMEX) 1 MG tablet  canagliflozin (INVOKANA) 100 MG tablet  Continuous Blood Gluc  (FREESTYLE LIA 2 READER) NATALIA  Continuous Blood Gluc Sensor (FREESTYLE LIA 2 SENSOR) MISC  famotidine (PEPCID) 20 MG tablet  folic acid (FOLVITE) 1 MG tablet  hydrochlorothiazide (HYDRODIURIL) 50 MG tablet  insulin aspart (NOVOLOG PEN) 100 UNIT/ML pen  insulin glargine (LANTUS PEN) 100 UNIT/ML pen  labetalol (NORMODYNE) 300 MG tablet  metFORMIN (GLUCOPHAGE XR) 500 MG 24 hr tablet  OLANZapine (ZYPREXA) 5 MG tablet  OLANZapine zydis (ZYPREXA) 5 MG ODT  potassium chloride ER (K-TAB) 20 MEQ CR tablet  predniSONE (DELTASONE) 20 MG tablet  predniSONE (DELTASONE) 20 MG  tablet  predniSONE (DELTASONE) 20 MG tablet  QUEtiapine (SEROQUEL) 200 MG tablet  simethicone (MYLICON) 80 MG chewable tablet      Allergies   Allergen Reactions    Lisinopril Angioedema     Hospitalization in 7/2019 notes possible ACE-inhibitor related angioedema.     Losartan Angioedema     Angioedema on 12/26/22, received losartan 12/25/22.    Lidocaine      Pt states this is not an allergy and doesn't recall this to be an allergy    Pollen Extract      Family History  No family history on file.  Social History   Social History     Tobacco Use    Smoking status: Some Days     Years: 45.00     Types: Cigarettes    Smokeless tobacco: Never    Tobacco comments:     Smokes 1 pack per week currently (12/2022).   Vaping Use    Vaping Use: Never used   Substance Use Topics    Alcohol use: Not Currently    Drug use: Yes     Types: Marijuana         A medically appropriate review of systems was performed with pertinent positives and negatives noted in the HPI, and all other systems negative.    Physical Exam   BP: 138/81  Pulse: 75  Temp: 98.5  F (36.9  C)  Resp: 18  Weight: (!) 156.5 kg (345 lb)  SpO2: 92 %    Wt Readings from Last 10 Encounters:   08/19/23 (!) 156.5 kg (345 lb)   02/02/23 (!) 157.7 kg (347 lb 11.2 oz)   01/14/23 143.8 kg (317 lb)   09/15/22 138.3 kg (305 lb)   10/28/21 (!) 158.8 kg (350 lb)   09/26/21 (!) 153.8 kg (339 lb)     Physical Exam  Constitutional:       General: He is not in acute distress.     Appearance: Normal appearance. He is not toxic-appearing.   HENT:      Head: Normocephalic and atraumatic.      Nose: Nose normal. No congestion.      Mouth/Throat:      Mouth: Mucous membranes are moist.      Pharynx: Oropharynx is clear.   Eyes:      Extraocular Movements: Extraocular movements intact.      Pupils: Pupils are equal, round, and reactive to light.   Cardiovascular:      Rate and Rhythm: Regular rhythm.   Pulmonary:      Effort: Pulmonary effort is normal. No respiratory distress.       Breath sounds: No wheezing or rales.   Abdominal:      General: There is no distension.      Palpations: Abdomen is soft.      Tenderness: There is no abdominal tenderness.   Musculoskeletal:         General: Normal range of motion.      Right lower leg: Edema present.      Left lower leg: Edema present.   Skin:     General: Skin is warm and dry.      Comments: Chronic appearing wound to the left lower extremity approximately 2 cm in size over the left anterior shin.  No surrounding erythema.  No purulence.  No excess warmth.  No tenderness.   Neurological:      Mental Status: He is alert.           ED Course, Procedures, & Data      Procedures                      Results for orders placed or performed during the hospital encounter of 08/19/23   XR Tibia and Fibula Left 2 Views     Status: None    Narrative    EXAM: XR TIBIA AND FIBULA LEFT 2 VIEWS  LOCATION: [facility]  DATE: 8/19/2023    INDICATION: leg wound, fall  COMPARISON: None.      Impression    IMPRESSION: No fracture. No evidence of osteomyelitis. Diffuse soft tissue swelling. Degenerative arthritis left knee and ankle.   Basic metabolic panel     Status: Abnormal   Result Value Ref Range    Sodium 138 136 - 145 mmol/L    Potassium 3.9 3.4 - 5.3 mmol/L    Chloride 101 98 - 107 mmol/L    Carbon Dioxide (CO2) 31 (H) 22 - 29 mmol/L    Anion Gap 6 (L) 7 - 15 mmol/L    Urea Nitrogen 6.9 (L) 8.0 - 23.0 mg/dL    Creatinine 1.20 (H) 0.67 - 1.17 mg/dL    Calcium 9.2 8.8 - 10.2 mg/dL    Glucose 214 (H) 70 - 99 mg/dL    GFR Estimate 68 >60 mL/min/1.73m2   Nt probnp inpatient     Status: Normal   Result Value Ref Range    N terminal Pro BNP Inpatient 297 0 - 900 pg/mL   CBC with platelets and differential     Status: Abnormal   Result Value Ref Range    WBC Count 6.5 4.0 - 11.0 10e3/uL    RBC Count 5.10 4.40 - 5.90 10e6/uL    Hemoglobin 14.8 13.3 - 17.7 g/dL    Hematocrit 44.0 40.0 - 53.0 %    MCV 86 78 - 100 fL    MCH  29.0 26.5 - 33.0 pg    MCHC 33.6 31.5 - 36.5 g/dL    RDW 15.2 (H) 10.0 - 15.0 %    Platelet Count 215 150 - 450 10e3/uL    % Neutrophils 61 %    % Lymphocytes 23 %    % Monocytes 10 %    % Eosinophils 4 %    % Basophils 1 %    % Immature Granulocytes 1 %    NRBCs per 100 WBC 0 <1 /100    Absolute Neutrophils 4.1 1.6 - 8.3 10e3/uL    Absolute Lymphocytes 1.5 0.8 - 5.3 10e3/uL    Absolute Monocytes 0.6 0.0 - 1.3 10e3/uL    Absolute Eosinophils 0.2 0.0 - 0.7 10e3/uL    Absolute Basophils 0.1 0.0 - 0.2 10e3/uL    Absolute Immature Granulocytes 0.0 <=0.4 10e3/uL    Absolute NRBCs 0.0 10e3/uL   CBC with platelets differential     Status: Abnormal    Narrative    The following orders were created for panel order CBC with platelets differential.  Procedure                               Abnormality         Status                     ---------                               -----------         ------                     CBC with platelets and d...[[id number]]  Abnormal            Final result                 Please view results for these tests on the individual orders.     Medications - No data to display  Labs Ordered and Resulted from Time of ED Arrival to Time of ED Departure   BASIC METABOLIC PANEL - Abnormal       Result Value    Sodium 138      Potassium 3.9      Chloride 101      Carbon Dioxide (CO2) 31 (*)     Anion Gap 6 (*)     Urea Nitrogen 6.9 (*)     Creatinine 1.20 (*)     Calcium 9.2      Glucose 214 (*)     GFR Estimate 68     CBC WITH PLATELETS AND DIFFERENTIAL - Abnormal    WBC Count 6.5      RBC Count 5.10      Hemoglobin 14.8      Hematocrit 44.0      MCV 86      MCH 29.0      MCHC 33.6      RDW 15.2 (*)     Platelet Count 215      % Neutrophils 61      % Lymphocytes 23      % Monocytes 10      % Eosinophils 4      % Basophils 1      % Immature Granulocytes 1      NRBCs per 100 WBC 0      Absolute Neutrophils 4.1      Absolute Lymphocytes 1.5      Absolute Monocytes 0.6      Absolute Eosinophils 0.2       Absolute Basophils 0.1      Absolute Immature Granulocytes 0.0      Absolute NRBCs 0.0     NT PROBNP INPATIENT - Normal    N terminal Pro BNP Inpatient 297       XR Tibia and Fibula Left 2 Views   Final Result   IMPRESSION: No fracture. No evidence of osteomyelitis. Diffuse soft tissue swelling. Degenerative arthritis left knee and ankle.             Critical care was not performed.     Medical Decision Making  The patient's presentation was of moderate complexity (2 or more stable chronic illnesses).    The patient's evaluation involved:  an assessment requiring an independent historian (sister)  review of external note(s) from 1 sources (outpatient visit for recent fall)  ordering and/or review of 3+ test(s) in this encounter (see separate area of note for details)    The patient's management necessitated only low risk treatment.    Assessment & Plan    63-year-old male here for left lower extremity wound.  Patient was afebrile.  Exam showed a chronic appearing wound with no signs of associated infection such as erythema, fever, warmth.  Patient notes chronic drainage from the site which may be in part due to his chronic lymphedema and venous stasis.    X-ray showed no acute injury to that extremity.  Labs showed creatinine of 1.2 which is around his baseline.  CBC showed a normal white blood cell count.  BNP was normal and he does not appear to have fluid overload.    I discussed with the patient that this chronic wound is likely related to his venous stasis and lymphedema he requires follow-up with wound care and lymphedema clinic.  Referral to wound care was placed.  Return precautions including signs or symptoms to suggest infection were discussed.  All questions answered.    I have reviewed the nursing notes. I have reviewed the findings, diagnosis, plan and need for follow up with the patient.    Discharge Medication List as of 8/19/2023  2:59 PM          Final diagnoses:   Leg wound, left, subsequent  encounter       Ector Gomes MD    [facility] EMERGENCY DEPARTMENT  8/19/2023     Ector Gomes MD  08/19/23 6296

## 2023-08-19 NOTE — DISCHARGE INSTRUCTIONS
I have placed a referral for wound care for you. They should call you to schedule an appointment. If you do not get a call this link has some different providers and their contact information that work in wound care:  [email or web address]    You should also follow up with the lymphedema specialists with your previous referral

## 2023-08-19 NOTE — ED TRIAGE NOTES
Pt presented w/ c/o  of LLE swelling and drainage of clear/milky for the past 3 days. Pt states his skin is sloughing off in lower shin area.     Triage Assessment       Row Name 08/19/23 1126       Triage Assessment (Adult)    Airway WDL WDL       Respiratory WDL    Respiratory WDL WDL       Cardiac WDL    Cardiac WDL WDL

## 2023-08-21 ENCOUNTER — TRANSCRIBE ORDERS (OUTPATIENT)
Dept: OTHER | Age: 63
End: 2023-08-21

## 2023-08-28 ENCOUNTER — TRANSCRIBE ORDERS (OUTPATIENT)
Dept: OTHER | Age: 63
End: 2023-08-28

## 2023-08-28 DIAGNOSIS — I89.0 LYMPHEDEMA: Primary | ICD-10-CM

## 2023-09-06 PROBLEM — L03.90 CELLULITIS: Status: ACTIVE | Noted: 2021-01-22

## 2023-09-06 PROBLEM — I73.9 PERIPHERAL VASCULAR DISEASE (H): Chronic | Status: ACTIVE | Noted: 2020-12-31

## 2023-09-06 PROBLEM — E66.01 MORBID OBESITY WITH BMI OF 40.0-44.9, ADULT (H): Status: ACTIVE | Noted: 2019-06-18

## 2023-09-06 PROBLEM — G93.40 ENCEPHALOPATHY ACUTE: Status: ACTIVE | Noted: 2019-07-02

## 2023-09-06 PROBLEM — J96.02 ACUTE RESPIRATORY FAILURE WITH HYPOXIA AND HYPERCAPNIA (H): Status: ACTIVE | Noted: 2019-06-18

## 2023-09-06 PROBLEM — F17.200 TOBACCO DEPENDENCE: Status: ACTIVE | Noted: 2023-03-03

## 2023-09-06 PROBLEM — I87.2 VENOUS INSUFFICIENCY: Status: ACTIVE | Noted: 2021-07-16

## 2023-09-06 PROBLEM — H40.9 GLAUCOMA SYNDROME: Status: ACTIVE | Noted: 2019-06-18

## 2023-09-06 PROBLEM — I71.019: Status: ACTIVE | Noted: 2019-06-18

## 2023-09-06 PROBLEM — F20.9 SCHIZOPHRENIA (H): Chronic | Status: ACTIVE | Noted: 2019-12-18

## 2023-09-06 PROBLEM — J96.01 ACUTE RESPIRATORY FAILURE WITH HYPOXIA AND HYPERCAPNIA (H): Status: ACTIVE | Noted: 2019-06-18

## 2023-09-06 PROBLEM — G51.4 FACIAL TWITCHING: Status: ACTIVE | Noted: 2019-06-30

## 2023-09-28 ENCOUNTER — TELEPHONE (OUTPATIENT)
Dept: NEUROLOGY | Facility: CLINIC | Age: 63
End: 2023-09-28

## 2023-09-28 NOTE — TELEPHONE ENCOUNTER
Reached out to patient to schedule New Seizure per referral received on 08/18/23. No answer, voicemail box full, unable to leave voicemail, will send patient letter.

## 2023-12-05 ENCOUNTER — TRANSCRIBE ORDERS (OUTPATIENT)
Dept: BEHAVIORAL HEALTH | Facility: CLINIC | Age: 63
End: 2023-12-05
Payer: COMMERCIAL

## 2023-12-05 DIAGNOSIS — I89.0 LYMPHEDEMA: Primary | ICD-10-CM

## 2024-03-18 NOTE — PROGRESS NOTES
[facility]      OUTPATIENT PHYSICAL THERAPY EVALUATION  PLAN OF TREATMENT FOR OUTPATIENT REHABILITATION  (COMPLETE FOR INITIAL CLAIMS ONLY)  Patient's Last Name, First Name, M.I.  YOB: 1960  Antony Aguila                           Provider's Name  [facility] Medical Record No.  [id number]                               Onset Date:  09/25/21   Start of Care Date:  09/26/21      Type:     _X_PT   ___OT   ___SLP Medical Diagnosis:  BLE pain                        PT Diagnosis:  Impaired Functional Mobility   Visits from SOC:  1   _________________________________________________________________________________  Plan of Treatment/Functional Goals    Planned Interventions: gait training,neuromuscular re-education,strengthening,transfer training,home program guidelines,risk factor education,progressive activity/exercise     Goals: See Physical Therapy Goals on Care Plan in [facility] electronic health record.    Therapy Frequency: 3x/week  Predicted Duration of Therapy Intervention: 10/3/21  _________________________________________________________________________________    I CERTIFY THE NEED FOR THESE SERVICES FURNISHED UNDER        THIS PLAN OF TREATMENT AND WHILE UNDER MY CARE     (Physician co-signature of this document indicates review and certification of the therapy plan).                Certification date from: 09/26/21, Certification date to: 10/03/21    Referring Physician: Bhavana Dickerson APRN CNP            Initial Assessment        See Physical Therapy evaluation dated 09/26/21 in Epic electronic health record.   Improving  GI following, planning EGD when cleared by cardiology and pulmonology  Continue antibiotics  Monitor bmp  3/19 - K+ 3.2, replace and monitor.

## 2024-08-29 ENCOUNTER — LAB REQUISITION (OUTPATIENT)
Dept: LAB | Facility: CLINIC | Age: 64
End: 2024-08-29
Payer: COMMERCIAL

## 2024-08-29 DIAGNOSIS — E11.9 TYPE 2 DIABETES MELLITUS WITHOUT COMPLICATIONS (H): ICD-10-CM

## 2024-08-29 LAB
ALBUMIN MFR UR ELPH: <6 MG/DL
CREAT UR-MCNC: 51.7 MG/DL
PROT/CREAT 24H UR: NORMAL MG/G{CREAT}

## 2024-08-29 PROCEDURE — 84156 ASSAY OF PROTEIN URINE: CPT | Mod: ORL | Performed by: INTERNAL MEDICINE

## 2024-10-04 ENCOUNTER — TRANSCRIBE ORDERS (OUTPATIENT)
Dept: BEHAVIORAL HEALTH | Facility: CLINIC | Age: 64
End: 2024-10-04
Payer: COMMERCIAL

## 2024-10-04 DIAGNOSIS — M17.11 ARTHRITIS OF RIGHT KNEE: Primary | ICD-10-CM

## 2024-10-06 PROCEDURE — 82274 ASSAY TEST FOR BLOOD FECAL: CPT | Mod: ORL | Performed by: INTERNAL MEDICINE

## 2024-10-08 ENCOUNTER — LAB REQUISITION (OUTPATIENT)
Dept: LAB | Facility: CLINIC | Age: 64
End: 2024-10-08
Payer: COMMERCIAL

## 2024-10-08 DIAGNOSIS — Z12.11 ENCOUNTER FOR SCREENING FOR MALIGNANT NEOPLASM OF COLON: ICD-10-CM

## 2024-10-09 LAB — HEMOCCULT STL QL IA: POSITIVE

## 2024-10-11 ENCOUNTER — ANCILLARY PROCEDURE (OUTPATIENT)
Dept: GENERAL RADIOLOGY | Facility: CLINIC | Age: 64
End: 2024-10-11
Attending: INTERNAL MEDICINE
Payer: COMMERCIAL

## 2024-10-11 DIAGNOSIS — M17.11 ARTHRITIS OF RIGHT KNEE: ICD-10-CM

## 2024-10-11 PROCEDURE — 73562 X-RAY EXAM OF KNEE 3: CPT | Mod: RT | Performed by: RADIOLOGY

## 2024-12-18 ENCOUNTER — APPOINTMENT (OUTPATIENT)
Dept: GENERAL RADIOLOGY | Facility: CLINIC | Age: 64
End: 2024-12-18
Attending: PHYSICIAN ASSISTANT
Payer: COMMERCIAL

## 2024-12-18 ENCOUNTER — HOSPITAL ENCOUNTER (EMERGENCY)
Facility: CLINIC | Age: 64
Discharge: HOME | End: 2024-12-18
Attending: EMERGENCY MEDICINE | Admitting: EMERGENCY MEDICINE
Payer: COMMERCIAL

## 2024-12-18 VITALS
RESPIRATION RATE: 28 BRPM | SYSTOLIC BLOOD PRESSURE: 128 MMHG | HEART RATE: 72 BPM | DIASTOLIC BLOOD PRESSURE: 66 MMHG | TEMPERATURE: 98.4 F | OXYGEN SATURATION: 100 %

## 2024-12-18 DIAGNOSIS — R05.9 COUGH: ICD-10-CM

## 2024-12-18 LAB
ALBUMIN SERPL BCG-MCNC: 3.9 G/DL (ref 3.5–5.2)
ALP SERPL-CCNC: 78 U/L (ref 40–150)
ALT SERPL W P-5'-P-CCNC: 15 U/L (ref 0–70)
ANION GAP SERPL CALCULATED.3IONS-SCNC: 10 MMOL/L (ref 7–15)
AST SERPL W P-5'-P-CCNC: 17 U/L (ref 0–45)
BASOPHILS # BLD AUTO: 0 10E3/UL (ref 0–0.2)
BASOPHILS NFR BLD AUTO: 1 %
BILIRUB SERPL-MCNC: 0.2 MG/DL
BUN SERPL-MCNC: 12 MG/DL (ref 8–23)
CALCIUM SERPL-MCNC: 9.6 MG/DL (ref 8.8–10.4)
CHLORIDE SERPL-SCNC: 95 MMOL/L (ref 98–107)
CREAT SERPL-MCNC: 1.42 MG/DL (ref 0.67–1.17)
EGFRCR SERPLBLD CKD-EPI 2021: 55 ML/MIN/1.73M2
EOSINOPHIL # BLD AUTO: 0.3 10E3/UL (ref 0–0.7)
EOSINOPHIL NFR BLD AUTO: 4 %
ERYTHROCYTE [DISTWIDTH] IN BLOOD BY AUTOMATED COUNT: 14.2 % (ref 10–15)
FLUAV RNA SPEC QL NAA+PROBE: NEGATIVE
FLUBV RNA RESP QL NAA+PROBE: NEGATIVE
GLUCOSE SERPL-MCNC: 130 MG/DL (ref 70–99)
HCO3 SERPL-SCNC: 33 MMOL/L (ref 22–29)
HCT VFR BLD AUTO: 43.4 % (ref 40–53)
HGB BLD-MCNC: 14.6 G/DL (ref 13.3–17.7)
IMM GRANULOCYTES # BLD: 0 10E3/UL
IMM GRANULOCYTES NFR BLD: 1 %
LYMPHOCYTES # BLD AUTO: 1.7 10E3/UL (ref 0.8–5.3)
LYMPHOCYTES NFR BLD AUTO: 28 %
MCH RBC QN AUTO: 28.6 PG (ref 26.5–33)
MCHC RBC AUTO-ENTMCNC: 33.6 G/DL (ref 31.5–36.5)
MCV RBC AUTO: 85 FL (ref 78–100)
MONOCYTES # BLD AUTO: 0.9 10E3/UL (ref 0–1.3)
MONOCYTES NFR BLD AUTO: 15 %
NEUTROPHILS # BLD AUTO: 3.1 10E3/UL (ref 1.6–8.3)
NEUTROPHILS NFR BLD AUTO: 52 %
NRBC # BLD AUTO: 0 10E3/UL
NRBC BLD AUTO-RTO: 0 /100
NT-PROBNP SERPL-MCNC: 295 PG/ML (ref 0–900)
PLATELET # BLD AUTO: 220 10E3/UL (ref 150–450)
POTASSIUM SERPL-SCNC: 3.5 MMOL/L (ref 3.4–5.3)
PROT SERPL-MCNC: 7.1 G/DL (ref 6.4–8.3)
RBC # BLD AUTO: 5.11 10E6/UL (ref 4.4–5.9)
RSV RNA SPEC NAA+PROBE: NEGATIVE
SARS-COV-2 RNA RESP QL NAA+PROBE: NEGATIVE
SODIUM SERPL-SCNC: 138 MMOL/L (ref 135–145)
TROPONIN T SERPL HS-MCNC: 21 NG/L
WBC # BLD AUTO: 6 10E3/UL (ref 4–11)

## 2024-12-18 PROCEDURE — 71046 X-RAY EXAM CHEST 2 VIEWS: CPT

## 2024-12-18 PROCEDURE — 85004 AUTOMATED DIFF WBC COUNT: CPT | Performed by: PHYSICIAN ASSISTANT

## 2024-12-18 PROCEDURE — 93010 ELECTROCARDIOGRAM REPORT: CPT | Performed by: PHYSICIAN ASSISTANT

## 2024-12-18 PROCEDURE — 36415 COLL VENOUS BLD VENIPUNCTURE: CPT | Performed by: PHYSICIAN ASSISTANT

## 2024-12-18 PROCEDURE — 99284 EMERGENCY DEPT VISIT MOD MDM: CPT | Mod: FS | Performed by: EMERGENCY MEDICINE

## 2024-12-18 PROCEDURE — 94640 AIRWAY INHALATION TREATMENT: CPT | Performed by: EMERGENCY MEDICINE

## 2024-12-18 PROCEDURE — 80053 COMPREHEN METABOLIC PANEL: CPT | Performed by: PHYSICIAN ASSISTANT

## 2024-12-18 PROCEDURE — 250N000009 HC RX 250: Performed by: EMERGENCY MEDICINE

## 2024-12-18 PROCEDURE — 250N000009 HC RX 250: Performed by: PHYSICIAN ASSISTANT

## 2024-12-18 PROCEDURE — 84484 ASSAY OF TROPONIN QUANT: CPT | Performed by: PHYSICIAN ASSISTANT

## 2024-12-18 PROCEDURE — 83880 ASSAY OF NATRIURETIC PEPTIDE: CPT | Performed by: PHYSICIAN ASSISTANT

## 2024-12-18 PROCEDURE — 99285 EMERGENCY DEPT VISIT HI MDM: CPT | Mod: 25 | Performed by: EMERGENCY MEDICINE

## 2024-12-18 PROCEDURE — 87637 SARSCOV2&INF A&B&RSV AMP PRB: CPT | Performed by: PHYSICIAN ASSISTANT

## 2024-12-18 PROCEDURE — 93005 ELECTROCARDIOGRAM TRACING: CPT | Performed by: EMERGENCY MEDICINE

## 2024-12-18 RX ORDER — IPRATROPIUM BROMIDE AND ALBUTEROL SULFATE 2.5; .5 MG/3ML; MG/3ML
3 SOLUTION RESPIRATORY (INHALATION) ONCE
Status: COMPLETED | OUTPATIENT
Start: 2024-12-18 | End: 2024-12-18

## 2024-12-18 RX ORDER — PREDNISONE 20 MG/1
TABLET ORAL
Qty: 10 TABLET | Refills: 0 | Status: SHIPPED | OUTPATIENT
Start: 2024-12-18

## 2024-12-18 RX ORDER — ALBUTEROL SULFATE 90 UG/1
2 INHALANT RESPIRATORY (INHALATION) EVERY 6 HOURS PRN
Qty: 18 G | Refills: 0 | Status: SHIPPED | OUTPATIENT
Start: 2024-12-18

## 2024-12-18 RX ADMIN — IPRATROPIUM BROMIDE AND ALBUTEROL SULFATE 3 ML: .5; 3 SOLUTION RESPIRATORY (INHALATION) at 21:52

## 2024-12-18 RX ADMIN — IPRATROPIUM BROMIDE AND ALBUTEROL SULFATE 3 ML: .5; 3 SOLUTION RESPIRATORY (INHALATION) at 20:30

## 2024-12-18 RX ADMIN — IPRATROPIUM BROMIDE AND ALBUTEROL SULFATE 3 ML: .5; 3 SOLUTION RESPIRATORY (INHALATION) at 19:29

## 2024-12-18 ASSESSMENT — ACTIVITIES OF DAILY LIVING (ADL)
ADLS_ACUITY_SCORE: 51

## 2024-12-18 ASSESSMENT — COLUMBIA-SUICIDE SEVERITY RATING SCALE - C-SSRS
6. HAVE YOU EVER DONE ANYTHING, STARTED TO DO ANYTHING, OR PREPARED TO DO ANYTHING TO END YOUR LIFE?: NO
1. IN THE PAST MONTH, HAVE YOU WISHED YOU WERE DEAD OR WISHED YOU COULD GO TO SLEEP AND NOT WAKE UP?: NO
2. HAVE YOU ACTUALLY HAD ANY THOUGHTS OF KILLING YOURSELF IN THE PAST MONTH?: NO

## 2024-12-18 ASSESSMENT — ENCOUNTER SYMPTOMS: COUGH: 1

## 2024-12-19 LAB
ATRIAL RATE - MUSE: 66 BPM
DIASTOLIC BLOOD PRESSURE - MUSE: NORMAL MMHG
INTERPRETATION ECG - MUSE: NORMAL
P AXIS - MUSE: 34 DEGREES
PR INTERVAL - MUSE: 154 MS
QRS DURATION - MUSE: 84 MS
QT - MUSE: 432 MS
QTC - MUSE: 452 MS
R AXIS - MUSE: 37 DEGREES
SYSTOLIC BLOOD PRESSURE - MUSE: NORMAL MMHG
T AXIS - MUSE: 65 DEGREES
VENTRICULAR RATE- MUSE: 66 BPM

## 2024-12-19 NOTE — ED TRIAGE NOTES
Pt has been having a productive cough with clear mucous for two weeks. He is audibly wheezing and has labored breathing. Pt is constipated and feels dehydrated though he states he may be dehydrated.

## 2024-12-19 NOTE — ED PROVIDER NOTES
--    ED Attending Physician Attestation    I ULI QUIROGA MD, MD, cared for this patient with the Advanced Practice Provider ([facility]NA). I personally provided a substantive portion of the care for this patient, including approving the care plan for the number and complexity of problems addressed and taking responsibility related to the risk of complications and/or morbidity or mortality of patient management. Please see the DENA's documentation for full details.    Summary of HPI, PE, ED Course   Patient is a 64 year old male evaluated in the emergency department for cough and wheezing. Exam and ED course notable for expiratory wheezing.  No hypoxia.  Heart regular rate and rhythm without murmur.  Troponin and BNP normal.  Creatinine mildly elevated.  Better after DuoNebs x 3.  Has history of reactive airway disease and previous treatment with prednisone after the completion of care in the emergency department, the patient was discharged.      ULI QUIROGA MD, MD  Emergency Medicine       Uli Quiroga MD  12/18/24 1317

## 2024-12-19 NOTE — ED PROVIDER NOTES
ED Provider Note  [facility]      History     Chief Complaint   Patient presents with    Cough    Nasal Congestion       Cough    63yo M pmhx DM, PVD, thoracic aortic aneurysm, obesity, schizophrenia, HTN p/w nonproductive cough and SOB x ~1 week.  No orthopnea or CRESPO.  Endorses mild CP since last week.  Endorses baseline lower extremity edema.  Denies fever, chills, congestion, rhinorrhea, sore throat, HA, abdominal pain nausea, vomiting, diarrhea myalgias, HA.  Reports compliance with his medications.    Past Medical History  Past Medical History:   Diagnosis Date    Chronic dissection of thoracic aorta (H)     S/p exploration and median sternotomy by Dr. Marcelo on 6/18/19    Diabetes (H)     Hypertension      Past Surgical History:   Procedure Laterality Date    ESOPHAGOGASTRODUODENOSCOPY, WITH NASOGASTRIC TUBE INSERTION N/A 12/27/2022    Procedure: ESOPHAGOGASTRODUODENOSCOPY, WITH NASOJEJUNAL TUBE INSERTION;  Surgeon: Olu Jeffries MD;  Location: [facility]    ESOPHAGOSCOPY, GASTROSCOPY, DUODENOSCOPY (EGD), COMBINED N/A 1/6/2023    Procedure: ESOPHAGOGASTRODUODENOSCOPY (EGD) with NG tube placement;  Surgeon: Olu Jeffries MD;  Location: [facility]    MEDIAN STERNOTOMY  06/18/2019    Chronic dissection of thoracic aorta s/p exploration and median sternotomy by Dr. Marcelo    PICC TRIPLE LUMEN PLACEMENT Right 12/26/2022    Cephalic Vein 51 cm, 2 cm out    TRACHEOSTOMY  07/07/2019    decannulated 8/8/19    TRANSESOPHAGEAL ECHOCARDIOGRAM INTRAOPERATIVE N/A 12/23/2022    Procedure: Echocardiogram, Transesophageal, with anesthesia;  Surgeon: GENERIC ANESTHESIA PROVIDER;  Location: [facility] OR     albuterol (PROAIR HFA/PROVENTIL HFA/VENTOLIN HFA) 108 (90 Base) MCG/ACT inhaler  predniSONE (DELTASONE) 20 MG tablet  acetaminophen (TYLENOL) 500 MG tablet  ARIPiprazole (ABILIFY) 10 MG tablet  bumetanide (BUMEX) 1 MG tablet  Continuous Blood Gluc  (FREESTYLE LIA 2 READER) NATALIA  Continuous Blood Gluc Sensor  (FREESTYLE LIA 2 SENSOR) MISC  famotidine (PEPCID) 20 MG tablet  folic acid (FOLVITE) 1 MG tablet  hydrochlorothiazide (HYDRODIURIL) 50 MG tablet  insulin aspart (NOVOLOG PEN) 100 UNIT/ML pen  insulin glargine (LANTUS PEN) 100 UNIT/ML pen  labetalol (NORMODYNE) 300 MG tablet  metFORMIN (GLUCOPHAGE XR) 500 MG 24 hr tablet  OLANZapine (ZYPREXA) 5 MG tablet  OLANZapine zydis (ZYPREXA) 5 MG ODT  QUEtiapine (SEROQUEL) 200 MG tablet  simethicone (MYLICON) 80 MG chewable tablet      Allergies   Allergen Reactions    Lisinopril Angioedema     Hospitalization in 7/2019 notes possible ACE-inhibitor related angioedema.     Losartan Angioedema     Angioedema on 12/26/22, received losartan 12/25/22.    Lidocaine      Pt states this is not an allergy and doesn't recall this to be an allergy    Pollen Extract      Family History  No family history on file.  Social History   Social History     Tobacco Use    Smoking status: Some Days     Types: Cigarettes    Smokeless tobacco: Never    Tobacco comments:     Smokes 1 pack per week currently (12/2022).   Vaping Use    Vaping status: Never Used   Substance Use Topics    Alcohol use: Not Currently    Drug use: Yes     Types: Marijuana      A medically appropriate review of systems was performed with pertinent positives and negatives noted in the HPI, and all other systems negative.    Physical Exam   BP: (!) 170/96  Pulse: 72  Temp: 98.6  F (37  C)  Resp: 28  SpO2: 96 %  Physical Exam  Constitutional:       General: He is not in acute distress.     Appearance: Normal appearance. He is not toxic-appearing.   HENT:      Head: Atraumatic.   Eyes:      Conjunctiva/sclera: Conjunctivae normal.   Cardiovascular:      Rate and Rhythm: Normal rate and regular rhythm.      Heart sounds: Normal heart sounds.   Pulmonary:      Effort: Pulmonary effort is normal. Tachypnea present. No respiratory distress.      Breath sounds: Wheezing (diffuse, expiratory) present.   Abdominal:      Palpations:  Abdomen is soft.      Tenderness: There is no abdominal tenderness.   Musculoskeletal:         General: No deformity.      Cervical back: Neck supple.      Comments: Bilateral lower extremity swelling without pitting edema   Skin:     General: Skin is warm.   Neurological:      Mental Status: He is alert.           ED Course, Procedures, & Data      Procedures            EKG Interpretation:      Interpreted by Cristian Conroy PA-C  Time reviewed: 2000  Symptoms at time of EKG: SOB   Rhythm: normal sinus   Rate: Normal  Axis: Normal  Ectopy: none  Conduction: normal  ST Segments/ T Waves: T wave flattening aVL and T wave inversion V1  Q Waves: none  Comparison to prior: Unchanged    Clinical Impression: normal EKG                 Results for orders placed or performed during the hospital encounter of 12/18/24   XR Chest 2 Views     Status: None    Narrative    EXAM: XR CHEST 2 VIEWS  LOCATION: [facility]  DATE: 12/18/2024    INDICATION: SOB, cough. History of aortic dissection and aneurysms ascending and proximal descending thoracic aorta.  COMPARISON: 01/05/2023      Impression    IMPRESSION: Heart size within normal limits. Pulmonary vascularity normal. Subsegmental atelectasis left base. Large esophageal hiatal hernia. No acute infiltrates or effusions. Prior median sternotomy. Marked dilatation of the aortic arch and descending   thoracic aorta representing known aneurysms.   Comprehensive metabolic panel     Status: Abnormal   Result Value Ref Range    Sodium 138 135 - 145 mmol/L    Potassium 3.5 3.4 - 5.3 mmol/L    Carbon Dioxide (CO2) 33 (H) 22 - 29 mmol/L    Anion Gap 10 7 - 15 mmol/L    Urea Nitrogen 12.0 8.0 - 23.0 mg/dL    Creatinine 1.42 (H) 0.67 - 1.17 mg/dL    GFR Estimate 55 (L) >60 mL/min/1.73m2    Calcium 9.6 8.8 - 10.4 mg/dL    Chloride 95 (L) 98 - 107 mmol/L    Glucose 130 (H) 70 - 99 mg/dL    Alkaline Phosphatase 78 40 - 150 U/L    AST 17 0 - 45 U/L     ALT 15 0 - 70 U/L    Protein Total 7.1 6.4 - 8.3 g/dL    Albumin 3.9 3.5 - 5.2 g/dL    Bilirubin Total 0.2 <=1.2 mg/dL   Troponin T, High Sensitivity     Status: Normal   Result Value Ref Range    Troponin T, High Sensitivity 21 <=22 ng/L   Nt probnp inpatient (BNP)     Status: Normal   Result Value Ref Range    N terminal Pro BNP Inpatient 295 0 - 900 pg/mL   Influenza A/B, RSV and SARS-CoV2 PCR (COVID-19) Nasopharyngeal     Status: Normal    Specimen: Nasopharyngeal; Swab   Result Value Ref Range    Influenza A PCR Negative Negative    Influenza B PCR Negative Negative    RSV PCR Negative Negative    SARS CoV2 PCR Negative Negative    Narrative    Testing was performed using the Xpert Xpress CoV2/Flu/RSV Assay on the [employer] Instrument. This test should be ordered for the detection of SARS-CoV2, influenza, and RSV viruses in individuals with signs and symptoms of respiratory tract infection. This test is for in vitro diagnostic use under the [place] FDA for laboratories certified under CLIA to perform high or moderate complexity testing. This test has been [place] FDA cleared. A negative result does not rule out the presence of PCR inhibitors in the specimen or target RNA in concentration below the limit of detection for the assay. If only one viral target is positive but coinfection with multiple targets is suspected, the sample should be re-tested with another FDA cleared, approved, or authorized test, if coninfection would change clinical management. This test was validated by the [facility] "[employer]. These laboratories are certified under the Clinical Laboratory Improvement Amendments of 1988 (CLIA-88) as qualified to perfom high complexity laboratory testing.   CBC with platelets and differential     Status: None   Result Value Ref Range    WBC Count 6.0 4.0 - 11.0 10e3/uL    RBC Count 5.11 4.40 - 5.90 10e6/uL    Hemoglobin 14.6 13.3 - 17.7 g/dL    Hematocrit 43.4 40.0 - 53.0 %    MCV 85 78 -  100 fL    MCH 28.6 26.5 - 33.0 pg    MCHC 33.6 31.5 - 36.5 g/dL    RDW 14.2 10.0 - 15.0 %    Platelet Count 220 150 - 450 10e3/uL    % Neutrophils 52 %    % Lymphocytes 28 %    % Monocytes 15 %    % Eosinophils 4 %    % Basophils 1 %    % Immature Granulocytes 1 %    NRBCs per 100 WBC 0 <1 /100    Absolute Neutrophils 3.1 1.6 - 8.3 10e3/uL    Absolute Lymphocytes 1.7 0.8 - 5.3 10e3/uL    Absolute Monocytes 0.9 0.0 - 1.3 10e3/uL    Absolute Eosinophils 0.3 0.0 - 0.7 10e3/uL    Absolute Basophils 0.0 0.0 - 0.2 10e3/uL    Absolute Immature Granulocytes 0.0 <=0.4 10e3/uL    Absolute NRBCs 0.0 10e3/uL   EKG 12 lead     Status: None (Preliminary result)   Result Value Ref Range    Systolic Blood Pressure  mmHg    Diastolic Blood Pressure  mmHg    Ventricular Rate 66 BPM    Atrial Rate 66 BPM    IL Interval 154 ms    QRS Duration 84 ms     ms    QTc 452 ms    P Axis 34 degrees    R AXIS 37 degrees    T Axis 65 degrees    Interpretation ECG Sinus rhythm  Normal ECG      CBC with platelets differential     Status: None    Narrative    The following orders were created for panel order CBC with platelets differential.  Procedure                               Abnormality         Status                     ---------                               -----------         ------                     CBC with platelets and d...[[id number]]                      Final result                 Please view results for these tests on the individual orders.     Medications   ipratropium - albuterol 0.5 mg/2.5 mg/3 mL (DUONEB) neb solution 3 mL (3 mLs Nebulization $Given 12/18/24 1929)   ipratropium - albuterol 0.5 mg/2.5 mg/3 mL (DUONEB) neb solution 3 mL (3 mLs Nebulization $Given 12/18/24 2030)     Labs Ordered and Resulted from Time of ED Arrival to Time of ED Departure   COMPREHENSIVE METABOLIC PANEL - Abnormal       Result Value    Sodium 138      Potassium 3.5      Carbon Dioxide (CO2) 33 (*)     Anion Gap 10      Urea Nitrogen 12.0       Creatinine 1.42 (*)     GFR Estimate 55 (*)     Calcium 9.6      Chloride 95 (*)     Glucose 130 (*)     Alkaline Phosphatase 78      AST 17      ALT 15      Protein Total 7.1      Albumin 3.9      Bilirubin Total 0.2     TROPONIN T, HIGH SENSITIVITY - Normal    Troponin T, High Sensitivity 21     NT PROBNP INPATIENT - Normal    N terminal Pro BNP Inpatient 295     INFLUENZA A/B, RSV AND SARS-COV2 PCR - Normal    Influenza A PCR Negative      Influenza B PCR Negative      RSV PCR Negative      SARS CoV2 PCR Negative     CBC WITH PLATELETS AND DIFFERENTIAL    WBC Count 6.0      RBC Count 5.11      Hemoglobin 14.6      Hematocrit 43.4      MCV 85      MCH 28.6      MCHC 33.6      RDW 14.2      Platelet Count 220      % Neutrophils 52      % Lymphocytes 28      % Monocytes 15      % Eosinophils 4      % Basophils 1      % Immature Granulocytes 1      NRBCs per 100 WBC 0      Absolute Neutrophils 3.1      Absolute Lymphocytes 1.7      Absolute Monocytes 0.9      Absolute Eosinophils 0.3      Absolute Basophils 0.0      Absolute Immature Granulocytes 0.0      Absolute NRBCs 0.0       XR Chest 2 Views   Final Result   IMPRESSION: Heart size within normal limits. Pulmonary vascularity normal. Subsegmental atelectasis left base. Large esophageal hiatal hernia. No acute infiltrates or effusions. Prior median sternotomy. Marked dilatation of the aortic arch and descending    thoracic aorta representing known aneurysms.             Critical care was not performed.     Medical Decision Making  The patient's presentation was of high complexity (an acute health issue posing potential threat to life or bodily function).    The patient's evaluation involved:  review of external note(s) from 3+ sources (see separate area of note for details)  review of 3+ test result(s) ordered prior to this encounter (see separate area of note for details)  ordering and/or review of 3+ test(s) in this encounter (see separate area of note for  details)    The patient's management necessitated high risk (a decision regarding hospitalization).    Assessment & Plan    65yo M pmhx DM, PVD, thoracic aortic aneurysm, obesity, schizophrenia, HTN p/w nonproductive cough and SOB x ~1 week, with associated mild CP and baseline lower extremity edema.  No orthopnea or CRESPO, fever, chills.  ROS otherwise negative.  In ED patient is hypertensive (170/96), but afebrile not tachycardic.  SpO2 normal.  He is, however tachypneic, with audible expiratory wheezing but does not appear in respiratory distress.  Bilateral lower extremities are warm, but without pitting edema.  DDx viral URI versus COVID versus flu versus influenza versus bronchitis versus PNA versus fluid overload/CHF versus COPD versus asthma versus less likely ACS or PE versus other    Patient received DuoNeb with subjective and objective improvement in his wheezing, but not complete resolution.  EKG shows a sinus rhythm without ischemic changes.  His workup tonight was unrevealing with a CBC that was unremarkable without leukocytosis.  Negative troponin and BNP.  CMP with minimal elevation creatinine (1.42, up from 1.20s), otherwise unrevealing.  COVID, flu, RSV negative.  CXR unchanged from previous.  Patient has no oxygen requirement tonight and his tachypnea has resolved, and he maintains normal oxygen saturation.  Chart review shows previous documentation of asthma, but no clear PFTs raising suspicion the patient may have some degree of asthma exacerbation/bronchitis tonight.  With his improvement s/p DuoNeb, will discharge with prescription for course of steroids and albuterol inhaler.  He was given strict ER return precautions and advised PCP follow-up.    I have reviewed the nursing notes. I have reviewed the findings, diagnosis, plan and need for follow up with the patient.    New Prescriptions    ALBUTEROL (PROAIR HFA/PROVENTIL HFA/VENTOLIN HFA) 108 (90 BASE) MCG/ACT INHALER    Inhale 2 puffs into  the lungs every 6 hours as needed for shortness of breath, wheezing or cough.    PREDNISONE (DELTASONE) 20 MG TABLET    Take two tablets (= 40mg) each day for 5 (five) days       Final diagnoses:   Cough         Cristian Conroy PA-C  [facility] EMERGENCY DEPARTMENT  12/18/2024     Cristian Conroy PA-C  12/18/24 1052

## 2024-12-19 NOTE — DISCHARGE INSTRUCTIONS
Use albuterol inhaler as directed.  Take complete course of prednisone.    Follow-up with your primary care clinic within the next week.    Return if fever, worse, or other concerns.

## 2025-02-28 NOTE — PLAN OF CARE
A VV is OK with me if that works for her.  Then the next visit will have to be in person   Neuro: A&Ox4. Patient noncompliant and refusing cares - team aware. Patient woke up confused at 0030, stating that he was in his mothers garage - provider notified and came up to bedside. Code 21 called due to agitation and violence towards staff at 0030.   Cardiac: SR, HR 60-70's. On nicardipine drip. Goal -120, goal HR 55-60. Nicardipine drip stopped at 0140 by patient disconnecting IV himself during a code 21 called for agitation and violence towards staff.   Respiratory: Sating > 90% on 1L NC for beginning of shift. Increased to 4L NC. Continued increased oxyen needs - team aware and at bedside at 0345. Code blue called with providers at bedside due to patients inability to protect his airway - see flowsheets for details.   GI/: Adequate urine output, pt uses urinal appropriately. No BM this shift - miralax given beginning of shift per patient request.   Diet/appetite: Tolerating moderate consistent carb diet. ACHS blood sugars.   Activity:  Assist of standby, up to chair and in halls.  Pain: At acceptable level on current regimen. Complains of pain in R arm - ice pack given to patient.  Skin: No new deficits noted.  LDA's: 1 L PIV. Patient refusing insertion of second peripheral IV for nicardipine drip - team notified.     Patient intubated and transferred to  @ 0455. See anesthesiology note and flowsheets for further details.

## 2025-03-20 NOTE — PLAN OF CARE
ICU End of Shift Summary. See flowsheets for vital signs and detailed assessment.    Changes this shift: Pt alert to drowsy, following commands. On Fent @ 200 and Prop @20. Bilat wrist restraints continued for pulling at lines. Tmax 99.7F, PRN Tylenol given once. HR NSR/harsh 50's-70's. Normotensive. CMV settings remain 35%, RR 18, , and PEEP 8. Continues to have PIP's in the 30's-40's. TF running at goal, pt tolerating well. Noble in place with good output, Bumex 1mg TID.     Plan: Continue PST and vent weaning as able. Continue plan of care.       Problem: Plan of Care - These are the overarching goals to be used throughout the patient stay.    Goal: Absence of Hospital-Acquired Illness or Injury  Outcome: Progressing  Intervention: Identify and Manage Fall Risk  Recent Flowsheet Documentation  Taken 1/8/2023 0400 by Lyndsay Delacruz, RN  Safety Promotion/Fall Prevention:   activity supervised   clutter free environment maintained   fall prevention program maintained   increased rounding and observation   increase visualization of patient   lighting adjusted   patient and family education   room door open   room near nurse's station   room organization consistent   safety round/check completed  Taken 1/8/2023 0000 by Lyndsay Delacruz, RN  Safety Promotion/Fall Prevention:   activity supervised   clutter free environment maintained   fall prevention program maintained   increased rounding and observation   increase visualization of patient   lighting adjusted   patient and family education   room door open   room near nurse's station   room organization consistent   safety round/check completed  Taken 1/7/2023 2000 by Lyndsay Delacruz, RN  Safety Promotion/Fall Prevention:   activity supervised   clutter free environment maintained   fall prevention program maintained   increased rounding and observation   increase visualization of patient   lighting adjusted   patient and family education   room  door open   room near nurse's station   room organization consistent   safety round/check completed  Intervention: Prevent Skin Injury  Recent Flowsheet Documentation  Taken 1/8/2023 0400 by Lyndsay Delacruz RN  Body Position:   turned   right   heels elevated   upper extremity elevated  Taken 1/8/2023 0200 by Lyndsay Delacruz RN  Body Position:   turned   left   heels elevated   upper extremity elevated  Taken 1/8/2023 0000 by Lyndsay Delacruz RN  Body Position:   turned   right   heels elevated   upper extremity elevated  Taken 1/7/2023 2200 by Lyndsay Delacruz RN  Body Position:   turned   left   heels elevated   upper extremity elevated  Taken 1/7/2023 2000 by Lyndsay Delacruz RN  Body Position:   turned   right   heels elevated   upper extremity elevated  Intervention: Prevent and Manage VTE (Venous Thromboembolism) Risk  Recent Flowsheet Documentation  Taken 1/8/2023 0400 by Lyndsay Delacruz RN  VTE Prevention/Management: foot pump device on  Taken 1/8/2023 0000 by Lyndsay Delacruz RN  VTE Prevention/Management: foot pump device on  Taken 1/7/2023 2000 by Lyndsay Delacruz RN  VTE Prevention/Management: foot pump device on     Problem: Restraint, Nonviolent  Goal: Absence of Harm or Injury  Outcome: Progressing  Intervention: Protect Dignity, Rights and Personal Wellbeing  Recent Flowsheet Documentation  Taken 1/8/2023 0400 by Lyndsay Delacruz RN  Trust Relationship/Rapport:   care explained   emotional support provided   reassurance provided   thoughts/feelings acknowledged  Taken 1/8/2023 0000 by Lyndsay Delacruz RN  Trust Relationship/Rapport:   care explained   emotional support provided   reassurance provided   thoughts/feelings acknowledged  Taken 1/7/2023 2000 by Lyndsay Delacruz RN  Trust Relationship/Rapport:   care explained   emotional support provided   reassurance provided   thoughts/feelings acknowledged  Intervention: Protect Skin and Joint  Integrity  Recent Flowsheet Documentation  Taken 1/8/2023 0400 by Lyndsay Delacruz RN  Body Position:   turned   right   heels elevated   upper extremity elevated  Taken 1/8/2023 0200 by Lyndsay Delacruz RN  Body Position:   turned   left   heels elevated   upper extremity elevated  Taken 1/8/2023 0000 by Lyndsay Delacruz RN  Body Position:   turned   right   heels elevated   upper extremity elevated  Taken 1/7/2023 2200 by Lyndsay Delacruz RN  Body Position:   turned   left   heels elevated   upper extremity elevated  Taken 1/7/2023 2000 by Lyndsay Delacruz RN  Body Position:   turned   right   heels elevated   upper extremity elevated     Problem: Gas Exchange Impaired  Goal: Optimal Gas Exchange  Outcome: Progressing  Intervention: Optimize Oxygenation and Ventilation  Recent Flowsheet Documentation  Taken 1/8/2023 0400 by Lyndsay Delacruz RN  Head of Bed (HOB) Positioning: HOB at 30 degrees  Taken 1/8/2023 0200 by Lyndsay Delacruz RN  Head of Bed (HOB) Positioning: HOB at 30 degrees  Taken 1/8/2023 0000 by Lyndsay Delacruz RN  Head of Bed (HOB) Positioning: HOB at 30 degrees  Taken 1/7/2023 2200 by Lyndsay Delacruz RN  Head of Bed (HOB) Positioning: HOB at 30 degrees  Taken 1/7/2023 2000 by Lyndsay Delacruz RN  Head of Bed (HOB) Positioning: HOB at 30 degrees      Detail Level: Simple Comment: Bx proven SCCIS accession#: [id number] 2/27/25 Render Risk Assessment In Note?: no

## (undated) RX ORDER — LIDOCAINE HYDROCHLORIDE 20 MG/ML
SOLUTION OROPHARYNGEAL
Status: DISPENSED
Start: 2022-12-23

## (undated) RX ORDER — FENTANYL CITRATE 50 UG/ML
INJECTION, SOLUTION INTRAMUSCULAR; INTRAVENOUS
Status: DISPENSED
Start: 2022-12-23

## (undated) RX ORDER — IOPAMIDOL 408 MG/ML
INJECTION, SOLUTION INTRATHECAL
Status: DISPENSED
Start: 2023-01-06

## (undated) RX ORDER — IOPAMIDOL 408 MG/ML
INJECTION, SOLUTION INTRATHECAL
Status: DISPENSED
Start: 2022-12-27

## (undated) RX ORDER — ONDANSETRON 2 MG/ML
INJECTION INTRAMUSCULAR; INTRAVENOUS
Status: DISPENSED
Start: 2022-12-23

## (undated) RX ORDER — PROPOFOL 10 MG/ML
INJECTION, EMULSION INTRAVENOUS
Status: DISPENSED
Start: 2022-12-23

## (undated) RX ORDER — MAGNESIUM OXIDE 400 MG/1
TABLET ORAL
Status: DISPENSED
Start: 2022-12-23

## (undated) RX ORDER — DEXAMETHASONE SODIUM PHOSPHATE 4 MG/ML
INJECTION, SOLUTION INTRA-ARTICULAR; INTRALESIONAL; INTRAMUSCULAR; INTRAVENOUS; SOFT TISSUE
Status: DISPENSED
Start: 2022-12-23

## (undated) RX ORDER — GLYCOPYRROLATE 0.2 MG/ML
INJECTION, SOLUTION INTRAMUSCULAR; INTRAVENOUS
Status: DISPENSED
Start: 2022-12-23